# Patient Record
Sex: FEMALE | Race: WHITE | NOT HISPANIC OR LATINO | Employment: OTHER | URBAN - METROPOLITAN AREA
[De-identification: names, ages, dates, MRNs, and addresses within clinical notes are randomized per-mention and may not be internally consistent; named-entity substitution may affect disease eponyms.]

---

## 2017-11-15 ENCOUNTER — OFFICE VISIT (OUTPATIENT)
Dept: MEDICAL GROUP | Facility: MEDICAL CENTER | Age: 57
End: 2017-11-15
Payer: MEDICARE

## 2017-11-15 VITALS
SYSTOLIC BLOOD PRESSURE: 104 MMHG | TEMPERATURE: 98 F | WEIGHT: 141.09 LBS | BODY MASS INDEX: 25.96 KG/M2 | DIASTOLIC BLOOD PRESSURE: 48 MMHG | HEIGHT: 62 IN | HEART RATE: 100 BPM | OXYGEN SATURATION: 92 % | RESPIRATION RATE: 16 BRPM

## 2017-11-15 DIAGNOSIS — E78.5 DYSLIPIDEMIA: ICD-10-CM

## 2017-11-15 DIAGNOSIS — I10 ESSENTIAL HYPERTENSION: ICD-10-CM

## 2017-11-15 DIAGNOSIS — Z23 NEED FOR VACCINATION: ICD-10-CM

## 2017-11-15 DIAGNOSIS — E03.9 HYPOTHYROIDISM, UNSPECIFIED TYPE: ICD-10-CM

## 2017-11-15 DIAGNOSIS — Z12.11 SCREEN FOR COLON CANCER: ICD-10-CM

## 2017-11-15 DIAGNOSIS — Z12.39 SCREENING FOR MALIGNANT NEOPLASM OF BREAST: ICD-10-CM

## 2017-11-15 DIAGNOSIS — E10.3599 TYPE 1 DIABETES MELLITUS WITH PROLIFERATIVE RETINOPATHY, UNSPECIFIED LATERALITY, UNSPECIFIED PROLIFERATIVE RETINOPATHY TYPE: Primary | ICD-10-CM

## 2017-11-15 LAB
HBA1C MFR BLD: 9.4 % (ref ?–5.8)
INT CON NEG: NEGATIVE
INT CON POS: POSITIVE

## 2017-11-15 PROCEDURE — 92250 FUNDUS PHOTOGRAPHY W/I&R: CPT | Mod: 26 | Performed by: NURSE PRACTITIONER

## 2017-11-15 PROCEDURE — 83036 HEMOGLOBIN GLYCOSYLATED A1C: CPT | Mod: GZ | Performed by: NURSE PRACTITIONER

## 2017-11-15 PROCEDURE — 99204 OFFICE O/P NEW MOD 45 MIN: CPT | Performed by: NURSE PRACTITIONER

## 2017-11-15 ASSESSMENT — PATIENT HEALTH QUESTIONNAIRE - PHQ9
5. POOR APPETITE OR OVEREATING: 1 - SEVERAL DAYS
SUM OF ALL RESPONSES TO PHQ QUESTIONS 1-9: 4
CLINICAL INTERPRETATION OF PHQ2 SCORE: 1

## 2017-11-15 ASSESSMENT — PAIN SCALES - GENERAL: PAINLEVEL: NO PAIN

## 2017-11-15 NOTE — LETTER
[employer] [facility]  MANDEEP MoPDuaneRDuaneN.  [address] [facility] [address]  [place] [address]  Fax: [phone]   Authorization for Release/Disclosure of   Protected Health Information   Name: PAMELA CENTENO : 1960 SSN: [id number]   Address: [address] [place] [id number] Phone:    [phone] (home)    I authorize the entity listed below to release/disclose the PHI below to:   [facility]/RAVINDER Mo.P.R.MALORIE and MANDEEP MoPDuaneRMARICRUZ   Provider or Entity Name:  Dr. Mcfadden   Address   [facility], [facility]   Phone:      Fax:     Reason for request: continuity of care   Information to be released:    [  ] LAST COLONOSCOPY,  including any PATH REPORT and follow-up  [  ] LAST FIT/COLOGUARD RESULT [  ] LAST DEXA  [  ] LAST MAMMOGRAM  [  ] LAST PAP  [  ] LAST LABS [  ] RETINA EXAM REPORT  [  ] IMMUNIZATION RECORDS  [x] Release all info      [  ] Check here and initial the line next to each item to release ALL health information INCLUDING  _____ Care and treatment for drug and / or alcohol abuse  _____ HIV testing, infection status, or AIDS  _____ Genetic Testing    DATES OF SERVICE OR TIME PERIOD TO BE DISCLOSED: _____________  I understand and acknowledge that:  * This Authorization may be revoked at any time by you in writing, except if your health information has already been used or disclosed.  * Your health information that will be used or disclosed as a result of you signing this authorization could be re-disclosed by the recipient. If this occurs, your re-disclosed health information may no longer be protected by State or Federal laws.  * You may refuse to sign this Authorization. Your refusal will not affect your ability to obtain treatment.  * This Authorization becomes effective upon signing and will  on (date) __________.      If no date is indicated, this Authorization will  one (1) year from the signature date.    Name: Pamela Centeno    Signature:   Date:     11/15/2017       PLEASE FAX REQUESTED RECORDS BACK TO: [phone]

## 2017-11-15 NOTE — PROGRESS NOTES
CC: Diabetes      Poonam Torrie Mayo is a 57 y.o. female here to establish care and to discuss the evaluation and management of:    1. Type 1 diabetes mellitus with proliferative retinopathy, unspecified laterality, unspecified proliferative retinopathy type (CMS-[facility])   Patient states that she was diagnosed and she was 35 years old. Has had trouble in the past obtaining medications, and medical attention due to financial constraints. Patient states that she checks her blood sugar once per day, prefers to run high versus low does not eat regularly.  States that she takes her NovoLog 5-10 units when she eats however states only has one meal per day.Patient states that she been told that she had some diabetic retinopathy last time she had her eyes checked. Denies any visual changes at this time such as floaters, blurry vision, obstructed vision. Most recent blood sugar was 419.  Denies any real symptoms of hypoglycemia she states that it happens when she sleeps and then wakes up and is moaning and begins to walk around and then falls to the ground. States her son had watched this happen but did not check a blood sugar at that time. Has had emergency medical services called to help her with low blood sugars. States she takes Lantus 40 units daily. Denies any neuropathy denies any changes in her vision, denies any chest pain, shortness of breath.    2. Essential hypertension  She states she takes Diovan for this. She states that she thinks her blood pressure medicine is causing her to sleep more. Denies any increased alcohol, and/or drug use. Denies any chest pain, shortness of breath, leg swelling, heart palpitations.    3. Hypothyroidism, unspecified type  She says she takes her levothyroxine 100 µg for this. Denies any heat or cold intolerance, palpitations, states she does suffer from constipation which she takes FiberCon 4.    4. Dyslipidemia  States she takes her atorvastatin daily, has been on this for one year.  "    5. Need for vaccination  Declines at this time flu and pneumococcal vaccine.    6. Screen for colon cancer  States just had colonoscopy in July of this year. States no report of polyps or cancer    7. Screening for malignant neoplasm of breast  Declines mammogram      ROS:  Denies any Headache, Blurred Vision, Confusion Chest pain,  Shortness of breath,  Abdominal pain, Changes of bowel or bladder, states sometimes that she has some constipation which she takes 2 over-the-counter FiberCon tablets. Lower ext edema, Fevers, Nights sweats, Weight Changes, Focal weakness or numbness.  All other systems are negative.      Current Outpatient Prescriptions:   •  insulin glargine (LANTUS) 100 UNIT/ML Solution, Inject 40 Units as instructed every day., Disp: 10 mL, Rfl: 3  •  valsartan (DIOVAN) 80 MG TABS, Take 80 mg by mouth every day., Disp: , Rfl:   •  levothyroxine (SYNTHROID) 112 MCG TABS, Take 1 Tab by mouth every day. (Patient taking differently: Take 100 mcg by mouth every day.), Disp: 30 Tab, Rfl: 0  •  atorvastatin (LIPITOR) 10 MG TABS, Take 1 Tab by mouth every day., Disp: 30 Tab, Rfl: 0  •  INSULIN SYRINGE .5CC/29G 29G X 1/2\" 0.5 ML MISC, For use with lantus and novolog as instructed, Disp: 150 Each, Rfl: 3  •  Lancets MISC, 1 Each by Does not apply route every day., Disp: 30 Each, Rfl: 3  •  insulin aspart (NOVOLOG) 100 UNIT/ML SOLN, Inject 10 Units as instructed 3 times a day before meals., Disp: 1 Vial, Rfl: 3  •  insulin aspart (NOVOLOG) 100 UNIT/ML Solution, Inject 10 Units as instructed 3 times a day before meals., Disp: 10 mL, Rfl: 3  •  glucose blood (PRECISION XTRA TEST STRIPS) strip, 1 Strip by Other route 3 times a day before meals. (Patient not taking: Reported on 11/15/2017), Disp: 100 Strip, Rfl: 6  •  Blood Glucose Monitoring Suppl (PRECISION XTRA MONITOR) KARLA, ... (Patient not taking: Reported on 11/15/2017), Disp: 1 Each, Rfl: 0    No Known Allergies    Past Medical History:   Diagnosis Date " "  • 250.01 1996   • Dupuytren contracture     bilateral hands   • Hyperlipidemia    • Hypertension    • Hypothyroid      Past Surgical History:   Procedure Laterality Date   • TUBAL COAGULATION LAPAROSCOPIC BILATERAL  1984   • APPENDECTOMY     • TONSILLECTOMY       Family History   Problem Relation Age of Onset   • Lung Disease Father    • Diabetes Son      type 1     Social History     Social History   • Marital status: Single     Spouse name: N/A   • Number of children: N/A   • Years of education: N/A     Occupational History   • Not on file.     Social History Main Topics   • Smoking status: Former Smoker     Packs/day: 0.25     Years: 40.00     Types: Cigarettes     Quit date: 6/1/2016   • Smokeless tobacco: Never Used      Comment: occ   • Alcohol use 1.5 oz/week     3 Cans of beer per week      Comment: occasional   • Drug use: No   • Sexual activity: No     Other Topics Concern   • Not on file     Social History Narrative   • No narrative on file       Objective:     Vitals: /48   Pulse 100   Temp 36.7 °C (98 °F)   Resp 16   Ht 1.575 m (5' 2\")   Wt 64 kg (141 lb 1.5 oz)   SpO2 92%   Breastfeeding? No   BMI 25.81 kg/m²      General: Alert, pleasant, NAD  HEENT:  Normocephalic.  PERRL, EOMI, no icterus or pallor.  Conjunctivae and lids normal.  External ears normal. Tympanic membranes pearly, opaque.  Nares patent, mucosa pink.  Oropharynx non-erythematous, mucous membranes moist.  Absence of teeth. Neck supple.  No thyromegaly or masses palpated. No cervical or supraclavicular lymphadenopathy.  Heart:  Regular rate and rhythm.  S1 and S2 normal.  No murmurs appreciated.  Respiratory:  Normal respiratory effort.  Clear to auscultation bilaterally.    Abdomen:  Bowel sounds present.  Non-distended, soft, non-tender, no guarding/rebound. No hepatosplenomegaly.  No hernias.  Skin:  Warm, dry, no rashes  Musculoskeletal:  Gait is normal.  Moves all extremities well.  Extremities:  Pedal pulses 2+ " symmetric. No leg edema.  Neurological: No tremors, sensation grossly intact, patellar reflexes absent, tone/strength normal, gait is normal, no clonus, rapid movements normal, finger-to-nose intact, CN2-12 intact  Psych:  Affect/mood is normal, judgement is good, memory is intact, grooming is appropriate.      Assessment and Plan.   57 y.o. female to establish and discuss the following    1. Type 1 diabetes mellitus with proliferative retinopathy, unspecified laterality, unspecified proliferative retinopathy type (CMS-HCC)  Not well controlled. A1c in clinic today was 9.4. Discussed importance of having regular mealtimes so she can maintain better control of her blood sugars. Patient states he has enough medication at this time and will request refills when needed. Labs ordered and will follow up with patient 2 weeks to review labs.  - LIPID PROFILE; Future  - TSH; Future  - VITAMIN D,25 HYDROXY; Future  - COMP METABOLIC PANEL; Future  - POCT Retinal Eye Exam  - MICROALBUMIN CREAT RATIO URINE; Future    2. Essential hypertension  Stable in clinic today at 104/48. Patient states has been on Diovan for a long time. Will look into changing to lisinopril for prevention of nephropathy if urine creatinine ratio is elevated.  - COMP METABOLIC PANEL; Future    3. Hypothyroidism, unspecified type  Stable. Last TSH was in 2013 and it was 5.260. Continue levothyroxine 100mcg daily. May need to adjust dose after labs return.   - TSH; Future    4. Dyslipidemia  Last documented lipid panel was in 2013. Need new values. Continue Atorvastatin 10mg.   - LIPID PROFILE; Future    5. Need for vaccination  Declining Flu and pneumonia vaccines at this time. Will attempt on next visit.     6. Screen for colon cancer  States received in July 2017 and states no polyps/cancer. Requesting records.    7. Screening for malignant neoplasm of breast  Declining mammogram despite education of importance.      Return in about 2 weeks (around  11/29/2017) for Diabetes.    I have placed the above orders and discussed them with an approved delegating provider.  The MA is performing the below orders under the direction of Dr. Navneet FLORES

## 2017-11-27 ENCOUNTER — HOSPITAL ENCOUNTER (OUTPATIENT)
Dept: LAB | Facility: MEDICAL CENTER | Age: 57
End: 2017-11-27
Attending: NURSE PRACTITIONER
Payer: MEDICARE

## 2017-11-27 DIAGNOSIS — E78.5 DYSLIPIDEMIA: ICD-10-CM

## 2017-11-27 DIAGNOSIS — E10.3599 TYPE 1 DIABETES MELLITUS WITH PROLIFERATIVE RETINOPATHY, UNSPECIFIED LATERALITY, UNSPECIFIED PROLIFERATIVE RETINOPATHY TYPE: ICD-10-CM

## 2017-11-27 DIAGNOSIS — E03.9 HYPOTHYROIDISM, UNSPECIFIED TYPE: ICD-10-CM

## 2017-11-27 DIAGNOSIS — I10 ESSENTIAL HYPERTENSION: ICD-10-CM

## 2017-11-27 LAB
ALBUMIN SERPL BCP-MCNC: 3.7 G/DL (ref 3.2–4.9)
ALBUMIN/GLOB SERPL: 1 G/DL
ALP SERPL-CCNC: 81 U/L (ref 30–99)
ALT SERPL-CCNC: 12 U/L (ref 2–50)
ANION GAP SERPL CALC-SCNC: 9 MMOL/L (ref 0–11.9)
AST SERPL-CCNC: 13 U/L (ref 12–45)
BILIRUB SERPL-MCNC: 0.5 MG/DL (ref 0.1–1.5)
BUN SERPL-MCNC: 15 MG/DL (ref 8–22)
CALCIUM SERPL-MCNC: 9.4 MG/DL (ref 8.5–10.5)
CHLORIDE SERPL-SCNC: 105 MMOL/L (ref 96–112)
CHOLEST SERPL-MCNC: 139 MG/DL (ref 100–199)
CO2 SERPL-SCNC: 27 MMOL/L (ref 20–33)
CREAT SERPL-MCNC: 0.82 MG/DL (ref 0.5–1.4)
CREAT UR-MCNC: 118.3 MG/DL
GFR SERPL CREATININE-BSD FRML MDRD: >60 ML/MIN/1.73 M 2
GLOBULIN SER CALC-MCNC: 3.7 G/DL (ref 1.9–3.5)
GLUCOSE SERPL-MCNC: 86 MG/DL (ref 65–99)
HDLC SERPL-MCNC: 40 MG/DL
LDLC SERPL CALC-MCNC: 82 MG/DL
MICROALBUMIN UR-MCNC: 6.2 MG/DL
MICROALBUMIN/CREAT UR: 52 MG/G (ref 0–30)
POTASSIUM SERPL-SCNC: 4.3 MMOL/L (ref 3.6–5.5)
PROT SERPL-MCNC: 7.4 G/DL (ref 6–8.2)
SODIUM SERPL-SCNC: 141 MMOL/L (ref 135–145)
TRIGL SERPL-MCNC: 85 MG/DL (ref 0–149)
TSH SERPL DL<=0.005 MIU/L-ACNC: 2.95 UIU/ML (ref 0.3–3.7)

## 2017-11-27 PROCEDURE — 82043 UR ALBUMIN QUANTITATIVE: CPT

## 2017-11-27 PROCEDURE — 84443 ASSAY THYROID STIM HORMONE: CPT

## 2017-11-27 PROCEDURE — 80061 LIPID PANEL: CPT

## 2017-11-27 PROCEDURE — 80053 COMPREHEN METABOLIC PANEL: CPT

## 2017-11-27 PROCEDURE — 36415 COLL VENOUS BLD VENIPUNCTURE: CPT

## 2017-11-27 PROCEDURE — 82570 ASSAY OF URINE CREATININE: CPT

## 2017-11-28 PROBLEM — R80.9 MICROALBUMINURIA: Status: ACTIVE | Noted: 2017-11-28

## 2017-12-01 ENCOUNTER — OFFICE VISIT (OUTPATIENT)
Dept: MEDICAL GROUP | Facility: MEDICAL CENTER | Age: 57
End: 2017-12-01
Payer: MEDICARE

## 2017-12-01 VITALS
RESPIRATION RATE: 16 BRPM | OXYGEN SATURATION: 97 % | WEIGHT: 140 LBS | DIASTOLIC BLOOD PRESSURE: 80 MMHG | SYSTOLIC BLOOD PRESSURE: 138 MMHG | HEART RATE: 93 BPM | TEMPERATURE: 98 F | HEIGHT: 62 IN | BODY MASS INDEX: 25.76 KG/M2

## 2017-12-01 DIAGNOSIS — I10 ESSENTIAL HYPERTENSION: ICD-10-CM

## 2017-12-01 DIAGNOSIS — R80.9 MICROALBUMINURIA: ICD-10-CM

## 2017-12-01 DIAGNOSIS — E03.9 HYPOTHYROIDISM, UNSPECIFIED TYPE: ICD-10-CM

## 2017-12-01 DIAGNOSIS — Z71.2 ENCOUNTER TO DISCUSS TEST RESULTS: ICD-10-CM

## 2017-12-01 DIAGNOSIS — E10.9 TYPE 1 DIABETES MELLITUS WITHOUT COMPLICATION (HCC): ICD-10-CM

## 2017-12-01 DIAGNOSIS — K59.01 SLOW TRANSIT CONSTIPATION: ICD-10-CM

## 2017-12-01 PROCEDURE — 99213 OFFICE O/P EST LOW 20 MIN: CPT | Performed by: NURSE PRACTITIONER

## 2017-12-01 NOTE — PATIENT INSTRUCTIONS
Polyethylene Glycol powder  What is this medicine?  POLYETHYLENE GLYCOL 3350 (mahad ee ETH i amanda; GLYE col) powder is a laxative used to treat constipation. It increases the amount of water in the stool. Bowel movements become easier and more frequent.  This medicine may be used for other purposes; ask your health care provider or pharmacist if you have questions.  COMMON BRAND NAME(S): GaviLax, GlycoLax, MiraLax, Gwendolyn Health   What should I tell my health care provider before I take this medicine?  They need to know if you have any of these conditions:  -a history of blockage of the stomach or intestine  -current abdomen distension or pain  -difficulty swallowing  -diverticulitis, ulcerative colitis, or other chronic bowel disease  -phenylketonuria  -an unusual or allergic reaction to polyethylene glycol, other medicines, dyes, or preservatives  -pregnant or trying to get pregnant  -breast-feeding  How should I use this medicine?  Take this medicine by mouth. The bottle has a measuring cap that is marked with a line. Pour the powder into the cap up to the marked line (the dose is about 1 heaping tablespoon). Add the powder in the cap to a full glass (4 to 8 ounces or 120 to 240 ml) of water, juice, soda, coffee or tea. Mix the powder well. Drink the solution. Take exactly as directed. Do not take your medicine more often than directed.  Talk to your pediatrician regarding the use of this medicine in children. Special care may be needed.  Overdosage: If you think you have taken too much of this medicine contact a poison control center or emergency room at once.  NOTE: This medicine is only for you. Do not share this medicine with others.  What if I miss a dose?  If you miss a dose, take it as soon as you can. If it is almost time for your next dose, take only that dose. Do not take double or extra doses.  What may interact with this medicine?  Interactions are not expected.  This list may not describe all possible  interactions. Give your health care provider a list of all the medicines, herbs, non-prescription drugs, or dietary supplements you use. Also tell them if you smoke, drink alcohol, or use illegal drugs. Some items may interact with your medicine.  What should I watch for while using this medicine?  Do not use for more than 2 weeks without advice from your doctor or health care professional. It can take 2 to 4 days to have a bowel movement and to experience improvement in constipation. See your health care professional for any changes in bowel habits, including constipation, that are severe or last longer than three weeks.  Always take this medicine with plenty of water.  What side effects may I notice from receiving this medicine?  Side effects that you should report to your doctor or health care professional as soon as possible:  -diarrhea  -difficulty breathing  -itching of the skin, hives, or skin rash  -severe bloating, pain, or distension of the stomach  -vomiting  Side effects that usually do not require medical attention (report to your doctor or health care professional if they continue or are bothersome):  -bloating or gas  -lower abdominal discomfort or cramps  -nausea  This list may not describe all possible side effects. Call your doctor for medical advice about side effects. You may report side effects to FDA at 4-797-FDA-1738.  Where should I keep my medicine?  Keep out of the reach of children.  Store between 15 and 30 degrees C (59 and 86 degrees F). Throw away any unused medicine after the expiration date.  NOTE: This sheet is a summary. It may not cover all possible information. If you have questions about this medicine, talk to your doctor, pharmacist, or health care provider.  © 2014, [employer]. (7/20/2009 4:50:45 PM)  Constipation, Adult  Constipation is when a person has fewer than three bowel movements a week, has difficulty having a bowel movement, or has stools that are dry,  hard, or larger than normal. As people grow older, constipation is more common. A low-fiber diet, not taking in enough fluids, and taking certain medicines may make constipation worse.   CAUSES   · Certain medicines, such as antidepressants, pain medicine, iron supplements, antacids, and water pills.    · Certain diseases, such as diabetes, irritable bowel syndrome (IBS), thyroid disease, or depression.    · Not drinking enough water.    · Not eating enough fiber-rich foods.    · Stress or travel.    · Lack of physical activity or exercise.    · Ignoring the urge to have a bowel movement.    · Using laxatives too much.    SIGNS AND SYMPTOMS   · Having fewer than three bowel movements a week.    · Straining to have a bowel movement.    · Having stools that are hard, dry, or larger than normal.    · Feeling full or bloated.    · Pain in the lower abdomen.    · Not feeling relief after having a bowel movement.    DIAGNOSIS   Your health care provider will take a medical history and perform a physical exam. Further testing may be done for severe constipation. Some tests may include:  · A barium enema X-ray to examine your rectum, colon, and, sometimes, your small intestine.    · A sigmoidoscopy to examine your lower colon.    · A colonoscopy to examine your entire colon.  TREATMENT   Treatment will depend on the severity of your constipation and what is causing it. Some dietary treatments include drinking more fluids and eating more fiber-rich foods. Lifestyle treatments may include regular exercise. If these diet and lifestyle recommendations do not help, your health care provider may recommend taking over-the-counter laxative medicines to help you have bowel movements. Prescription medicines may be prescribed if over-the-counter medicines do not work.   HOME CARE INSTRUCTIONS   · Eat foods that have a lot of fiber, such as fruits, vegetables, whole grains, and beans.  · Limit foods high in fat and processed sugars,  such as french fries, hamburgers, cookies, candies, and soda.    · A fiber supplement may be added to your diet if you cannot get enough fiber from foods.    · Drink enough fluids to keep your urine clear or pale yellow.    · Exercise regularly or as directed by your health care provider.    · Go to the restroom when you have the urge to go. Do not hold it.    · Only take over-the-counter or prescription medicines as directed by your health care provider. Do not take other medicines for constipation without talking to your health care provider first.    SEEK IMMEDIATE MEDICAL CARE IF:   · You have bright red blood in your stool.    · Your constipation lasts for more than 4 days or gets worse.    · You have abdominal or rectal pain.    · You have thin, pencil-like stools.    · You have unexplained weight loss.  MAKE SURE YOU:   · Understand these instructions.  · Will watch your condition.  · Will get help right away if you are not doing well or get worse.     This information is not intended to replace advice given to you by your health care provider. Make sure you discuss any questions you have with your health care provider.     Document Released: 09/15/2005 Document Revised: 01/08/2016 Document Reviewed: 09/29/2014  [employer] Interactive Patient Education ©2016 [employer]

## 2017-12-01 NOTE — PROGRESS NOTES
"cc:  Diabetes check    Subjective:     HPI:     Poonam Mayo is a 57 y.o. female here to discuss the evaluation and management of:    1. Type 1 diabetes mellitus without complication (CMS-Formerly [facility])  Patient states that she did not check her blood sugar this morning however checked it last night prior to eating and it was over 300. States that she does give herself between 5-10 units of NovoLog however usually gives herself 5 because she is afraid of having hypoglycemic events. States that she does continue to take her Lantus daily. Denies any recent hypo-glycemic event states her last one was one month ago. She walks most every day around town. Denies any numbness or tingling in her feet, denies any cuts or sores on her feet. Denies any changes in her vision however she does not have her glasses as they broke and is in the process of getting new glasses so currently her vision is a little bit blurry, denies any floaters, tunnel vision, or black spots.    2. Essential hypertension  Patient states that she takes her Diovan every day. Denies any episodes of chest pain, shortness of breath and her dizziness, denies leg swelling.    3. Hypothyroidism, unspecified type  Patient states that she takes her thyroid medication every day, denies any heart palpitations, changes in heat or cold intolerance. Although states she does have some constipation.    4. Encounter to discuss test results  Wanting to review her recent lab results and retinal scan.    5. Slow transit constipation  Patient states that she has trouble with having bowel movements as she's always been some vomiting as a bowel movement at least once per week. At one point she states she has gone over 2 weeks without having a bowel movement. States that at least once a week she takes one of the tablets FiberCon. States that she needs to drink more water and eat more fruits and vegetables.      Patient here for Diabetes follow up:    Blood sugar this am: \"not taken " "yet this morning, last night 329 before eating\"  Diet: \"don't eat a lot of bread\" \"don't have full kitchen at current motel\"  Exercise: \"walking around town several times per week\"  Recent Hypoglycemic episodes: \"a month ago\"  Smoking hx: \"quit one year ago\"  Eye Changes: \"lost glasses over 2 months ago-have prescription and will go to [employer] to get new pair\"    Feet no numbness/tingling, no sores     DIABETES ABCDEF TARGETS    A1C:    9.4% on 11-15-17   Blood Pressure   138/80  Cholesterol   WNL-on atorvastatin 10mg  Dysalbuminuria -microalbuminuria at 52 on November labs  Enterecoated Aspirin   n/a   Retinal Eye exam   November 2017, no retinopathy  Foot Integrity   intact  Monofilament: not performed    Labs Ordered none today  BP MEDICATION: diovan  ASCVD risk: 6.7%     ROS:  Denies any Headache, Blurred Vision, Confusion Chest pain,  Shortness of breath,  Abdominal pain, Changes of bowel or bladder-reports consipation, Lower ext edema, Fevers, Nights sweats, Weight Changes, Focal weakness or numbness.  All other systems are negative.        Current Outpatient Prescriptions:   •  valsartan (DIOVAN) 80 MG TABS, Take 80 mg by mouth every day., Disp: , Rfl:   •  levothyroxine (SYNTHROID) 112 MCG TABS, Take 1 Tab by mouth every day. (Patient taking differently: Take 100 mcg by mouth every day.), Disp: 30 Tab, Rfl: 0  •  atorvastatin (LIPITOR) 10 MG TABS, Take 1 Tab by mouth every day., Disp: 30 Tab, Rfl: 0  •  INSULIN SYRINGE .5CC/29G 29G X 1/2\" 0.5 ML MISC, For use with lantus and novolog as instructed, Disp: 150 Each, Rfl: 3  •  Lancets MISC, 1 Each by Does not apply route every day., Disp: 30 Each, Rfl: 3  •  insulin aspart (NOVOLOG) 100 UNIT/ML SOLN, Inject 10 Units as instructed 3 times a day before meals., Disp: 1 Vial, Rfl: 3  •  insulin aspart (NOVOLOG) 100 UNIT/ML Solution, Inject 10 Units as instructed 3 times a day before meals., Disp: 10 mL, Rfl: 3  •  insulin glargine (LANTUS) 100 UNIT/ML Solution, " "Inject 40 Units as instructed every day., Disp: 10 mL, Rfl: 3  •  glucose blood (PRECISION XTRA TEST STRIPS) strip, 1 Strip by Other route 3 times a day before meals. (Patient not taking: Reported on 11/15/2017), Disp: 100 Strip, Rfl: 6  •  Blood Glucose Monitoring Suppl (PRECISION XTRA MONITOR) KARLA, ... (Patient not taking: Reported on 11/15/2017), Disp: 1 Each, Rfl: 0    No Known Allergies    Past Medical History:   Diagnosis Date   • 250.01 1996   • Dupuytren contracture     bilateral hands   • Hyperlipidemia    • Hypertension    • Hypothyroid      Past Surgical History:   Procedure Laterality Date   • TUBAL COAGULATION LAPAROSCOPIC BILATERAL  1984   • APPENDECTOMY     • TONSILLECTOMY       Family History   Problem Relation Age of Onset   • Lung Disease Father    • Diabetes Son      type 1     Social History     Social History   • Marital status: Single     Spouse name: N/A   • Number of children: N/A   • Years of education: N/A     Occupational History   • Not on file.     Social History Main Topics   • Smoking status: Former Smoker     Packs/day: 0.25     Years: 40.00     Types: Cigarettes     Quit date: 6/1/2016   • Smokeless tobacco: Never Used      Comment: occ   • Alcohol use 1.5 oz/week     3 Cans of beer per week      Comment: occasional   • Drug use: No   • Sexual activity: No     Other Topics Concern   • Not on file     Social History Narrative   • No narrative on file       Objective:     Vitals: /80   Pulse 93   Temp 36.7 °C (98 °F)   Resp 16   Ht 1.575 m (5' 2\")   Wt 63.5 kg (140 lb)   SpO2 97%   BMI 25.61 kg/m²    General: Alert, pleasant, NAD  HEENT: Normocephalic.  pablo supple.  No thyromegaly or masses palpated. No cervical or supraclavicular lymphadenopathy.  Heart: Regular rate and rhythm.  S1 and S2 normal.  No murmurs appreciated.  Respiratory: Normal respiratory effort.  Clear to auscultation bilaterally.  Skin: Warm, dry, no rashes.  Musculoskeletal: Gait is normal.  Moves all " extremities well.  Extremities: No leg edema.  Pedal pulses 2+ symmetric.   Neurological: No tremors, sensation grossly intact  Psych:  Affect/mood is normal, judgement is good, memory is intact, grooming is appropriate.    Assessment/Plan:     Poonam was seen today for results.    Diagnoses and all orders for this visit:    Encounter to discuss test results  Reviewed most recent lab results and retinal scan. Copy retinal scan given to patient    Type 1 diabetes mellitus without complication (CMS-HCC)  Chronic. Recent A1c was 9.4. Educated patient that she may need to give herself more insulin with her meals as her preprandial sugars are running over 300. Patient states she will try to work on this    Essential hypertension  Stable. No episodes of dizziness, leg swelling, syncopal episodes. Continue Diovan.     Hypothyroidism, unspecified type  Stable. Most recent TSH in November was 2.950. Continue levothyroxine 112mcg.    Microalbuminuria   Stable. Patient's last urine in November showed micro-albuminuria, ratio is 52. Serum creatinine 0.82, GFR over 60. Will follow-up with annual labs.    Slow transit constipation  Discussed opportunities to increasing her water intake to approximately 1-2 L per day. Increasing her fiber intake of fruits and veggies, and maybe supplementing with over-the-counter MiraLAX or Benefiber. Patient agrees to plan.       Health care Maintenance: Declines flu/pneumonia vaccine. Declines need for mammo and pap. Awaiting colonoscopy orders from previous provider. Pt states will bring in her records on next visit.     Return in about 3 months (around 3/1/2018).

## 2018-02-12 NOTE — TELEPHONE ENCOUNTER
1. Caller Name: Poonam                                        Call Back Number: [phone]      Patient approves a detailed voicemail message: yes

## 2018-03-21 DIAGNOSIS — E78.2 MIXED HYPERLIPIDEMIA: ICD-10-CM

## 2018-03-21 RX ORDER — LEVOTHYROXINE SODIUM 112 UG/1
112 TABLET ORAL DAILY
Qty: 30 TAB | Refills: 2 | Status: SHIPPED | OUTPATIENT
Start: 2018-03-21 | End: 2018-07-30 | Stop reason: SDUPTHER

## 2018-03-21 RX ORDER — ATORVASTATIN CALCIUM 10 MG/1
10 TABLET, FILM COATED ORAL DAILY
Qty: 30 TAB | Refills: 2 | Status: SHIPPED | OUTPATIENT
Start: 2018-03-21 | End: 2018-09-12 | Stop reason: SDUPTHER

## 2018-03-21 RX ORDER — VALSARTAN 80 MG/1
80 TABLET ORAL DAILY
Qty: 30 TAB | Refills: 3 | Status: SHIPPED | OUTPATIENT
Start: 2018-03-21 | End: 2018-06-05 | Stop reason: SDUPTHER

## 2018-05-30 ENCOUNTER — APPOINTMENT (OUTPATIENT)
Dept: RADIOLOGY | Facility: MEDICAL CENTER | Age: 58
End: 2018-05-30
Attending: EMERGENCY MEDICINE
Payer: MEDICARE

## 2018-05-30 ENCOUNTER — HOSPITAL ENCOUNTER (EMERGENCY)
Facility: MEDICAL CENTER | Age: 58
End: 2018-05-30
Attending: EMERGENCY MEDICINE
Payer: MEDICARE

## 2018-05-30 VITALS
TEMPERATURE: 97.9 F | BODY MASS INDEX: 24.97 KG/M2 | DIASTOLIC BLOOD PRESSURE: 81 MMHG | SYSTOLIC BLOOD PRESSURE: 155 MMHG | HEIGHT: 61 IN | RESPIRATION RATE: 16 BRPM | OXYGEN SATURATION: 97 % | WEIGHT: 132.28 LBS | HEART RATE: 84 BPM

## 2018-05-30 DIAGNOSIS — S62.326A CLOSED DISPLACED FRACTURE OF SHAFT OF FIFTH METACARPAL BONE OF RIGHT HAND, INITIAL ENCOUNTER: ICD-10-CM

## 2018-05-30 PROCEDURE — 99284 EMERGENCY DEPT VISIT MOD MDM: CPT

## 2018-05-30 PROCEDURE — 302874 HCHG BANDAGE ACE 2 OR 3""

## 2018-05-30 PROCEDURE — 73130 X-RAY EXAM OF HAND: CPT | Mod: RT

## 2018-05-30 PROCEDURE — 29125 APPL SHORT ARM SPLINT STATIC: CPT

## 2018-05-30 PROCEDURE — 73110 X-RAY EXAM OF WRIST: CPT | Mod: RT

## 2018-05-30 ASSESSMENT — LIFESTYLE VARIABLES
EVER FELT BAD OR GUILTY ABOUT YOUR DRINKING: NO
AVERAGE NUMBER OF DAYS PER WEEK YOU HAVE A DRINK CONTAINING ALCOHOL: 1
HAVE YOU EVER FELT YOU SHOULD CUT DOWN ON YOUR DRINKING: NO
EVER HAD A DRINK FIRST THING IN THE MORNING TO STEADY YOUR NERVES TO GET RID OF A HANGOVER: NO
DO YOU DRINK ALCOHOL: YES
TOTAL SCORE: 0
TOTAL SCORE: 0
ON A TYPICAL DAY WHEN YOU DRINK ALCOHOL HOW MANY DRINKS DO YOU HAVE: 2
HAVE PEOPLE ANNOYED YOU BY CRITICIZING YOUR DRINKING: NO
CONSUMPTION TOTAL: NEGATIVE
TOTAL SCORE: 0
HOW MANY TIMES IN THE PAST YEAR HAVE YOU HAD 5 OR MORE DRINKS IN A DAY: 0

## 2018-05-30 ASSESSMENT — PAIN SCALES - GENERAL: PAINLEVEL_OUTOF10: 7

## 2018-05-31 ENCOUNTER — PATIENT OUTREACH (OUTPATIENT)
Dept: HEALTH INFORMATION MANAGEMENT | Facility: OTHER | Age: 58
End: 2018-05-31

## 2018-05-31 NOTE — LETTER
Poonam Mayo  [address] [address]  [place] [address]    May 31, 2018      Dear Poonam Mayo,    [facility] wants to ensure your discharge home is safe and you or your loved ones have had all of your questions answered regarding your care after you leave the hospital.    Our discharge team was unsuccessful in our attempts to contact you telephonically and we wanted to be sure that you had a list of resources and contact information should you have any questions regarding your hospital stay, follow-up instructions, or active medical symptoms.    Questions or Concerns Regarding… Contact   Medical Questions Related to Your Discharge  (7 days a week, 8am-5pm) Contact a Nurse Care Coordinator   [phone]   Medical Questions Not Related to Your Discharge  (24 hours a day / 7 days a week)  Contact the Nurse Health Line   [phone]    Medications or Discharge Instructions Refer to your discharge packet   or contact your -[phone]   Follow-up Appointment(s) Schedule your appointment via [facility]   or contact Scheduling [phone]   Billing Review your statement via [facility]  or contact Billing [phone]   Medical Records Review your records via [facility]   or contact Medical Records [phone]     You can also easily access your medical information, test results and upcoming appointments via the [facility] free online health management tool. You can learn more and sign up at [employer]. For assistance setting up your [facility] account, please call [phone].    Once again, we want to ensure your discharge home is safe and that you have a clear understanding of any next steps in your care. If you have any questions or concerns, please do not hesitate to contact us, we are here for you. Thank you for choosing [facility] for your healthcare needs.    Sincerely,      Your [facility] Healthcare Team

## 2018-05-31 NOTE — ED TRIAGE NOTES
"Chief Complaint   Patient presents with   • Hand Pain   • Hand Swelling   Slammed hand on a piece of furniture. Swelling to R hand.     BP (!) 164/94   Pulse (!) 106   Temp 36.5 °C (97.7 °F)   Resp 18   Ht 1.549 m (5' 1\")   Wt 60 kg (132 lb 4.4 oz)   SpO2 98%   BMI 24.99 kg/m²     Pt Informed regarding triage process and verbalized understanding to inform triage tech or RN for any changes in condition.  Placed in lobby.    "

## 2018-05-31 NOTE — DISCHARGE INSTRUCTIONS
You were seen in the Emergency Department for hand pain.    Xrays were completed demonstrating fracture of the 5th hand bone.    Please use 1,000mg of tylenol or 600mg of ibuprofen every 6 hours as needed for pain.    Please follow up with hand surgeon as directed within the next week.    Return to the Emergency Department with uncontrolled pain, numbness or weakness of extremities, or other concerns.      Metacarpal Fracture  A metacarpal fracture is a break (fracture) of a bone in the hand. Metacarpals are the bones that extend from your knuckles to your wrist. In each hand, you have five metacarpal bones that connect your fingers and your thumb to your wrist.  Some hand fractures have bone pieces that are close together and stable (simple). These fractures may be treated with only a splint or cast. Hand fractures that have many pieces of broken bone (comminuted), unstable bone pieces (displaced), or a bone that breaks through the skin (compound) usually require surgery.  What are the causes?  This injury may be caused by:  · A fall.  · A hard, direct hit to your hand.  · An injury that squeezes your knuckle, stretches your finger out of place, or crushes your hand.  What increases the risk?  This injury is more likely to occur if:  · You play contact sports.  · You have certain bone diseases.  What are the signs or symptoms?  Symptoms of this type of fracture develop soon after the injury. Symptoms may include:  · Swelling.  · Pain.  · Stiffness.  · Increased pain with movement.  · Bruising.  · Inability to move a finger.  · A shortened finger.  · A finger knuckle that looks sunken in.  · Unusual appearance of the hand or finger (deformity).  How is this diagnosed?  This injury may be diagnosed based on your signs and symptoms, especially if you had a recent hand injury. Your health care provider will perform a physical exam. He or she may also order X-rays to confirm the diagnosis.  How is this  treated?  Treatment for this injury depends on the type of fracture you have and how severe it is. Possible treatments include:  · Non-reduction. This can be done if the bone does not need to be moved back into place. The fracture can be casted or splinted as it is.  · Closed reduction. If your bone is stable and can be moved back into place, you may only need to wear a cast or splint or have rebekah taping.  · Closed reduction with internal fixation (CRIF). This is the most common treatment. You may have this procedure if your bone can be moved back into place but needs more support. Wires, pins, or screws may be inserted through your skin to stabilize the fracture.  · Open reduction with internal fixation (ORIF). This may be needed if your fracture is severe and unstable. It involves surgery to move your bone back into the right position. Screws, wires, or plates are used to stabilize the fracture.  After all procedures, you may need to wear a cast or a splint for several weeks. You will also need to have follow-up X-rays to make sure that the bone is healing well and staying in position. After you no longer need your cast or splint, you may need physical therapy. This will help you to regain full movement and strength in your hand.  Follow these instructions at home:  If you have a cast:  · Do not stick anything inside the cast to scratch your skin. Doing that increases your risk of infection.  · Check the skin around the cast every day. Report any concerns to your health care provider. You may put lotion on dry skin around the edges of the cast. Do not apply lotion to the skin underneath the cast.  If you have a splint:  · Wear it as directed by your health care provider. Remove it only as directed by your health care provider.  · Loosen the splint if your fingers become numb and tingle, or if they turn cold and blue.  Bathing  · Cover the cast or splint with a watertight plastic bag to protect it from water while  you take a bath or a shower. Do not let the cast or splint get wet.  Managing pain, stiffness, and swelling  · If directed, apply ice to the injured area (if you have a splint, not a cast):  ¨ Put ice in a plastic bag.  ¨ Place a towel between your skin and the bag.  ¨ Leave the ice on for 20 minutes, 2-3 times a day.  · Move your fingers often to avoid stiffness and to lessen swelling.  · Raise the injured area above the level of your heart while you are sitting or lying down.  Driving  · Do not drive or operate heavy machinery while taking pain medicine.  · Do not drive while wearing a cast or splint on a hand that you use for driving.  Activity  · Return to your normal activities as directed by your health care provider. Ask your health care provider what activities are safe for you.  General instructions  · Do not put pressure on any part of the cast or splint until it is fully hardened. This may take several hours.  · Keep the cast or splint clean and dry.  · Do not use any tobacco products, including cigarettes, chewing tobacco, or electronic cigarettes. Tobacco can delay bone healing. If you need help quitting, ask your health care provider.  · Take medicines only as directed by your health care provider.  · Keep all follow-up visits as directed by your health care provider. This is important.  Contact a health care provider if:  · Your pain is getting worse.  · You have redness, swelling, or pain in the injured area.  · You have fluid, blood, or pus coming from under your cast or splint.  · You notice a bad smell coming from under your cast or splint.  · You have a fever.  Get help right away if:  · You develop a rash.  · You have trouble breathing.  · Your skin or nails on your injured hand turn blue or gray even after you loosen your splint.  · Your injured hand feels cold or becomes numb even after you loosen your splint.  · You develop severe pain under the cast or in your hand.  This information is not  intended to replace advice given to you by your health care provider. Make sure you discuss any questions you have with your health care provider.  Document Released: 12/18/2006 Document Revised: 05/25/2017 Document Reviewed: 10/07/2015  [employer] Interactive Patient Education © 2017 [employer]      Cast or Splint Care  Casts and splints support injured limbs and keep bones from moving while they heal.   HOME CARE  · Keep the cast or splint uncovered during the drying period.  ¨ A plaster cast can take 24 to 48 hours to dry.  ¨ A fiberglass cast will dry in less than 1 hour.  · Do not rest the cast on anything harder than a pillow for 24 hours.  · Do not put weight on your injured limb. Do not put pressure on the cast. Wait for your doctor's approval.  · Keep the cast or splint dry.  ¨ Cover the cast or splint with a plastic bag during baths or wet weather.  ¨ If you have a cast over your chest and belly (trunk), take sponge baths until the cast is taken off.  ¨ If your cast gets wet, dry it with a towel or blow dryer. Use the cool setting on the blow dryer.  · Keep your cast or splint clean. Wash a dirty cast with a damp cloth.  · Do not put any objects under your cast or splint.  · Do not scratch the skin under the cast with an object. If itching is a problem, use a blow dryer on a cool setting over the itchy area.  · Do not trim or cut your cast.  · Do not take out the padding from inside your cast.  · Exercise your joints near the cast as told by your doctor.  · Raise (elevate) your injured limb on 1 or 2 pillows for the first 1 to 3 days.  GET HELP IF:  · Your cast or splint cracks.  · Your cast or splint is too tight or too loose.  · You itch badly under the cast.  · Your cast gets wet or has a soft spot.  · You have a bad smell coming from the cast.  · You get an object stuck under the cast.  · Your skin around the cast becomes red or sore.  · You have new or more pain after the cast is put on.  GET HELP  RIGHT AWAY IF:  · You have fluid leaking through the cast.  · You cannot move your fingers or toes.  · Your fingers or toes turn blue or white or are cool, painful, or puffy (swollen).  · You have tingling or lose feeling (numbness) around the injured area.  · You have bad pain or pressure under the cast.  · You have trouble breathing or have shortness of breath.  · You have chest pain.     This information is not intended to replace advice given to you by your health care provider. Make sure you discuss any questions you have with your health care provider.     Document Released: 04/18/2012 Document Revised: 08/20/2014 Document Reviewed: 06/26/2014  [employer] Interactive Patient Education ©2016 [employer]

## 2018-05-31 NOTE — ED PROVIDER NOTES
ED Provider Note    Scribed for Corrine Rader M.D. by José Manuel Bernard. 5/30/2018  7:24 PM    Means of arrival: Walk in  History obtained from: Patient  History limited by: None      CHIEF COMPLAINT  Chief Complaint   Patient presents with   • Hand Pain   • Hand Swelling       HPI  Poonam Mayo is a 57 y.o. Female with diabetes and hypertension who presents to the Emergency Department for evaluation of right hand pain and wrist pain with associated swelling onset around 1:30 PM. The patient states the pain began 30 minutes after she was climbing through her daughter's window because she was locked out.  She landed with her palm out on her right hand. Movement exacerbates the pain. The patient took 4 Aspirin earlier today with mild relief of pain. She is not currently on any blood thinning medications. The patient is negative for any motor or sensory changes    REVIEW OF SYSTEMS  Pertinent positive include right hand pain, right wrist pain, and swelling. Pertinent negative include motor or sensory changes.   E.    PAST MEDICAL HISTORY   has a past medical history of 250.01 (1996); Dupuytren contracture; Hyperlipidemia; Hypertension; and Hypothyroid.    SOCIAL HISTORY  Social History     Social History Main Topics   • Smoking status: Former Smoker     Packs/day: 0.25     Years: 40.00     Types: Cigarettes     Quit date: 6/1/2016   • Smokeless tobacco: Never Used   • Alcohol use 1.5 oz/week     3 Cans of beer per week      Comment: occasional   • Drug use: No   • Sexual activity: No       SURGICAL HISTORY   has a past surgical history that includes tubal coagulation laparoscopic bilateral (1984); appendectomy; and tonsillectomy.    CURRENT MEDICATIONS  Home Medications     Reviewed by Nguyễn Rodriguez R.NDuane (Registered Nurse) on 05/30/18 at 1928  Med List Status: Partial   Medication Last Dose Status   atorvastatin (LIPITOR) 10 MG Tab  Active   Blood Glucose Monitoring Suppl (PRECISION XTRA MONITOR) KARLA Not Taking  "Active   glucose blood (PRECISION XTRA TEST STRIPS) strip Not Taking Active   insulin aspart (NOVOLOG) 100 UNIT/ML Solution  Active   insulin aspart (NOVOLOG) 100 UNIT/ML Solution  Active   insulin glargine (LANTUS) 100 UNIT/ML Solution  Active   INSULIN SYRINGE .5CC/29G 29G X 1/2\" 0.5 ML MISC 12/1/2017 Active   Lancets MISC 12/1/2017 Active   levothyroxine (SYNTHROID) 112 MCG Tab  Active   valsartan (DIOVAN) 80 MG Tab  Active                ALLERGIES  No Known Allergies    PHYSICAL EXAM   VITAL SIGNS: BP (!) 164/94   Pulse (!) 106   Temp 36.5 °C (97.7 °F)   Resp 18   Ht 1.549 m (5' 1\")   Wt 60 kg (132 lb 4.4 oz)   SpO2 98%   BMI 24.99 kg/m²    Constitutional: Well appearing female. Alert in no apparent distress.  HENT: Normocephalic, Atraumatic. Bilateral external ears normal. Nose normal. Moist mucous membranes.  Neck: Supple, full range of motion.  Eyes: Pupils are equal and reactive. Conjunctiva normal.   Heart: Regular rate and rhythm. No murmurs.    Lungs: No respiratory distress.  Normal work of breathing.  Clear to auscultation bilaterally.  Abdomen:  Soft, no distention. No tenderness to palpation  Skin: Warm, Dry. No rash.   Musculoskeletal: Swelling and echymosis to palmar aspect overlying 4th and 5th metacarpals. No rotational deformity.  Motor and sensory intact distally to injury. 2+ Radial pulses.  Neurologic: Alert and oriented. Moving all extremities spontaneously  Psychiatric: Affect normal, Mood normal. Appears appropriate and not intoxicated.     DIAGNOSTIC STUDIES    RADIOLOGY  Personally reviewed by me  DX-HAND 3+ RIGHT   Final Result         1.  Oblique fracture of the fifth metacarpal diaphysis, waist, and base extending into the carpometacarpal joint      DX-WRIST-COMPLETE 3+ RIGHT   Final Result         1.  Oblique fracture of the fifth metacarpal diaphysis, waist, and base extending into the carpometacarpal joint        ED COURSE  Vitals:    05/30/18 1813 05/30/18 1824 05/30/18 2008 " "05/30/18 2113   BP: (!) 164/94  158/85 155/81   Pulse: (!) 106  88 84   Resp: 18  16 16   Temp: 36.5 °C (97.7 °F)  36.4 °C (97.6 °F) 36.6 °C (97.9 °F)   SpO2: 98%  97% 97%   Weight:  60 kg (132 lb 4.4 oz)     Height: 1.549 m (5' 1\")            Medications administered:  Medications - No data to display      7:24 PM Patient seen and examined at bedside. The patient presents with right hand pain and wrist pain. Ordered for DX-Hand and DX-Wrist to evaluate.    8:14 PM - Reviewed patient's radiology results as shown above.    8:16 PM - Patient reevaluated at bedside. She was updated with radiology results that indicate fracture. Patient will be placed in ulnar gutter splint. She was informed I will be consulting with Hand Surgery.     MEDICAL DECISION MAKING  Patient presents with isolated right hand pain after hitting it while climbing in a window today.  She is mildly hypertensive and tachycardic on arrival.  No other traumatic injuries identified.  No concern for neurovascular compromise.  XR demonstrate 5th MCP fracture extending into carpometacarpal joint.  No rotational deformity.  Patient will be placed in ulnar guttar splint and discharged home with Hand follow up.    8:40 PM - Consult with Sundeep Espinosa, who is comfortable with patient being discharged in ulnar gutter splint and follow up in clinic.     8:55 PM - Patient informed after talking with Sundeep Espinosa, she will be discharged home in ulnar gutter and need to follow up in clinic as outpatient. Patient is agreeable to discharge.        The patient will return for new or worsening symptoms and is stable at the time of discharge.    DISPOSITION:  Patient will be discharged home in stable condition.    FOLLOW UP:  Henry Macias Jr., M.D.  [address]  [place] [address]  [phone]          [facility], Emergency Dept  [address] [facility] [address] [phone]    If symptoms worsen      OUTPATIENT " MEDICATIONS:  Discharge Medication List as of 5/30/2018  9:05 PM          IMPRESSION  (S62.326A) Closed displaced fracture of shaft of fifth metacarpal bone of right hand, initial encounter    Results, diagnoses, and treatment options were discussed with the patient and/or family. Patient verbalized understanding of plan of care.    Discharge Medication List as of 5/30/2018  9:05 PM               José Manuel MEADOWS (Cherryibcurtis), am scribing for, and in the presence of, Corrine Rader M.D..    Electronically signed by: José Manuel Bernard (Donovan), 5/30/2018    Corrine MEADOWS M.D. personally performed the services described in this documentation, as scribed by José Manuel Bernard in my presence, and it is both accurate and complete.    The note accurately reflects work and decisions made by me.  Corrine Rader  5/31/2018  1:09 PM

## 2018-05-31 NOTE — ED NOTES
Pt AOx4.  Pt given discharge instructions and pt verbalized understanding.  Pt ambulated to lobby with steady gait.

## 2018-06-05 RX ORDER — VALSARTAN 80 MG/1
80 TABLET ORAL DAILY
Qty: 90 TAB | Refills: 3 | Status: SHIPPED | OUTPATIENT
Start: 2018-06-05 | End: 2018-08-10

## 2018-06-05 NOTE — TELEPHONE ENCOUNTER
Per Insurance they would like a 90 day supply.    Was the patient seen in the last year in this department? Yes     Does patient have an active prescription for medications requested? No     Received Request Via: Pharmacy

## 2018-06-29 RX ORDER — INSULIN GLARGINE 100 [IU]/ML
40 INJECTION, SOLUTION SUBCUTANEOUS DAILY
Qty: 10 ML | Refills: 3 | Status: SHIPPED | OUTPATIENT
Start: 2018-06-29 | End: 2018-09-12 | Stop reason: SDUPTHER

## 2018-07-03 ENCOUNTER — OFFICE VISIT (OUTPATIENT)
Dept: MEDICAL GROUP | Facility: MEDICAL CENTER | Age: 58
End: 2018-07-03
Payer: MEDICARE

## 2018-07-03 VITALS
BODY MASS INDEX: 22.26 KG/M2 | SYSTOLIC BLOOD PRESSURE: 120 MMHG | HEIGHT: 62 IN | RESPIRATION RATE: 14 BRPM | WEIGHT: 121 LBS | DIASTOLIC BLOOD PRESSURE: 78 MMHG | TEMPERATURE: 97.9 F | HEART RATE: 99 BPM | OXYGEN SATURATION: 97 %

## 2018-07-03 DIAGNOSIS — E10.9 TYPE 1 DIABETES MELLITUS WITHOUT COMPLICATION (HCC): Chronic | ICD-10-CM

## 2018-07-03 LAB
HBA1C MFR BLD: 10.3 % (ref ?–5.8)
INT CON NEG: NEGATIVE
INT CON POS: POSITIVE

## 2018-07-03 PROCEDURE — 99213 OFFICE O/P EST LOW 20 MIN: CPT | Performed by: NURSE PRACTITIONER

## 2018-07-03 PROCEDURE — 83036 HEMOGLOBIN GLYCOSYLATED A1C: CPT | Performed by: NURSE PRACTITIONER

## 2018-07-03 NOTE — PROGRESS NOTES
cc:  Follow diabetes.      Subjective:     HPI:     Poonamluis m Mayo is a 57 y.o. female here to discuss the evaluation and management of:    1. Type 1 diabetes mellitus without complication (HCC)    Patient states that over the last week she has had several blood sugar lows.  States that she does not know what her blood sugar was that she was not testing while she was low.  States that she did not use any of her fast acting insulin at all last week. States that her daughter has called her between 9 and 10 in the morning and new that her blood sugars are low and came home and was giving her cereal and milk.  States that she typically goes to bed around 130 to 3:30 in the morning gets up at 11 AM.  States she has been taking her Lantus 40 units at 6 PM to 7 PM.  States that she wants to get it to where she is taking her Lantus to 12 in the afternoon.  States that she is testing about 2-3 times a day.  She states that she gets low and she will have a bowl of cereal, milk, juices and candy.  States he only eats about 2 meals a day.  States she cannot tell when her blood sugars are are getting low however she can tell when her blood sugars are getting high.  States that she thinks her legs start to get a little bit numb numbness and weakness when she starting to get low but denies any confusion, diaphoresis, fatigue or lethargy during any lows.  States that when her blood sugars are getting high she noticed that she starts urinating more frequently.    She walks most every day around town. Denies any numbness or tingling in her feet, denies any cuts or sores on her feet. Denies any changes in her vision however she does not have her glasses as they broke and is in the process of getting new glasses so currently her vision is a little bit blurry, denies any floaters, tunnel vision, or black spots.      ROS:  Denies any Headache, Blurred Vision, Confusion, Chest pain,  Shortness of breath,  Abdominal pain, Changes of  "bowel or bladder, Lower ext edema, Fevers, Nights sweats, Weight Changes, Focal weakness or numbness.  All other systems are negative.  Recent low blood sugars        Current Outpatient Prescriptions:   •  insulin aspart (NOVOLOG) 100 UNIT/ML Solution, Inject 10 Units as instructed 3 times a day before meals., Disp: 1 Vial, Rfl: 3  •  insulin glargine (LANTUS) 100 UNIT/ML Solution, Inject 40 Units as instructed every day., Disp: 10 mL, Rfl: 3  •  valsartan (DIOVAN) 80 MG Tab, Take 1 Tab by mouth every day., Disp: 90 Tab, Rfl: 3  •  levothyroxine (SYNTHROID) 112 MCG Tab, Take 1 Tab by mouth every day., Disp: 30 Tab, Rfl: 2  •  atorvastatin (LIPITOR) 10 MG Tab, Take 1 Tab by mouth every day., Disp: 30 Tab, Rfl: 2  •  insulin aspart (NOVOLOG) 100 UNIT/ML Solution, Inject 10 Units as instructed 3 times a day before meals., Disp: 10 mL, Rfl: 3  •  INSULIN SYRINGE .5CC/29G 29G X 1/2\" 0.5 ML MISC, For use with lantus and novolog as instructed, Disp: 150 Each, Rfl: 3  •  Lancets MISC, 1 Each by Does not apply route every day., Disp: 30 Each, Rfl: 3  •  glucose blood (PRECISION XTRA TEST STRIPS) strip, 1 Strip by Other route 3 times a day before meals. (Patient not taking: Reported on 11/15/2017), Disp: 100 Strip, Rfl: 6  •  Blood Glucose Monitoring Suppl (PRECISION XTRA MONITOR) KARLA, ... (Patient not taking: Reported on 11/15/2017), Disp: 1 Each, Rfl: 0    No Known Allergies    Past Medical History:   Diagnosis Date   • 250.01 1996   • Dupuytren contracture     bilateral hands   • Hyperlipidemia    • Hypertension    • Hypothyroid      Past Surgical History:   Procedure Laterality Date   • TUBAL COAGULATION LAPAROSCOPIC BILATERAL  1984   • APPENDECTOMY     • TONSILLECTOMY       Family History   Problem Relation Age of Onset   • Lung Disease Father    • Diabetes Son      type 1     Social History     Social History   • Marital status: Single     Spouse name: N/A   • Number of children: N/A   • Years of education: N/A " "    Occupational History   • Not on file.     Social History Main Topics   • Smoking status: Former Smoker     Packs/day: 0.25     Years: 40.00     Types: Cigarettes     Quit date: 6/1/2016   • Smokeless tobacco: Never Used   • Alcohol use 1.5 oz/week     3 Cans of beer per week      Comment: occasional   • Drug use: No   • Sexual activity: No     Other Topics Concern   • Not on file     Social History Narrative   • No narrative on file       Objective:     Vitals: /78   Pulse 99   Temp 36.6 °C (97.9 °F)   Resp 14   Ht 1.575 m (5' 2\")   Wt 54.9 kg (121 lb)   SpO2 97%   BMI 22.13 kg/m²    General: Alert, pleasant, NAD  HEENT: Normocephalic.  Heart: Regular rate and rhythm.  S1 and S2 normal.  No murmurs appreciated.  Respiratory: Normal respiratory effort.  Clear to auscultation bilaterally.  Skin: Warm, dry, no rashes.  Musculoskeletal: Gait is normal.  Moves all extremities well.  Extremities: No leg edema.    Neurological: No tremors, sensation grossly intact, gait is normal,   Psych:  Affect/mood is normal, judgement is good, memory is intact, grooming is appropriate.    Assessment/Plan:     Poonam was seen today for blood sugar problem.    Diagnoses and all orders for this visit:    Type 1 diabetes mellitus without complication (HCC)  Not well controlled.  A1c today in clinic was 10.3.  Patient has a very erratic sleep cycle and and does not check her blood sugar routinely.  States she is testing her blood sugars 2 to 3 times a day.  Discussed with patient that she should start taking her Lantus at 10 PM in the evening and make sure she checks her fasting sugar no later than 10 AM.  Patient states that she currently goes to bed around 1:30 occasionally 3:30 and wakes up at 11 AM.  Will refer her over to diabetic education.  Patient declines going over to endocrinology right now to have tighter control of her sugars.     -     REFERRAL TO DIABETIC EDUCATION Diabetes Self Management Education / " Training (DSME/T) and Medical Nutrition Therapy (MNT): Initial Group DSME/MNT as authorized by payor; DSME/T Content: Monitoring Diabetes, Nutritional Management  -     POCT  A1C         Health care Maintenance:     Return in about 3 months (around 10/3/2018) for Diabetes.    I have placed the above orders and discussed them with an approved delegating provider.  The MA is performing the below orders under the direction of Dr. Yaya FLORES

## 2018-07-09 ENCOUNTER — HOSPITAL ENCOUNTER (EMERGENCY)
Facility: MEDICAL CENTER | Age: 58
End: 2018-07-09
Payer: MEDICARE

## 2018-07-09 VITALS
SYSTOLIC BLOOD PRESSURE: 146 MMHG | HEIGHT: 61 IN | OXYGEN SATURATION: 98 % | WEIGHT: 125.44 LBS | TEMPERATURE: 96.8 F | HEART RATE: 103 BPM | BODY MASS INDEX: 23.68 KG/M2 | RESPIRATION RATE: 16 BRPM | DIASTOLIC BLOOD PRESSURE: 69 MMHG

## 2018-07-09 PROCEDURE — 302449 STATCHG TRIAGE ONLY (STATISTIC)

## 2018-07-09 ASSESSMENT — PAIN SCALES - GENERAL: PAINLEVEL_OUTOF10: 0

## 2018-07-09 ASSESSMENT — PAIN SCALES - WONG BAKER: WONGBAKER_NUMERICALRESPONSE: DOESN'T HURT AT ALL

## 2018-07-09 NOTE — ED NOTES
Tried to room pt but room was not ready, pt said she was going to smoke a cigarette and walked out.

## 2018-07-09 NOTE — ED NOTES
Lobby, restroom, senior lounge, and outside checked for pt and unable to find her.  Charge RN notified, pt discharged from computer.

## 2018-07-09 NOTE — ED TRIAGE NOTES
"Chief Complaint   Patient presents with   • Bug Bite     Multiple bites on body   • Wound Check     Lesion approximatly 2 cm diameter on left shin, pt states she pulled bugs from wound   /69   Pulse (!) 103   Temp 36 °C (96.8 °F)   Resp 16   Ht 1.549 m (5' 1\")   Wt 56.9 kg (125 lb 7.1 oz)   SpO2 98%   BMI 23.70 kg/m²    Pt ambulated to triage with above complaint.  Pt states she pulled multiple bugs from lesion on left shin and has multiple small lesions on all extemities.  Pt has small bugs in a bag that she states she pulled from her left shin.  No bugs currently noted on pt.  "

## 2018-07-31 RX ORDER — LEVOTHYROXINE SODIUM 112 UG/1
112 TABLET ORAL DAILY
Qty: 30 TAB | Refills: 2 | Status: SHIPPED | OUTPATIENT
Start: 2018-07-31 | End: 2018-09-12 | Stop reason: SDUPTHER

## 2018-08-08 ENCOUNTER — TELEPHONE (OUTPATIENT)
Dept: MEDICAL GROUP | Facility: MEDICAL CENTER | Age: 58
End: 2018-08-08

## 2018-08-08 NOTE — TELEPHONE ENCOUNTER
That is based on the certain , not all were recalled. Have her check with her pharmacy if she had the recalled medication. If so then I will change.

## 2018-08-08 NOTE — TELEPHONE ENCOUNTER
Pt wants to know if she could get her Valsartan changed to a different medication due to the recall.

## 2018-08-10 RX ORDER — LISINOPRIL 20 MG/1
20 TABLET ORAL DAILY
Qty: 30 TAB | Refills: 1 | Status: SHIPPED | OUTPATIENT
Start: 2018-08-10 | End: 2018-09-14 | Stop reason: SDUPTHER

## 2018-09-10 DIAGNOSIS — E78.2 MIXED HYPERLIPIDEMIA: ICD-10-CM

## 2018-09-13 RX ORDER — ATORVASTATIN CALCIUM 10 MG/1
10 TABLET, FILM COATED ORAL DAILY
Qty: 30 TAB | Refills: 2 | Status: SHIPPED | OUTPATIENT
Start: 2018-09-13 | End: 2018-09-14 | Stop reason: SDUPTHER

## 2018-09-13 RX ORDER — LEVOTHYROXINE SODIUM 112 UG/1
112 TABLET ORAL DAILY
Qty: 30 TAB | Refills: 2 | Status: SHIPPED | OUTPATIENT
Start: 2018-09-13 | End: 2018-09-14 | Stop reason: SDUPTHER

## 2018-09-13 RX ORDER — INSULIN GLARGINE 100 [IU]/ML
40 INJECTION, SOLUTION SUBCUTANEOUS DAILY
Qty: 10 ML | Refills: 3 | Status: SHIPPED | OUTPATIENT
Start: 2018-09-13 | End: 2018-09-14 | Stop reason: SDUPTHER

## 2018-09-14 ENCOUNTER — OFFICE VISIT (OUTPATIENT)
Dept: MEDICAL GROUP | Facility: MEDICAL CENTER | Age: 58
End: 2018-09-14
Payer: MEDICARE

## 2018-09-14 VITALS
RESPIRATION RATE: 12 BRPM | SYSTOLIC BLOOD PRESSURE: 86 MMHG | OXYGEN SATURATION: 97 % | HEART RATE: 90 BPM | WEIGHT: 120.2 LBS | TEMPERATURE: 97.3 F | DIASTOLIC BLOOD PRESSURE: 42 MMHG | HEIGHT: 62 IN | BODY MASS INDEX: 22.12 KG/M2

## 2018-09-14 DIAGNOSIS — I10 ESSENTIAL HYPERTENSION: ICD-10-CM

## 2018-09-14 DIAGNOSIS — E10.9 TYPE 1 DIABETES MELLITUS WITHOUT COMPLICATION (HCC): Chronic | ICD-10-CM

## 2018-09-14 DIAGNOSIS — E78.2 MIXED HYPERLIPIDEMIA: ICD-10-CM

## 2018-09-14 PROCEDURE — 99213 OFFICE O/P EST LOW 20 MIN: CPT | Performed by: NURSE PRACTITIONER

## 2018-09-14 RX ORDER — LISINOPRIL 20 MG/1
20 TABLET ORAL DAILY
Qty: 90 TAB | Refills: 3 | Status: SHIPPED | OUTPATIENT
Start: 2018-09-14 | End: 2019-04-18 | Stop reason: SDUPTHER

## 2018-09-14 RX ORDER — INSULIN GLARGINE 100 [IU]/ML
40 INJECTION, SOLUTION SUBCUTANEOUS DAILY
Qty: 40 ML | Refills: 3 | Status: SHIPPED | OUTPATIENT
Start: 2018-09-14 | End: 2019-09-28 | Stop reason: SDUPTHER

## 2018-09-14 RX ORDER — LEVOTHYROXINE SODIUM 112 UG/1
112 TABLET ORAL DAILY
Qty: 90 TAB | Refills: 3 | Status: SHIPPED | OUTPATIENT
Start: 2018-09-14 | End: 2019-10-28 | Stop reason: SDUPTHER

## 2018-09-14 RX ORDER — ATORVASTATIN CALCIUM 10 MG/1
10 TABLET, FILM COATED ORAL DAILY
Qty: 90 TAB | Refills: 3 | Status: SHIPPED | OUTPATIENT
Start: 2018-09-14 | End: 2019-10-30 | Stop reason: SDUPTHER

## 2018-09-14 NOTE — PROGRESS NOTES
"cc:  \"concerned about blood pressure reading\"      Subjective:     HPI:     Poonamluis m Mayo is a 57 y.o. female here to discuss the evaluation and management of:    Blood pressure  Took her blood pressure medication at 10am today, she usually takes at 4pm. sually takes at 4pm. Blood pressure running 135/85, 150/80's at home. She got concerned it was high because she saw that she had a broken blood vessel in her right eye a few weeks ago. It is resolving now.  She just does not remember having any head pain, trauma, or being hit on her right eye, no vomiting or coughing spells. Denies chest pain, dizziness, or vision changes.     Diabetes  States she is not being very good about her diabetes. States that she needs to \"be better about it.\"    Requesting refills now be sent to [facility] mail order.      ROS:  Denies any Headache, Blurred Vision, Confusion, Chest pain,  Shortness of breath,  Abdominal pain, Changes of bowel or bladder, Lower ext edema, Fevers, Nights sweats, Weight Changes, Focal weakness or numbness.  And all other systems are negative.burst blood vessel        Current Outpatient Prescriptions:   •  atorvastatin (LIPITOR) 10 MG Tab, Take 1 Tab by mouth every day., Disp: 90 Tab, Rfl: 3  •  insulin aspart (NOVOLOG) 100 UNIT/ML Solution, Inject 10 Units as instructed 3 times a day before meals., Disp: 3 Vial, Rfl: 3  •  levothyroxine (SYNTHROID) 112 MCG Tab, Take 1 Tab by mouth every day., Disp: 90 Tab, Rfl: 3  •  lisinopril (PRINIVIL) 20 MG Tab, Take 1 Tab by mouth every day., Disp: 90 Tab, Rfl: 3  •  insulin glargine (LANTUS) 100 UNIT/ML Solution, Inject 40 Units as instructed every day., Disp: 40 mL, Rfl: 3  •  INSULIN SYRINGE .5CC/29G 29G X 1/2\" 0.5 ML MISC, For use with lantus and novolog as instructed, Disp: 150 Each, Rfl: 3  •  glucose blood (PRECISION XTRA TEST STRIPS) strip, 1 Strip by Other route 3 times a day before meals., Disp: 100 Strip, Rfl: 6  •  Blood Glucose Monitoring Suppl (PRECISION " "XTRA MONITOR) KARLA ..., Disp: 1 Each, Rfl: 0  •  Lancets MISC, 1 Each by Does not apply route every day., Disp: 30 Each, Rfl: 3  •  insulin aspart (NOVOLOG) 100 UNIT/ML Solution, Inject 10 Units as instructed 3 times a day before meals., Disp: 10 mL, Rfl: 9    No Known Allergies    Past Medical History:   Diagnosis Date   • 250.01 1996   • Dupuytren contracture     bilateral hands   • Hyperlipidemia    • Hypertension    • Hypothyroid      Past Surgical History:   Procedure Laterality Date   • TUBAL COAGULATION LAPAROSCOPIC BILATERAL  1984   • APPENDECTOMY     • TONSILLECTOMY       Family History   Problem Relation Age of Onset   • Lung Disease Father    • Diabetes Son         type 1     Social History     Social History   • Marital status: Single     Spouse name: N/A   • Number of children: N/A   • Years of education: N/A     Occupational History   • Not on file.     Social History Main Topics   • Smoking status: Former Smoker     Packs/day: 0.25     Years: 40.00     Types: Cigarettes     Quit date: 6/1/2016   • Smokeless tobacco: Never Used   • Alcohol use 1.5 oz/week     3 Cans of beer per week      Comment: occasional   • Drug use: No   • Sexual activity: No     Other Topics Concern   • Not on file     Social History Narrative   • No narrative on file       Objective:     Vitals: BP (!) 86/42   Pulse 90   Temp 36.3 °C (97.3 °F)   Resp 12   Ht 1.575 m (5' 2\")   Wt 54.5 kg (120 lb 3.2 oz)   SpO2 97%   BMI 21.98 kg/m²    General: Alert, pleasant, NAD  HEENT: Normocephalic.  Right eye with burst blood vessels  Skin: Warm, dry, no rashes.  Musculoskeletal: Gait is normal.  Moves all extremities well.  Extremities: No leg edema. No discoloration  Neurological: No tremors, sensation grossly intact  Psych:  Affect/mood is normal, judgement is good, memory is intact, grooming is appropriate.    Assessment/Plan:     Poonam was seen today for medication problem.    Diagnoses and all orders for this " visit:    Essential hypertension  Hypotensive in clinic today. Repeat blood pressure twice. Asymptomatic. Denies dizziness. She took her medications at 10am instead of her usual 4pm today. Continue current regimen and recordings of her blood pressure. Follow up in two months. Will order Cmp next visit.     Mixed hyperlipidemia  Stable.tolerating without myalgias. Last lipid panel . Will order labs at next visit.   -     atorvastatin (LIPITOR) 10 MG Tab; Take 1 Tab by mouth every day.    Type 1 diabetes mellitus without complication (HCC)  Not in control. Does not take her insulin as directed. Follow up in two months. Last A1c was 10.3%. Declined Endocrinology referral .    Other orders  -     insulin aspart (NOVOLOG) 100 UNIT/ML Solution; Inject 10 Units as instructed 3 times a day before meals.  -     levothyroxine (SYNTHROID) 112 MCG Tab; Take 1 Tab by mouth every day.  -     lisinopril (PRINIVIL) 20 MG Tab; Take 1 Tab by mouth every day.  -     insulin glargine (LANTUS) 100 UNIT/ML Solution; Inject 40 Units as instructed every day.          Health care Maintenance:   Influenza vaccine: declined  Pneumonia vaccines: declined      Return in about 2 months (around 11/14/2018) for Diabetes.          Anamaria FLORES

## 2018-12-04 ENCOUNTER — TELEPHONE (OUTPATIENT)
Dept: MEDICAL GROUP | Facility: MEDICAL CENTER | Age: 58
End: 2018-12-04

## 2018-12-04 DIAGNOSIS — E10.9 TYPE 1 DIABETES MELLITUS WITHOUT COMPLICATION (HCC): Chronic | ICD-10-CM

## 2018-12-04 NOTE — TELEPHONE ENCOUNTER
1. Caller Name: Poonam Mayo                                         Call Back Number: [phone] (home)       Patient approves a detailed voicemail message: yes    Pt called and would like a referral for this eye doctor. She said they're accepting new patients and accepts her insurance.     Referral - Eye doctor Specialist    Name: Nguyễn Beasley   Phone: [phone]    Doesn't remember doctor name.

## 2019-01-08 ENCOUNTER — TELEPHONE (OUTPATIENT)
Dept: MEDICAL GROUP | Facility: MEDICAL CENTER | Age: 59
End: 2019-01-08

## 2019-01-08 NOTE — TELEPHONE ENCOUNTER
1. Caller Name: [facility]                                         Call Back Number: [phone]      Patient approves a detailed voicemail message: N\A    I got a call from [facility] letting us know that the pt left them a VM about canceling her apt. She did not leave a reason.

## 2019-04-05 ENCOUNTER — OFFICE VISIT (OUTPATIENT)
Dept: MEDICAL GROUP | Facility: MEDICAL CENTER | Age: 59
End: 2019-04-05
Payer: MEDICARE

## 2019-04-05 VITALS
OXYGEN SATURATION: 98 % | HEIGHT: 62 IN | RESPIRATION RATE: 16 BRPM | BODY MASS INDEX: 21.35 KG/M2 | HEART RATE: 96 BPM | SYSTOLIC BLOOD PRESSURE: 98 MMHG | DIASTOLIC BLOOD PRESSURE: 60 MMHG | TEMPERATURE: 95.5 F | WEIGHT: 116 LBS

## 2019-04-05 DIAGNOSIS — I10 ESSENTIAL HYPERTENSION: ICD-10-CM

## 2019-04-05 DIAGNOSIS — E10.9 TYPE 1 DIABETES MELLITUS WITHOUT COMPLICATION (HCC): Chronic | ICD-10-CM

## 2019-04-05 DIAGNOSIS — E03.9 HYPOTHYROIDISM, UNSPECIFIED TYPE: ICD-10-CM

## 2019-04-05 DIAGNOSIS — E55.9 VITAMIN D DEFICIENCY: ICD-10-CM

## 2019-04-05 DIAGNOSIS — B88.9 INFESTATION: ICD-10-CM

## 2019-04-05 PROCEDURE — 99214 OFFICE O/P EST MOD 30 MIN: CPT | Performed by: NURSE PRACTITIONER

## 2019-04-05 RX ORDER — HYDROXYZINE HYDROCHLORIDE 25 MG/1
25 TABLET, FILM COATED ORAL 3 TIMES DAILY PRN
Qty: 60 TAB | Refills: 0 | Status: SHIPPED | OUTPATIENT
Start: 2019-04-05 | End: 2019-04-05 | Stop reason: SDUPTHER

## 2019-04-05 RX ORDER — HYDROXYZINE HYDROCHLORIDE 25 MG/1
25 TABLET, FILM COATED ORAL 3 TIMES DAILY PRN
Qty: 60 TAB | Refills: 0 | Status: SHIPPED | OUTPATIENT
Start: 2019-04-05 | End: 2019-04-15

## 2019-04-05 NOTE — PROGRESS NOTES
"cc:  \" I feel like I have a parasite\"       Subjective:     HPI:     Poonam Mayo is a 58 y.o. female here to discuss the evaluation and management of:    Patient is here today because she feels like she has a \"parasite\" infection. She currently lives at the [facility] for the last year. She states that she has been waking up itching for three months now. She has bites all over her, mainly on her head and buttocks. She has open sores on her head that she has been scratching and making bleed. She has bites on her arms, legs and her back. She also makes note that she felt like she had a \"worm\" in her ear and a worm in her nose. She states that she has not seen anything like \"bed bugs.\" States that she has tried putting alcohol and hydrogen peroxide and topically ointment on her skin. Has also tried putting bleach on her skin. She states she has them \"in jars at her house.\"    Diabetes      ROS:  Denies any Headache, Blurred Vision, Confusion, Chest pain,  Shortness of breath,  Abdominal pain, Changes of bowel or bladder, Lower ext edema, Fevers, Nights sweats, Weight Changes, Focal weakness or numbness.  And all other systems are negative. Itching, infestation        Current Outpatient Prescriptions:   •  mupirocin (BACTROBAN) 2 % Ointment, Apply 1 g to affected area(s) 2 times a day., Disp: 60 g, Rfl: 1  •  hydrOXYzine HCl (ATARAX) 25 MG Tab, Take 1 Tab by mouth 3 times a day as needed for Itching for up to 10 days., Disp: 60 Tab, Rfl: 0  •  atorvastatin (LIPITOR) 10 MG Tab, Take 1 Tab by mouth every day., Disp: 90 Tab, Rfl: 3  •  insulin aspart (NOVOLOG) 100 UNIT/ML Solution, Inject 10 Units as instructed 3 times a day before meals., Disp: 3 Vial, Rfl: 3  •  levothyroxine (SYNTHROID) 112 MCG Tab, Take 1 Tab by mouth every day., Disp: 90 Tab, Rfl: 3  •  lisinopril (PRINIVIL) 20 MG Tab, Take 1 Tab by mouth every day., Disp: 90 Tab, Rfl: 3  •  insulin glargine (LANTUS) 100 UNIT/ML Solution, Inject 40 Units as " "instructed every day., Disp: 40 mL, Rfl: 3  •  insulin aspart (NOVOLOG) 100 UNIT/ML Solution, Inject 10 Units as instructed 3 times a day before meals., Disp: 10 mL, Rfl: 9  •  INSULIN SYRINGE .5CC/29G 29G X 1/2\" 0.5 ML MISC, For use with lantus and novolog as instructed, Disp: 150 Each, Rfl: 3  •  glucose blood (PRECISION XTRA TEST STRIPS) strip, 1 Strip by Other route 3 times a day before meals., Disp: 100 Strip, Rfl: 6  •  Blood Glucose Monitoring Suppl (PRECISION XTRA MONITOR) KARLA, ..., Disp: 1 Each, Rfl: 0  •  Lancets MISC, 1 Each by Does not apply route every day., Disp: 30 Each, Rfl: 3    No Known Allergies    Past Medical History:   Diagnosis Date   • 250.01 1996   • Dupuytren contracture     bilateral hands   • Hyperlipidemia    • Hypertension    • Hypothyroid      Past Surgical History:   Procedure Laterality Date   • TUBAL COAGULATION LAPAROSCOPIC BILATERAL  1984   • APPENDECTOMY     • TONSILLECTOMY       Family History   Problem Relation Age of Onset   • Lung Disease Father    • Diabetes Son         type 1     Social History     Social History   • Marital status: Single     Spouse name: N/A   • Number of children: N/A   • Years of education: N/A     Occupational History   • Not on file.     Social History Main Topics   • Smoking status: Former Smoker     Packs/day: 0.25     Years: 40.00     Types: Cigarettes     Quit date: 6/1/2016   • Smokeless tobacco: Never Used   • Alcohol use 1.5 oz/week     3 Cans of beer per week      Comment: occasional   • Drug use: No   • Sexual activity: No     Other Topics Concern   • Not on file     Social History Narrative   • No narrative on file       Objective:     Vitals: BP (!) 98/60   Pulse 96   Temp (!) 35.3 °C (95.5 °F)   Resp 16   Ht 1.575 m (5' 2\")   Wt 52.6 kg (116 lb)   SpO2 98%   BMI 21.22 kg/m²    General: Alert, pleasant, tearful  HEENT: Normocephali  Skin: Warm, dry, no rashes.multiple papular scabs on arms, legs, and buttocks. Scalp with multiple " large open sores.  Musculoskeletal: Gait is normal.  Moves all extremities well.  Extremities: No leg edema. No discoloration  Neurological: No tremors, sensation grossly intact, gait is normal,   Psych:  Affect/mood is tearful. Perseverating, judgement is good, memory is intact, grooming is appropriate.    Assessment/Plan:     Diagnoses and all orders for this visit:    Infestation  On inspection there are no visible infestations. Appears that she may have bed bugs versus scabies. Presentation on her buttocks are with multiple dried papular lesions. Her scalp has multiple large lesions that are crusted. She reports the lesions are pruritic. Will treat.    -     : hydrOXYzine HCl (ATARAX) 25 MG Tab; Take 1 Tab by mouth 3 times a day as needed for Itching for up to 10 days.  -     : mupirocin (BACTROBAN) 2 % Ointment; Apply 1 g to affected area(s) every day.  -     URINE DRUG SCREEN; Future  -     mupirocin (BACTROBAN) 2 % Ointment; Apply 1 g to affected area(s) 2 times a day.  -     hydrOXYzine HCl (ATARAX) 25 MG Tab; Take 1 Tab by mouth 3 times a day as needed for Itching for up to 10 days.  -permethrin 5% cream     Type 1 diabetes mellitus without complication (HCC)  Not well controlled. Needs updated labs. She is not very reliable regarding her diabetes.   -     Comp Metabolic Panel; Future  -     HEMOGLOBIN A1C; Future  -     Lipid Profile; Future  -     l: MICROALBUMIN CREAT RATIO URINE; Future  -     TSH WITH REFLEX TO FT4; Future  -     MICROALBUMIN CREAT RATIO URINE; Future    Essential hypertension  Due for labs. Stable in clinic. Denies chest pain or shortness of breath.     Hypothyroidism, unspecified type  Due for labs. Reports she has been taking her levothyroxine 112 daily for this.     Vitamin D deficiency  -     VITAMIN D,25 HYDROXY; Future          No Follow-up on file.        Anamaria FLORES

## 2019-04-07 RX ORDER — PERMETHRIN 50 MG/G
1 CREAM TOPICAL ONCE
Qty: 1 TUBE | Refills: 0 | Status: SHIPPED | OUTPATIENT
Start: 2019-04-07 | End: 2019-04-18 | Stop reason: SDUPTHER

## 2019-04-10 ENCOUNTER — HOSPITAL ENCOUNTER (OUTPATIENT)
Dept: LAB | Facility: MEDICAL CENTER | Age: 59
End: 2019-04-10
Attending: NURSE PRACTITIONER
Payer: MEDICARE

## 2019-04-10 DIAGNOSIS — E55.9 VITAMIN D DEFICIENCY: ICD-10-CM

## 2019-04-10 DIAGNOSIS — E10.9 TYPE 1 DIABETES MELLITUS WITHOUT COMPLICATION (HCC): Chronic | ICD-10-CM

## 2019-04-10 DIAGNOSIS — B88.9 INFESTATION: ICD-10-CM

## 2019-04-10 LAB
CREAT UR-MCNC: 140.3 MG/DL
EST. AVERAGE GLUCOSE BLD GHB EST-MCNC: 275 MG/DL
HBA1C MFR BLD: 11.2 % (ref 0–5.6)
MICROALBUMIN UR-MCNC: 1.9 MG/DL
MICROALBUMIN/CREAT UR: 14 MG/G (ref 0–30)

## 2019-04-10 PROCEDURE — 36415 COLL VENOUS BLD VENIPUNCTURE: CPT

## 2019-04-10 PROCEDURE — 80061 LIPID PANEL: CPT

## 2019-04-10 PROCEDURE — 82306 VITAMIN D 25 HYDROXY: CPT

## 2019-04-10 PROCEDURE — 80053 COMPREHEN METABOLIC PANEL: CPT

## 2019-04-10 PROCEDURE — 83036 HEMOGLOBIN GLYCOSYLATED A1C: CPT | Mod: GA

## 2019-04-10 PROCEDURE — 82043 UR ALBUMIN QUANTITATIVE: CPT

## 2019-04-10 PROCEDURE — 82570 ASSAY OF URINE CREATININE: CPT

## 2019-04-10 PROCEDURE — 84443 ASSAY THYROID STIM HORMONE: CPT

## 2019-04-10 PROCEDURE — 84439 ASSAY OF FREE THYROXINE: CPT

## 2019-04-11 LAB
25(OH)D3 SERPL-MCNC: 26 NG/ML (ref 30–100)
ALBUMIN SERPL BCP-MCNC: 4.4 G/DL (ref 3.2–4.9)
ALBUMIN/GLOB SERPL: 1.3 G/DL
ALP SERPL-CCNC: 95 U/L (ref 30–99)
ALT SERPL-CCNC: 24 U/L (ref 2–50)
ANION GAP SERPL CALC-SCNC: 8 MMOL/L (ref 0–11.9)
AST SERPL-CCNC: 20 U/L (ref 12–45)
BILIRUB SERPL-MCNC: 0.5 MG/DL (ref 0.1–1.5)
BUN SERPL-MCNC: 22 MG/DL (ref 8–22)
CALCIUM SERPL-MCNC: 9.2 MG/DL (ref 8.5–10.5)
CHLORIDE SERPL-SCNC: 106 MMOL/L (ref 96–112)
CHOLEST SERPL-MCNC: 158 MG/DL (ref 100–199)
CO2 SERPL-SCNC: 28 MMOL/L (ref 20–33)
CREAT SERPL-MCNC: 0.99 MG/DL (ref 0.5–1.4)
FASTING STATUS PATIENT QL REPORTED: NORMAL
GLOBULIN SER CALC-MCNC: 3.4 G/DL (ref 1.9–3.5)
GLUCOSE SERPL-MCNC: 81 MG/DL (ref 65–99)
HDLC SERPL-MCNC: 58 MG/DL
LDLC SERPL CALC-MCNC: 88 MG/DL
POTASSIUM SERPL-SCNC: 4.8 MMOL/L (ref 3.6–5.5)
PROT SERPL-MCNC: 7.8 G/DL (ref 6–8.2)
SODIUM SERPL-SCNC: 142 MMOL/L (ref 135–145)
T4 FREE SERPL-MCNC: 1.11 NG/DL (ref 0.53–1.43)
TRIGL SERPL-MCNC: 60 MG/DL (ref 0–149)
TSH SERPL DL<=0.005 MIU/L-ACNC: 8.71 UIU/ML (ref 0.38–5.33)

## 2019-04-18 ENCOUNTER — OFFICE VISIT (OUTPATIENT)
Dept: MEDICAL GROUP | Facility: MEDICAL CENTER | Age: 59
End: 2019-04-18
Payer: MEDICARE

## 2019-04-18 VITALS
OXYGEN SATURATION: 100 % | HEART RATE: 93 BPM | SYSTOLIC BLOOD PRESSURE: 92 MMHG | TEMPERATURE: 97.5 F | DIASTOLIC BLOOD PRESSURE: 60 MMHG | HEIGHT: 62 IN | WEIGHT: 112 LBS | RESPIRATION RATE: 16 BRPM | BODY MASS INDEX: 20.61 KG/M2

## 2019-04-18 DIAGNOSIS — B88.9 INFESTATION: ICD-10-CM

## 2019-04-18 DIAGNOSIS — E55.9 VITAMIN D DEFICIENCY: ICD-10-CM

## 2019-04-18 DIAGNOSIS — E10.8 TYPE 1 DIABETES MELLITUS WITH COMPLICATION (HCC): ICD-10-CM

## 2019-04-18 DIAGNOSIS — Z12.12 SCREENING FOR COLORECTAL CANCER: ICD-10-CM

## 2019-04-18 DIAGNOSIS — E03.9 HYPOTHYROIDISM, UNSPECIFIED TYPE: ICD-10-CM

## 2019-04-18 DIAGNOSIS — Z12.11 SCREENING FOR COLORECTAL CANCER: ICD-10-CM

## 2019-04-18 LAB — RETINAL SCREEN: NEGATIVE

## 2019-04-18 PROCEDURE — 92250 FUNDUS PHOTOGRAPHY W/I&R: CPT | Mod: TC | Performed by: NURSE PRACTITIONER

## 2019-04-18 PROCEDURE — 99214 OFFICE O/P EST MOD 30 MIN: CPT | Performed by: NURSE PRACTITIONER

## 2019-04-18 RX ORDER — PERMETHRIN 50 MG/G
1 CREAM TOPICAL ONCE
Qty: 2 TUBE | Refills: 0 | Status: SHIPPED | OUTPATIENT
Start: 2019-04-18 | End: 2019-04-18

## 2019-04-18 RX ORDER — LISINOPRIL 5 MG/1
20 TABLET ORAL DAILY
Qty: 30 TAB | Refills: 11 | Status: SHIPPED | OUTPATIENT
Start: 2019-04-18 | End: 2019-05-28 | Stop reason: SDUPTHER

## 2019-04-18 RX ORDER — PERMETHRIN 50 MG/G
1 CREAM TOPICAL ONCE
Qty: 2 TUBE | Refills: 0 | Status: SHIPPED | OUTPATIENT
Start: 2019-04-18 | End: 2019-04-18 | Stop reason: SDUPTHER

## 2019-05-28 DIAGNOSIS — E10.8 TYPE 1 DIABETES MELLITUS WITH COMPLICATION (HCC): ICD-10-CM

## 2019-05-28 DIAGNOSIS — B88.9 INFESTATION: ICD-10-CM

## 2019-05-28 RX ORDER — LISINOPRIL 5 MG/1
5 TABLET ORAL DAILY
Qty: 30 TAB | Refills: 11 | Status: SHIPPED | OUTPATIENT
Start: 2019-05-28 | End: 2020-10-07 | Stop reason: SDUPTHER

## 2019-05-28 RX ORDER — PERMETHRIN 50 MG/G
1 CREAM TOPICAL ONCE
Qty: 2 TUBE | Refills: 0 | Status: SHIPPED | OUTPATIENT
Start: 2019-05-28 | End: 2019-05-28

## 2019-07-16 ENCOUNTER — TELEPHONE (OUTPATIENT)
Dept: MEDICAL GROUP | Facility: MEDICAL CENTER | Age: 59
End: 2019-07-16

## 2019-07-16 NOTE — TELEPHONE ENCOUNTER
I could not find it under meds.    Was the patient seen in the last year in this department? Yes    Does patient have an active prescription for medications requested? No     Received Request Via: Pharmacy

## 2019-08-02 ENCOUNTER — OFFICE VISIT (OUTPATIENT)
Dept: MEDICAL GROUP | Facility: MEDICAL CENTER | Age: 59
End: 2019-08-02
Payer: MEDICARE

## 2019-08-02 VITALS
OXYGEN SATURATION: 95 % | BODY MASS INDEX: 19.88 KG/M2 | HEIGHT: 62 IN | SYSTOLIC BLOOD PRESSURE: 102 MMHG | TEMPERATURE: 97.7 F | WEIGHT: 108 LBS | RESPIRATION RATE: 16 BRPM | DIASTOLIC BLOOD PRESSURE: 68 MMHG | HEART RATE: 102 BPM

## 2019-08-02 DIAGNOSIS — L29.9 ITCHY SCALP: ICD-10-CM

## 2019-08-02 DIAGNOSIS — L85.3 DRY SKIN: ICD-10-CM

## 2019-08-02 DIAGNOSIS — F22 DELUSION OF INFESTATION (HCC): ICD-10-CM

## 2019-08-02 PROCEDURE — 99213 OFFICE O/P EST LOW 20 MIN: CPT | Performed by: NURSE PRACTITIONER

## 2019-08-02 RX ORDER — QUETIAPINE FUMARATE 25 MG/1
12.5 TABLET, FILM COATED ORAL
Qty: 30 TAB | Refills: 0 | Status: SHIPPED | OUTPATIENT
Start: 2019-08-02 | End: 2020-10-07

## 2019-08-02 ASSESSMENT — PATIENT HEALTH QUESTIONNAIRE - PHQ9
CLINICAL INTERPRETATION OF PHQ2 SCORE: 2
SUM OF ALL RESPONSES TO PHQ QUESTIONS 1-9: 9
5. POOR APPETITE OR OVEREATING: 0 - NOT AT ALL

## 2019-09-30 RX ORDER — INSULIN GLARGINE 100 [IU]/ML
INJECTION, SOLUTION SUBCUTANEOUS
Qty: 40 ML | Refills: 3 | Status: SHIPPED | OUTPATIENT
Start: 2019-09-30 | End: 2020-11-05 | Stop reason: SDUPTHER

## 2019-10-30 DIAGNOSIS — E78.2 MIXED HYPERLIPIDEMIA: ICD-10-CM

## 2019-10-30 RX ORDER — ATORVASTATIN CALCIUM 10 MG/1
10 TABLET, FILM COATED ORAL DAILY
Qty: 90 TAB | Refills: 3 | Status: SHIPPED | OUTPATIENT
Start: 2019-10-30 | End: 2020-10-07

## 2019-10-30 RX ORDER — LEVOTHYROXINE SODIUM 112 UG/1
TABLET ORAL
Qty: 90 TAB | Refills: 1 | Status: SHIPPED | OUTPATIENT
Start: 2019-10-30 | End: 2020-08-21 | Stop reason: SDUPTHER

## 2020-02-07 DIAGNOSIS — E10.9 TYPE 1 DIABETES MELLITUS (HCC): ICD-10-CM

## 2020-02-07 RX ORDER — LANCETS 30 GAUGE
1 EACH MISCELLANEOUS DAILY
Qty: 30 EACH | Refills: 3 | Status: SHIPPED | OUTPATIENT
Start: 2020-02-07 | End: 2022-01-01

## 2020-02-07 NOTE — TELEPHONE ENCOUNTER
Pt is out in [place] until June or July and does not have enough of her insulin. She would like an Rx until she gets back.     Received request via: Patient    Was the patient seen in the last year in this department? No     Does the patient have an active prescription (recently filled or refills available) for medication(s) requested? No

## 2020-08-21 RX ORDER — LEVOTHYROXINE SODIUM 112 UG/1
112 TABLET ORAL
Qty: 90 TAB | Refills: 0 | Status: SHIPPED | OUTPATIENT
Start: 2020-08-21 | End: 2020-12-21 | Stop reason: SDUPTHER

## 2020-10-07 ENCOUNTER — OFFICE VISIT (OUTPATIENT)
Dept: MEDICAL GROUP | Facility: MEDICAL CENTER | Age: 60
End: 2020-10-07
Payer: MEDICARE

## 2020-10-07 VITALS
TEMPERATURE: 97.2 F | WEIGHT: 132.5 LBS | HEIGHT: 62 IN | BODY MASS INDEX: 24.38 KG/M2 | SYSTOLIC BLOOD PRESSURE: 100 MMHG | DIASTOLIC BLOOD PRESSURE: 50 MMHG | OXYGEN SATURATION: 95 % | HEART RATE: 99 BPM

## 2020-10-07 DIAGNOSIS — F17.210 CIGARETTE NICOTINE DEPENDENCE WITHOUT COMPLICATION: ICD-10-CM

## 2020-10-07 DIAGNOSIS — Z23 NEED FOR VACCINATION: ICD-10-CM

## 2020-10-07 DIAGNOSIS — E55.9 VITAMIN D DEFICIENCY: Chronic | ICD-10-CM

## 2020-10-07 DIAGNOSIS — E78.5 DYSLIPIDEMIA: ICD-10-CM

## 2020-10-07 DIAGNOSIS — E03.9 HYPOTHYROIDISM, UNSPECIFIED TYPE: Chronic | ICD-10-CM

## 2020-10-07 DIAGNOSIS — E10.8 TYPE I DIABETES MELLITUS WITH COMPLICATION (HCC): ICD-10-CM

## 2020-10-07 DIAGNOSIS — Z11.59 ENCOUNTER FOR HEPATITIS C SCREENING TEST FOR LOW RISK PATIENT: ICD-10-CM

## 2020-10-07 LAB
HBA1C MFR BLD: 9.8 % (ref 0–5.6)
INT CON NEG: NEGATIVE
INT CON POS: POSITIVE

## 2020-10-07 PROCEDURE — 90732 PPSV23 VACC 2 YRS+ SUBQ/IM: CPT | Performed by: NURSE PRACTITIONER

## 2020-10-07 PROCEDURE — G0009 ADMIN PNEUMOCOCCAL VACCINE: HCPCS | Performed by: NURSE PRACTITIONER

## 2020-10-07 PROCEDURE — 99214 OFFICE O/P EST MOD 30 MIN: CPT | Mod: 25 | Performed by: NURSE PRACTITIONER

## 2020-10-07 PROCEDURE — 83036 HEMOGLOBIN GLYCOSYLATED A1C: CPT | Performed by: NURSE PRACTITIONER

## 2020-10-07 RX ORDER — LISINOPRIL 5 MG/1
5 TABLET ORAL DAILY
Qty: 90 TAB | Refills: 3 | Status: SHIPPED | OUTPATIENT
Start: 2020-10-07 | End: 2021-02-16 | Stop reason: SDUPTHER

## 2020-10-07 RX ORDER — ATORVASTATIN CALCIUM 10 MG/1
10 TABLET, FILM COATED ORAL DAILY
Qty: 90 TAB | Refills: 3 | Status: SHIPPED | OUTPATIENT
Start: 2020-10-07 | End: 2021-02-16 | Stop reason: SDUPTHER

## 2020-10-07 RX ORDER — SIMVASTATIN 10 MG
TABLET ORAL
COMMUNITY
End: 2020-10-07

## 2020-10-07 SDOH — HEALTH STABILITY: MENTAL HEALTH: HOW OFTEN DO YOU HAVE A DRINK CONTAINING ALCOHOL?: MONTHLY OR LESS

## 2020-10-07 SDOH — HEALTH STABILITY: MENTAL HEALTH: HOW MANY STANDARD DRINKS CONTAINING ALCOHOL DO YOU HAVE ON A TYPICAL DAY?: 1 OR 2

## 2020-10-07 SDOH — HEALTH STABILITY: MENTAL HEALTH: HOW OFTEN DO YOU HAVE 6 OR MORE DRINKS ON ONE OCCASION?: NEVER

## 2020-10-07 ASSESSMENT — PATIENT HEALTH QUESTIONNAIRE - PHQ9: CLINICAL INTERPRETATION OF PHQ2 SCORE: 0

## 2020-10-07 NOTE — PATIENT INSTRUCTIONS
Get blood work done-then follow back up.    Take Lantus at same time daily.    Check blood sugar at least before meals and at bedtime. Try to eat on regular schedule.     Due for eye exam.
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
## 2020-10-10 LAB
25(OH)D3+25(OH)D2 SERPL-MCNC: 29.2 NG/ML (ref 30–100)
ALBUMIN SERPL-MCNC: 4.7 G/DL (ref 3.8–4.9)
ALBUMIN/CREAT UR: 13 MG/G CREAT (ref 0–29)
ALBUMIN/GLOB SERPL: 1.9 {RATIO} (ref 1.2–2.2)
ALP SERPL-CCNC: 104 IU/L (ref 39–117)
ALT SERPL-CCNC: 26 IU/L (ref 0–32)
AST SERPL-CCNC: 37 IU/L (ref 0–40)
BILIRUB SERPL-MCNC: 0.4 MG/DL (ref 0–1.2)
BUN SERPL-MCNC: 25 MG/DL (ref 6–24)
BUN/CREAT SERPL: 29 (ref 9–23)
CALCIUM SERPL-MCNC: 9.4 MG/DL (ref 8.7–10.2)
CHLORIDE SERPL-SCNC: 101 MMOL/L (ref 96–106)
CHOLEST SERPL-MCNC: 138 MG/DL (ref 100–199)
CO2 SERPL-SCNC: 23 MMOL/L (ref 20–29)
CREAT SERPL-MCNC: 0.86 MG/DL (ref 0.57–1)
CREAT UR-MCNC: 77.8 MG/DL
GLOBULIN SER CALC-MCNC: 2.5 G/DL (ref 1.5–4.5)
GLUCOSE SERPL-MCNC: 127 MG/DL (ref 65–99)
HCV AB S/CO SERPL IA: <0.1 S/CO RATIO (ref 0–0.9)
HDLC SERPL-MCNC: 48 MG/DL
LABORATORY COMMENT REPORT: NORMAL
LDLC SERPL CALC-MCNC: 71 MG/DL (ref 0–99)
MICROALBUMIN UR-MCNC: 9.9 UG/ML
POTASSIUM SERPL-SCNC: 4.8 MMOL/L (ref 3.5–5.2)
PROT SERPL-MCNC: 7.2 G/DL (ref 6–8.5)
SODIUM SERPL-SCNC: 139 MMOL/L (ref 134–144)
T3 SERPL-MCNC: 101 NG/DL (ref 71–180)
T4 FREE SERPL-MCNC: 1.83 NG/DL (ref 0.82–1.77)
THYROPEROXIDASE AB SERPL-ACNC: 79 IU/ML (ref 0–34)
TRIGL SERPL-MCNC: 104 MG/DL (ref 0–149)
TSH SERPL DL<=0.005 MIU/L-ACNC: 1.86 UIU/ML (ref 0.45–4.5)
VLDLC SERPL CALC-MCNC: 19 MG/DL (ref 5–40)

## 2020-10-11 ENCOUNTER — HOSPITAL ENCOUNTER (EMERGENCY)
Facility: MEDICAL CENTER | Age: 60
End: 2020-10-11
Attending: EMERGENCY MEDICINE
Payer: MEDICARE

## 2020-10-11 VITALS
DIASTOLIC BLOOD PRESSURE: 63 MMHG | SYSTOLIC BLOOD PRESSURE: 96 MMHG | HEIGHT: 62 IN | WEIGHT: 132 LBS | BODY MASS INDEX: 24.29 KG/M2 | HEART RATE: 63 BPM | RESPIRATION RATE: 16 BRPM | OXYGEN SATURATION: 98 % | TEMPERATURE: 97.2 F

## 2020-10-11 DIAGNOSIS — F41.0 PANIC ATTACK: ICD-10-CM

## 2020-10-11 DIAGNOSIS — F41.9 ANXIETY: ICD-10-CM

## 2020-10-11 PROCEDURE — A9270 NON-COVERED ITEM OR SERVICE: HCPCS | Performed by: EMERGENCY MEDICINE

## 2020-10-11 PROCEDURE — 700102 HCHG RX REV CODE 250 W/ 637 OVERRIDE(OP): Performed by: EMERGENCY MEDICINE

## 2020-10-11 PROCEDURE — 99284 EMERGENCY DEPT VISIT MOD MDM: CPT

## 2020-10-11 RX ORDER — LORAZEPAM 2 MG/1
2 TABLET ORAL ONCE
Status: COMPLETED | OUTPATIENT
Start: 2020-10-11 | End: 2020-10-11

## 2020-10-11 RX ORDER — DIPHENHYDRAMINE HYDROCHLORIDE 50 MG/ML
25 INJECTION INTRAMUSCULAR; INTRAVENOUS ONCE
Status: DISCONTINUED | OUTPATIENT
Start: 2020-10-11 | End: 2020-10-11 | Stop reason: HOSPADM

## 2020-10-11 RX ORDER — HALOPERIDOL 5 MG/ML
2.5 INJECTION INTRAMUSCULAR ONCE
Status: DISCONTINUED | OUTPATIENT
Start: 2020-10-11 | End: 2020-10-11 | Stop reason: HOSPADM

## 2020-10-11 RX ADMIN — LORAZEPAM 2 MG: 2 TABLET ORAL at 02:02

## 2020-10-11 ASSESSMENT — ENCOUNTER SYMPTOMS
ABDOMINAL PAIN: 0
NAUSEA: 0
VOMITING: 0
NERVOUS/ANXIOUS: 1
SHORTNESS OF BREATH: 0

## 2020-10-11 NOTE — ED PROVIDER NOTES
ED Provider Note    Scribed for Gema Porter M.D. by Casey Ty. 10/11/2020, 1:44 AM.    Primary care provider: SANDRA Mo  Means of arrival: Walk-in  History obtained from: Patient  History limited by: None    CHIEF COMPLAINT  Chief Complaint   Patient presents with   • Anxiety       HPI  Poonam Mayo is a 59 y.o. female who presents to the Emergency Department for acute, moderate anxiety onset 5 hours ago. Patient reports that around 7:30 she began to feel increasingly anxious, denies any acute stressors. Prior to the episode she states that she took several vitamins and is concerned it might be related. She smokes around a pack of week and drinks alcohol weekly.  She reports that she drink 2 cocktails in order to try and calm down her anxiety but this did not work and therefore she came in for further management.  Patient has a past medical history including diabetes. She denies any drug usage, fever, or cough. 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  Patient reports that she does not take this as she does not feel it helps her.  Patient denies suicidal or homicidal ideation.      REVIEW OF SYSTEMS  Review of Systems   Respiratory: Negative for shortness of breath.    Cardiovascular: Negative for chest pain.   Gastrointestinal: Negative for abdominal pain, nausea and vomiting.   Psychiatric/Behavioral: The patient is nervous/anxious.    All other systems reviewed and are negative.    PAST MEDICAL HISTORY   has a past medical history of 250.01 (1996), Dupuytren contracture, HTN (hypertension) (3/18/2011), Hyperlipidemia, Hypertension, and Hypothyroid.    SURGICAL HISTORY   has a past surgical history that includes tubal coagulation laparoscopic bilateral (1984); appendectomy; and tonsillectomy.    SOCIAL HISTORY  Social History     Tobacco  "Use   • Smoking status: Current Every Day Smoker     Packs/day: 0.25     Years: 40.00     Pack years: 10.00     Types: Cigarettes     Last attempt to quit: 2016     Years since quittin.3   • Smokeless tobacco: Never Used   Substance Use Topics   • Alcohol use: Yes     Frequency: Monthly or less     Drinks per session: 1 or 2     Binge frequency: Never     Comment: occasional   • Drug use: No      Social History     Substance and Sexual Activity   Drug Use No       FAMILY HISTORY  Family History   Problem Relation Age of Onset   • Lung Disease Father    • Diabetes Son         type 1       CURRENT MEDICATIONS  Current Outpatient Medications   Medication Instructions   • ADULT ASPIRIN LOW STRENGTH PO ADULT ASPIRIN LOW STRENGTH 81 MG ORAL TABLET DISINTEGRATING   • atorvastatin (LIPITOR) 10 mg, Oral, DAILY   • Blood Glucose Monitoring Suppl (PRECISION XTRA MONITOR) KARLA ...   • glucose blood (PRECISION XTRA TEST STRIPS) strip 1 Strip, Other, 3 TIMES DAILY BEFORE MEALS   • insulin aspart (NOVOLOG) 10 Units, Subcutaneous, 3 TIMES DAILY BEFORE MEALS   • insulin aspart (NOVOLOG) 10 Units, Subcutaneous, 3 TIMES DAILY BEFORE MEALS   • INSULIN SYRINGE .5CC/29G 29G X 1/2\" 0.5 ML MISC For use with lantus and novolog as instructed   • Insulin Syringe-Needle U-100 (BD INSULIN SYRINGE ULTRAFINE) 31G X 15/64\" 1 ML Misc 1 Syringe, Subcutaneous, 4 TIMES DAILY - BEFORE MEALS , NIGHTLY, Dx: E10.8   • Insulin Syringes U-100 1 mL Use one syringe to inject insulin three times daily.   • Lancets 1 Each, Does not apply, DAILY   • LANTUS 100 UNIT/ML Solution INJECT 40 UNITS AS INSTRUCTED EVERY DAY.   • levothyroxine (SYNTHROID) 112 mcg, Oral, EVERY DAY   • lisinopril (PRINIVIL) 5 mg, Oral, DAILY        ALLERGIES  No Known Allergies    PHYSICAL EXAM  VITAL SIGNS: /72   Pulse 98   Temp 36.2 °C (97.2 °F) (Temporal)   Resp 14   Ht 1.575 m (5' 2\")   Wt 59.9 kg (132 lb)   SpO2 99%   BMI 24.14 kg/m²   Vitals reviewed by " myself.  Physical Exam  Nursing note and vitals reviewed.  Constitutional: Well-developed and well-nourished. She is very anxious and cannot sit still.  HENT: Head is normocephalic and atraumatic.  Eyes: extra-ocular movements intact  Cardiovascular: Regular rate and regular rhythm. No murmur heard.  Pulmonary/Chest: Breath sounds normal. No wheezes or rales.   Abdominal: Soft and non-tender. No distention.    Musculoskeletal: Extremities exhibit normal range of motion without edema or tenderness.   Neurological: Awake and alert  Skin: Skin is warm and dry. No rash.       DIAGNOSTIC STUDIES  LABS  None    REASSESSMENT    1:44 AM - Patient seen and examined at bedside. Discussed plan of care, including treating her symptoms. Patient agrees to the plan of care.      COURSE & MEDICAL DECISION MAKING  Nursing notes, VS, PMSFHx reviewed in chart.    Patient is a 59-year-old female who comes in for evaluation of anxiety.  Differential diagnosis includes acute stress, anxiety, psychosis NOS.  On physical exam patient is anxious, however her vitals are within normal limits.  She is not a danger to herself or others and therefore legal hold is not indicated.  She is treated with Ativan with some improvement in her symptoms, patient reports she is still feeling anxious and would like some more medication.  Therefore I was going to give her IM Haldol and Benadryl.  However I received a call from nursing staff that patient refused these medications and would like to be discharged as she was starting to feel improved with the Ativan alone.  Therefore patient is reassured and given strict return precautions.  She is then discharged in stable condition.        FINAL IMPRESSION  1. Anxiety    2. Panic attack          Casey MEADOWS), am scribing for, and in the presence of, Gema Porter M.D..    Electronically signed by: Casey Median), 10/11/2020    Gema MEADOWS M.D. personally performed the services  described in this documentation, as scribed by Casey Ty in my presence, and it is both accurate and complete. E.    The note accurately reflects work and decisions made by me.  Gema Porter M.D.  10/11/2020  4:17 AM

## 2020-10-11 NOTE — ED TRIAGE NOTES
Anxiety, restless, pacing around in the triage room.  Started about 1930. Symptoms have improved from when they first started.  Patient reports that she was having trouble catching her breath.  No changes in routine or stressors.  Patient reports to taking vitamins but has taken them before.

## 2020-10-14 ENCOUNTER — HOSPITAL ENCOUNTER (EMERGENCY)
Facility: MEDICAL CENTER | Age: 60
End: 2020-10-14
Attending: EMERGENCY MEDICINE
Payer: MEDICARE

## 2020-10-14 VITALS
HEIGHT: 62 IN | SYSTOLIC BLOOD PRESSURE: 152 MMHG | RESPIRATION RATE: 16 BRPM | HEART RATE: 89 BPM | OXYGEN SATURATION: 97 % | DIASTOLIC BLOOD PRESSURE: 110 MMHG | WEIGHT: 136.47 LBS | TEMPERATURE: 98 F | BODY MASS INDEX: 25.11 KG/M2

## 2020-10-14 DIAGNOSIS — M62.838 MUSCLE SPASM: ICD-10-CM

## 2020-10-14 DIAGNOSIS — N30.90 CYSTITIS: ICD-10-CM
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
## 2020-10-14 DIAGNOSIS — S30.861A INSECT BITE OF ABDOMINAL WALL, INITIAL ENCOUNTER: ICD-10-CM

## 2020-10-14 LAB
ALBUMIN SERPL BCP-MCNC: 4.6 G/DL (ref 3.2–4.9)
ALBUMIN/GLOB SERPL: 1.5 G/DL
ALP SERPL-CCNC: 99 U/L (ref 30–99)
ALT SERPL-CCNC: 20 U/L (ref 2–50)
ANION GAP SERPL CALC-SCNC: 11 MMOL/L (ref 7–16)
ANISOCYTOSIS BLD QL SMEAR: ABNORMAL
APPEARANCE UR: CLEAR
AST SERPL-CCNC: 29 U/L (ref 12–45)
BACTERIA #/AREA URNS HPF: ABNORMAL /HPF
BASOPHILS # BLD AUTO: 0 % (ref 0–1.8)
BASOPHILS # BLD: 0 K/UL (ref 0–0.12)
BILIRUB SERPL-MCNC: 0.3 MG/DL (ref 0.1–1.5)
BILIRUB UR QL STRIP.AUTO: NEGATIVE
BUN SERPL-MCNC: 16 MG/DL (ref 8–22)
CALCIUM SERPL-MCNC: 9.9 MG/DL (ref 8.5–10.5)
CHLORIDE SERPL-SCNC: 103 MMOL/L (ref 96–112)
CO2 SERPL-SCNC: 27 MMOL/L (ref 20–33)
COLOR UR: YELLOW
CREAT SERPL-MCNC: 0.8 MG/DL (ref 0.5–1.4)
EOSINOPHIL # BLD AUTO: 0.34 K/UL (ref 0–0.51)
EOSINOPHIL NFR BLD: 4.3 % (ref 0–6.9)
EPI CELLS #/AREA URNS HPF: NEGATIVE /HPF
ERYTHROCYTE [DISTWIDTH] IN BLOOD BY AUTOMATED COUNT: 40.6 FL (ref 35.9–50)
GLOBULIN SER CALC-MCNC: 3.1 G/DL (ref 1.9–3.5)
GLUCOSE SERPL-MCNC: 114 MG/DL (ref 65–99)
GLUCOSE UR STRIP.AUTO-MCNC: NEGATIVE MG/DL
HCT VFR BLD AUTO: 43.5 % (ref 37–47)
HGB BLD-MCNC: 14.2 G/DL (ref 12–16)
HYALINE CASTS #/AREA URNS LPF: ABNORMAL /LPF
KETONES UR STRIP.AUTO-MCNC: NEGATIVE MG/DL
LEUKOCYTE ESTERASE UR QL STRIP.AUTO: ABNORMAL
LYMPHOCYTES # BLD AUTO: 2.06 K/UL (ref 1–4.8)
LYMPHOCYTES NFR BLD: 26.1 % (ref 22–41)
MANUAL DIFF BLD: NORMAL
MCH RBC QN AUTO: 29.6 PG (ref 27–33)
MCHC RBC AUTO-ENTMCNC: 32.6 G/DL (ref 33.6–35)
MCV RBC AUTO: 90.8 FL (ref 81.4–97.8)
MICRO URNS: ABNORMAL
MICROCYTES BLD QL SMEAR: ABNORMAL
MONOCYTES # BLD AUTO: 0.41 K/UL (ref 0–0.85)
MONOCYTES NFR BLD AUTO: 5.2 % (ref 0–13.4)
MORPHOLOGY BLD-IMP: NORMAL
MYELOCYTES NFR BLD MANUAL: 0.9 %
NEUTROPHILS # BLD AUTO: 4.95 K/UL (ref 2–7.15)
NEUTROPHILS NFR BLD: 62.6 % (ref 44–72)
NITRITE UR QL STRIP.AUTO: NEGATIVE
NRBC # BLD AUTO: 0 K/UL
NRBC BLD-RTO: 0 /100 WBC
PH UR STRIP.AUTO: 5 [PH] (ref 5–8)
PLATELET # BLD AUTO: 387 K/UL (ref 164–446)
PLATELET BLD QL SMEAR: NORMAL
PMV BLD AUTO: 9.1 FL (ref 9–12.9)
POLYCHROMASIA BLD QL SMEAR: NORMAL
POTASSIUM SERPL-SCNC: 4 MMOL/L (ref 3.6–5.5)
PROT SERPL-MCNC: 7.7 G/DL (ref 6–8.2)
PROT UR QL STRIP: NEGATIVE MG/DL
RBC # BLD AUTO: 4.79 M/UL (ref 4.2–5.4)
RBC # URNS HPF: ABNORMAL /HPF
RBC BLD AUTO: PRESENT
RBC UR QL AUTO: NEGATIVE
SODIUM SERPL-SCNC: 141 MMOL/L (ref 135–145)
SP GR UR STRIP.AUTO: 1
UROBILINOGEN UR STRIP.AUTO-MCNC: 0.2 MG/DL
WBC # BLD AUTO: 7.9 K/UL (ref 4.8–10.8)
WBC #/AREA URNS HPF: ABNORMAL /HPF

## 2020-10-14 PROCEDURE — 96374 THER/PROPH/DIAG INJ IV PUSH: CPT

## 2020-10-14 PROCEDURE — 85027 COMPLETE CBC AUTOMATED: CPT

## 2020-10-14 PROCEDURE — 80053 COMPREHEN METABOLIC PANEL: CPT

## 2020-10-14 PROCEDURE — 99284 EMERGENCY DEPT VISIT MOD MDM: CPT

## 2020-10-14 PROCEDURE — 85007 BL SMEAR W/DIFF WBC COUNT: CPT

## 2020-10-14 PROCEDURE — 81001 URINALYSIS AUTO W/SCOPE: CPT

## 2020-10-14 PROCEDURE — 700111 HCHG RX REV CODE 636 W/ 250 OVERRIDE (IP): Performed by: EMERGENCY MEDICINE

## 2020-10-14 RX ORDER — CYCLOBENZAPRINE HCL 10 MG
10 TABLET ORAL 3 TIMES DAILY PRN
Qty: 20 TAB | Refills: 0 | Status: SHIPPED | OUTPATIENT
Start: 2020-10-14 | End: 2021-12-08

## 2020-10-14 RX ORDER — CEPHALEXIN 500 MG/1
500 CAPSULE ORAL 3 TIMES DAILY
Qty: 21 CAP | Refills: 0 | Status: SHIPPED | OUTPATIENT
Start: 2020-10-14 | End: 2020-10-21

## 2020-10-14 RX ORDER — DIAZEPAM 5 MG/ML
5 INJECTION, SOLUTION INTRAMUSCULAR; INTRAVENOUS ONCE
Status: COMPLETED | OUTPATIENT
Start: 2020-10-14 | End: 2020-10-14

## 2020-10-14 RX ADMIN — DIAZEPAM 5 MG: 5 INJECTION, SOLUTION INTRAMUSCULAR; INTRAVENOUS at 19:55

## 2020-10-14 ASSESSMENT — LIFESTYLE VARIABLES
DO YOU DRINK ALCOHOL: NO
DOES PATIENT WANT TO STOP DRINKING: NO

## 2020-10-15 NOTE — ED NOTES
Discharge instructions given to pt and discussed, paper prescriptions given to pt.  Questions and concerns addressed and answered.  Pt aware to follow-up with MD appointment.  Heart monitor and IV removed.  Pt has all belongings at discharge. Pt walked to nurses station with steady gait and stated she needed a wheelchair to dc. Wheelchair provided and wheeled to front entrance to be driven home by .

## 2020-10-15 NOTE — ED PROVIDER NOTES
"ED Provider Note    CHIEF COMPLAINT  Chief Complaint   Patient presents with   • Bug Bite     L groin x1 week. No drainage, pain, swelling.       HPI  Poonam Torrie Mayo is a 59 y.o. female who presents for evaluation of multiple complaints.  First she reports a \"spider bite\" to her left lower abdomen that has been present for 1 week.  She states there is no pain or drainage associated with it, no fever.  However starting today she is began to have muscle spasms that she reports seems to manifest with back arching, she reports these are very uncomfortable when they happen and 1 of these events occurred during my examination.  Additionally she has \"numbness\" of her stomach and kidneys.  The patient is concerned that all of the symptoms are related to the spider bite.  She is not short of breath although describes discomfort in her chest when these muscle spasms hit.  No focal weakness of the extremities.  History of hypertension, hyperlipidemia and anxiety.  Most recently evaluated emergency department 3 days ago with complaints of anxiety.  He states this is different.    REVIEW OF SYSTEMS  Negative for fever, rash, dyspnea, abdominal pain, nausea, vomiting, diarrhea, headache, focal weakness, focal tingling. All other systems are negative.     PAST MEDICAL HISTORY  Past Medical History:   Diagnosis Date   • 1996   • Dupuytren contracture     bilateral hands   • HTN (hypertension) 3/18/2011   • Hyperlipidemia    • Hypertension    • Hypothyroid        FAMILY HISTORY  Family History   Problem Relation Age of Onset   • Lung Disease Father    • Diabetes Son         type 1       SOCIAL HISTORY  Social History     Tobacco Use   • Smoking status: Current Every Day Smoker     Packs/day: 0.25     Years: 40.00     Pack years: 10.00     Types: Cigarettes     Last attempt to quit: 2016     Years since quittin.3   • Smokeless tobacco: Never Used   Substance Use Topics   • Alcohol use: Yes     Frequency: Monthly or " "less     Drinks per session: 1 or 2     Binge frequency: Never     Comment: occasional   • Drug use: No       SURGICAL HISTORY  Past Surgical History:   Procedure Laterality Date   • TUBAL COAGULATION LAPAROSCOPIC BILATERAL  1984   • APPENDECTOMY     • TONSILLECTOMY         CURRENT MEDICATIONS  I personally reviewed the medication list in the charting documentation.     ALLERGIES  No Known Allergies    MEDICAL RECORD  I have reviewed patient's medical record and pertinent results are listed above.      PHYSICAL EXAM  VITAL SIGNS: /80   Pulse 92   Temp 36.9 °C (98.5 °F) (Temporal)   Resp 16   Ht 1.575 m (5' 2\")   Wt 61.9 kg (136 lb 7.4 oz)   SpO2 95%   Breastfeeding No   BMI 24.96 kg/m²    Constitutional: Well appearing patient in no acute distress.  Not toxic, nor ill in appearance.  Once during examination the patient reported that an episode is coming on, she then experienced arching of her back and extension of her extremities.  This lasted for 3 to 5 seconds, resolved spontaneously and there was no alteration in her level of awareness or consciousness at that time.  HENT: Normocephalic, no evidence of acute trauma.  Eyes: No scleral icterus. Normal conjunctiva   Neck: Supple, comfortable, nonpainful range of motion.   Cardiovascular: Regular heart rate and rhythm.   Thorax & Lungs: Chest is nontender.  Lungs are clear to auscultation with good air movement bilaterally.  No wheeze, rhonchi, nor rales.   Abdomen: Soft, with no tenderness, rebound nor guarding.  There is a small erythematous lesion to the left lower quadrant without drainage.  No fluctuance.  Small superficial ulceration in the center.  Skin: Warm, dry. No rash appreciated  Extremities/Musculoskeletal: No sign of trauma. No asymmetric calf tenderness, erythema or edema. Normal range of motion   Neurologic: Alert & oriented. No focal deficits observed.   Psychiatric: Normal affect appropriate for the clinical situation.    DIAGNOSTIC " STUDIES / PROCEDURES    LABS/EKGs  Results for orders placed or performed during the hospital encounter of 10/14/20   CBC WITH DIFFERENTIAL   Result Value Ref Range    WBC 7.9 4.8 - 10.8 K/uL    RBC 4.79 4.20 - 5.40 M/uL    Hemoglobin 14.2 12.0 - 16.0 g/dL    Hematocrit 43.5 37.0 - 47.0 %    MCV 90.8 81.4 - 97.8 fL    MCH 29.6 27.0 - 33.0 pg    MCHC 32.6 (L) 33.6 - 35.0 g/dL    RDW 40.6 35.9 - 50.0 fL    Platelet Count 387 164 - 446 K/uL    MPV 9.1 9.0 - 12.9 fL    Neutrophils-Polys 62.60 44.00 - 72.00 %    Lymphocytes 26.10 22.00 - 41.00 %    Monocytes 5.20 0.00 - 13.40 %    Eosinophils 4.30 0.00 - 6.90 %    Basophils 0.00 0.00 - 1.80 %    Nucleated RBC 0.00 /100 WBC    Neutrophils (Absolute) 4.95 2.00 - 7.15 K/uL    Lymphs (Absolute) 2.06 1.00 - 4.80 K/uL    Monos (Absolute) 0.41 0.00 - 0.85 K/uL    Eos (Absolute) 0.34 0.00 - 0.51 K/uL    Baso (Absolute) 0.00 0.00 - 0.12 K/uL    NRBC (Absolute) 0.00 K/uL    Anisocytosis 1+     Microcytosis 1+    Comp Metabolic Panel   Result Value Ref Range    Sodium 141 135 - 145 mmol/L    Potassium 4.0 3.6 - 5.5 mmol/L    Chloride 103 96 - 112 mmol/L    Co2 27 20 - 33 mmol/L    Anion Gap 11.0 7.0 - 16.0    Glucose 114 (H) 65 - 99 mg/dL    Bun 16 8 - 22 mg/dL    Creatinine 0.80 0.50 - 1.40 mg/dL    Calcium 9.9 8.5 - 10.5 mg/dL    AST(SGOT) 29 12 - 45 U/L    ALT(SGPT) 20 2 - 50 U/L    Alkaline Phosphatase 99 30 - 99 U/L    Total Bilirubin 0.3 0.1 - 1.5 mg/dL    Albumin 4.6 3.2 - 4.9 g/dL    Total Protein 7.7 6.0 - 8.2 g/dL    Globulin 3.1 1.9 - 3.5 g/dL    A-G Ratio 1.5 g/dL   URINALYSIS,CULTURE IF INDICATED    Specimen: Urine   Result Value Ref Range    Color Yellow     Character Clear     Specific Gravity 1.005 <1.035    Ph 5.0 5.0 - 8.0    Glucose Negative Negative mg/dL    Ketones Negative Negative mg/dL    Protein Negative Negative mg/dL    Bilirubin Negative Negative    Urobilinogen, Urine 0.2 Negative    Nitrite Negative Negative    Leukocyte Esterase Moderate (A) Negative  "   Occult Blood Negative Negative    Micro Urine Req Microscopic    ESTIMATED GFR   Result Value Ref Range    GFR If African American >60 >60 mL/min/1.73 m 2    GFR If Non African American >60 >60 mL/min/1.73 m 2   DIFFERENTIAL MANUAL   Result Value Ref Range    Myelocytes 0.90 %    Manual Diff Status PERFORMED    PERIPHERAL SMEAR REVIEW   Result Value Ref Range    Peripheral Smear Review see below    PLATELET ESTIMATE   Result Value Ref Range    Plt Estimation Normal    MORPHOLOGY   Result Value Ref Range    RBC Morphology Present     Polychromia 2+    URINE MICROSCOPIC (W/UA)   Result Value Ref Range    WBC 5-10 (A) /hpf    RBC 0-2 /hpf    Bacteria Many (A) None /hpf    Epithelial Cells Negative /hpf    Hyaline Cast 0-2 /lpf       COURSE & MEDICAL DECISION MAKING  I have reviewed any medical record information, laboratory studies and radiographic results as noted above.  Differential diagnoses includes: Electrolyte abnormality, anemia, UTI, eversion disorder    Encounter Summary: This is a 59 y.o. female with multiple complaints that she is concerned are arising from a spider bite, there is a small nonspecific lesion on her left lower abdomen that does not seem to be overtly infected, certainly not an abscess.  But associated with this she has been experiencing some episodic muscle spasming that manifests as back arching and extremity extension, she also feels numb in her stomach and kidneys but is not having any dysuria, hematuria or malodorous urine.  Will check blood work and urinalysis, administer some Valium and she will be reevaluated ------- blood work is unremarkable.  The patient ultimately had a delay in obtaining her urinalysis and was signed out to my partner, urinalysis comes back concerning for cystitis and she was treated with Keflex.  The patient had many bizarre episodes of \"spasming and seizures\" but was able to walk around without difficulty never truly had a seizure.  Will prescribe her some " Flexeril.  She will follow-up with her primary care provider.  Strict return instructions discussed    DISPOSITION: Charge home in stable condition      FINAL IMPRESSION  1. Insect bite of abdominal wall, initial encounter    2. Muscle spasm    3. Cystitis           This dictation was created using voice recognition software. The accuracy of the dictation is limited to the abilities of the software. I expect there may be some errors of grammar and possibly content. The nursing notes were reviewed and certain aspects of this information were incorporated into this note.    Electronically signed by: Peter Han M.D., 10/14/2020 7:13 PM

## 2020-10-15 NOTE — ED TRIAGE NOTES
"Chief Complaint   Patient presents with   • Bug Bite     L groin x1 week. No drainage, pain, swelling.     /80   Pulse 92   Temp 36.9 °C (98.5 °F) (Temporal)   Resp 16   Ht 1.575 m (5' 2\")   Wt 61.9 kg (136 lb 7.4 oz)   SpO2 95%   Breastfeeding No   BMI 24.96 kg/m²     Pt ambulated into triage, mask in place. VS as above, NAD, encouraged to return to the triage nurse or tech with any new complaints or symptoms.  "

## 2020-10-15 NOTE — ED NOTES
"Pt's  states \"she's having multiple seizures and needs her seizure medication.\" One minute later, pt is seen walking to the restroom in no apparent distress with a steady gait.   "

## 2020-10-15 NOTE — ED NOTES
"Pt ambulated to yellow 62, now walking to restroom  Here for \"muscle spasm\" due to spider bite.   Small open wound left suprapubic area.   "

## 2020-10-16 DIAGNOSIS — G25.81 RLS (RESTLESS LEGS SYNDROME): ICD-10-CM

## 2020-10-16 RX ORDER — GABAPENTIN 100 MG/1
200 CAPSULE ORAL NIGHTLY PRN
Qty: 60 CAP | Refills: 0 | Status: SHIPPED | OUTPATIENT
Start: 2020-10-16 | End: 2021-02-16 | Stop reason: SDUPTHER

## 2020-10-28 ENCOUNTER — OFFICE VISIT (OUTPATIENT)
Dept: MEDICAL GROUP | Facility: MEDICAL CENTER | Age: 60
End: 2020-10-28
Payer: MEDICARE

## 2020-10-28 VITALS
HEIGHT: 62 IN | DIASTOLIC BLOOD PRESSURE: 58 MMHG | WEIGHT: 132 LBS | OXYGEN SATURATION: 97 % | BODY MASS INDEX: 24.29 KG/M2 | SYSTOLIC BLOOD PRESSURE: 96 MMHG | TEMPERATURE: 97.5 F | HEART RATE: 100 BPM

## 2020-10-28 DIAGNOSIS — E55.9 VITAMIN D DEFICIENCY: ICD-10-CM

## 2020-10-28 DIAGNOSIS — R76.8 ANTI-TPO ANTIBODIES PRESENT: ICD-10-CM

## 2020-10-28 DIAGNOSIS — E78.5 DYSLIPIDEMIA: ICD-10-CM

## 2020-10-28 DIAGNOSIS — E03.9 HYPOTHYROIDISM, UNSPECIFIED TYPE: ICD-10-CM

## 2020-10-28 DIAGNOSIS — E10.8 TYPE 1 DIABETES MELLITUS WITH COMPLICATION (HCC): ICD-10-CM

## 2020-10-28 PROCEDURE — 99214 OFFICE O/P EST MOD 30 MIN: CPT | Performed by: NURSE PRACTITIONER

## 2020-10-28 ASSESSMENT — FIBROSIS 4 INDEX: FIB4 SCORE: 0.99

## 2020-10-28 NOTE — PROGRESS NOTES
Chief Complaint   Patient presents with   • Lab Results     DOS: 10/14/2020       Subjective:     HPI:     Pamela Centeno is a 59 y.o. female here to discuss the evaluation and management of:    Patient presents today for recent follow-up from the emergency room.  Patient did present to the emergency room on October 11th for anxiety.  Patient then presented several days later on October 14th for a presumed spider bite.  She states that she started to have muscle spasms from her waist down.  Apparently her back was arcing and she was very uncomfortable.  She was very concerned that this was related to the spider bite and having some sort of reaction to this.  She was not having a seizure.  Based on exam from the emergency room it does not appear as if the small lesion on her left lower abdomen was infected.  No abscess was formed.  For her episodic muscle spasms she was given Valium.  She was treated for cystitis with Keflex.  She was also prescribed Flexeril which apparently was helpful.  Patient states that she has not had any symptoms since that ER visit.      She is also here to review her most recent labs.  She been lost to follow-up she was living in [place] for over a year.    1. Anti-TPO antibodies present  Found on most recent labs.  Results for PAMELA CENTENO (MRN [id number]) as of 11/2/2020 05:15   Ref. Range 10/9/2020 05:44   TSH Latest Ref Range: 0.450 - 4.500 uIU/mL 1.860   Free T-4 Latest Ref Range: 0.82 - 1.77 ng/dL 1.83 (H)   T3 Latest Ref Range: 71 - 180 ng/dL 101   Results for PAMELA CENTENO (MRN [id number]) as of 11/2/2020 05:15   Ref. Range 10/9/2020 05:44   Microsomal -Tpo- Abs Latest Ref Range: 0 - 34 IU/mL 79 (H)         2. Hypothyroidism, unspecified type  Reports compliance with her levothyroxine 112 mcg daily.    3. Type 1 diabetes mellitus with complication (HCC)  Not well controlled.  Last A1c 9.8%.  Patient is somewhat erratic with her insulin dosing.  She also does not have a  regular routine of nutrition.  She does not check her sugars more than 1-2 times a day.  Due for retinal screening.    4. Vitamin D deficiency  Suboptimal on most recent blood work.  Recommend taking OTC vitamin D3 for this.    5.  Dyslipidemia  Reports compliance with her atorvastatin.  Denies myalgias.    Results for PAMELA CENTENO (MRN [id number]) as of 11/2/2020 05:16   Ref. Range 10/9/2020 05:44   Cholesterol,Tot Latest Ref Range: 100 - 199 mg/dL 138   Triglycerides Latest Ref Range: 0 - 149 mg/dL 104   HDL Latest Ref Range: >39 mg/dL 48   LDL Chol Calc ([facility]) Latest Ref Range: 0 - 99 mg/dL 71   VLDL Cholesterol Calc Latest Ref Range: 5 - 40 mg/dL 19       ROS:  Denies any Headache, Blurred Vision, Confusion, Chest pain,  Shortness of breath,  Abdominal pain, Changes of bowel or bladder, Lower ext edema, Fevers, Nights sweats, Weight Changes, Focal weakness or numbness.  And all other systems reviewed and are all negative. POSITIVE FOR n/a        Current Outpatient Medications:   •  gabapentin (NEURONTIN) 100 MG Cap, Take 2 Caps by mouth at bedtime as needed., Disp: 60 Cap, Rfl: 0  •  cyclobenzaprine (FLEXERIL) 10 mg Tab, Take 1 Tab by mouth 3 times a day as needed., Disp: 20 Tab, Rfl: 0  •  ADULT ASPIRIN LOW STRENGTH PO, ADULT ASPIRIN LOW STRENGTH 81 MG ORAL TABLET DISINTEGRATING, Disp: , Rfl:   •  lisinopril (PRINIVIL) 5 MG Tab, Take 1 Tab by mouth every day., Disp: 90 Tab, Rfl: 3  •  atorvastatin (LIPITOR) 10 MG Tab, Take 1 Tab by mouth every day., Disp: 90 Tab, Rfl: 3  •  levothyroxine (SYNTHROID) 112 MCG Tab, Take 1 Tab by mouth every day., Disp: 90 Tab, Rfl: 0  •  Lancets, 1 Each by Does not apply route every day., Disp: 30 Each, Rfl: 3  •  insulin aspart (NOVOLOG) 100 UNIT/ML Solution, Inject 10 Units as instructed 3 times a day before meals., Disp: 3 Vial, Rfl: 3  •  LANTUS 100 UNIT/ML Solution, INJECT 40 UNITS AS INSTRUCTED EVERY DAY., Disp: 40 mL, Rfl: 3  •  Insulin Syringe-Needle U-100 (BD INSULIN  "SYRINGE ULTRAFINE) 31G X 15/64\" 1 ML Misc, Inject 1 Syringe as instructed 4 Times a Day,Before Meals and at Bedtime. Dx: E10.8, Disp: 360 Each, Rfl: 11  •  Insulin Syringes U-100 1 mL, Use one syringe to inject insulin three times daily., Disp: 100 Each, Rfl: 11  •  insulin aspart (NOVOLOG) 100 UNIT/ML Solution, Inject 10 Units as instructed 3 times a day before meals., Disp: 10 mL, Rfl: 9  •  INSULIN SYRINGE .5CC/29G 29G X 1/2\" 0.5 ML MISC, For use with lantus and novolog as instructed, Disp: 150 Each, Rfl: 3  •  glucose blood (PRECISION XTRA TEST STRIPS) strip, 1 Strip by Other route 3 times a day before meals., Disp: 100 Strip, Rfl: 6  •  Blood Glucose Monitoring Suppl (PRECISION XTRA MONITOR) KARLA, ..., Disp: 1 Each, Rfl: 0    No Known Allergies    Past Medical History:   Diagnosis Date   • 1996   • Dupuytren contracture     bilateral hands   • HTN (hypertension) 3/18/2011   • Hyperlipidemia    • Hypertension    • Hypothyroid      Past Surgical History:   Procedure Laterality Date   • TUBAL COAGULATION LAPAROSCOPIC BILATERAL     • APPENDECTOMY     • TONSILLECTOMY       Family History   Problem Relation Age of Onset   • Lung Disease Father    • Diabetes Son         type 1     Social History     Socioeconomic History   • Marital status: Single     Spouse name: Not on file   • Number of children: Not on file   • Years of education: Not on file   • Highest education level: Not on file   Occupational History   • Not on file   Social Needs   • Financial resource strain: Not on file   • Food insecurity     Worry: Not on file     Inability: Not on file   • Transportation needs     Medical: Not on file     Non-medical: Not on file   Tobacco Use   • Smoking status: Current Every Day Smoker     Packs/day: 0.25     Years: 40.00     Pack years: 10.00     Types: Cigarettes     Last attempt to quit: 2016     Years since quittin.4   • Smokeless tobacco: Never Used   Substance and Sexual Activity   • Alcohol " "use: Yes     Frequency: Monthly or less     Drinks per session: 1 or 2     Binge frequency: Never     Comment: occasional   • Drug use: No   • Sexual activity: Never   Lifestyle   • Physical activity     Days per week: Not on file     Minutes per session: Not on file   • Stress: Not on file   Relationships   • Social connections     Talks on phone: Not on file     Gets together: Not on file     Attends Gnosticism service: Not on file     Active member of club or organization: Not on file     Attends meetings of clubs or organizations: Not on file     Relationship status: Not on file   • Intimate partner violence     Fear of current or ex partner: Not on file     Emotionally abused: Not on file     Physically abused: Not on file     Forced sexual activity: Not on file   Other Topics Concern   • Not on file   Social History Narrative   • Not on file       Objective:     Vitals: BP (!) 96/58 (BP Location: Right arm, Patient Position: Sitting, BP Cuff Size: Adult)   Pulse 100   Temp 36.4 °C (97.5 °F) (Temporal)   Ht 1.575 m (5' 2\")   Wt 59.9 kg (132 lb)   SpO2 97%   BMI 24.14 kg/m²    General: Alert, pleasant, NAD, mildly anxious  HEENT: Normocephalic.    Skin: Warm, dry, no rashes.  Extremities: No leg edema. No discoloration  Neurological: No tremors  Psych:  Affect/mood is normal, judgement is good, memory is intact, grooming is appropriate.    Assessment/Plan:     Poonam was seen today for lab results.    Diagnoses and all orders for this visit:    Anti-TPO antibodies present  Present on most recent labs.  Recommend follow-up with endocrinology for further work-up.  -     REFERRAL TO ENDOCRINOLOGY    Hypothyroidism, unspecified type  Recommend follow-up with endocrinology for this.  -     REFERRAL TO ENDOCRINOLOGY    Type 1 diabetes mellitus with complication (HCC)  Now controlled A1c 9.8%.  Have discussed multiple times working on better compliance with her medications however has been unsuccessful.  Due for " retinal scan.    Vitamin D deficiency  Slightly suboptimal on most recent labs.  Recommend at least 2000 IUs daily.    Dyslipidemia  Stable.  Tolerating atorvastatin without myalgias.  Last lipid panel October, 2020.        No follow-ups on file.          Anamaria PAYTON.

## 2020-11-05 RX ORDER — INSULIN GLARGINE 100 [IU]/ML
40 INJECTION, SOLUTION SUBCUTANEOUS
Qty: 40 ML | Refills: 3 | Status: SHIPPED | OUTPATIENT
Start: 2020-11-05 | End: 2020-11-06 | Stop reason: SDUPTHER

## 2020-11-06 RX ORDER — INSULIN GLARGINE 100 [IU]/ML
40 INJECTION, SOLUTION SUBCUTANEOUS
Qty: 40 ML | Refills: 3 | Status: SHIPPED | OUTPATIENT
Start: 2020-11-06 | End: 2021-02-16 | Stop reason: SDUPTHER

## 2020-12-22 RX ORDER — LEVOTHYROXINE SODIUM 112 UG/1
112 TABLET ORAL
Qty: 90 TAB | Refills: 1 | Status: SHIPPED | OUTPATIENT
Start: 2020-12-22 | End: 2021-02-16 | Stop reason: SDUPTHER

## 2021-02-16 DIAGNOSIS — E78.5 DYSLIPIDEMIA: ICD-10-CM

## 2021-02-16 DIAGNOSIS — E10.8 TYPE I DIABETES MELLITUS WITH COMPLICATION (HCC): ICD-10-CM

## 2021-02-16 NOTE — TELEPHONE ENCOUNTER
Patient Seen: 10/28/2020 With TATA Mo.  Next Appointment: 2/22/2021 With TATA Mo.  Was the patient seen in the last year in this department? Yes     Does patient have an active prescription for medications requested? Yes     Received Request Via: Patient

## 2021-02-16 NOTE — TELEPHONE ENCOUNTER
Patient requests change in pharmacy. Chart updated and prescription called into     [facility]/pharmacy #[id number] - [place] - [address]  [address] [address]  Phone: [phone] Fax: [phone]

## 2021-02-17 RX ORDER — LEVOTHYROXINE SODIUM 112 UG/1
112 TABLET ORAL
Qty: 90 TABLET | Refills: 2 | Status: SHIPPED | OUTPATIENT
Start: 2021-02-17 | End: 2021-12-08 | Stop reason: SDUPTHER

## 2021-02-17 RX ORDER — ATORVASTATIN CALCIUM 10 MG/1
10 TABLET, FILM COATED ORAL DAILY
Qty: 90 TABLET | Refills: 2 | Status: SHIPPED | OUTPATIENT
Start: 2021-02-17 | End: 2021-12-08 | Stop reason: SDUPTHER

## 2021-02-17 RX ORDER — GABAPENTIN 100 MG/1
200 CAPSULE ORAL NIGHTLY PRN
Qty: 60 CAPSULE | Refills: 2 | Status: SHIPPED | OUTPATIENT
Start: 2021-02-17 | End: 2021-12-08

## 2021-02-17 RX ORDER — INSULIN GLARGINE 100 [IU]/ML
40 INJECTION, SOLUTION SUBCUTANEOUS
Qty: 40 ML | Refills: 2 | Status: SHIPPED | OUTPATIENT
Start: 2021-02-17 | End: 2021-12-08 | Stop reason: SDUPTHER

## 2021-02-17 RX ORDER — LISINOPRIL 5 MG/1
5 TABLET ORAL DAILY
Qty: 90 TABLET | Refills: 2 | Status: SHIPPED | OUTPATIENT
Start: 2021-02-17 | End: 2021-12-08 | Stop reason: SDUPTHER

## 2021-02-22 ENCOUNTER — OFFICE VISIT (OUTPATIENT)
Dept: MEDICAL GROUP | Facility: MEDICAL CENTER | Age: 61
End: 2021-02-22
Payer: MEDICARE

## 2021-02-22 VITALS
HEART RATE: 98 BPM | BODY MASS INDEX: 22.08 KG/M2 | TEMPERATURE: 96.8 F | OXYGEN SATURATION: 98 % | WEIGHT: 120 LBS | DIASTOLIC BLOOD PRESSURE: 62 MMHG | SYSTOLIC BLOOD PRESSURE: 112 MMHG | HEIGHT: 62 IN

## 2021-02-22 DIAGNOSIS — E03.8 HYPOTHYROIDISM DUE TO HASHIMOTO'S THYROIDITIS: ICD-10-CM

## 2021-02-22 DIAGNOSIS — E06.3 HYPOTHYROIDISM DUE TO HASHIMOTO'S THYROIDITIS: ICD-10-CM

## 2021-02-22 DIAGNOSIS — E10.8: ICD-10-CM

## 2021-02-22 DIAGNOSIS — F17.210 CIGARETTE NICOTINE DEPENDENCE WITHOUT COMPLICATION: ICD-10-CM

## 2021-02-22 DIAGNOSIS — E55.9 VITAMIN D DEFICIENCY: Chronic | ICD-10-CM

## 2021-02-22 LAB
HBA1C MFR BLD: 10.4 % (ref 0–5.6)
INT CON NEG: NEGATIVE
INT CON POS: POSITIVE

## 2021-02-22 PROCEDURE — 83036 HEMOGLOBIN GLYCOSYLATED A1C: CPT | Performed by: NURSE PRACTITIONER

## 2021-02-22 PROCEDURE — 99214 OFFICE O/P EST MOD 30 MIN: CPT | Performed by: NURSE PRACTITIONER

## 2021-02-22 RX ORDER — ASPIRIN 325 MG
325 TABLET ORAL EVERY 6 HOURS PRN
COMMUNITY

## 2021-02-22 ASSESSMENT — FIBROSIS 4 INDEX: FIB4 SCORE: 1.01

## 2021-02-22 NOTE — PROGRESS NOTES
"Chief Complaint   Patient presents with   • Diabetes     Fv       Subjective:     HPI:     Poonamluis m Mayo is a 60 y.o. female here to discuss the evaluation and management of:    Type I diabetes with complications (HCC)  Not well controlled. Today's A1c 10.4. having some blurry and difficulty with her vision. Has been reluctant to see Endo in the past. Have discussed with her that I would recommend follow up with Endocrinology at this time as her diabetes is poorly managed.  She denies any numbness/tingling in her feet. No wounds on her feet.  Reports that she does \"eats cookies and milk at night\" so she does not have \"any lows at night.\"    Patient continues to be somewhat erratic with her insulin dosing.  She also does not have a regular routine of nutrition.  She does not check her sugars more than 1-2 times a day.  Overdue for Retinal scan.     Low Vitamin D3  Sub optimal on last labs.   Recommend OTC supplement for this.  States she is taking vitamin D3.    Thyroid  Positive TPO on recent labs. Taking Levothyroxine in the morning.     TSH Latest Ref Range: 0.450 - 4.500 uIU/mL 1.860   Free T-4 Latest Ref Range: 0.82 - 1.77 ng/dL 1.83 (H)   T3 Latest Ref Range: 71 - 180 ng/dL 101   Microsomal -Tpo- Abs Latest Ref Range: 0 - 34 IU/mL 79 (H)     Cholesterol  She states that she has been taking her atorvastatin 10 mg.  No myalgias.    ROS:  Denies any Headache, Blurred Vision, Confusion, Chest pain,  Shortness of breath,  Abdominal pain, Changes of bowel or bladder, Lower ext edema, Fevers, Nights sweats, Weight Changes, Focal weakness or numbness.  And all other systems reviewed and are all negative. POSITIVE FOR blurry vision        Current Outpatient Medications:   •  aspirin (ASA) 325 MG Tab, Take 325 mg by mouth every 6 hours as needed., Disp: , Rfl:   •  lisinopril (PRINIVIL) 5 MG Tab, Take 1 tablet by mouth every day., Disp: 90 tablet, Rfl: 2  •  levothyroxine (SYNTHROID) 112 MCG Tab, Take 1 tablet by " "mouth every day., Disp: 90 tablet, Rfl: 2  •  insulin glargine (LANTUS) 100 UNIT/ML Solution, Inject 40 Units under the skin every day. AS INSTRUCTED, Disp: 40 mL, Rfl: 2  •  atorvastatin (LIPITOR) 10 MG Tab, Take 1 tablet by mouth every day., Disp: 90 tablet, Rfl: 2  •  Lancets, 1 Each by Does not apply route every day., Disp: 30 Each, Rfl: 3  •  insulin aspart (NOVOLOG) 100 UNIT/ML Solution, Inject 10 Units as instructed 3 times a day before meals., Disp: 3 Vial, Rfl: 3  •  Insulin Syringe-Needle U-100 (BD INSULIN SYRINGE ULTRAFINE) 31G X 15/64\" 1 ML Misc, Inject 1 Syringe as instructed 4 Times a Day,Before Meals and at Bedtime. Dx: E10.8, Disp: 360 Each, Rfl: 11  •  Insulin Syringes U-100 1 mL, Use one syringe to inject insulin three times daily., Disp: 100 Each, Rfl: 11  •  INSULIN SYRINGE .5CC/29G 29G X 1/2\" 0.5 ML MISC, For use with lantus and novolog as instructed, Disp: 150 Each, Rfl: 3  •  glucose blood (PRECISION XTRA TEST STRIPS) strip, 1 Strip by Other route 3 times a day before meals., Disp: 100 Strip, Rfl: 6  •  Blood Glucose Monitoring Suppl (PRECISION XTRA MONITOR) KARLA, ..., Disp: 1 Each, Rfl: 0  •  insulin aspart (NOVOLOG) 100 UNIT/ML Solution, Inject 10 Units under the skin 3 times a day before meals. (Patient not taking: Reported on 2/22/2021), Disp: 10 mL, Rfl: 2  •  gabapentin (NEURONTIN) 100 MG Cap, Take 2 Capsules by mouth at bedtime as needed. (Patient not taking: Reported on 2/22/2021), Disp: 60 capsule, Rfl: 2  •  cyclobenzaprine (FLEXERIL) 10 mg Tab, Take 1 Tab by mouth 3 times a day as needed. (Patient not taking: Reported on 2/22/2021), Disp: 20 Tab, Rfl: 0  •  ADULT ASPIRIN LOW STRENGTH PO, ADULT ASPIRIN LOW STRENGTH 81 MG ORAL TABLET DISINTEGRATING, Disp: , Rfl:     No Known Allergies    Past Medical History:   Diagnosis Date   • 250.01 1996   • Dupuytren contracture     bilateral hands   • HTN (hypertension) 3/18/2011   • Hyperlipidemia    • Hypertension    • Hypothyroid      Past " Surgical History:   Procedure Laterality Date   • TUBAL COAGULATION LAPAROSCOPIC BILATERAL     • APPENDECTOMY     • TONSILLECTOMY       Family History   Problem Relation Age of Onset   • Lung Disease Father    • Diabetes Son         type 1     Social History     Socioeconomic History   • Marital status: Single     Spouse name: Not on file   • Number of children: Not on file   • Years of education: Not on file   • Highest education level: Not on file   Occupational History   • Not on file   Tobacco Use   • Smoking status: Current Every Day Smoker     Packs/day: 0.25     Years: 40.00     Pack years: 10.00     Types: Cigarettes     Last attempt to quit: 2016     Years since quittin.7   • Smokeless tobacco: Never Used   Substance and Sexual Activity   • Alcohol use: Yes     Comment: occasional   • Drug use: No   • Sexual activity: Never   Other Topics Concern   • Not on file   Social History Narrative   • Not on file     Social Determinants of Health     Financial Resource Strain:    • Difficulty of Paying Living Expenses:    Food Insecurity:    • Worried About Running Out of Food in the Last Year:    • Ran Out of Food in the Last Year:    Transportation Needs:    • Lack of Transportation (Medical):    • Lack of Transportation (Non-Medical):    Physical Activity:    • Days of Exercise per Week:    • Minutes of Exercise per Session:    Stress:    • Feeling of Stress :    Social Connections:    • Frequency of Communication with Friends and Family:    • Frequency of Social Gatherings with Friends and Family:    • Attends [employer] Services:    • Active Member of Clubs or Organizations:    • Attends Club or Organization Meetings:    • Marital Status:    Intimate Partner Violence:    • Fear of Current or Ex-Partner:    • Emotionally Abused:    • Physically Abused:    • Sexually Abused:        Objective:     Vitals: /62 (BP Location: Right arm, Patient Position: Sitting, BP Cuff Size: Adult)   Pulse 98    "Temp 36 °C (96.8 °F) (Temporal)   Ht 1.575 m (5' 2\")   Wt 54.4 kg (120 lb)   SpO2 98%   BMI 21.95 kg/m²    General: Alert, pleasant, NAD  HEENT: Normocephalic.   Skin: Warm, dry, no rashes.  Extremities: No leg edema. No discoloration  Neurological: No tremors  Psych:  Affect/mood is pressured speech, distracted judgement is good, memory is intact, grooming is appropriate.    Assessment/Plan:     Poonam was seen today for diabetes.    Diagnoses and all orders for this visit:    Type I diabetes with complications (HCC)  Very poorly controlled.  Has been a longstanding problem for the patient.  Have discussed following up with endocrinology for this to manage her diabetes.  She is been very reluctant in the past and has had poor compliance with this.  Needs retinal scan.  Have advised her to schedule an appointment with a optometrist.  -     POCT Hemoglobin A1C  -     REFERRAL TO ENDOCRINOLOGY    Hypothyroidism due to Hashimoto's thyroiditis  Positive TPO antibodies on previous labs.  Recheck thyroid labs.  Continue current regimen at this time.  -     TSH; Future  -     FREE THYROXINE; Future  -     ANTITHYROGLOBULIN AB; Future  -     TRIIDOTHYRONINE; Future    Cigarette nicotine dependence without complication  Discussed with patient the importance of reducing tobacco consumption. Educated patient regarding the effects of tobacco use on cardiovascular system.    Vitamin D deficiency  Supplementing with OTC.        No follow-ups on file.    {I have placed the above orders and discussed them with an approved delegating provider.  The MA is performing the below orders under the direction of Dr. Yaya PAYTON.  "

## 2021-03-04 ENCOUNTER — TELEPHONE (OUTPATIENT)
Dept: MEDICAL GROUP | Facility: MEDICAL CENTER | Age: 61
End: 2021-03-04

## 2021-03-04 DIAGNOSIS — E10.8: ICD-10-CM

## 2021-03-04 NOTE — TELEPHONE ENCOUNTER
FINAL PRIOR AUTHORIZATION STATUS:    1.  Name of Medication & Dose: Novolog 100 Unit/mL     2. Prior Auth Status: Denied.  Reason: Not in formulary; will approve, humalog or Lyumjev    3. Action Taken: Pharmacy Notified: yes Patient Notified: no

## 2021-04-02 ENCOUNTER — OFFICE VISIT (OUTPATIENT)
Dept: MEDICAL GROUP | Facility: MEDICAL CENTER | Age: 61
End: 2021-04-02
Payer: MEDICARE

## 2021-04-02 VITALS
HEIGHT: 62 IN | DIASTOLIC BLOOD PRESSURE: 54 MMHG | TEMPERATURE: 96.2 F | BODY MASS INDEX: 19.95 KG/M2 | SYSTOLIC BLOOD PRESSURE: 94 MMHG | HEART RATE: 99 BPM | OXYGEN SATURATION: 100 % | WEIGHT: 108.4 LBS

## 2021-04-02 DIAGNOSIS — E10.8 TYPE 1 DIABETES MELLITUS WITH COMPLICATION (HCC): Chronic | ICD-10-CM

## 2021-04-02 DIAGNOSIS — R73.9 HYPERGLYCEMIA: ICD-10-CM

## 2021-04-02 DIAGNOSIS — B88.9 INFESTATION: ICD-10-CM

## 2021-04-02 DIAGNOSIS — E10.10 DIABETIC KETOACIDOSIS WITHOUT COMA ASSOCIATED WITH TYPE 1 DIABETES MELLITUS (HCC): ICD-10-CM

## 2021-04-02 LAB — GLUCOSE BLD-MCNC: 361 MG/DL (ref 70–100)

## 2021-04-02 PROCEDURE — 99214 OFFICE O/P EST MOD 30 MIN: CPT | Performed by: NURSE PRACTITIONER

## 2021-04-02 PROCEDURE — 82962 GLUCOSE BLOOD TEST: CPT | Performed by: NURSE PRACTITIONER

## 2021-04-02 ASSESSMENT — FIBROSIS 4 INDEX: FIB4 SCORE: 1.01

## 2021-04-02 NOTE — PROGRESS NOTES
Chief Complaint   Patient presents with   • Emesis     past 4 days, has trouble sleeping. Dizzy, causing her to fall.    • Blood Sugar Problem     pt. took reading this morning 281   • Shortness of Breath       Subjective:     HPI:     Poonam Mayo is a 60 y.o. female here to discuss the evaluation and management of:    Patient presents today with multiple complaints that started about 4 days ago.   Reports has been having emesis for three days in a row, did not throw up yesterday or today. No fevers. No diarrhea.  Feeling dizzy when she gets up and reports she has fallen and denies hitting her head.  She is not very clear if she is been taking her insulin as directed.  Has not been able to eat.  She does mention that she took 42 units of Lantus and 10 Novolog last night. POCT Glucose is 361 now. Somewhat groggy.     Have discussed with patient that I am concerned about her symptoms and have recommended for patient to follow-up in the emergency room at this time for further work-up.  I am concerned about diabetic ketoacidosis for her.  Patient states that she is unable to walk downstairs at this time to the emergency room.  She has called her daughter to come drive her to the emergency room.  States that she will [facility] emergency.  Able to speak with the daughter as I did ask her to come  her mother and walk with her.  Daughter and patient do understand the stroke recommendations.    Infestation  Has been spraying a lot of Raid in her house-precipitating her symptoms.  She is not sure if sprain all this radiated and being indoors without opening any windows could have contributed.  This is to help with ants and roaches.     She has multiple stages of scratches, scabs all over her back as well as her legs.    Has lost weight was 120 pounds on last visit-2/22/2021-now she is 108 pounds.    Has not picked up her Humalog (insurance recently changed formulary).     ROS:  Denies any  "Headache, Blurred Vision, Confusion, Chest pain,  Shortness of breath,  Abdominal pain, Changes of bowel or bladder, Lower ext edema, Fevers, Nights sweats, Weight Changes, Focal weakness or numbness.  And all other systems reviewed and are all negative. POSITIVE FOR see above        Current Outpatient Medications:   •  insulin lispro (HUMALOG) 100 UNIT/ML, Inject 10 Units under the skin 3 times a day before meals., Disp: 10 mL, Rfl: 3  •  aspirin (ASA) 325 MG Tab, Take 325 mg by mouth every 6 hours as needed., Disp: , Rfl:   •  lisinopril (PRINIVIL) 5 MG Tab, Take 1 tablet by mouth every day., Disp: 90 tablet, Rfl: 2  •  levothyroxine (SYNTHROID) 112 MCG Tab, Take 1 tablet by mouth every day., Disp: 90 tablet, Rfl: 2  •  insulin glargine (LANTUS) 100 UNIT/ML Solution, Inject 40 Units under the skin every day. AS INSTRUCTED, Disp: 40 mL, Rfl: 2  •  atorvastatin (LIPITOR) 10 MG Tab, Take 1 tablet by mouth every day., Disp: 90 tablet, Rfl: 2  •  ADULT ASPIRIN LOW STRENGTH PO, ADULT ASPIRIN LOW STRENGTH 81 MG ORAL TABLET DISINTEGRATING, Disp: , Rfl:   •  Lancets, 1 Each by Does not apply route every day., Disp: 30 Each, Rfl: 3  •  Insulin Syringe-Needle U-100 (BD INSULIN SYRINGE ULTRAFINE) 31G X 15/64\" 1 ML Misc, Inject 1 Syringe as instructed 4 Times a Day,Before Meals and at Bedtime. Dx: E10.8, Disp: 360 Each, Rfl: 11  •  Insulin Syringes U-100 1 mL, Use one syringe to inject insulin three times daily., Disp: 100 Each, Rfl: 11  •  INSULIN SYRINGE .5CC/29G 29G X 1/2\" 0.5 ML MISC, For use with lantus and novolog as instructed, Disp: 150 Each, Rfl: 3  •  glucose blood (PRECISION XTRA TEST STRIPS) strip, 1 Strip by Other route 3 times a day before meals., Disp: 100 Strip, Rfl: 6  •  Blood Glucose Monitoring Suppl (PRECISION XTRA MONITOR) KARLA, ..., Disp: 1 Each, Rfl: 0  •  gabapentin (NEURONTIN) 100 MG Cap, Take 2 Capsules by mouth at bedtime as needed. (Patient not taking: Reported on 2/22/2021), Disp: 60 capsule, Rfl: " 2  •  cyclobenzaprine (FLEXERIL) 10 mg Tab, Take 1 Tab by mouth 3 times a day as needed. (Patient not taking: Reported on 2021), Disp: 20 Tab, Rfl: 0    No Known Allergies    Past Medical History:   Diagnosis Date   • 1996   • Dupuytren contracture     bilateral hands   • HTN (hypertension) 3/18/2011   • Hyperlipidemia    • Hypertension    • Hypothyroid      Past Surgical History:   Procedure Laterality Date   • TUBAL COAGULATION LAPAROSCOPIC BILATERAL     • APPENDECTOMY     • TONSILLECTOMY       Family History   Problem Relation Age of Onset   • Lung Disease Father    • Diabetes Son         type 1     Social History     Socioeconomic History   • Marital status: Single     Spouse name: Not on file   • Number of children: Not on file   • Years of education: Not on file   • Highest education level: Not on file   Occupational History   • Not on file   Tobacco Use   • Smoking status: Current Every Day Smoker     Packs/day: 0.25     Years: 40.00     Pack years: 10.00     Types: Cigarettes     Last attempt to quit: 2016     Years since quittin.8   • Smokeless tobacco: Never Used   Substance and Sexual Activity   • Alcohol use: Yes     Comment: occasional   • Drug use: No   • Sexual activity: Never   Other Topics Concern   • Not on file   Social History Narrative   • Not on file     Social Determinants of Health     Financial Resource Strain:    • Difficulty of Paying Living Expenses:    Food Insecurity:    • Worried About Running Out of Food in the Last Year:    • Ran Out of Food in the Last Year:    Transportation Needs:    • Lack of Transportation (Medical):    • Lack of Transportation (Non-Medical):    Physical Activity:    • Days of Exercise per Week:    • Minutes of Exercise per Session:    Stress:    • Feeling of Stress :    Social Connections:    • Frequency of Communication with Friends and Family:    • Frequency of Social Gatherings with Friends and Family:    • Attends [employer] Services:  "   • Active Member of Clubs or Organizations:    • Attends Club or Organization Meetings:    • Marital Status:    Intimate Partner Violence:    • Fear of Current or Ex-Partner:    • Emotionally Abused:    • Physically Abused:    • Sexually Abused:        Objective:     Vitals: BP (!) 94/54 (BP Location: Right arm, Patient Position: Sitting, BP Cuff Size: Adult)   Pulse 99   Temp (!) 35.7 °C (96.2 °F) (Temporal)   Ht 1.575 m (5' 2\")   Wt 49.2 kg (108 lb 6.4 oz)   LMP 03/18/2011   SpO2 100%   BMI 19.83 kg/m²    General: Groggy  pleasant, appears ill  HEENT: Normocephalic.  Skin: Scabs, lesions all over her back and legs  Extremities: No leg edema. No discoloration  Neurological: No tremors  Psych:  Affect/mood is lethargic judgement is good, memory is intact, grooming is unkempt    Assessment/Plan:     Poonam was seen today for emesis, blood sugar problem and shortness of breath.    Diagnoses and all orders for this visit:    Diabetic ketoacidosis without coma associated with type 1 diabetes mellitus (HCC)  I have advised patient to present to the emergency room.  Patient's daughter \"Lanie\" present during this discussion and she has escorted the patient out of the office.    Type 1 diabetes mellitus with complication (HCC)  Not well controlled.  Glucose on in the clinic today 361.  -     POCT Glucose    Hyperglycemia  Chronic problem.  Has not been taking her insulin.    Infestation  This has been an ongoing problem as well.  Patient has multiple dried scabs, various sizes and open wounds on her legs.  Concern for possible secondary skin infection.      No follow-ups on file.    {I have placed the above orders and discussed them with an approved delegating provider.  The MA is performing the below orders under the direction of Dr. Yaya KAUFMANPDuaneRDuaneNDuane  "

## 2021-04-02 NOTE — LETTER
[employer] [facility]  MANDEEP MoP.RDuaneN.  [address] [facility] [address]  [place] [address]  Fax: [phone]   Authorization for Release/Disclosure of   Protected Health Information   Name: PAMELA CENTENO : 1960 SSN: [id number]   Address: [address] [place] [address] Phone:    [phone] (home)    I authorize the entity listed below to release/disclose the PHI below to:   [facility]/Anamaria Wang A.P.R.NDuane and ETHEL Mo.R.ERICKSON.   Provider or Entity Name:                                               [facility]   Address   City, State, Zip   Phone:      Fax:     Reason for request: continuity of care   Information to be released:    [  ] LAST COLONOSCOPY,  including any PATH REPORT and follow-up  [  ] LAST FIT/COLOGUARD RESULT [  ] LAST DEXA  [  ] LAST MAMMOGRAM  [  ] LAST PAP  [  ] LAST LABS [  ] RETINA EXAM REPORT  [  ] IMMUNIZATION RECORDS  [ x ] Release all info      [  ] Check here and initial the line next to each item to release ALL health information INCLUDING  _____ Care and treatment for drug and / or alcohol abuse  _____ HIV testing, infection status, or AIDS  _____ Genetic Testing    DATES OF SERVICE OR TIME PERIOD TO BE DISCLOSED: _____________  I understand and acknowledge that:  * This Authorization may be revoked at any time by you in writing, except if your health information has already been used or disclosed.  * Your health information that will be used or disclosed as a result of you signing this authorization could be re-disclosed by the recipient. If this occurs, your re-disclosed health information may no longer be protected by State or Federal laws.  * You may refuse to sign this Authorization. Your refusal will not affect your ability to obtain treatment.  * This Authorization becomes effective upon signing and will  on (date) __________.      If no date is indicated, this Authorization will  one (1) year from the signature date.       Name: Poonam Mayo    Signature:            Continuity of Care   Date:     4/2/2021       PLEASE FAX REQUESTED RECORDS BACK TO: [phone]

## 2021-11-29 ENCOUNTER — TELEPHONE (OUTPATIENT)
Dept: MEDICAL GROUP | Facility: MEDICAL CENTER | Age: 61
End: 2021-11-29

## 2021-11-30 NOTE — TELEPHONE ENCOUNTER
Pt came in the clinic stating that her ins will no longer cover her Rx Novolog and sh is down to her last bottle of it, pt would like to know if she will be able to receive the Rx she was on before which was Humalog as an alternative or another type of short acting insulin if appropriate. She would like Rx to be sent to [facility] on [facility].

## 2021-12-08 ENCOUNTER — OFFICE VISIT (OUTPATIENT)
Dept: MEDICAL GROUP | Facility: MEDICAL CENTER | Age: 61
End: 2021-12-08
Payer: MEDICARE

## 2021-12-08 VITALS
TEMPERATURE: 97 F | SYSTOLIC BLOOD PRESSURE: 100 MMHG | WEIGHT: 108.47 LBS | DIASTOLIC BLOOD PRESSURE: 62 MMHG | HEART RATE: 88 BPM | OXYGEN SATURATION: 100 % | BODY MASS INDEX: 19.96 KG/M2 | HEIGHT: 62 IN

## 2021-12-08 DIAGNOSIS — E78.5 DYSLIPIDEMIA: ICD-10-CM

## 2021-12-08 DIAGNOSIS — E03.8 HYPOTHYROIDISM DUE TO HASHIMOTO'S THYROIDITIS: ICD-10-CM

## 2021-12-08 DIAGNOSIS — F17.210 CIGARETTE NICOTINE DEPENDENCE WITHOUT COMPLICATION: ICD-10-CM

## 2021-12-08 DIAGNOSIS — E06.3 HYPOTHYROIDISM DUE TO HASHIMOTO'S THYROIDITIS: ICD-10-CM

## 2021-12-08 DIAGNOSIS — E10.65 TYPE 1 DIABETES MELLITUS WITH HYPERGLYCEMIA (HCC): ICD-10-CM

## 2021-12-08 DIAGNOSIS — E55.9 VITAMIN D DEFICIENCY: ICD-10-CM

## 2021-12-08 PROBLEM — R80.9 MICROALBUMINURIA: Status: RESOLVED | Noted: 2017-11-28 | Resolved: 2021-12-08

## 2021-12-08 PROCEDURE — 99214 OFFICE O/P EST MOD 30 MIN: CPT | Performed by: NURSE PRACTITIONER

## 2021-12-08 RX ORDER — LEVOTHYROXINE SODIUM 112 UG/1
112 TABLET ORAL
Qty: 90 TABLET | Refills: 2 | Status: SHIPPED | OUTPATIENT
Start: 2021-12-08

## 2021-12-08 RX ORDER — LISINOPRIL 5 MG/1
5 TABLET ORAL DAILY
Qty: 90 TABLET | Refills: 2 | Status: SHIPPED | OUTPATIENT
Start: 2021-12-08

## 2021-12-08 RX ORDER — ATORVASTATIN CALCIUM 10 MG/1
10 TABLET, FILM COATED ORAL DAILY
Qty: 90 TABLET | Refills: 2 | Status: SHIPPED | OUTPATIENT
Start: 2021-12-08

## 2021-12-08 RX ORDER — INSULIN GLARGINE 100 [IU]/ML
40 INJECTION, SOLUTION SUBCUTANEOUS
Qty: 40 ML | Refills: 2 | Status: SHIPPED | OUTPATIENT
Start: 2021-12-08

## 2021-12-08 ASSESSMENT — FIBROSIS 4 INDEX: FIB4 SCORE: 1.02

## 2021-12-08 ASSESSMENT — PATIENT HEALTH QUESTIONNAIRE - PHQ9: CLINICAL INTERPRETATION OF PHQ2 SCORE: 0

## 2021-12-08 NOTE — PROGRESS NOTES
Chief Complaint   Patient presents with   • Diabetes Follow-up       Subjective:     HPI:     Poonam Mayo is a 61 y.o. female   here to discuss the evaluation and management of:    Last OV 4/2021.     Diabetes type 1  Presents today to follow-up on diabetes.  She is requesting to have refills of her Humalog.  She reports she is taking 10 units in the morning and 5 to 10 units in the evenings.  She reports she is been skipping the afternoon dose as she does not eat that much throughout the day.  She reports that she is taking 41 units of Lantus around 12 in the afternoon.  She has a very odd schedule.   Does not check her sugars as directed.  Due for retinal exam. Last exam in 2019 without DM retinopathy.  She is on a low-dose ACE and statin.    Thyroid  Taking Levothyroxine 112 mcg for this. Due for labs. Weight is stable.     Dyslipidemia  Taking Atorvastatin 10mg for this. Denies myalgias. Due for lipid panel.     Vitamin D insufficiency  Taking occasionally vitamin D supplements.     ROS:  Denies any Headache, Blurred Vision, Confusion, Chest pain,  Shortness of breath,  Abdominal pain, Changes of bowel or bladder, Lower ext edema, Fevers, Nights sweats, Weight Changes, Focal weakness or numbness.  And all other systems reviewed and are all negative. POSITIVE FOR : see above        Current Outpatient Medications:   •  insulin lispro (HUMALOG) 100 UNIT/ML, Inject 10 Units under the skin 3 times a day before meals., Disp: 10 mL, Rfl: 3  •  lisinopril (PRINIVIL) 5 MG Tab, Take 1 Tablet by mouth every day., Disp: 90 Tablet, Rfl: 2  •  insulin glargine (LANTUS) 100 UNIT/ML Solution, Inject 40 Units under the skin every day. AS INSTRUCTED, Disp: 40 mL, Rfl: 2  •  levothyroxine (SYNTHROID) 112 MCG Tab, Take 1 Tablet by mouth every day., Disp: 90 Tablet, Rfl: 2  •  atorvastatin (LIPITOR) 10 MG Tab, Take 1 Tablet by mouth every day., Disp: 90 Tablet, Rfl: 2  •  aspirin (ASA) 325 MG Tab, Take 325 mg by mouth every 6  "hours as needed., Disp: , Rfl:   •  Lancets, 1 Each by Does not apply route every day., Disp: 30 Each, Rfl: 3  •  Insulin Syringe-Needle U-100 (BD INSULIN SYRINGE ULTRAFINE) 31G X 15/64\" 1 ML Misc, Inject 1 Syringe as instructed 4 Times a Day,Before Meals and at Bedtime. Dx: E10.8, Disp: 360 Each, Rfl: 11  •  Insulin Syringes U-100 1 mL, Use one syringe to inject insulin three times daily., Disp: 100 Each, Rfl: 11  •  INSULIN SYRINGE .5CC/29G 29G X 1/2\" 0.5 ML MISC, For use with lantus and novolog as instructed, Disp: 150 Each, Rfl: 3  •  glucose blood (PRECISION XTRA TEST STRIPS) strip, 1 Strip by Other route 3 times a day before meals., Disp: 100 Strip, Rfl: 6  •  Blood Glucose Monitoring Suppl (PRECISION XTRA MONITOR) KARLA, ..., Disp: 1 Each, Rfl: 0    No Known Allergies    Past Medical History:   Diagnosis Date   • 1996   • Dupuytren contracture     bilateral hands   • HTN (hypertension) 3/18/2011   • Hyperlipidemia    • Hypertension    • Hypothyroid      Past Surgical History:   Procedure Laterality Date   • TUBAL COAGULATION LAPAROSCOPIC BILATERAL     • APPENDECTOMY     • TONSILLECTOMY       Family History   Problem Relation Age of Onset   • Lung Disease Father    • Diabetes Son         type 1     Social History     Socioeconomic History   • Marital status: Single     Spouse name: Not on file   • Number of children: Not on file   • Years of education: Not on file   • Highest education level: Not on file   Occupational History   • Not on file   Tobacco Use   • Smoking status: Current Every Day Smoker     Packs/day: 0.25     Years: 40.00     Pack years: 10.00     Types: Cigarettes     Last attempt to quit: 2016     Years since quittin.5   • Smokeless tobacco: Never Used   Vaping Use   • Vaping Use: Never used   Substance and Sexual Activity   • Alcohol use: Yes     Comment: occasional   • Drug use: No   • Sexual activity: Never   Other Topics Concern   • Not on file   Social History Narrative " "  • Not on file     Social Determinants of Health     Financial Resource Strain:    • Difficulty of Paying Living Expenses: Not on file   Food Insecurity:    • Worried About Running Out of Food in the Last Year: Not on file   • Ran Out of Food in the Last Year: Not on file   Transportation Needs:    • Lack of Transportation (Medical): Not on file   • Lack of Transportation (Non-Medical): Not on file   Physical Activity:    • Days of Exercise per Week: Not on file   • Minutes of Exercise per Session: Not on file   Stress:    • Feeling of Stress : Not on file   Social Connections:    • Frequency of Communication with Friends and Family: Not on file   • Frequency of Social Gatherings with Friends and Family: Not on file   • Attends Orthodox Services: Not on file   • Active Member of Clubs or Organizations: Not on file   • Attends Club or Organization Meetings: Not on file   • Marital Status: Not on file   Intimate Partner Violence:    • Fear of Current or Ex-Partner: Not on file   • Emotionally Abused: Not on file   • Physically Abused: Not on file   • Sexually Abused: Not on file   Housing Stability:    • Unable to Pay for Housing in the Last Year: Not on file   • Number of Places Lived in the Last Year: Not on file   • Unstable Housing in the Last Year: Not on file       Objective:     Vitals: /62 (BP Location: Right arm, Patient Position: Sitting, BP Cuff Size: Adult)   Pulse 88   Temp 36.1 °C (97 °F) (Temporal)   Ht 1.575 m (5' 2\")   Wt 49.2 kg (108 lb 7.5 oz)   LMP 03/18/2011   SpO2 100%   BMI 19.84 kg/m²    General: Alert, pleasant, NAD  HEENT: Normocephalic.  Neck supple.   Respiratory: no distress, no audible wheezing, RR -WNL  Skin: Warm, dry, no rashes.  Extremities: No leg edema. No discoloration  Neurological: No tremors  Psych:  Affect/mood is normal, judgement is good, memory is intact, grooming is appropriate.    Assessment/Plan:     Poonam was seen today for diabetes follow-up.    Diagnoses " and all orders for this visit:    Type 1 diabetes mellitus with hyperglycemia (HCC)  Chronic, not well managed.  Patient is inconsistent with her medications and has been for many years.  She is due for labs.  Have placed refills on her medications for her.  Follow-up after labs.  Have tried to complete retinal exam in the clinic today-however patient became inpatient and left before exam done.   -     insulin lispro (HUMALOG) 100 UNIT/ML; Inject 10 Units under the skin 3 times a day before meals.  -     lisinopril (PRINIVIL) 5 MG Tab; Take 1 Tablet by mouth every day.  -     insulin glargine (LANTUS) 100 UNIT/ML Solution; Inject 40 Units under the skin every day. AS INSTRUCTED  -     MICROALBUMIN CREAT RATIO URINE; Future  -     Comp Metabolic Panel; Future  -     HEMOGLOBIN A1C; Future  -     POCT Retinal Eye Exam    Hypothyroidism due to Hashimoto's thyroiditis  Clinically stable on current replacement therapy.  Patient is due for labs.  -     levothyroxine (SYNTHROID) 112 MCG Tab; Take 1 Tablet by mouth every day.  -     TSH WITH REFLEX TO FT4; Future    Dyslipidemia  Chronic. On low-dose statin, denies myalgias.  Goal LDL below 70.  Continue current regimen.  Due for labs.  -     atorvastatin (LIPITOR) 10 MG Tab; Take 1 Tablet by mouth every day.  -     Lipid Profile; Future  -     Comp Metabolic Panel; Future    Vitamin D deficiency  Recommend OTC supplementation.    Cigarette nicotine dependence without complication  Discussed with patient the importance of reducing tobacco consumption. Educated patient regarding the effects of tobacco use on cardiovascular system.      Return in about 3 months (around 3/8/2022) for Lab results, Diabetes.    {I have placed the above orders and discussed them with an approved delegating provider.  The MA is performing the below orders under the direction of Dr. Krzysztof FLORES

## 2021-12-15 ENCOUNTER — HOSPITAL ENCOUNTER (OUTPATIENT)
Dept: LAB | Facility: MEDICAL CENTER | Age: 61
End: 2021-12-15
Attending: NURSE PRACTITIONER
Payer: MEDICARE

## 2021-12-15 DIAGNOSIS — E78.5 DYSLIPIDEMIA: ICD-10-CM

## 2021-12-15 DIAGNOSIS — E03.8 HYPOTHYROIDISM DUE TO HASHIMOTO'S THYROIDITIS: ICD-10-CM

## 2021-12-15 DIAGNOSIS — E06.3 HYPOTHYROIDISM DUE TO HASHIMOTO'S THYROIDITIS: ICD-10-CM

## 2021-12-15 DIAGNOSIS — E10.65 TYPE 1 DIABETES MELLITUS WITH HYPERGLYCEMIA (HCC): ICD-10-CM

## 2021-12-15 LAB
ALBUMIN SERPL BCP-MCNC: 4 G/DL (ref 3.2–4.9)
ALBUMIN/GLOB SERPL: 1.3 G/DL
ALP SERPL-CCNC: 109 U/L (ref 30–99)
ALT SERPL-CCNC: 19 U/L (ref 2–50)
ANION GAP SERPL CALC-SCNC: 12 MMOL/L (ref 7–16)
AST SERPL-CCNC: 23 U/L (ref 12–45)
BILIRUB SERPL-MCNC: 0.3 MG/DL (ref 0.1–1.5)
BUN SERPL-MCNC: 15 MG/DL (ref 8–22)
CALCIUM SERPL-MCNC: 9.1 MG/DL (ref 8.5–10.5)
CHLORIDE SERPL-SCNC: 104 MMOL/L (ref 96–112)
CHOLEST SERPL-MCNC: 159 MG/DL (ref 100–199)
CO2 SERPL-SCNC: 28 MMOL/L (ref 20–33)
CREAT SERPL-MCNC: 0.69 MG/DL (ref 0.5–1.4)
CREAT UR-MCNC: 26.13 MG/DL
EST. AVERAGE GLUCOSE BLD GHB EST-MCNC: 278 MG/DL
GLOBULIN SER CALC-MCNC: 3.1 G/DL (ref 1.9–3.5)
GLUCOSE SERPL-MCNC: 51 MG/DL (ref 65–99)
HBA1C MFR BLD: 11.3 % (ref 4–5.6)
HDLC SERPL-MCNC: 60 MG/DL
LDLC SERPL CALC-MCNC: 84 MG/DL
MICROALBUMIN UR-MCNC: <1.2 MG/DL
MICROALBUMIN/CREAT UR: NORMAL MG/G (ref 0–30)
POTASSIUM SERPL-SCNC: 4.4 MMOL/L (ref 3.6–5.5)
PROT SERPL-MCNC: 7.1 G/DL (ref 6–8.2)
SODIUM SERPL-SCNC: 144 MMOL/L (ref 135–145)
TRIGL SERPL-MCNC: 74 MG/DL (ref 0–149)
TSH SERPL DL<=0.005 MIU/L-ACNC: 3.83 UIU/ML (ref 0.38–5.33)

## 2021-12-15 PROCEDURE — 84443 ASSAY THYROID STIM HORMONE: CPT

## 2021-12-15 PROCEDURE — 36415 COLL VENOUS BLD VENIPUNCTURE: CPT

## 2021-12-15 PROCEDURE — 82570 ASSAY OF URINE CREATININE: CPT

## 2021-12-15 PROCEDURE — 82043 UR ALBUMIN QUANTITATIVE: CPT

## 2021-12-15 PROCEDURE — 80053 COMPREHEN METABOLIC PANEL: CPT

## 2021-12-15 PROCEDURE — 83036 HEMOGLOBIN GLYCOSYLATED A1C: CPT | Mod: GA

## 2021-12-15 PROCEDURE — 80061 LIPID PANEL: CPT

## 2022-01-01 ENCOUNTER — APPOINTMENT (OUTPATIENT)
Dept: RADIOLOGY | Facility: MEDICAL CENTER | Age: 62
DRG: 004 | End: 2022-01-01
Attending: INTERNAL MEDICINE
Payer: MEDICARE

## 2022-01-01 ENCOUNTER — APPOINTMENT (OUTPATIENT)
Dept: RADIOLOGY | Facility: MEDICAL CENTER | Age: 62
DRG: 004 | End: 2022-01-01
Attending: NURSE PRACTITIONER
Payer: MEDICARE

## 2022-01-01 ENCOUNTER — HOSPITAL ENCOUNTER (INPATIENT)
Facility: MEDICAL CENTER | Age: 62
LOS: 29 days | DRG: 004 | End: 2023-01-13
Attending: EMERGENCY MEDICINE | Admitting: INTERNAL MEDICINE
Payer: MEDICARE

## 2022-01-01 ENCOUNTER — APPOINTMENT (OUTPATIENT)
Dept: RADIOLOGY | Facility: MEDICAL CENTER | Age: 62
DRG: 004 | End: 2022-01-01
Attending: EMERGENCY MEDICINE
Payer: MEDICARE

## 2022-01-01 ENCOUNTER — HOSPITAL ENCOUNTER (INPATIENT)
Facility: MEDICAL CENTER | Age: 62
LOS: 2 days | DRG: 637 | End: 2022-04-12
Attending: EMERGENCY MEDICINE | Admitting: INTERNAL MEDICINE
Payer: MEDICARE

## 2022-01-01 ENCOUNTER — APPOINTMENT (OUTPATIENT)
Dept: RADIOLOGY | Facility: MEDICAL CENTER | Age: 62
DRG: 637 | End: 2022-01-01
Attending: NURSE PRACTITIONER
Payer: MEDICARE

## 2022-01-01 ENCOUNTER — OFFICE VISIT (OUTPATIENT)
Dept: MEDICAL GROUP | Facility: MEDICAL CENTER | Age: 62
End: 2022-01-01
Payer: MEDICARE

## 2022-01-01 ENCOUNTER — PATIENT OUTREACH (OUTPATIENT)
Dept: HEALTH INFORMATION MANAGEMENT | Facility: OTHER | Age: 62
End: 2022-01-01
Payer: MEDICARE

## 2022-01-01 ENCOUNTER — PATIENT OUTREACH (OUTPATIENT)
Dept: MEDICAL GROUP | Facility: MEDICAL CENTER | Age: 62
End: 2022-01-01
Payer: MEDICARE

## 2022-01-01 VITALS
DIASTOLIC BLOOD PRESSURE: 71 MMHG | BODY MASS INDEX: 18.9 KG/M2 | HEART RATE: 92 BPM | HEIGHT: 61 IN | WEIGHT: 100.09 LBS | TEMPERATURE: 97.1 F | SYSTOLIC BLOOD PRESSURE: 132 MMHG | RESPIRATION RATE: 18 BRPM | OXYGEN SATURATION: 98 %

## 2022-01-01 VITALS
OXYGEN SATURATION: 98 % | WEIGHT: 104.5 LBS | BODY MASS INDEX: 19.23 KG/M2 | DIASTOLIC BLOOD PRESSURE: 62 MMHG | TEMPERATURE: 97.2 F | HEIGHT: 62 IN | SYSTOLIC BLOOD PRESSURE: 120 MMHG | HEART RATE: 91 BPM

## 2022-01-01 VITALS
OXYGEN SATURATION: 99 % | HEART RATE: 91 BPM | SYSTOLIC BLOOD PRESSURE: 94 MMHG | HEIGHT: 62 IN | DIASTOLIC BLOOD PRESSURE: 56 MMHG | WEIGHT: 107.14 LBS | BODY MASS INDEX: 19.72 KG/M2 | TEMPERATURE: 97.8 F

## 2022-01-01 DIAGNOSIS — R41.3 MEMORY CHANGES: ICD-10-CM

## 2022-01-01 DIAGNOSIS — R73.9 HYPERGLYCEMIA: ICD-10-CM

## 2022-01-01 DIAGNOSIS — E10.10 DIABETIC KETOACIDOSIS WITHOUT COMA ASSOCIATED WITH TYPE 1 DIABETES MELLITUS (HCC): ICD-10-CM

## 2022-01-01 DIAGNOSIS — Z91.199 POOR COMPLIANCE: ICD-10-CM

## 2022-01-01 DIAGNOSIS — E87.20 LACTIC ACIDOSIS: ICD-10-CM

## 2022-01-01 DIAGNOSIS — J96.01 ACUTE RESPIRATORY FAILURE WITH HYPOXIA (HCC): ICD-10-CM

## 2022-01-01 DIAGNOSIS — E03.8 HYPOTHYROIDISM DUE TO HASHIMOTO'S THYROIDITIS: ICD-10-CM

## 2022-01-01 DIAGNOSIS — E87.20 METABOLIC ACIDOSIS: ICD-10-CM

## 2022-01-01 DIAGNOSIS — E16.2 HYPOGLYCEMIA: ICD-10-CM

## 2022-01-01 DIAGNOSIS — K29.00 ACUTE GASTRITIS, PRESENCE OF BLEEDING UNSPECIFIED, UNSPECIFIED GASTRITIS TYPE: ICD-10-CM

## 2022-01-01 DIAGNOSIS — N17.9 AKI (ACUTE KIDNEY INJURY) (HCC): ICD-10-CM

## 2022-01-01 DIAGNOSIS — Z79.4 ENCOUNTER FOR LONG-TERM (CURRENT) USE OF INSULIN (HCC): ICD-10-CM

## 2022-01-01 DIAGNOSIS — E10.65 TYPE 1 DIABETES MELLITUS WITH HYPERGLYCEMIA (HCC): ICD-10-CM

## 2022-01-01 DIAGNOSIS — G93.40 ACUTE ENCEPHALOPATHY: ICD-10-CM

## 2022-01-01 DIAGNOSIS — E11.11 DIABETIC KETOACIDOSIS WITH COMA ASSOCIATED WITH TYPE 2 DIABETES MELLITUS (HCC): Primary | ICD-10-CM

## 2022-01-01 DIAGNOSIS — E86.0 DEHYDRATION: ICD-10-CM

## 2022-01-01 DIAGNOSIS — E10.3592: ICD-10-CM

## 2022-01-01 DIAGNOSIS — J96.02 ACUTE RESPIRATORY FAILURE WITH HYPERCAPNIA (HCC): ICD-10-CM

## 2022-01-01 DIAGNOSIS — E10.3592 TYPE 1 DIABETES MELLITUS WITH PROLIFERATIVE RETINOPATHY OF LEFT EYE WITHOUT MACULAR EDEMA (HCC): ICD-10-CM

## 2022-01-01 DIAGNOSIS — E06.3 HYPOTHYROIDISM DUE TO HASHIMOTO'S THYROIDITIS: Chronic | ICD-10-CM

## 2022-01-01 DIAGNOSIS — E03.8 HYPOTHYROIDISM DUE TO HASHIMOTO'S THYROIDITIS: Chronic | ICD-10-CM

## 2022-01-01 DIAGNOSIS — Z09 HOSPITAL DISCHARGE FOLLOW-UP: ICD-10-CM

## 2022-01-01 DIAGNOSIS — E06.3 HYPOTHYROIDISM DUE TO HASHIMOTO'S THYROIDITIS: ICD-10-CM

## 2022-01-01 DIAGNOSIS — R53.1 GENERALIZED WEAKNESS: ICD-10-CM

## 2022-01-01 LAB
ALBUMIN SERPL BCP-MCNC: 2.9 G/DL (ref 3.2–4.9)
ALBUMIN SERPL BCP-MCNC: 3.1 G/DL (ref 3.2–4.9)
ALBUMIN SERPL BCP-MCNC: 3.5 G/DL (ref 3.2–4.9)
ALBUMIN SERPL BCP-MCNC: 4 G/DL (ref 3.2–4.9)
ALBUMIN SERPL BCP-MCNC: 4.8 G/DL (ref 3.2–4.9)
ALBUMIN/GLOB SERPL: 0.8 G/DL
ALBUMIN/GLOB SERPL: 0.9 G/DL
ALBUMIN/GLOB SERPL: 1.2 G/DL
ALBUMIN/GLOB SERPL: 1.2 G/DL
ALBUMIN/GLOB SERPL: 1.6 G/DL
ALP SERPL-CCNC: 112 U/L (ref 30–99)
ALP SERPL-CCNC: 116 U/L (ref 30–99)
ALP SERPL-CCNC: 176 U/L (ref 30–99)
ALP SERPL-CCNC: 85 U/L (ref 30–99)
ALP SERPL-CCNC: 95 U/L (ref 30–99)
ALP SERPL-CCNC: 95 U/L (ref 30–99)
ALP SERPL-CCNC: 99 U/L (ref 30–99)
ALT SERPL-CCNC: 15 U/L (ref 2–50)
ALT SERPL-CCNC: 17 U/L (ref 2–50)
ALT SERPL-CCNC: 18 U/L (ref 2–50)
ALT SERPL-CCNC: 21 U/L (ref 2–50)
ALT SERPL-CCNC: 29 U/L (ref 2–50)
ALT SERPL-CCNC: 47 U/L (ref 2–50)
ALT SERPL-CCNC: 49 U/L (ref 2–50)
AMMONIA PLAS-SCNC: 28 UMOL/L (ref 11–45)
AMPHET UR QL SCN: NEGATIVE
AMPHET UR QL SCN: POSITIVE
ANION GAP SERPL CALC-SCNC: 10 MMOL/L (ref 7–16)
ANION GAP SERPL CALC-SCNC: 11 MMOL/L (ref 7–16)
ANION GAP SERPL CALC-SCNC: 12 MMOL/L (ref 7–16)
ANION GAP SERPL CALC-SCNC: 13 MMOL/L (ref 7–16)
ANION GAP SERPL CALC-SCNC: 16 MMOL/L (ref 7–16)
ANION GAP SERPL CALC-SCNC: 17 MMOL/L (ref 7–16)
ANION GAP SERPL CALC-SCNC: 27 MMOL/L (ref 7–16)
ANION GAP SERPL CALC-SCNC: 7 MMOL/L (ref 7–16)
ANION GAP SERPL CALC-SCNC: 7 MMOL/L (ref 7–16)
ANION GAP SERPL CALC-SCNC: 8 MMOL/L (ref 7–16)
ANION GAP SERPL CALC-SCNC: 9 MMOL/L (ref 7–16)
APAP SERPL-MCNC: <5 UG/ML (ref 10–30)
APPEARANCE UR: ABNORMAL
APPEARANCE UR: ABNORMAL
AST SERPL-CCNC: 15 U/L (ref 12–45)
AST SERPL-CCNC: 19 U/L (ref 12–45)
AST SERPL-CCNC: 22 U/L (ref 12–45)
AST SERPL-CCNC: 25 U/L (ref 12–45)
AST SERPL-CCNC: 25 U/L (ref 12–45)
AST SERPL-CCNC: 44 U/L (ref 12–45)
AST SERPL-CCNC: 48 U/L (ref 12–45)
B-OH-BUTYR SERPL-MCNC: 5.6 MMOL/L (ref 0.02–0.27)
BACTERIA #/AREA URNS HPF: ABNORMAL /HPF
BACTERIA #/AREA URNS HPF: NEGATIVE /HPF
BACTERIA BLD CULT: NORMAL
BACTERIA BLD CULT: NORMAL
BACTERIA SPEC RESP CULT: ABNORMAL
BACTERIA SPEC RESP CULT: ABNORMAL
BACTERIA UR CULT: ABNORMAL
BARBITURATES UR QL SCN: NEGATIVE
BARBITURATES UR QL SCN: NEGATIVE
BASE EXCESS BLDA CALC-SCNC: 0 MMOL/L (ref -4–3)
BASE EXCESS BLDA CALC-SCNC: 3 MMOL/L (ref -4–3)
BASE EXCESS BLDA CALC-SCNC: 3 MMOL/L (ref -4–3)
BASE EXCESS BLDA CALC-SCNC: 4 MMOL/L (ref -4–3)
BASE EXCESS BLDA CALC-SCNC: 4 MMOL/L (ref -4–3)
BASE EXCESS BLDA CALC-SCNC: 5 MMOL/L (ref -4–3)
BASE EXCESS BLDA CALC-SCNC: 6 MMOL/L (ref -4–3)
BASE EXCESS BLDA CALC-SCNC: 8 MMOL/L (ref -4–3)
BASE EXCESS BLDV CALC-SCNC: -4 MMOL/L (ref -4–3)
BASE EXCESS BLDV CALC-SCNC: -9 MMOL/L
BASOPHILS # BLD AUTO: 0.2 % (ref 0–1.8)
BASOPHILS # BLD AUTO: 0.3 % (ref 0–1.8)
BASOPHILS # BLD AUTO: 0.4 % (ref 0–1.8)
BASOPHILS # BLD AUTO: 0.6 % (ref 0–1.8)
BASOPHILS # BLD: 0.02 K/UL (ref 0–0.12)
BASOPHILS # BLD: 0.03 K/UL (ref 0–0.12)
BASOPHILS # BLD: 0.04 K/UL (ref 0–0.12)
BASOPHILS # BLD: 0.05 K/UL (ref 0–0.12)
BASOPHILS # BLD: 0.07 K/UL (ref 0–0.12)
BENZODIAZ UR QL SCN: NEGATIVE
BENZODIAZ UR QL SCN: NEGATIVE
BILIRUB SERPL-MCNC: 0.2 MG/DL (ref 0.1–1.5)
BILIRUB SERPL-MCNC: 0.3 MG/DL (ref 0.1–1.5)
BILIRUB SERPL-MCNC: 0.5 MG/DL (ref 0.1–1.5)
BILIRUB SERPL-MCNC: 0.6 MG/DL (ref 0.1–1.5)
BILIRUB SERPL-MCNC: 0.6 MG/DL (ref 0.1–1.5)
BILIRUB SERPL-MCNC: 0.7 MG/DL (ref 0.1–1.5)
BILIRUB SERPL-MCNC: 0.7 MG/DL (ref 0.1–1.5)
BILIRUB UR QL STRIP.AUTO: NEGATIVE
BILIRUB UR QL STRIP.AUTO: NEGATIVE
BODY TEMPERATURE: ABNORMAL CENTIGRADE
BODY TEMPERATURE: ABNORMAL DEGREES
BREATHS SETTING VENT: 12
BREATHS SETTING VENT: 22
BUN SERPL-MCNC: 13 MG/DL (ref 8–22)
BUN SERPL-MCNC: 14 MG/DL (ref 8–22)
BUN SERPL-MCNC: 15 MG/DL (ref 8–22)
BUN SERPL-MCNC: 16 MG/DL (ref 8–22)
BUN SERPL-MCNC: 17 MG/DL (ref 8–22)
BUN SERPL-MCNC: 18 MG/DL (ref 8–22)
BUN SERPL-MCNC: 18 MG/DL (ref 8–22)
BUN SERPL-MCNC: 20 MG/DL (ref 8–22)
BUN SERPL-MCNC: 20 MG/DL (ref 8–22)
BUN SERPL-MCNC: 21 MG/DL (ref 8–22)
BUN SERPL-MCNC: 22 MG/DL (ref 8–22)
BUN SERPL-MCNC: 22 MG/DL (ref 8–22)
BUN SERPL-MCNC: 25 MG/DL (ref 8–22)
BUN SERPL-MCNC: 26 MG/DL (ref 8–22)
BUN SERPL-MCNC: 26 MG/DL (ref 8–22)
BUN SERPL-MCNC: 28 MG/DL (ref 8–22)
BUN SERPL-MCNC: 30 MG/DL (ref 8–22)
BUN SERPL-MCNC: 31 MG/DL (ref 8–22)
BUN SERPL-MCNC: 35 MG/DL (ref 8–22)
BUN SERPL-MCNC: 40 MG/DL (ref 8–22)
BUN SERPL-MCNC: 45 MG/DL (ref 8–22)
BZE UR QL SCN: NEGATIVE
BZE UR QL SCN: NEGATIVE
CALCIUM ALBUM COR SERPL-MCNC: 8.3 MG/DL (ref 8.5–10.5)
CALCIUM ALBUM COR SERPL-MCNC: 8.7 MG/DL (ref 8.5–10.5)
CALCIUM ALBUM COR SERPL-MCNC: 9.1 MG/DL (ref 8.5–10.5)
CALCIUM ALBUM COR SERPL-MCNC: 9.5 MG/DL (ref 8.5–10.5)
CALCIUM ALBUM COR SERPL-MCNC: 9.5 MG/DL (ref 8.5–10.5)
CALCIUM ALBUM COR SERPL-MCNC: 9.7 MG/DL (ref 8.5–10.5)
CALCIUM SERPL-MCNC: 7.5 MG/DL (ref 8.5–10.5)
CALCIUM SERPL-MCNC: 7.9 MG/DL (ref 8.5–10.5)
CALCIUM SERPL-MCNC: 8.1 MG/DL (ref 8.5–10.5)
CALCIUM SERPL-MCNC: 8.2 MG/DL (ref 8.5–10.5)
CALCIUM SERPL-MCNC: 8.3 MG/DL (ref 8.5–10.5)
CALCIUM SERPL-MCNC: 8.3 MG/DL (ref 8.5–10.5)
CALCIUM SERPL-MCNC: 8.4 MG/DL (ref 8.5–10.5)
CALCIUM SERPL-MCNC: 8.4 MG/DL (ref 8.5–10.5)
CALCIUM SERPL-MCNC: 8.5 MG/DL (ref 8.5–10.5)
CALCIUM SERPL-MCNC: 8.5 MG/DL (ref 8.5–10.5)
CALCIUM SERPL-MCNC: 8.6 MG/DL (ref 8.5–10.5)
CALCIUM SERPL-MCNC: 8.7 MG/DL (ref 8.5–10.5)
CALCIUM SERPL-MCNC: 8.7 MG/DL (ref 8.5–10.5)
CALCIUM SERPL-MCNC: 8.8 MG/DL (ref 8.5–10.5)
CALCIUM SERPL-MCNC: 9 MG/DL (ref 8.5–10.5)
CANNABINOIDS UR QL SCN: NEGATIVE
CANNABINOIDS UR QL SCN: NEGATIVE
CHLORIDE SERPL-SCNC: 100 MMOL/L (ref 96–112)
CHLORIDE SERPL-SCNC: 101 MMOL/L (ref 96–112)
CHLORIDE SERPL-SCNC: 102 MMOL/L (ref 96–112)
CHLORIDE SERPL-SCNC: 102 MMOL/L (ref 96–112)
CHLORIDE SERPL-SCNC: 103 MMOL/L (ref 96–112)
CHLORIDE SERPL-SCNC: 104 MMOL/L (ref 96–112)
CHLORIDE SERPL-SCNC: 105 MMOL/L (ref 96–112)
CHLORIDE SERPL-SCNC: 106 MMOL/L (ref 96–112)
CHLORIDE SERPL-SCNC: 107 MMOL/L (ref 96–112)
CHLORIDE SERPL-SCNC: 110 MMOL/L (ref 96–112)
CHLORIDE SERPL-SCNC: 84 MMOL/L (ref 96–112)
CHLORIDE SERPL-SCNC: 96 MMOL/L (ref 96–112)
CHLORIDE SERPL-SCNC: 97 MMOL/L (ref 96–112)
CHLORIDE SERPL-SCNC: 98 MMOL/L (ref 96–112)
CHLORIDE SERPL-SCNC: 99 MMOL/L (ref 96–112)
CK SERPL-CCNC: 197 U/L (ref 0–154)
CO2 BLDA-SCNC: 25 MMOL/L (ref 20–33)
CO2 BLDA-SCNC: 28 MMOL/L (ref 20–33)
CO2 BLDA-SCNC: 29 MMOL/L (ref 20–33)
CO2 BLDA-SCNC: 29 MMOL/L (ref 20–33)
CO2 BLDA-SCNC: 30 MMOL/L (ref 20–33)
CO2 BLDA-SCNC: 31 MMOL/L (ref 20–33)
CO2 BLDA-SCNC: 32 MMOL/L (ref 20–33)
CO2 BLDA-SCNC: 32 MMOL/L (ref 20–33)
CO2 BLDV-SCNC: 24 MMOL/L (ref 20–33)
CO2 SERPL-SCNC: 16 MMOL/L (ref 20–33)
CO2 SERPL-SCNC: 17 MMOL/L (ref 20–33)
CO2 SERPL-SCNC: 19 MMOL/L (ref 20–33)
CO2 SERPL-SCNC: 21 MMOL/L (ref 20–33)
CO2 SERPL-SCNC: 22 MMOL/L (ref 20–33)
CO2 SERPL-SCNC: 23 MMOL/L (ref 20–33)
CO2 SERPL-SCNC: 24 MMOL/L (ref 20–33)
CO2 SERPL-SCNC: 25 MMOL/L (ref 20–33)
CO2 SERPL-SCNC: 26 MMOL/L (ref 20–33)
CO2 SERPL-SCNC: 27 MMOL/L (ref 20–33)
CO2 SERPL-SCNC: 29 MMOL/L (ref 20–33)
CO2 SERPL-SCNC: 30 MMOL/L (ref 20–33)
COLOR UR: YELLOW
COLOR UR: YELLOW
CORTIS SERPL-MCNC: 23.3 UG/DL (ref 0–23)
CREAT SERPL-MCNC: 0.47 MG/DL (ref 0.5–1.4)
CREAT SERPL-MCNC: 0.54 MG/DL (ref 0.5–1.4)
CREAT SERPL-MCNC: 0.55 MG/DL (ref 0.5–1.4)
CREAT SERPL-MCNC: 0.56 MG/DL (ref 0.5–1.4)
CREAT SERPL-MCNC: 0.57 MG/DL (ref 0.5–1.4)
CREAT SERPL-MCNC: 0.58 MG/DL (ref 0.5–1.4)
CREAT SERPL-MCNC: 0.59 MG/DL (ref 0.5–1.4)
CREAT SERPL-MCNC: 0.59 MG/DL (ref 0.5–1.4)
CREAT SERPL-MCNC: 0.6 MG/DL (ref 0.5–1.4)
CREAT SERPL-MCNC: 0.61 MG/DL (ref 0.5–1.4)
CREAT SERPL-MCNC: 0.62 MG/DL (ref 0.5–1.4)
CREAT SERPL-MCNC: 0.63 MG/DL (ref 0.5–1.4)
CREAT SERPL-MCNC: 0.64 MG/DL (ref 0.5–1.4)
CREAT SERPL-MCNC: 0.65 MG/DL (ref 0.5–1.4)
CREAT SERPL-MCNC: 0.7 MG/DL (ref 0.5–1.4)
CREAT SERPL-MCNC: 0.72 MG/DL (ref 0.5–1.4)
CREAT SERPL-MCNC: 0.75 MG/DL (ref 0.5–1.4)
CREAT SERPL-MCNC: 0.76 MG/DL (ref 0.5–1.4)
CREAT SERPL-MCNC: 0.78 MG/DL (ref 0.5–1.4)
CREAT SERPL-MCNC: 0.81 MG/DL (ref 0.5–1.4)
CREAT SERPL-MCNC: 0.9 MG/DL (ref 0.5–1.4)
CREAT SERPL-MCNC: 1.03 MG/DL (ref 0.5–1.4)
CREAT SERPL-MCNC: 1.17 MG/DL (ref 0.5–1.4)
CREAT SERPL-MCNC: 1.5 MG/DL (ref 0.5–1.4)
CRP SERPL HS-MCNC: 12.59 MG/DL (ref 0–0.75)
CRP SERPL HS-MCNC: 13.3 MG/DL (ref 0–0.75)
CRP SERPL HS-MCNC: 15.77 MG/DL (ref 0–0.75)
DELSYS IDSYS: ABNORMAL
EKG IMPRESSION: NORMAL
EKG IMPRESSION: NORMAL
END TIDAL CARBON DIOXIDE IECO2: 33 MMHG
END TIDAL CARBON DIOXIDE IECO2: 34 MMHG
END TIDAL CARBON DIOXIDE IECO2: 38 MMHG
EOSINOPHIL # BLD AUTO: 0.01 K/UL (ref 0–0.51)
EOSINOPHIL # BLD AUTO: 0.01 K/UL (ref 0–0.51)
EOSINOPHIL # BLD AUTO: 0.04 K/UL (ref 0–0.51)
EOSINOPHIL # BLD AUTO: 0.1 K/UL (ref 0–0.51)
EOSINOPHIL # BLD AUTO: 0.13 K/UL (ref 0–0.51)
EOSINOPHIL # BLD AUTO: 0.17 K/UL (ref 0–0.51)
EOSINOPHIL # BLD AUTO: 0.18 K/UL (ref 0–0.51)
EOSINOPHIL # BLD AUTO: 0.18 K/UL (ref 0–0.51)
EOSINOPHIL # BLD AUTO: 0.2 K/UL (ref 0–0.51)
EOSINOPHIL NFR BLD: 0.1 % (ref 0–6.9)
EOSINOPHIL NFR BLD: 0.1 % (ref 0–6.9)
EOSINOPHIL NFR BLD: 0.5 % (ref 0–6.9)
EOSINOPHIL NFR BLD: 1.3 % (ref 0–6.9)
EOSINOPHIL NFR BLD: 1.5 % (ref 0–6.9)
EOSINOPHIL NFR BLD: 1.6 % (ref 0–6.9)
EOSINOPHIL NFR BLD: 2.2 % (ref 0–6.9)
EOSINOPHIL NFR BLD: 2.2 % (ref 0–6.9)
EOSINOPHIL NFR BLD: 2.7 % (ref 0–6.9)
EPI CELLS #/AREA URNS HPF: ABNORMAL /HPF
EPI CELLS #/AREA URNS HPF: NEGATIVE /HPF
ERYTHROCYTE [DISTWIDTH] IN BLOOD BY AUTOMATED COUNT: 36.2 FL (ref 35.9–50)
ERYTHROCYTE [DISTWIDTH] IN BLOOD BY AUTOMATED COUNT: 37.2 FL (ref 35.9–50)
ERYTHROCYTE [DISTWIDTH] IN BLOOD BY AUTOMATED COUNT: 42.3 FL (ref 35.9–50)
ERYTHROCYTE [DISTWIDTH] IN BLOOD BY AUTOMATED COUNT: 42.4 FL (ref 35.9–50)
ERYTHROCYTE [DISTWIDTH] IN BLOOD BY AUTOMATED COUNT: 42.5 FL (ref 35.9–50)
ERYTHROCYTE [DISTWIDTH] IN BLOOD BY AUTOMATED COUNT: 42.7 FL (ref 35.9–50)
ERYTHROCYTE [DISTWIDTH] IN BLOOD BY AUTOMATED COUNT: 42.9 FL (ref 35.9–50)
ERYTHROCYTE [DISTWIDTH] IN BLOOD BY AUTOMATED COUNT: 43 FL (ref 35.9–50)
ERYTHROCYTE [DISTWIDTH] IN BLOOD BY AUTOMATED COUNT: 43.2 FL (ref 35.9–50)
ERYTHROCYTE [DISTWIDTH] IN BLOOD BY AUTOMATED COUNT: 43.3 FL (ref 35.9–50)
ERYTHROCYTE [DISTWIDTH] IN BLOOD BY AUTOMATED COUNT: 43.6 FL (ref 35.9–50)
ERYTHROCYTE [DISTWIDTH] IN BLOOD BY AUTOMATED COUNT: 43.8 FL (ref 35.9–50)
ERYTHROCYTE [DISTWIDTH] IN BLOOD BY AUTOMATED COUNT: 44.2 FL (ref 35.9–50)
ERYTHROCYTE [DISTWIDTH] IN BLOOD BY AUTOMATED COUNT: 44.5 FL (ref 35.9–50)
ERYTHROCYTE [DISTWIDTH] IN BLOOD BY AUTOMATED COUNT: 44.6 FL (ref 35.9–50)
ERYTHROCYTE [DISTWIDTH] IN BLOOD BY AUTOMATED COUNT: 45.6 FL (ref 35.9–50)
EST. AVERAGE GLUCOSE BLD GHB EST-MCNC: 286 MG/DL
EST. AVERAGE GLUCOSE BLD GHB EST-MCNC: 332 MG/DL
ETHANOL BLD-MCNC: <10.1 MG/DL
GFR SERPLBLD CREATININE-BSD FMLA CKD-EPI: 100 ML/MIN/1.73 M 2
GFR SERPLBLD CREATININE-BSD FMLA CKD-EPI: 100 ML/MIN/1.73 M 2
GFR SERPLBLD CREATININE-BSD FMLA CKD-EPI: 101 ML/MIN/1.73 M 2
GFR SERPLBLD CREATININE-BSD FMLA CKD-EPI: 102 ML/MIN/1.73 M 2
GFR SERPLBLD CREATININE-BSD FMLA CKD-EPI: 103 ML/MIN/1.73 M 2
GFR SERPLBLD CREATININE-BSD FMLA CKD-EPI: 104 ML/MIN/1.73 M 2
GFR SERPLBLD CREATININE-BSD FMLA CKD-EPI: 107 ML/MIN/1.73 M 2
GFR SERPLBLD CREATININE-BSD FMLA CKD-EPI: 39 ML/MIN/1.73 M 2
GFR SERPLBLD CREATININE-BSD FMLA CKD-EPI: 53 ML/MIN/1.73 M 2
GFR SERPLBLD CREATININE-BSD FMLA CKD-EPI: 62 ML/MIN/1.73 M 2
GFR SERPLBLD CREATININE-BSD FMLA CKD-EPI: 73 ML/MIN/1.73 M 2
GFR SERPLBLD CREATININE-BSD FMLA CKD-EPI: 82 ML/MIN/1.73 M 2
GFR SERPLBLD CREATININE-BSD FMLA CKD-EPI: 86 ML/MIN/1.73 M 2
GFR SERPLBLD CREATININE-BSD FMLA CKD-EPI: 89 ML/MIN/1.73 M 2
GFR SERPLBLD CREATININE-BSD FMLA CKD-EPI: 90 ML/MIN/1.73 M 2
GFR SERPLBLD CREATININE-BSD FMLA CKD-EPI: 94 ML/MIN/1.73 M 2
GFR SERPLBLD CREATININE-BSD FMLA CKD-EPI: 98 ML/MIN/1.73 M 2
GFR SERPLBLD CREATININE-BSD FMLA CKD-EPI: 99 ML/MIN/1.73 M 2
GLOBULIN SER CALC-MCNC: 3 G/DL (ref 1.9–3.5)
GLOBULIN SER CALC-MCNC: 3 G/DL (ref 1.9–3.5)
GLOBULIN SER CALC-MCNC: 3.3 G/DL (ref 1.9–3.5)
GLOBULIN SER CALC-MCNC: 3.4 G/DL (ref 1.9–3.5)
GLOBULIN SER CALC-MCNC: 3.5 G/DL (ref 1.9–3.5)
GLUCOSE BLD STRIP.AUTO-MCNC: 103 MG/DL (ref 65–99)
GLUCOSE BLD STRIP.AUTO-MCNC: 105 MG/DL (ref 65–99)
GLUCOSE BLD STRIP.AUTO-MCNC: 109 MG/DL (ref 65–99)
GLUCOSE BLD STRIP.AUTO-MCNC: 113 MG/DL (ref 65–99)
GLUCOSE BLD STRIP.AUTO-MCNC: 113 MG/DL (ref 65–99)
GLUCOSE BLD STRIP.AUTO-MCNC: 116 MG/DL (ref 65–99)
GLUCOSE BLD STRIP.AUTO-MCNC: 117 MG/DL (ref 65–99)
GLUCOSE BLD STRIP.AUTO-MCNC: 119 MG/DL (ref 65–99)
GLUCOSE BLD STRIP.AUTO-MCNC: 125 MG/DL (ref 65–99)
GLUCOSE BLD STRIP.AUTO-MCNC: 126 MG/DL (ref 65–99)
GLUCOSE BLD STRIP.AUTO-MCNC: 128 MG/DL (ref 65–99)
GLUCOSE BLD STRIP.AUTO-MCNC: 128 MG/DL (ref 65–99)
GLUCOSE BLD STRIP.AUTO-MCNC: 130 MG/DL (ref 65–99)
GLUCOSE BLD STRIP.AUTO-MCNC: 130 MG/DL (ref 65–99)
GLUCOSE BLD STRIP.AUTO-MCNC: 133 MG/DL (ref 65–99)
GLUCOSE BLD STRIP.AUTO-MCNC: 133 MG/DL (ref 65–99)
GLUCOSE BLD STRIP.AUTO-MCNC: 135 MG/DL (ref 65–99)
GLUCOSE BLD STRIP.AUTO-MCNC: 136 MG/DL (ref 65–99)
GLUCOSE BLD STRIP.AUTO-MCNC: 137 MG/DL (ref 65–99)
GLUCOSE BLD STRIP.AUTO-MCNC: 137 MG/DL (ref 65–99)
GLUCOSE BLD STRIP.AUTO-MCNC: 139 MG/DL (ref 65–99)
GLUCOSE BLD STRIP.AUTO-MCNC: 141 MG/DL (ref 65–99)
GLUCOSE BLD STRIP.AUTO-MCNC: 142 MG/DL (ref 65–99)
GLUCOSE BLD STRIP.AUTO-MCNC: 142 MG/DL (ref 65–99)
GLUCOSE BLD STRIP.AUTO-MCNC: 144 MG/DL (ref 65–99)
GLUCOSE BLD STRIP.AUTO-MCNC: 146 MG/DL (ref 65–99)
GLUCOSE BLD STRIP.AUTO-MCNC: 147 MG/DL (ref 65–99)
GLUCOSE BLD STRIP.AUTO-MCNC: 149 MG/DL (ref 65–99)
GLUCOSE BLD STRIP.AUTO-MCNC: 153 MG/DL (ref 65–99)
GLUCOSE BLD STRIP.AUTO-MCNC: 155 MG/DL (ref 65–99)
GLUCOSE BLD STRIP.AUTO-MCNC: 155 MG/DL (ref 65–99)
GLUCOSE BLD STRIP.AUTO-MCNC: 156 MG/DL (ref 65–99)
GLUCOSE BLD STRIP.AUTO-MCNC: 158 MG/DL (ref 65–99)
GLUCOSE BLD STRIP.AUTO-MCNC: 160 MG/DL (ref 65–99)
GLUCOSE BLD STRIP.AUTO-MCNC: 161 MG/DL (ref 65–99)
GLUCOSE BLD STRIP.AUTO-MCNC: 165 MG/DL (ref 65–99)
GLUCOSE BLD STRIP.AUTO-MCNC: 166 MG/DL (ref 65–99)
GLUCOSE BLD STRIP.AUTO-MCNC: 169 MG/DL (ref 65–99)
GLUCOSE BLD STRIP.AUTO-MCNC: 169 MG/DL (ref 65–99)
GLUCOSE BLD STRIP.AUTO-MCNC: 170 MG/DL (ref 65–99)
GLUCOSE BLD STRIP.AUTO-MCNC: 171 MG/DL (ref 65–99)
GLUCOSE BLD STRIP.AUTO-MCNC: 173 MG/DL (ref 65–99)
GLUCOSE BLD STRIP.AUTO-MCNC: 174 MG/DL (ref 65–99)
GLUCOSE BLD STRIP.AUTO-MCNC: 176 MG/DL (ref 65–99)
GLUCOSE BLD STRIP.AUTO-MCNC: 177 MG/DL (ref 65–99)
GLUCOSE BLD STRIP.AUTO-MCNC: 177 MG/DL (ref 65–99)
GLUCOSE BLD STRIP.AUTO-MCNC: 179 MG/DL (ref 65–99)
GLUCOSE BLD STRIP.AUTO-MCNC: 182 MG/DL (ref 65–99)
GLUCOSE BLD STRIP.AUTO-MCNC: 19 MG/DL (ref 65–99)
GLUCOSE BLD STRIP.AUTO-MCNC: 192 MG/DL (ref 65–99)
GLUCOSE BLD STRIP.AUTO-MCNC: 195 MG/DL (ref 65–99)
GLUCOSE BLD STRIP.AUTO-MCNC: 195 MG/DL (ref 65–99)
GLUCOSE BLD STRIP.AUTO-MCNC: 196 MG/DL (ref 65–99)
GLUCOSE BLD STRIP.AUTO-MCNC: 197 MG/DL (ref 65–99)
GLUCOSE BLD STRIP.AUTO-MCNC: 197 MG/DL (ref 65–99)
GLUCOSE BLD STRIP.AUTO-MCNC: 198 MG/DL (ref 65–99)
GLUCOSE BLD STRIP.AUTO-MCNC: 199 MG/DL (ref 65–99)
GLUCOSE BLD STRIP.AUTO-MCNC: 200 MG/DL (ref 65–99)
GLUCOSE BLD STRIP.AUTO-MCNC: 202 MG/DL (ref 65–99)
GLUCOSE BLD STRIP.AUTO-MCNC: 202 MG/DL (ref 65–99)
GLUCOSE BLD STRIP.AUTO-MCNC: 207 MG/DL (ref 65–99)
GLUCOSE BLD STRIP.AUTO-MCNC: 209 MG/DL (ref 65–99)
GLUCOSE BLD STRIP.AUTO-MCNC: 211 MG/DL (ref 65–99)
GLUCOSE BLD STRIP.AUTO-MCNC: 211 MG/DL (ref 65–99)
GLUCOSE BLD STRIP.AUTO-MCNC: 212 MG/DL (ref 65–99)
GLUCOSE BLD STRIP.AUTO-MCNC: 216 MG/DL (ref 65–99)
GLUCOSE BLD STRIP.AUTO-MCNC: 221 MG/DL (ref 65–99)
GLUCOSE BLD STRIP.AUTO-MCNC: 23 MG/DL (ref 65–99)
GLUCOSE BLD STRIP.AUTO-MCNC: 230 MG/DL (ref 65–99)
GLUCOSE BLD STRIP.AUTO-MCNC: 235 MG/DL (ref 65–99)
GLUCOSE BLD STRIP.AUTO-MCNC: 247 MG/DL (ref 65–99)
GLUCOSE BLD STRIP.AUTO-MCNC: 248 MG/DL (ref 65–99)
GLUCOSE BLD STRIP.AUTO-MCNC: 251 MG/DL (ref 65–99)
GLUCOSE BLD STRIP.AUTO-MCNC: 254 MG/DL (ref 65–99)
GLUCOSE BLD STRIP.AUTO-MCNC: 254 MG/DL (ref 65–99)
GLUCOSE BLD STRIP.AUTO-MCNC: 255 MG/DL (ref 65–99)
GLUCOSE BLD STRIP.AUTO-MCNC: 259 MG/DL (ref 65–99)
GLUCOSE BLD STRIP.AUTO-MCNC: 263 MG/DL (ref 65–99)
GLUCOSE BLD STRIP.AUTO-MCNC: 267 MG/DL (ref 65–99)
GLUCOSE BLD STRIP.AUTO-MCNC: 267 MG/DL (ref 65–99)
GLUCOSE BLD STRIP.AUTO-MCNC: 269 MG/DL (ref 65–99)
GLUCOSE BLD STRIP.AUTO-MCNC: 269 MG/DL (ref 65–99)
GLUCOSE BLD STRIP.AUTO-MCNC: 287 MG/DL (ref 65–99)
GLUCOSE BLD STRIP.AUTO-MCNC: 288 MG/DL (ref 65–99)
GLUCOSE BLD STRIP.AUTO-MCNC: 295 MG/DL (ref 65–99)
GLUCOSE BLD STRIP.AUTO-MCNC: 295 MG/DL (ref 65–99)
GLUCOSE BLD STRIP.AUTO-MCNC: 299 MG/DL (ref 65–99)
GLUCOSE BLD STRIP.AUTO-MCNC: 308 MG/DL (ref 65–99)
GLUCOSE BLD STRIP.AUTO-MCNC: 323 MG/DL (ref 65–99)
GLUCOSE BLD STRIP.AUTO-MCNC: 330 MG/DL (ref 65–99)
GLUCOSE BLD STRIP.AUTO-MCNC: 336 MG/DL (ref 65–99)
GLUCOSE BLD STRIP.AUTO-MCNC: 349 MG/DL (ref 65–99)
GLUCOSE BLD STRIP.AUTO-MCNC: 38 MG/DL (ref 65–99)
GLUCOSE BLD STRIP.AUTO-MCNC: 419 MG/DL (ref 65–99)
GLUCOSE BLD STRIP.AUTO-MCNC: 46 MG/DL (ref 65–99)
GLUCOSE BLD STRIP.AUTO-MCNC: 534 MG/DL (ref 65–99)
GLUCOSE BLD STRIP.AUTO-MCNC: 55 MG/DL (ref 65–99)
GLUCOSE BLD STRIP.AUTO-MCNC: 56 MG/DL (ref 65–99)
GLUCOSE BLD STRIP.AUTO-MCNC: 69 MG/DL (ref 65–99)
GLUCOSE BLD STRIP.AUTO-MCNC: 74 MG/DL (ref 65–99)
GLUCOSE BLD STRIP.AUTO-MCNC: 75 MG/DL (ref 65–99)
GLUCOSE BLD STRIP.AUTO-MCNC: 76 MG/DL (ref 65–99)
GLUCOSE BLD STRIP.AUTO-MCNC: 77 MG/DL (ref 65–99)
GLUCOSE BLD STRIP.AUTO-MCNC: 78 MG/DL (ref 65–99)
GLUCOSE BLD STRIP.AUTO-MCNC: 79 MG/DL (ref 65–99)
GLUCOSE BLD STRIP.AUTO-MCNC: 85 MG/DL (ref 65–99)
GLUCOSE BLD STRIP.AUTO-MCNC: 87 MG/DL (ref 65–99)
GLUCOSE BLD STRIP.AUTO-MCNC: 87 MG/DL (ref 65–99)
GLUCOSE BLD STRIP.AUTO-MCNC: 88 MG/DL (ref 65–99)
GLUCOSE BLD STRIP.AUTO-MCNC: 89 MG/DL (ref 65–99)
GLUCOSE BLD STRIP.AUTO-MCNC: 90 MG/DL (ref 65–99)
GLUCOSE BLD STRIP.AUTO-MCNC: 90 MG/DL (ref 65–99)
GLUCOSE BLD STRIP.AUTO-MCNC: 95 MG/DL (ref 65–99)
GLUCOSE SERPL-MCNC: 112 MG/DL (ref 65–99)
GLUCOSE SERPL-MCNC: 113 MG/DL (ref 65–99)
GLUCOSE SERPL-MCNC: 116 MG/DL (ref 65–99)
GLUCOSE SERPL-MCNC: 122 MG/DL (ref 65–99)
GLUCOSE SERPL-MCNC: 126 MG/DL (ref 65–99)
GLUCOSE SERPL-MCNC: 128 MG/DL (ref 65–99)
GLUCOSE SERPL-MCNC: 131 MG/DL (ref 65–99)
GLUCOSE SERPL-MCNC: 143 MG/DL (ref 65–99)
GLUCOSE SERPL-MCNC: 148 MG/DL (ref 65–99)
GLUCOSE SERPL-MCNC: 157 MG/DL (ref 65–99)
GLUCOSE SERPL-MCNC: 166 MG/DL (ref 65–99)
GLUCOSE SERPL-MCNC: 172 MG/DL (ref 65–99)
GLUCOSE SERPL-MCNC: 175 MG/DL (ref 65–99)
GLUCOSE SERPL-MCNC: 182 MG/DL (ref 65–99)
GLUCOSE SERPL-MCNC: 188 MG/DL (ref 65–99)
GLUCOSE SERPL-MCNC: 231 MG/DL (ref 65–99)
GLUCOSE SERPL-MCNC: 258 MG/DL (ref 65–99)
GLUCOSE SERPL-MCNC: 296 MG/DL (ref 65–99)
GLUCOSE SERPL-MCNC: 341 MG/DL (ref 65–99)
GLUCOSE SERPL-MCNC: 345 MG/DL (ref 65–99)
GLUCOSE SERPL-MCNC: 39 MG/DL (ref 65–99)
GLUCOSE SERPL-MCNC: 460 MG/DL (ref 65–99)
GLUCOSE SERPL-MCNC: 576 MG/DL (ref 65–99)
GLUCOSE SERPL-MCNC: 74 MG/DL (ref 65–99)
GLUCOSE SERPL-MCNC: 80 MG/DL (ref 65–99)
GLUCOSE SERPL-MCNC: 83 MG/DL (ref 65–99)
GLUCOSE UR STRIP.AUTO-MCNC: 250 MG/DL
GLUCOSE UR STRIP.AUTO-MCNC: >=1000 MG/DL
GRAM STN SPEC: ABNORMAL
GRAM STN SPEC: NORMAL
HBA1C MFR BLD: 11.6 % (ref 4–5.6)
HBA1C MFR BLD: 13.2 % (ref 4–5.6)
HCO3 BLDA-SCNC: 23.6 MMOL/L (ref 17–25)
HCO3 BLDA-SCNC: 26.9 MMOL/L (ref 17–25)
HCO3 BLDA-SCNC: 27.8 MMOL/L (ref 17–25)
HCO3 BLDA-SCNC: 27.9 MMOL/L (ref 17–25)
HCO3 BLDA-SCNC: 29.2 MMOL/L (ref 17–25)
HCO3 BLDA-SCNC: 30 MMOL/L (ref 17–25)
HCO3 BLDA-SCNC: 30.2 MMOL/L (ref 17–25)
HCO3 BLDA-SCNC: 30.2 MMOL/L (ref 17–25)
HCO3 BLDV-SCNC: 18 MMOL/L (ref 24–28)
HCO3 BLDV-SCNC: 22.3 MMOL/L (ref 24–28)
HCT VFR BLD AUTO: 25.9 % (ref 37–47)
HCT VFR BLD AUTO: 26.7 % (ref 37–47)
HCT VFR BLD AUTO: 26.8 % (ref 37–47)
HCT VFR BLD AUTO: 27.6 % (ref 37–47)
HCT VFR BLD AUTO: 27.7 % (ref 37–47)
HCT VFR BLD AUTO: 28.8 % (ref 37–47)
HCT VFR BLD AUTO: 30.4 % (ref 37–47)
HCT VFR BLD AUTO: 30.7 % (ref 37–47)
HCT VFR BLD AUTO: 30.8 % (ref 37–47)
HCT VFR BLD AUTO: 31.4 % (ref 37–47)
HCT VFR BLD AUTO: 32.1 % (ref 37–47)
HCT VFR BLD AUTO: 32.3 % (ref 37–47)
HCT VFR BLD AUTO: 32.6 % (ref 37–47)
HCT VFR BLD AUTO: 32.8 % (ref 37–47)
HCT VFR BLD AUTO: 39.8 % (ref 37–47)
HCT VFR BLD AUTO: 39.9 % (ref 37–47)
HCT VFR BLD AUTO: 40.9 % (ref 37–47)
HCT VFR BLD AUTO: 43.9 % (ref 37–47)
HCT VFR BLD AUTO: 45.1 % (ref 37–47)
HGB BLD-MCNC: 10 G/DL (ref 12–16)
HGB BLD-MCNC: 10.2 G/DL (ref 12–16)
HGB BLD-MCNC: 10.2 G/DL (ref 12–16)
HGB BLD-MCNC: 10.5 G/DL (ref 12–16)
HGB BLD-MCNC: 10.9 G/DL (ref 12–16)
HGB BLD-MCNC: 13.3 G/DL (ref 12–16)
HGB BLD-MCNC: 13.7 G/DL (ref 12–16)
HGB BLD-MCNC: 14.3 G/DL (ref 12–16)
HGB BLD-MCNC: 14.7 G/DL (ref 12–16)
HGB BLD-MCNC: 15.3 G/DL (ref 12–16)
HGB BLD-MCNC: 8.3 G/DL (ref 12–16)
HGB BLD-MCNC: 8.4 G/DL (ref 12–16)
HGB BLD-MCNC: 8.6 G/DL (ref 12–16)
HGB BLD-MCNC: 8.8 G/DL (ref 12–16)
HGB BLD-MCNC: 8.9 G/DL (ref 12–16)
HGB BLD-MCNC: 9 G/DL (ref 12–16)
HGB BLD-MCNC: 9.6 G/DL (ref 12–16)
HGB BLD-MCNC: 9.8 G/DL (ref 12–16)
HGB BLD-MCNC: 9.9 G/DL (ref 12–16)
HOROWITZ INDEX BLDA+IHG-RTO: 115 MM[HG]
HOROWITZ INDEX BLDA+IHG-RTO: 195 MM[HG]
HOROWITZ INDEX BLDA+IHG-RTO: 250 MM[HG]
HOROWITZ INDEX BLDA+IHG-RTO: 260 MM[HG]
HOROWITZ INDEX BLDA+IHG-RTO: 264 MM[HG]
HOROWITZ INDEX BLDA+IHG-RTO: 267 MM[HG]
HOROWITZ INDEX BLDA+IHG-RTO: 287 MM[HG]
HOROWITZ INDEX BLDA+IHG-RTO: 870 MM[HG]
HYALINE CASTS #/AREA URNS LPF: ABNORMAL /LPF
HYALINE CASTS #/AREA URNS LPF: ABNORMAL /LPF
IMM GRANULOCYTES # BLD AUTO: 0.02 K/UL (ref 0–0.11)
IMM GRANULOCYTES # BLD AUTO: 0.03 K/UL (ref 0–0.11)
IMM GRANULOCYTES # BLD AUTO: 0.04 K/UL (ref 0–0.11)
IMM GRANULOCYTES # BLD AUTO: 0.06 K/UL (ref 0–0.11)
IMM GRANULOCYTES # BLD AUTO: 0.07 K/UL (ref 0–0.11)
IMM GRANULOCYTES # BLD AUTO: 0.09 K/UL (ref 0–0.11)
IMM GRANULOCYTES # BLD AUTO: 0.11 K/UL (ref 0–0.11)
IMM GRANULOCYTES NFR BLD AUTO: 0.3 % (ref 0–0.9)
IMM GRANULOCYTES NFR BLD AUTO: 0.4 % (ref 0–0.9)
IMM GRANULOCYTES NFR BLD AUTO: 0.5 % (ref 0–0.9)
IMM GRANULOCYTES NFR BLD AUTO: 0.6 % (ref 0–0.9)
IMM GRANULOCYTES NFR BLD AUTO: 0.6 % (ref 0–0.9)
IMM GRANULOCYTES NFR BLD AUTO: 0.7 % (ref 0–0.9)
IMM GRANULOCYTES NFR BLD AUTO: 0.8 % (ref 0–0.9)
KETONES UR STRIP.AUTO-MCNC: 80 MG/DL
KETONES UR STRIP.AUTO-MCNC: NEGATIVE MG/DL
LACTATE BLD-SCNC: 0.5 MMOL/L (ref 0.5–2)
LACTATE BLD-SCNC: 0.6 MMOL/L (ref 0.5–2)
LACTATE BLD-SCNC: 0.8 MMOL/L (ref 0.5–2)
LACTATE BLD-SCNC: 2.4 MMOL/L (ref 0.5–2)
LACTATE BLD-SCNC: 3.1 MMOL/L (ref 0.5–2)
LACTATE SERPL-SCNC: 2.7 MMOL/L (ref 0.5–2)
LEUKOCYTE ESTERASE UR QL STRIP.AUTO: ABNORMAL
LEUKOCYTE ESTERASE UR QL STRIP.AUTO: ABNORMAL
LIPASE SERPL-CCNC: 14 U/L (ref 11–82)
LYMPHOCYTES # BLD AUTO: 1.02 K/UL (ref 1–4.8)
LYMPHOCYTES # BLD AUTO: 1.25 K/UL (ref 1–4.8)
LYMPHOCYTES # BLD AUTO: 1.35 K/UL (ref 1–4.8)
LYMPHOCYTES # BLD AUTO: 1.41 K/UL (ref 1–4.8)
LYMPHOCYTES # BLD AUTO: 1.49 K/UL (ref 1–4.8)
LYMPHOCYTES # BLD AUTO: 1.63 K/UL (ref 1–4.8)
LYMPHOCYTES # BLD AUTO: 1.76 K/UL (ref 1–4.8)
LYMPHOCYTES # BLD AUTO: 2.35 K/UL (ref 1–4.8)
LYMPHOCYTES # BLD AUTO: 2.65 K/UL (ref 1–4.8)
LYMPHOCYTES NFR BLD: 12.4 % (ref 22–41)
LYMPHOCYTES NFR BLD: 13.4 % (ref 22–41)
LYMPHOCYTES NFR BLD: 14.5 % (ref 22–41)
LYMPHOCYTES NFR BLD: 14.8 % (ref 22–41)
LYMPHOCYTES NFR BLD: 17.8 % (ref 22–41)
LYMPHOCYTES NFR BLD: 17.8 % (ref 22–41)
LYMPHOCYTES NFR BLD: 19 % (ref 22–41)
LYMPHOCYTES NFR BLD: 20.4 % (ref 22–41)
LYMPHOCYTES NFR BLD: 22.9 % (ref 22–41)
MAGNESIUM SERPL-MCNC: 1.8 MG/DL (ref 1.5–2.5)
MAGNESIUM SERPL-MCNC: 1.8 MG/DL (ref 1.5–2.5)
MAGNESIUM SERPL-MCNC: 1.9 MG/DL (ref 1.5–2.5)
MAGNESIUM SERPL-MCNC: 2 MG/DL (ref 1.5–2.5)
MAGNESIUM SERPL-MCNC: 2.1 MG/DL (ref 1.5–2.5)
MAGNESIUM SERPL-MCNC: 2.1 MG/DL (ref 1.5–2.5)
MAGNESIUM SERPL-MCNC: 2.2 MG/DL (ref 1.5–2.5)
MAGNESIUM SERPL-MCNC: 2.3 MG/DL (ref 1.5–2.5)
MAGNESIUM SERPL-MCNC: 2.4 MG/DL (ref 1.5–2.5)
MCH RBC QN AUTO: 29.6 PG (ref 27–33)
MCH RBC QN AUTO: 29.9 PG (ref 27–33)
MCH RBC QN AUTO: 30 PG (ref 27–33)
MCH RBC QN AUTO: 30.1 PG (ref 27–33)
MCH RBC QN AUTO: 30.2 PG (ref 27–33)
MCH RBC QN AUTO: 30.3 PG (ref 27–33)
MCH RBC QN AUTO: 30.3 PG (ref 27–33)
MCH RBC QN AUTO: 30.4 PG (ref 27–33)
MCH RBC QN AUTO: 30.5 PG (ref 27–33)
MCH RBC QN AUTO: 30.6 PG (ref 27–33)
MCH RBC QN AUTO: 30.7 PG (ref 27–33)
MCH RBC QN AUTO: 30.8 PG (ref 27–33)
MCH RBC QN AUTO: 30.9 PG (ref 27–33)
MCH RBC QN AUTO: 31.1 PG (ref 27–33)
MCHC RBC AUTO-ENTMCNC: 31.2 G/DL (ref 33.6–35)
MCHC RBC AUTO-ENTMCNC: 31.3 G/DL (ref 33.6–35)
MCHC RBC AUTO-ENTMCNC: 31.5 G/DL (ref 33.6–35)
MCHC RBC AUTO-ENTMCNC: 31.5 G/DL (ref 33.6–35)
MCHC RBC AUTO-ENTMCNC: 31.6 G/DL (ref 33.6–35)
MCHC RBC AUTO-ENTMCNC: 31.8 G/DL (ref 33.6–35)
MCHC RBC AUTO-ENTMCNC: 31.9 G/DL (ref 33.6–35)
MCHC RBC AUTO-ENTMCNC: 32 G/DL (ref 33.6–35)
MCHC RBC AUTO-ENTMCNC: 32.1 G/DL (ref 33.6–35)
MCHC RBC AUTO-ENTMCNC: 32.1 G/DL (ref 33.6–35)
MCHC RBC AUTO-ENTMCNC: 32.2 G/DL (ref 33.6–35)
MCHC RBC AUTO-ENTMCNC: 32.2 G/DL (ref 33.6–35)
MCHC RBC AUTO-ENTMCNC: 32.6 G/DL (ref 33.6–35)
MCHC RBC AUTO-ENTMCNC: 32.6 G/DL (ref 33.6–35)
MCHC RBC AUTO-ENTMCNC: 33.2 G/DL (ref 33.6–35)
MCHC RBC AUTO-ENTMCNC: 33.3 G/DL (ref 33.6–35)
MCHC RBC AUTO-ENTMCNC: 33.5 G/DL (ref 33.6–35)
MCHC RBC AUTO-ENTMCNC: 34.9 G/DL (ref 33.6–35)
MCHC RBC AUTO-ENTMCNC: 35.9 G/DL (ref 33.6–35)
MCV RBC AUTO: 85 FL (ref 81.4–97.8)
MCV RBC AUTO: 86.8 FL (ref 81.4–97.8)
MCV RBC AUTO: 90.9 FL (ref 81.4–97.8)
MCV RBC AUTO: 92.1 FL (ref 81.4–97.8)
MCV RBC AUTO: 93.1 FL (ref 81.4–97.8)
MCV RBC AUTO: 93.2 FL (ref 81.4–97.8)
MCV RBC AUTO: 93.7 FL (ref 81.4–97.8)
MCV RBC AUTO: 93.8 FL (ref 81.4–97.8)
MCV RBC AUTO: 93.9 FL (ref 81.4–97.8)
MCV RBC AUTO: 94 FL (ref 81.4–97.8)
MCV RBC AUTO: 94.2 FL (ref 81.4–97.8)
MCV RBC AUTO: 94.5 FL (ref 81.4–97.8)
MCV RBC AUTO: 95 FL (ref 81.4–97.8)
MCV RBC AUTO: 95.2 FL (ref 81.4–97.8)
MCV RBC AUTO: 95.6 FL (ref 81.4–97.8)
MCV RBC AUTO: 96.6 FL (ref 81.4–97.8)
MCV RBC AUTO: 96.6 FL (ref 81.4–97.8)
MCV RBC AUTO: 96.7 FL (ref 81.4–97.8)
MCV RBC AUTO: 97.1 FL (ref 81.4–97.8)
METHADONE UR QL SCN: NEGATIVE
METHADONE UR QL SCN: NEGATIVE
MICRO URNS: ABNORMAL
MICRO URNS: ABNORMAL
MODE IMODE: ABNORMAL
MONOCYTES # BLD AUTO: 0.53 K/UL (ref 0–0.85)
MONOCYTES # BLD AUTO: 0.55 K/UL (ref 0–0.85)
MONOCYTES # BLD AUTO: 0.6 K/UL (ref 0–0.85)
MONOCYTES # BLD AUTO: 0.61 K/UL (ref 0–0.85)
MONOCYTES # BLD AUTO: 0.63 K/UL (ref 0–0.85)
MONOCYTES # BLD AUTO: 0.84 K/UL (ref 0–0.85)
MONOCYTES # BLD AUTO: 0.99 K/UL (ref 0–0.85)
MONOCYTES # BLD AUTO: 1.02 K/UL (ref 0–0.85)
MONOCYTES # BLD AUTO: 1.03 K/UL (ref 0–0.85)
MONOCYTES NFR BLD AUTO: 15 % (ref 0–13.4)
MONOCYTES NFR BLD AUTO: 5.3 % (ref 0–13.4)
MONOCYTES NFR BLD AUTO: 6.4 % (ref 0–13.4)
MONOCYTES NFR BLD AUTO: 6.8 % (ref 0–13.4)
MONOCYTES NFR BLD AUTO: 6.9 % (ref 0–13.4)
MONOCYTES NFR BLD AUTO: 7.1 % (ref 0–13.4)
MONOCYTES NFR BLD AUTO: 7.1 % (ref 0–13.4)
MONOCYTES NFR BLD AUTO: 8.3 % (ref 0–13.4)
MONOCYTES NFR BLD AUTO: 9.2 % (ref 0–13.4)
NEUTROPHILS # BLD AUTO: 11.05 K/UL (ref 2–7.15)
NEUTROPHILS # BLD AUTO: 12.35 K/UL (ref 2–7.15)
NEUTROPHILS # BLD AUTO: 4.03 K/UL (ref 2–7.15)
NEUTROPHILS # BLD AUTO: 5.27 K/UL (ref 2–7.15)
NEUTROPHILS # BLD AUTO: 5.58 K/UL (ref 2–7.15)
NEUTROPHILS # BLD AUTO: 5.79 K/UL (ref 2–7.15)
NEUTROPHILS # BLD AUTO: 6.4 K/UL (ref 2–7.15)
NEUTROPHILS # BLD AUTO: 6.54 K/UL (ref 2–7.15)
NEUTROPHILS # BLD AUTO: 9.08 K/UL (ref 2–7.15)
NEUTROPHILS NFR BLD: 61 % (ref 44–72)
NEUTROPHILS NFR BLD: 68.5 % (ref 44–72)
NEUTROPHILS NFR BLD: 69.9 % (ref 44–72)
NEUTROPHILS NFR BLD: 71.1 % (ref 44–72)
NEUTROPHILS NFR BLD: 74.4 % (ref 44–72)
NEUTROPHILS NFR BLD: 75.9 % (ref 44–72)
NEUTROPHILS NFR BLD: 76 % (ref 44–72)
NEUTROPHILS NFR BLD: 77.8 % (ref 44–72)
NEUTROPHILS NFR BLD: 79.5 % (ref 44–72)
NITRITE UR QL STRIP.AUTO: NEGATIVE
NITRITE UR QL STRIP.AUTO: NEGATIVE
NRBC # BLD AUTO: 0 K/UL
NRBC BLD-RTO: 0 /100 WBC
O2/TOTAL GAS SETTING VFR VENT: 100 %
O2/TOTAL GAS SETTING VFR VENT: 30 %
O2/TOTAL GAS SETTING VFR VENT: 40 %
O2/TOTAL GAS SETTING VFR VENT: 50 %
OPIATES UR QL SCN: NEGATIVE
OPIATES UR QL SCN: NEGATIVE
OSMOLALITY SERPL: 319 MOSM/KG H2O (ref 278–298)
OXYCODONE UR QL SCN: NEGATIVE
OXYCODONE UR QL SCN: NEGATIVE
PCO2 BLDA: 33.1 MMHG (ref 26–37)
PCO2 BLDA: 35 MMHG (ref 26–37)
PCO2 BLDA: 35.2 MMHG (ref 26–37)
PCO2 BLDA: 36.6 MMHG (ref 26–37)
PCO2 BLDA: 38.4 MMHG (ref 26–37)
PCO2 BLDA: 42.4 MMHG (ref 26–37)
PCO2 BLDA: 44.5 MMHG (ref 26–37)
PCO2 BLDA: 52.6 MMHG (ref 26–37)
PCO2 BLDV: 42 MMHG (ref 41–51)
PCO2 BLDV: 44.9 MMHG (ref 41–51)
PCO2 TEMP ADJ BLDA: 34 MMHG (ref 26–37)
PCO2 TEMP ADJ BLDA: 35.5 MMHG (ref 26–37)
PCO2 TEMP ADJ BLDA: 37.4 MMHG (ref 26–37)
PCO2 TEMP ADJ BLDA: 38.2 MMHG (ref 26–37)
PCO2 TEMP ADJ BLDA: 40.6 MMHG (ref 26–37)
PCO2 TEMP ADJ BLDA: 43.6 MMHG (ref 26–37)
PCO2 TEMP ADJ BLDA: 46 MMHG (ref 26–37)
PCO2 TEMP ADJ BLDV: 41 MMHG (ref 41–51)
PCP UR QL SCN: NEGATIVE
PCP UR QL SCN: NEGATIVE
PEEP END EXPIRATORY PRESSURE IPEEP: 8 CMH20
PH BLDA: 7.36 [PH] (ref 7.4–7.5)
PH BLDA: 7.43 [PH] (ref 7.4–7.5)
PH BLDA: 7.44 [PH] (ref 7.4–7.5)
PH BLDA: 7.44 [PH] (ref 7.4–7.5)
PH BLDA: 7.47 [PH] (ref 7.4–7.5)
PH BLDA: 7.51 [PH] (ref 7.4–7.5)
PH BLDA: 7.52 [PH] (ref 7.4–7.5)
PH BLDA: 7.54 [PH] (ref 7.4–7.5)
PH BLDV: 7.23 [PH] (ref 7.31–7.45)
PH BLDV: 7.33 [PH] (ref 7.31–7.45)
PH TEMP ADJ BLDA: 7.41 [PH] (ref 7.4–7.5)
PH TEMP ADJ BLDA: 7.42 [PH] (ref 7.4–7.5)
PH TEMP ADJ BLDA: 7.43 [PH] (ref 7.4–7.5)
PH TEMP ADJ BLDA: 7.45 [PH] (ref 7.4–7.5)
PH TEMP ADJ BLDA: 7.49 [PH] (ref 7.4–7.5)
PH TEMP ADJ BLDA: 7.51 [PH] (ref 7.4–7.5)
PH TEMP ADJ BLDA: 7.54 [PH] (ref 7.4–7.5)
PH TEMP ADJ BLDV: 7.34 [PH] (ref 7.31–7.45)
PH UR STRIP.AUTO: 5 [PH] (ref 5–8)
PH UR STRIP.AUTO: 7.5 [PH] (ref 5–8)
PHOSPHATE SERPL-MCNC: 1.9 MG/DL (ref 2.5–4.5)
PHOSPHATE SERPL-MCNC: 2.3 MG/DL (ref 2.5–4.5)
PHOSPHATE SERPL-MCNC: 2.4 MG/DL (ref 2.5–4.5)
PHOSPHATE SERPL-MCNC: 2.4 MG/DL (ref 2.5–4.5)
PHOSPHATE SERPL-MCNC: 2.9 MG/DL (ref 2.5–4.5)
PHOSPHATE SERPL-MCNC: 3.1 MG/DL (ref 2.5–4.5)
PHOSPHATE SERPL-MCNC: 3.1 MG/DL (ref 2.5–4.5)
PHOSPHATE SERPL-MCNC: 3.2 MG/DL (ref 2.5–4.5)
PHOSPHATE SERPL-MCNC: 3.5 MG/DL (ref 2.5–4.5)
PHOSPHATE SERPL-MCNC: 3.8 MG/DL (ref 2.5–4.5)
PHOSPHATE SERPL-MCNC: 3.9 MG/DL (ref 2.5–4.5)
PHOSPHATE SERPL-MCNC: 4 MG/DL (ref 2.5–4.5)
PHOSPHATE SERPL-MCNC: 4.2 MG/DL (ref 2.5–4.5)
PLATELET # BLD AUTO: 242 K/UL (ref 164–446)
PLATELET # BLD AUTO: 257 K/UL (ref 164–446)
PLATELET # BLD AUTO: 274 K/UL (ref 164–446)
PLATELET # BLD AUTO: 276 K/UL (ref 164–446)
PLATELET # BLD AUTO: 283 K/UL (ref 164–446)
PLATELET # BLD AUTO: 291 K/UL (ref 164–446)
PLATELET # BLD AUTO: 292 K/UL (ref 164–446)
PLATELET # BLD AUTO: 302 K/UL (ref 164–446)
PLATELET # BLD AUTO: 313 K/UL (ref 164–446)
PLATELET # BLD AUTO: 319 K/UL (ref 164–446)
PLATELET # BLD AUTO: 336 K/UL (ref 164–446)
PLATELET # BLD AUTO: 341 K/UL (ref 164–446)
PLATELET # BLD AUTO: 349 K/UL (ref 164–446)
PLATELET # BLD AUTO: 365 K/UL (ref 164–446)
PLATELET # BLD AUTO: 381 K/UL (ref 164–446)
PLATELET # BLD AUTO: 412 K/UL (ref 164–446)
PLATELET # BLD AUTO: 422 K/UL (ref 164–446)
PLATELET # BLD AUTO: 425 K/UL (ref 164–446)
PLATELET # BLD AUTO: 436 K/UL (ref 164–446)
PMV BLD AUTO: 8.5 FL (ref 9–12.9)
PMV BLD AUTO: 8.6 FL (ref 9–12.9)
PMV BLD AUTO: 8.7 FL (ref 9–12.9)
PMV BLD AUTO: 8.8 FL (ref 9–12.9)
PMV BLD AUTO: 8.9 FL (ref 9–12.9)
PMV BLD AUTO: 9 FL (ref 9–12.9)
PMV BLD AUTO: 9.1 FL (ref 9–12.9)
PMV BLD AUTO: 9.1 FL (ref 9–12.9)
PMV BLD AUTO: 9.2 FL (ref 9–12.9)
PMV BLD AUTO: 9.3 FL (ref 9–12.9)
PMV BLD AUTO: 9.4 FL (ref 9–12.9)
PMV BLD AUTO: 9.4 FL (ref 9–12.9)
PMV BLD AUTO: 9.5 FL (ref 9–12.9)
PMV BLD AUTO: 9.7 FL (ref 9–12.9)
PO2 BLDA: 115 MMHG (ref 64–87)
PO2 BLDA: 132 MMHG (ref 64–87)
PO2 BLDA: 261 MMHG (ref 64–87)
PO2 BLDA: 75 MMHG (ref 64–87)
PO2 BLDA: 78 MMHG (ref 64–87)
PO2 BLDA: 78 MMHG (ref 64–87)
PO2 BLDA: 80 MMHG (ref 64–87)
PO2 BLDA: 86 MMHG (ref 64–87)
PO2 BLDV: 30 MMHG (ref 25–40)
PO2 BLDV: 33.2 MMHG (ref 25–40)
PO2 TEMP ADJ BLDA: 136 MMHG (ref 64–87)
PO2 TEMP ADJ BLDA: 264 MMHG (ref 64–87)
PO2 TEMP ADJ BLDA: 81 MMHG (ref 64–87)
PO2 TEMP ADJ BLDA: 83 MMHG (ref 64–87)
PO2 TEMP ADJ BLDA: 85 MMHG (ref 64–87)
PO2 TEMP ADJ BLDA: 87 MMHG (ref 64–87)
PO2 TEMP ADJ BLDA: 87 MMHG (ref 64–87)
PO2 TEMP ADJ BLDV: 29 MMHG (ref 25–40)
POTASSIUM SERPL-SCNC: 3.3 MMOL/L (ref 3.6–5.5)
POTASSIUM SERPL-SCNC: 3.3 MMOL/L (ref 3.6–5.5)
POTASSIUM SERPL-SCNC: 3.6 MMOL/L (ref 3.6–5.5)
POTASSIUM SERPL-SCNC: 3.7 MMOL/L (ref 3.6–5.5)
POTASSIUM SERPL-SCNC: 3.8 MMOL/L (ref 3.6–5.5)
POTASSIUM SERPL-SCNC: 3.8 MMOL/L (ref 3.6–5.5)
POTASSIUM SERPL-SCNC: 3.9 MMOL/L (ref 3.6–5.5)
POTASSIUM SERPL-SCNC: 4 MMOL/L (ref 3.6–5.5)
POTASSIUM SERPL-SCNC: 4.2 MMOL/L (ref 3.6–5.5)
POTASSIUM SERPL-SCNC: 4.2 MMOL/L (ref 3.6–5.5)
POTASSIUM SERPL-SCNC: 4.4 MMOL/L (ref 3.6–5.5)
POTASSIUM SERPL-SCNC: 4.4 MMOL/L (ref 3.6–5.5)
POTASSIUM SERPL-SCNC: 4.5 MMOL/L (ref 3.6–5.5)
POTASSIUM SERPL-SCNC: 4.8 MMOL/L (ref 3.6–5.5)
POTASSIUM SERPL-SCNC: 5.3 MMOL/L (ref 3.6–5.5)
PREALB SERPL-MCNC: 10.8 MG/DL (ref 18–38)
PREALB SERPL-MCNC: 15.7 MG/DL (ref 18–38)
PREALB SERPL-MCNC: 9 MG/DL (ref 18–38)
PROPOXYPH UR QL SCN: NEGATIVE
PROPOXYPH UR QL SCN: NEGATIVE
PROT SERPL-MCNC: 6.2 G/DL (ref 6–8.2)
PROT SERPL-MCNC: 6.3 G/DL (ref 6–8.2)
PROT SERPL-MCNC: 6.4 G/DL (ref 6–8.2)
PROT SERPL-MCNC: 6.4 G/DL (ref 6–8.2)
PROT SERPL-MCNC: 6.5 G/DL (ref 6–8.2)
PROT SERPL-MCNC: 7.3 G/DL (ref 6–8.2)
PROT SERPL-MCNC: 7.8 G/DL (ref 6–8.2)
PROT UR QL STRIP: 300 MG/DL
PROT UR QL STRIP: NEGATIVE MG/DL
RBC # BLD AUTO: 2.72 M/UL (ref 4.2–5.4)
RBC # BLD AUTO: 2.75 M/UL (ref 4.2–5.4)
RBC # BLD AUTO: 2.82 M/UL (ref 4.2–5.4)
RBC # BLD AUTO: 2.94 M/UL (ref 4.2–5.4)
RBC # BLD AUTO: 2.94 M/UL (ref 4.2–5.4)
RBC # BLD AUTO: 2.98 M/UL (ref 4.2–5.4)
RBC # BLD AUTO: 3.19 M/UL (ref 4.2–5.4)
RBC # BLD AUTO: 3.24 M/UL (ref 4.2–5.4)
RBC # BLD AUTO: 3.25 M/UL (ref 4.2–5.4)
RBC # BLD AUTO: 3.32 M/UL (ref 4.2–5.4)
RBC # BLD AUTO: 3.34 M/UL (ref 4.2–5.4)
RBC # BLD AUTO: 3.38 M/UL (ref 4.2–5.4)
RBC # BLD AUTO: 3.5 M/UL (ref 4.2–5.4)
RBC # BLD AUTO: 3.5 M/UL (ref 4.2–5.4)
RBC # BLD AUTO: 4.39 M/UL (ref 4.2–5.4)
RBC # BLD AUTO: 4.44 M/UL (ref 4.2–5.4)
RBC # BLD AUTO: 4.68 M/UL (ref 4.2–5.4)
RBC # BLD AUTO: 4.84 M/UL (ref 4.2–5.4)
RBC # BLD AUTO: 5.06 M/UL (ref 4.2–5.4)
RBC # URNS HPF: ABNORMAL /HPF
RBC # URNS HPF: ABNORMAL /HPF
RBC UR QL AUTO: ABNORMAL
RBC UR QL AUTO: ABNORMAL
SALICYLATES SERPL-MCNC: <1 MG/DL (ref 15–25)
SAO2 % BLDA: 100 % (ref 93–99)
SAO2 % BLDA: 96 % (ref 93–99)
SAO2 % BLDA: 97 % (ref 93–99)
SAO2 % BLDA: 98 % (ref 93–99)
SAO2 % BLDA: 98 % (ref 93–99)
SAO2 % BLDA: 99 % (ref 93–99)
SAO2 % BLDV: 52 %
SAO2 % BLDV: 61.7 %
SIGNIFICANT IND 70042: ABNORMAL
SIGNIFICANT IND 70042: ABNORMAL
SIGNIFICANT IND 70042: NORMAL
SITE SITE: ABNORMAL
SITE SITE: ABNORMAL
SITE SITE: NORMAL
SODIUM SERPL-SCNC: 127 MMOL/L (ref 135–145)
SODIUM SERPL-SCNC: 129 MMOL/L (ref 135–145)
SODIUM SERPL-SCNC: 130 MMOL/L (ref 135–145)
SODIUM SERPL-SCNC: 132 MMOL/L (ref 135–145)
SODIUM SERPL-SCNC: 132 MMOL/L (ref 135–145)
SODIUM SERPL-SCNC: 133 MMOL/L (ref 135–145)
SODIUM SERPL-SCNC: 134 MMOL/L (ref 135–145)
SODIUM SERPL-SCNC: 135 MMOL/L (ref 135–145)
SODIUM SERPL-SCNC: 135 MMOL/L (ref 135–145)
SODIUM SERPL-SCNC: 137 MMOL/L (ref 135–145)
SODIUM SERPL-SCNC: 138 MMOL/L (ref 135–145)
SODIUM SERPL-SCNC: 139 MMOL/L (ref 135–145)
SODIUM SERPL-SCNC: 140 MMOL/L (ref 135–145)
SODIUM SERPL-SCNC: 141 MMOL/L (ref 135–145)
SODIUM SERPL-SCNC: 142 MMOL/L (ref 135–145)
SODIUM SERPL-SCNC: 142 MMOL/L (ref 135–145)
SOURCE SOURCE: ABNORMAL
SOURCE SOURCE: ABNORMAL
SOURCE SOURCE: NORMAL
SP GR UR STRIP.AUTO: 1.01
SP GR UR STRIP.AUTO: 1.02
SPECIMEN DRAWN FROM PATIENT: ABNORMAL
TIDAL VOLUME IVT: 350 ML
TRIGL SERPL-MCNC: 63 MG/DL (ref 0–149)
TRIGL SERPL-MCNC: 87 MG/DL (ref 0–149)
TROPONIN T SERPL-MCNC: 19 NG/L (ref 6–19)
TROPONIN T SERPL-MCNC: 46 NG/L (ref 6–19)
TROPONIN T SERPL-MCNC: 78 NG/L (ref 6–19)
TSH SERPL DL<=0.005 MIU/L-ACNC: 2.23 UIU/ML (ref 0.38–5.33)
UROBILINOGEN UR STRIP.AUTO-MCNC: 0.2 MG/DL
UROBILINOGEN UR STRIP.AUTO-MCNC: 0.2 MG/DL
WBC # BLD AUTO: 10.7 K/UL (ref 4.8–10.8)
WBC # BLD AUTO: 10.8 K/UL (ref 4.8–10.8)
WBC # BLD AUTO: 11.3 K/UL (ref 4.8–10.8)
WBC # BLD AUTO: 11.4 K/UL (ref 4.8–10.8)
WBC # BLD AUTO: 11.8 K/UL (ref 4.8–10.8)
WBC # BLD AUTO: 13.2 K/UL (ref 4.8–10.8)
WBC # BLD AUTO: 14.9 K/UL (ref 4.8–10.8)
WBC # BLD AUTO: 15.9 K/UL (ref 4.8–10.8)
WBC # BLD AUTO: 6.6 K/UL (ref 4.8–10.8)
WBC # BLD AUTO: 7.3 K/UL (ref 4.8–10.8)
WBC # BLD AUTO: 7.6 K/UL (ref 4.8–10.8)
WBC # BLD AUTO: 7.7 K/UL (ref 4.8–10.8)
WBC # BLD AUTO: 7.7 K/UL (ref 4.8–10.8)
WBC # BLD AUTO: 7.8 K/UL (ref 4.8–10.8)
WBC # BLD AUTO: 7.9 K/UL (ref 4.8–10.8)
WBC # BLD AUTO: 8.3 K/UL (ref 4.8–10.8)
WBC # BLD AUTO: 8.6 K/UL (ref 4.8–10.8)
WBC # BLD AUTO: 8.6 K/UL (ref 4.8–10.8)
WBC # BLD AUTO: 9.2 K/UL (ref 4.8–10.8)
WBC #/AREA URNS HPF: ABNORMAL /HPF
WBC #/AREA URNS HPF: ABNORMAL /HPF

## 2022-01-01 PROCEDURE — 94799 UNLISTED PULMONARY SVC/PX: CPT

## 2022-01-01 PROCEDURE — 99291 CRITICAL CARE FIRST HOUR: CPT

## 2022-01-01 PROCEDURE — 83605 ASSAY OF LACTIC ACID: CPT

## 2022-01-01 PROCEDURE — 700101 HCHG RX REV CODE 250: Performed by: EMERGENCY MEDICINE

## 2022-01-01 PROCEDURE — 700105 HCHG RX REV CODE 258: Performed by: INTERNAL MEDICINE

## 2022-01-01 PROCEDURE — 700102 HCHG RX REV CODE 250 W/ 637 OVERRIDE(OP): Performed by: NURSE PRACTITIONER

## 2022-01-01 PROCEDURE — 71045 X-RAY EXAM CHEST 1 VIEW: CPT

## 2022-01-01 PROCEDURE — 700111 HCHG RX REV CODE 636 W/ 250 OVERRIDE (IP): Performed by: INTERNAL MEDICINE

## 2022-01-01 PROCEDURE — 36600 WITHDRAWAL OF ARTERIAL BLOOD: CPT

## 2022-01-01 PROCEDURE — A9270 NON-COVERED ITEM OR SERVICE: HCPCS | Performed by: INTERNAL MEDICINE

## 2022-01-01 PROCEDURE — 82077 ASSAY SPEC XCP UR&BREATH IA: CPT

## 2022-01-01 PROCEDURE — 700111 HCHG RX REV CODE 636 W/ 250 OVERRIDE (IP): Performed by: NURSE PRACTITIONER

## 2022-01-01 PROCEDURE — 31500 INSERT EMERGENCY AIRWAY: CPT

## 2022-01-01 PROCEDURE — 84100 ASSAY OF PHOSPHORUS: CPT

## 2022-01-01 PROCEDURE — 700101 HCHG RX REV CODE 250: Performed by: INTERNAL MEDICINE

## 2022-01-01 PROCEDURE — 82962 GLUCOSE BLOOD TEST: CPT

## 2022-01-01 PROCEDURE — 302214 INTUBATION BOX: Performed by: EMERGENCY MEDICINE

## 2022-01-01 PROCEDURE — 770022 HCHG ROOM/CARE - ICU (200)

## 2022-01-01 PROCEDURE — 80048 BASIC METABOLIC PNL TOTAL CA: CPT

## 2022-01-01 PROCEDURE — 700105 HCHG RX REV CODE 258: Performed by: NURSE PRACTITIONER

## 2022-01-01 PROCEDURE — 700102 HCHG RX REV CODE 250 W/ 637 OVERRIDE(OP): Performed by: INTERNAL MEDICINE

## 2022-01-01 PROCEDURE — 99291 CRITICAL CARE FIRST HOUR: CPT | Performed by: NURSE PRACTITIONER

## 2022-01-01 PROCEDURE — 94003 VENT MGMT INPAT SUBQ DAY: CPT

## 2022-01-01 PROCEDURE — 700105 HCHG RX REV CODE 258: Performed by: EMERGENCY MEDICINE

## 2022-01-01 PROCEDURE — 83735 ASSAY OF MAGNESIUM: CPT

## 2022-01-01 PROCEDURE — 80053 COMPREHEN METABOLIC PANEL: CPT

## 2022-01-01 PROCEDURE — 82803 BLOOD GASES ANY COMBINATION: CPT

## 2022-01-01 PROCEDURE — 82962 GLUCOSE BLOOD TEST: CPT | Mod: 91

## 2022-01-01 PROCEDURE — 95822 EEG COMA OR SLEEP ONLY: CPT | Mod: 26 | Performed by: STUDENT IN AN ORGANIZED HEALTH CARE EDUCATION/TRAINING PROGRAM

## 2022-01-01 PROCEDURE — 86140 C-REACTIVE PROTEIN: CPT

## 2022-01-01 PROCEDURE — 95819 EEG AWAKE AND ASLEEP: CPT | Performed by: PSYCHIATRY & NEUROLOGY

## 2022-01-01 PROCEDURE — 700111 HCHG RX REV CODE 636 W/ 250 OVERRIDE (IP)

## 2022-01-01 PROCEDURE — 81001 URINALYSIS AUTO W/SCOPE: CPT

## 2022-01-01 PROCEDURE — A9270 NON-COVERED ITEM OR SERVICE: HCPCS | Performed by: NURSE PRACTITIONER

## 2022-01-01 PROCEDURE — 99214 OFFICE O/P EST MOD 30 MIN: CPT | Performed by: NURSE PRACTITIONER

## 2022-01-01 PROCEDURE — 700111 HCHG RX REV CODE 636 W/ 250 OVERRIDE (IP): Mod: JA | Performed by: NURSE PRACTITIONER

## 2022-01-01 PROCEDURE — 80143 DRUG ASSAY ACETAMINOPHEN: CPT

## 2022-01-01 PROCEDURE — 99291 CRITICAL CARE FIRST HOUR: CPT | Performed by: INTERNAL MEDICINE

## 2022-01-01 PROCEDURE — 700101 HCHG RX REV CODE 250: Performed by: NURSE PRACTITIONER

## 2022-01-01 PROCEDURE — 99291 CRITICAL CARE FIRST HOUR: CPT | Mod: 25 | Performed by: NURSE PRACTITIONER

## 2022-01-01 PROCEDURE — 85025 COMPLETE CBC W/AUTO DIFF WBC: CPT | Mod: 91

## 2022-01-01 PROCEDURE — 96375 TX/PRO/DX INJ NEW DRUG ADDON: CPT

## 2022-01-01 PROCEDURE — 87077 CULTURE AEROBIC IDENTIFY: CPT

## 2022-01-01 PROCEDURE — 97163 PT EVAL HIGH COMPLEX 45 MIN: CPT

## 2022-01-01 PROCEDURE — 85025 COMPLETE CBC W/AUTO DIFF WBC: CPT

## 2022-01-01 PROCEDURE — 95700 EEG CONT REC W/VID EEG TECH: CPT | Performed by: STUDENT IN AN ORGANIZED HEALTH CARE EDUCATION/TRAINING PROGRAM

## 2022-01-01 PROCEDURE — 99239 HOSP IP/OBS DSCHRG MGMT >30: CPT | Performed by: HOSPITALIST

## 2022-01-01 PROCEDURE — 87186 SC STD MICRODIL/AGAR DIL: CPT

## 2022-01-01 PROCEDURE — 3E0G76Z INTRODUCTION OF NUTRITIONAL SUBSTANCE INTO UPPER GI, VIA NATURAL OR ARTIFICIAL OPENING: ICD-10-PCS | Performed by: INTERNAL MEDICINE

## 2022-01-01 PROCEDURE — 700105 HCHG RX REV CODE 258: Performed by: STUDENT IN AN ORGANIZED HEALTH CARE EDUCATION/TRAINING PROGRAM

## 2022-01-01 PROCEDURE — 96365 THER/PROPH/DIAG IV INF INIT: CPT

## 2022-01-01 PROCEDURE — 84484 ASSAY OF TROPONIN QUANT: CPT

## 2022-01-01 PROCEDURE — 83690 ASSAY OF LIPASE: CPT

## 2022-01-01 PROCEDURE — 700102 HCHG RX REV CODE 250 W/ 637 OVERRIDE(OP): Performed by: EMERGENCY MEDICINE

## 2022-01-01 PROCEDURE — 80307 DRUG TEST PRSMV CHEM ANLYZR: CPT

## 2022-01-01 PROCEDURE — 83036 HEMOGLOBIN GLYCOSYLATED A1C: CPT

## 2022-01-01 PROCEDURE — 84100 ASSAY OF PHOSPHORUS: CPT | Mod: 91

## 2022-01-01 PROCEDURE — 36415 COLL VENOUS BLD VENIPUNCTURE: CPT

## 2022-01-01 PROCEDURE — 700102 HCHG RX REV CODE 250 W/ 637 OVERRIDE(OP)

## 2022-01-01 PROCEDURE — 0BH17EZ INSERTION OF ENDOTRACHEAL AIRWAY INTO TRACHEA, VIA NATURAL OR ARTIFICIAL OPENING: ICD-10-PCS | Performed by: EMERGENCY MEDICINE

## 2022-01-01 PROCEDURE — 99233 SBSQ HOSP IP/OBS HIGH 50: CPT | Mod: 25 | Performed by: PSYCHIATRY & NEUROLOGY

## 2022-01-01 PROCEDURE — 85027 COMPLETE CBC AUTOMATED: CPT

## 2022-01-01 PROCEDURE — 303105 HCHG CATHETER EXTRA

## 2022-01-01 PROCEDURE — 93005 ELECTROCARDIOGRAM TRACING: CPT | Performed by: EMERGENCY MEDICINE

## 2022-01-01 PROCEDURE — 95720 EEG PHY/QHP EA INCR W/VEEG: CPT | Performed by: STUDENT IN AN ORGANIZED HEALTH CARE EDUCATION/TRAINING PROGRAM

## 2022-01-01 PROCEDURE — 31500 INSERT EMERGENCY AIRWAY: CPT | Performed by: NURSE PRACTITIONER

## 2022-01-01 PROCEDURE — 80179 DRUG ASSAY SALICYLATE: CPT

## 2022-01-01 PROCEDURE — 87070 CULTURE OTHR SPECIMN AEROBIC: CPT

## 2022-01-01 PROCEDURE — 82010 KETONE BODYS QUAN: CPT

## 2022-01-01 PROCEDURE — 95819 EEG AWAKE AND ASLEEP: CPT | Mod: 26 | Performed by: PSYCHIATRY & NEUROLOGY

## 2022-01-01 PROCEDURE — 95714 VEEG EA 12-26 HR UNMNTR: CPT | Performed by: STUDENT IN AN ORGANIZED HEALTH CARE EDUCATION/TRAINING PROGRAM

## 2022-01-01 PROCEDURE — 82140 ASSAY OF AMMONIA: CPT

## 2022-01-01 PROCEDURE — 82533 TOTAL CORTISOL: CPT

## 2022-01-01 PROCEDURE — 70551 MRI BRAIN STEM W/O DYE: CPT

## 2022-01-01 PROCEDURE — 96367 TX/PROPH/DG ADDL SEQ IV INF: CPT

## 2022-01-01 PROCEDURE — 0BH17EZ INSERTION OF ENDOTRACHEAL AIRWAY INTO TRACHEA, VIA NATURAL OR ARTIFICIAL OPENING: ICD-10-PCS | Performed by: INTERNAL MEDICINE

## 2022-01-01 PROCEDURE — 770001 HCHG ROOM/CARE - MED/SURG/GYN PRIV*

## 2022-01-01 PROCEDURE — 87077 CULTURE AEROBIC IDENTIFY: CPT | Mod: 91

## 2022-01-01 PROCEDURE — 700111 HCHG RX REV CODE 636 W/ 250 OVERRIDE (IP): Performed by: EMERGENCY MEDICINE

## 2022-01-01 PROCEDURE — 87086 URINE CULTURE/COLONY COUNT: CPT

## 2022-01-01 PROCEDURE — 84134 ASSAY OF PREALBUMIN: CPT

## 2022-01-01 PROCEDURE — 5A1955Z RESPIRATORY VENTILATION, GREATER THAN 96 CONSECUTIVE HOURS: ICD-10-PCS | Performed by: EMERGENCY MEDICINE

## 2022-01-01 PROCEDURE — 302136 NUTRITION PUMP: Performed by: INTERNAL MEDICINE

## 2022-01-01 PROCEDURE — 95822 EEG COMA OR SLEEP ONLY: CPT | Performed by: STUDENT IN AN ORGANIZED HEALTH CARE EDUCATION/TRAINING PROGRAM

## 2022-01-01 PROCEDURE — 83930 ASSAY OF BLOOD OSMOLALITY: CPT

## 2022-01-01 PROCEDURE — 99292 CRITICAL CARE ADDL 30 MIN: CPT | Performed by: INTERNAL MEDICINE

## 2022-01-01 PROCEDURE — 99222 1ST HOSP IP/OBS MODERATE 55: CPT | Mod: 25 | Performed by: PSYCHIATRY & NEUROLOGY

## 2022-01-01 PROCEDURE — 73120 X-RAY EXAM OF HAND: CPT | Mod: RT

## 2022-01-01 PROCEDURE — 95718 EEG PHYS/QHP 2-12 HR W/VEEG: CPT | Performed by: STUDENT IN AN ORGANIZED HEALTH CARE EDUCATION/TRAINING PROGRAM

## 2022-01-01 PROCEDURE — 97167 OT EVAL HIGH COMPLEX 60 MIN: CPT

## 2022-01-01 PROCEDURE — 51702 INSERT TEMP BLADDER CATH: CPT

## 2022-01-01 PROCEDURE — 700111 HCHG RX REV CODE 636 W/ 250 OVERRIDE (IP): Performed by: STUDENT IN AN ORGANIZED HEALTH CARE EDUCATION/TRAINING PROGRAM

## 2022-01-01 PROCEDURE — 70450 CT HEAD/BRAIN W/O DYE: CPT

## 2022-01-01 PROCEDURE — 87147 CULTURE TYPE IMMUNOLOGIC: CPT

## 2022-01-01 PROCEDURE — 87205 SMEAR GRAM STAIN: CPT

## 2022-01-01 PROCEDURE — 99223 1ST HOSP IP/OBS HIGH 75: CPT | Performed by: HOSPITALIST

## 2022-01-01 PROCEDURE — 99496 TRANSJ CARE MGMT HIGH F2F 7D: CPT | Performed by: NURSE PRACTITIONER

## 2022-01-01 PROCEDURE — 306565 RIGID MIT RESTRAINT(PAIR): Performed by: INTERNAL MEDICINE

## 2022-01-01 PROCEDURE — 5A1955Z RESPIRATORY VENTILATION, GREATER THAN 96 CONSECUTIVE HOURS: ICD-10-PCS | Performed by: INTERNAL MEDICINE

## 2022-01-01 PROCEDURE — 302214 INTUBATION BOX: Performed by: INTERNAL MEDICINE

## 2022-01-01 PROCEDURE — 4A10X4Z MONITORING OF CENTRAL NERVOUS ELECTRICAL ACTIVITY, EXTERNAL APPROACH: ICD-10-PCS | Performed by: INTERNAL MEDICINE

## 2022-01-01 PROCEDURE — 99152 MOD SED SAME PHYS/QHP 5/>YRS: CPT

## 2022-01-01 PROCEDURE — 84478 ASSAY OF TRIGLYCERIDES: CPT

## 2022-01-01 PROCEDURE — 96374 THER/PROPH/DIAG INJ IV PUSH: CPT

## 2022-01-01 PROCEDURE — 83735 ASSAY OF MAGNESIUM: CPT | Mod: 91

## 2022-01-01 PROCEDURE — 87040 BLOOD CULTURE FOR BACTERIA: CPT

## 2022-01-01 PROCEDURE — 82550 ASSAY OF CK (CPK): CPT

## 2022-01-01 PROCEDURE — 94002 VENT MGMT INPAT INIT DAY: CPT

## 2022-01-01 PROCEDURE — 84443 ASSAY THYROID STIM HORMONE: CPT

## 2022-01-01 PROCEDURE — A9270 NON-COVERED ITEM OR SERVICE: HCPCS

## 2022-01-01 PROCEDURE — 95711 VEEG 2-12 HR UNMONITORED: CPT | Performed by: STUDENT IN AN ORGANIZED HEALTH CARE EDUCATION/TRAINING PROGRAM

## 2022-01-01 RX ORDER — PROMETHAZINE HYDROCHLORIDE 25 MG/1
12.5-25 SUPPOSITORY RECTAL EVERY 4 HOURS PRN
Status: DISCONTINUED | OUTPATIENT
Start: 2022-01-01 | End: 2022-01-01 | Stop reason: HOSPADM

## 2022-01-01 RX ORDER — POLYETHYLENE GLYCOL 3350 17 G/17G
1 POWDER, FOR SOLUTION ORAL
Status: DISCONTINUED | OUTPATIENT
Start: 2022-01-01 | End: 2022-01-01

## 2022-01-01 RX ORDER — LEVOTHYROXINE SODIUM 112 UG/1
112 TABLET ORAL DAILY
Status: DISCONTINUED | OUTPATIENT
Start: 2022-01-01 | End: 2022-01-01 | Stop reason: HOSPADM

## 2022-01-01 RX ORDER — DEXTROSE MONOHYDRATE 25 G/50ML
25 INJECTION, SOLUTION INTRAVENOUS
Status: DISCONTINUED | OUTPATIENT
Start: 2022-01-01 | End: 2022-01-01 | Stop reason: HOSPADM

## 2022-01-01 RX ORDER — MAGNESIUM SULFATE HEPTAHYDRATE 40 MG/ML
2 INJECTION, SOLUTION INTRAVENOUS
Status: COMPLETED | OUTPATIENT
Start: 2022-01-01 | End: 2022-01-01

## 2022-01-01 RX ORDER — INSULIN LISPRO 100 [IU]/ML
INJECTION, SOLUTION INTRAVENOUS; SUBCUTANEOUS
Qty: 10 ML | Refills: 3 | Status: SHIPPED | OUTPATIENT
Start: 2022-01-01

## 2022-01-01 RX ORDER — ROCURONIUM BROMIDE 10 MG/ML
50 INJECTION, SOLUTION INTRAVENOUS ONCE
Status: COMPLETED | OUTPATIENT
Start: 2022-01-01 | End: 2022-01-01

## 2022-01-01 RX ORDER — SODIUM CHLORIDE AND POTASSIUM CHLORIDE 150; 900 MG/100ML; MG/100ML
INJECTION, SOLUTION INTRAVENOUS CONTINUOUS
Status: DISCONTINUED | OUTPATIENT
Start: 2022-01-01 | End: 2022-01-01

## 2022-01-01 RX ORDER — CEFTRIAXONE 2 G/1
2 INJECTION, POWDER, FOR SOLUTION INTRAMUSCULAR; INTRAVENOUS ONCE
Status: COMPLETED | OUTPATIENT
Start: 2022-01-01 | End: 2022-01-01

## 2022-01-01 RX ORDER — ACETAMINOPHEN 325 MG/1
650 TABLET ORAL EVERY 6 HOURS PRN
Status: DISCONTINUED | OUTPATIENT
Start: 2022-01-01 | End: 2022-01-01

## 2022-01-01 RX ORDER — BISACODYL 10 MG
10 SUPPOSITORY, RECTAL RECTAL
Status: DISCONTINUED | OUTPATIENT
Start: 2022-01-01 | End: 2023-01-01

## 2022-01-01 RX ORDER — INSULIN LISPRO 100 [IU]/ML
INJECTION, SOLUTION INTRAVENOUS; SUBCUTANEOUS
COMMUNITY
Start: 2022-01-01 | End: 2022-01-01

## 2022-01-01 RX ORDER — HEPARIN SODIUM 5000 [USP'U]/ML
5000 INJECTION, SOLUTION INTRAVENOUS; SUBCUTANEOUS EVERY 8 HOURS
Status: DISCONTINUED | OUTPATIENT
Start: 2022-01-01 | End: 2022-01-01

## 2022-01-01 RX ORDER — ATORVASTATIN CALCIUM 10 MG/1
10 TABLET, FILM COATED ORAL DAILY
Status: DISCONTINUED | OUTPATIENT
Start: 2022-01-01 | End: 2022-01-01 | Stop reason: HOSPADM

## 2022-01-01 RX ORDER — MAGNESIUM SULFATE HEPTAHYDRATE 40 MG/ML
2 INJECTION, SOLUTION INTRAVENOUS ONCE
Status: COMPLETED | OUTPATIENT
Start: 2022-01-01 | End: 2022-01-01

## 2022-01-01 RX ORDER — ENOXAPARIN SODIUM 100 MG/ML
40 INJECTION SUBCUTANEOUS DAILY
Status: DISCONTINUED | OUTPATIENT
Start: 2022-01-01 | End: 2022-01-01

## 2022-01-01 RX ORDER — SODIUM CHLORIDE, SODIUM LACTATE, POTASSIUM CHLORIDE, CALCIUM CHLORIDE 600; 310; 30; 20 MG/100ML; MG/100ML; MG/100ML; MG/100ML
INJECTION, SOLUTION INTRAVENOUS CONTINUOUS
Status: ACTIVE | OUTPATIENT
Start: 2022-01-01 | End: 2022-01-01

## 2022-01-01 RX ORDER — DEXTROSE MONOHYDRATE 100 MG/ML
INJECTION, SOLUTION INTRAVENOUS CONTINUOUS
Status: DISCONTINUED | OUTPATIENT
Start: 2022-01-01 | End: 2022-01-01

## 2022-01-01 RX ORDER — LABETALOL HYDROCHLORIDE 5 MG/ML
10 INJECTION, SOLUTION INTRAVENOUS EVERY 4 HOURS PRN
Status: DISCONTINUED | OUTPATIENT
Start: 2022-01-01 | End: 2022-01-01 | Stop reason: HOSPADM

## 2022-01-01 RX ORDER — SODIUM CHLORIDE 9 MG/ML
2000 INJECTION, SOLUTION INTRAVENOUS ONCE
Status: DISCONTINUED | OUTPATIENT
Start: 2022-01-01 | End: 2022-01-01

## 2022-01-01 RX ORDER — SODIUM CHLORIDE, SODIUM LACTATE, POTASSIUM CHLORIDE, AND CALCIUM CHLORIDE .6; .31; .03; .02 G/100ML; G/100ML; G/100ML; G/100ML
1000 INJECTION, SOLUTION INTRAVENOUS ONCE
Status: COMPLETED | OUTPATIENT
Start: 2022-01-01 | End: 2022-01-01

## 2022-01-01 RX ORDER — SODIUM CHLORIDE 450 MG/100ML
INJECTION, SOLUTION INTRAVENOUS CONTINUOUS
Status: DISCONTINUED | OUTPATIENT
Start: 2022-01-01 | End: 2022-01-01

## 2022-01-01 RX ORDER — AMOXICILLIN 250 MG
2 CAPSULE ORAL 2 TIMES DAILY
Status: DISCONTINUED | OUTPATIENT
Start: 2022-01-01 | End: 2022-01-01 | Stop reason: HOSPADM

## 2022-01-01 RX ORDER — LEVETIRACETAM 500 MG/5ML
30 INJECTION, SOLUTION, CONCENTRATE INTRAVENOUS ONCE
Status: COMPLETED | OUTPATIENT
Start: 2022-01-01 | End: 2022-01-01

## 2022-01-01 RX ORDER — SODIUM CHLORIDE, SODIUM LACTATE, POTASSIUM CHLORIDE, CALCIUM CHLORIDE 600; 310; 30; 20 MG/100ML; MG/100ML; MG/100ML; MG/100ML
1000 INJECTION, SOLUTION INTRAVENOUS ONCE
Status: COMPLETED | OUTPATIENT
Start: 2022-01-01 | End: 2022-01-01

## 2022-01-01 RX ORDER — PROMETHAZINE HYDROCHLORIDE 25 MG/1
12.5-25 TABLET ORAL EVERY 4 HOURS PRN
Status: DISCONTINUED | OUTPATIENT
Start: 2022-01-01 | End: 2022-01-01 | Stop reason: HOSPADM

## 2022-01-01 RX ORDER — MAGNESIUM SULFATE HEPTAHYDRATE 40 MG/ML
4 INJECTION, SOLUTION INTRAVENOUS
Status: COMPLETED | OUTPATIENT
Start: 2022-01-01 | End: 2022-01-01

## 2022-01-01 RX ORDER — POLYETHYLENE GLYCOL 3350 17 G/17G
1 POWDER, FOR SOLUTION ORAL
Status: DISCONTINUED | OUTPATIENT
Start: 2022-01-01 | End: 2022-01-01 | Stop reason: HOSPADM

## 2022-01-01 RX ORDER — LISINOPRIL 5 MG/1
5 TABLET ORAL DAILY
Status: DISCONTINUED | OUTPATIENT
Start: 2022-01-01 | End: 2022-01-01 | Stop reason: HOSPADM

## 2022-01-01 RX ORDER — OMEPRAZOLE 20 MG/1
20 CAPSULE, DELAYED RELEASE ORAL DAILY
Qty: 14 CAPSULE | Refills: 0 | Status: SHIPPED | OUTPATIENT
Start: 2022-01-01 | End: 2022-01-01

## 2022-01-01 RX ORDER — OCTREOTIDE ACETATE 100 UG/ML
100 INJECTION, SOLUTION INTRAVENOUS; SUBCUTANEOUS ONCE
Status: COMPLETED | OUTPATIENT
Start: 2022-01-01 | End: 2022-01-01

## 2022-01-01 RX ORDER — PROPOFOL 10 MG/ML
60 INJECTION, EMULSION INTRAVENOUS ONCE
Status: COMPLETED | OUTPATIENT
Start: 2022-01-01 | End: 2022-01-01

## 2022-01-01 RX ORDER — DEXMEDETOMIDINE HYDROCHLORIDE 4 UG/ML
.1-1.5 INJECTION, SOLUTION INTRAVENOUS CONTINUOUS
Status: DISCONTINUED | OUTPATIENT
Start: 2022-01-01 | End: 2022-01-01

## 2022-01-01 RX ORDER — ETOMIDATE 2 MG/ML
20 INJECTION INTRAVENOUS ONCE
Status: COMPLETED | OUTPATIENT
Start: 2022-01-01 | End: 2022-01-01

## 2022-01-01 RX ORDER — BISACODYL 10 MG
10 SUPPOSITORY, RECTAL RECTAL
Status: DISCONTINUED | OUTPATIENT
Start: 2022-01-01 | End: 2022-01-01

## 2022-01-01 RX ORDER — LORAZEPAM 2 MG/ML
1-2 INJECTION INTRAMUSCULAR EVERY 4 HOURS PRN
Status: DISCONTINUED | OUTPATIENT
Start: 2022-01-01 | End: 2023-01-01

## 2022-01-01 RX ORDER — ASPIRIN 325 MG
325 TABLET ORAL DAILY
Status: DISCONTINUED | OUTPATIENT
Start: 2022-01-01 | End: 2023-01-01

## 2022-01-01 RX ORDER — LISINOPRIL 10 MG/1
10 TABLET ORAL DAILY
Status: DISCONTINUED | OUTPATIENT
Start: 2023-01-01 | End: 2023-01-01

## 2022-01-01 RX ORDER — ACETAMINOPHEN 325 MG/1
650 TABLET ORAL EVERY 6 HOURS PRN
Status: DISCONTINUED | OUTPATIENT
Start: 2022-01-01 | End: 2022-01-01 | Stop reason: HOSPADM

## 2022-01-01 RX ORDER — ACETAZOLAMIDE 250 MG/1
250 TABLET ORAL ONCE
Status: COMPLETED | OUTPATIENT
Start: 2022-01-01 | End: 2022-01-01

## 2022-01-01 RX ORDER — HYDROMORPHONE HYDROCHLORIDE 1 MG/ML
.5-1 INJECTION, SOLUTION INTRAMUSCULAR; INTRAVENOUS; SUBCUTANEOUS
Status: DISCONTINUED | OUTPATIENT
Start: 2022-01-01 | End: 2023-01-01

## 2022-01-01 RX ORDER — IPRATROPIUM BROMIDE AND ALBUTEROL SULFATE 2.5; .5 MG/3ML; MG/3ML
3 SOLUTION RESPIRATORY (INHALATION)
Status: DISCONTINUED | OUTPATIENT
Start: 2022-01-01 | End: 2023-01-01

## 2022-01-01 RX ORDER — FAMOTIDINE 20 MG/1
20 TABLET, FILM COATED ORAL EVERY 12 HOURS
Status: DISCONTINUED | OUTPATIENT
Start: 2022-01-01 | End: 2023-01-01

## 2022-01-01 RX ORDER — POTASSIUM CHLORIDE 7.45 MG/ML
10 INJECTION INTRAVENOUS ONCE
Status: COMPLETED | OUTPATIENT
Start: 2022-01-01 | End: 2022-01-01

## 2022-01-01 RX ORDER — SCOLOPAMINE TRANSDERMAL SYSTEM 1 MG/1
1 PATCH, EXTENDED RELEASE TRANSDERMAL
Status: DISCONTINUED | OUTPATIENT
Start: 2022-01-01 | End: 2022-01-01

## 2022-01-01 RX ORDER — LORAZEPAM 2 MG/ML
INJECTION INTRAMUSCULAR
Status: COMPLETED
Start: 2022-01-01 | End: 2022-01-01

## 2022-01-01 RX ORDER — ATORVASTATIN CALCIUM 10 MG/1
10 TABLET, FILM COATED ORAL DAILY
Status: DISCONTINUED | OUTPATIENT
Start: 2022-01-01 | End: 2023-01-01

## 2022-01-01 RX ORDER — PROCHLORPERAZINE EDISYLATE 5 MG/ML
5-10 INJECTION INTRAMUSCULAR; INTRAVENOUS EVERY 4 HOURS PRN
Status: DISCONTINUED | OUTPATIENT
Start: 2022-01-01 | End: 2022-01-01 | Stop reason: HOSPADM

## 2022-01-01 RX ORDER — OCTREOTIDE ACETATE 100 UG/ML
50 INJECTION, SOLUTION INTRAVENOUS; SUBCUTANEOUS EVERY 6 HOURS
Status: DISCONTINUED | OUTPATIENT
Start: 2022-01-01 | End: 2022-01-01

## 2022-01-01 RX ORDER — ROCURONIUM BROMIDE 10 MG/ML
60 INJECTION, SOLUTION INTRAVENOUS ONCE
Status: COMPLETED | OUTPATIENT
Start: 2022-01-01 | End: 2022-01-01

## 2022-01-01 RX ORDER — ENOXAPARIN SODIUM 100 MG/ML
30 INJECTION SUBCUTANEOUS DAILY
Status: DISCONTINUED | OUTPATIENT
Start: 2022-01-01 | End: 2023-01-01

## 2022-01-01 RX ORDER — BISACODYL 10 MG
10 SUPPOSITORY, RECTAL RECTAL
Status: DISCONTINUED | OUTPATIENT
Start: 2022-01-01 | End: 2022-01-01 | Stop reason: HOSPADM

## 2022-01-01 RX ORDER — LISINOPRIL 5 MG/1
5 TABLET ORAL DAILY
Status: DISCONTINUED | OUTPATIENT
Start: 2022-01-01 | End: 2022-01-01

## 2022-01-01 RX ORDER — POLYETHYLENE GLYCOL 3350 17 G/17G
1 POWDER, FOR SOLUTION ORAL
Status: DISCONTINUED | OUTPATIENT
Start: 2022-01-01 | End: 2023-01-01

## 2022-01-01 RX ORDER — ONDANSETRON 2 MG/ML
4 INJECTION INTRAMUSCULAR; INTRAVENOUS EVERY 4 HOURS PRN
Status: DISCONTINUED | OUTPATIENT
Start: 2022-01-01 | End: 2022-01-01 | Stop reason: HOSPADM

## 2022-01-01 RX ORDER — LORAZEPAM 2 MG/ML
2-4 INJECTION INTRAMUSCULAR EVERY 4 HOURS PRN
Status: DISCONTINUED | OUTPATIENT
Start: 2022-01-01 | End: 2022-01-01

## 2022-01-01 RX ORDER — SODIUM CHLORIDE 9 MG/ML
1000 INJECTION, SOLUTION INTRAVENOUS ONCE
Status: COMPLETED | OUTPATIENT
Start: 2022-01-01 | End: 2022-01-01

## 2022-01-01 RX ORDER — DEXTROSE AND SODIUM CHLORIDE 10; .45 G/100ML; G/100ML
INJECTION, SOLUTION INTRAVENOUS CONTINUOUS
Status: ACTIVE | OUTPATIENT
Start: 2022-01-01 | End: 2022-01-01

## 2022-01-01 RX ORDER — DEXTROSE AND SODIUM CHLORIDE 5; .9 G/100ML; G/100ML
INJECTION, SOLUTION INTRAVENOUS CONTINUOUS
Status: DISCONTINUED | OUTPATIENT
Start: 2022-01-01 | End: 2022-01-01

## 2022-01-01 RX ORDER — ONDANSETRON 4 MG/1
4 TABLET, ORALLY DISINTEGRATING ORAL EVERY 4 HOURS PRN
Status: DISCONTINUED | OUTPATIENT
Start: 2022-01-01 | End: 2022-01-01 | Stop reason: HOSPADM

## 2022-01-01 RX ORDER — MORPHINE SULFATE 4 MG/ML
2-8 INJECTION INTRAVENOUS ONCE
Status: DISCONTINUED | OUTPATIENT
Start: 2022-01-01 | End: 2022-01-01

## 2022-01-01 RX ORDER — QUETIAPINE FUMARATE 25 MG/1
25 TABLET, FILM COATED ORAL NIGHTLY
Status: DISCONTINUED | OUTPATIENT
Start: 2022-01-01 | End: 2022-01-01

## 2022-01-01 RX ORDER — DEXTROSE AND SODIUM CHLORIDE 5; .45 G/100ML; G/100ML
INJECTION, SOLUTION INTRAVENOUS CONTINUOUS
Status: DISCONTINUED | OUTPATIENT
Start: 2022-01-01 | End: 2022-01-01

## 2022-01-01 RX ORDER — AMOXICILLIN 250 MG
2 CAPSULE ORAL 2 TIMES DAILY
Status: DISCONTINUED | OUTPATIENT
Start: 2022-01-01 | End: 2023-01-01

## 2022-01-01 RX ORDER — ACETAMINOPHEN 325 MG/1
650 TABLET ORAL EVERY 6 HOURS PRN
Status: DISCONTINUED | OUTPATIENT
Start: 2022-01-01 | End: 2023-01-01

## 2022-01-01 RX ORDER — SODIUM CHLORIDE, SODIUM LACTATE, POTASSIUM CHLORIDE, AND CALCIUM CHLORIDE .6; .31; .03; .02 G/100ML; G/100ML; G/100ML; G/100ML
500 INJECTION, SOLUTION INTRAVENOUS ONCE
Status: COMPLETED | OUTPATIENT
Start: 2022-01-01 | End: 2022-01-01

## 2022-01-01 RX ORDER — LISINOPRIL 5 MG/1
5 TABLET ORAL ONCE
Status: COMPLETED | OUTPATIENT
Start: 2022-01-01 | End: 2022-01-01

## 2022-01-01 RX ORDER — AMOXICILLIN 250 MG
2 CAPSULE ORAL 2 TIMES DAILY
Status: DISCONTINUED | OUTPATIENT
Start: 2022-01-01 | End: 2022-01-01

## 2022-01-01 RX ORDER — LORAZEPAM 2 MG/1
TABLET ORAL
Status: DISCONTINUED
Start: 2022-01-01 | End: 2022-01-01

## 2022-01-01 RX ORDER — LEVOTHYROXINE SODIUM 112 UG/1
112 TABLET ORAL DAILY
Status: DISCONTINUED | OUTPATIENT
Start: 2022-01-01 | End: 2023-01-01

## 2022-01-01 RX ORDER — SODIUM CHLORIDE, SODIUM LACTATE, POTASSIUM CHLORIDE, AND CALCIUM CHLORIDE .6; .31; .03; .02 G/100ML; G/100ML; G/100ML; G/100ML
2000 INJECTION, SOLUTION INTRAVENOUS ONCE
Status: DISCONTINUED | OUTPATIENT
Start: 2022-01-01 | End: 2022-01-01

## 2022-01-01 RX ORDER — ACETAZOLAMIDE 500 MG/5ML
250 INJECTION, POWDER, LYOPHILIZED, FOR SOLUTION INTRAVENOUS ONCE
Status: DISCONTINUED | OUTPATIENT
Start: 2022-01-01 | End: 2022-01-01

## 2022-01-01 RX ADMIN — NYSTATIN 500000 UNITS: 100000 SUSPENSION ORAL at 12:39

## 2022-01-01 RX ADMIN — ENOXAPARIN SODIUM 40 MG: 40 INJECTION SUBCUTANEOUS at 18:01

## 2022-01-01 RX ADMIN — NYSTATIN 500000 UNITS: 100000 SUSPENSION ORAL at 16:14

## 2022-01-01 RX ADMIN — CEFAZOLIN 2 G: 2 INJECTION, POWDER, FOR SOLUTION INTRAMUSCULAR; INTRAVENOUS at 05:24

## 2022-01-01 RX ADMIN — FENTANYL CITRATE 50 MCG: 50 INJECTION INTRAMUSCULAR; INTRAVENOUS at 04:41

## 2022-01-01 RX ADMIN — HYDROMORPHONE HYDROCHLORIDE 1 MG: 1 INJECTION, SOLUTION INTRAMUSCULAR; INTRAVENOUS; SUBCUTANEOUS at 16:13

## 2022-01-01 RX ADMIN — LISINOPRIL 5 MG: 5 TABLET ORAL at 07:45

## 2022-01-01 RX ADMIN — DOCUSATE SODIUM 50 MG AND SENNOSIDES 8.6 MG 2 TABLET: 8.6; 5 TABLET, FILM COATED ORAL at 05:27

## 2022-01-01 RX ADMIN — CEFAZOLIN 2 G: 2 INJECTION, POWDER, FOR SOLUTION INTRAMUSCULAR; INTRAVENOUS at 23:48

## 2022-01-01 RX ADMIN — FAMOTIDINE 20 MG: 20 TABLET, FILM COATED ORAL at 05:20

## 2022-01-01 RX ADMIN — DEXMEDETOMIDINE 1 MCG/KG/HR: 200 INJECTION, SOLUTION INTRAVENOUS at 06:36

## 2022-01-01 RX ADMIN — CEFAZOLIN 2 G: 2 INJECTION, POWDER, FOR SOLUTION INTRAMUSCULAR; INTRAVENOUS at 11:15

## 2022-01-01 RX ADMIN — SODIUM CHLORIDE, POTASSIUM CHLORIDE, SODIUM LACTATE AND CALCIUM CHLORIDE 1000 ML: 600; 310; 30; 20 INJECTION, SOLUTION INTRAVENOUS at 23:58

## 2022-01-01 RX ADMIN — ACETAMINOPHEN 650 MG: 325 TABLET, FILM COATED ORAL at 06:47

## 2022-01-01 RX ADMIN — LEVETIRACETAM 1740 MG: 100 INJECTION, SOLUTION INTRAVENOUS at 21:07

## 2022-01-01 RX ADMIN — ROCURONIUM BROMIDE 50 MG: 10 INJECTION, SOLUTION INTRAVENOUS at 12:55

## 2022-01-01 RX ADMIN — LORAZEPAM 2 MG: 2 INJECTION INTRAMUSCULAR; INTRAVENOUS at 03:28

## 2022-01-01 RX ADMIN — CEFAZOLIN 2 G: 2 INJECTION, POWDER, FOR SOLUTION INTRAMUSCULAR; INTRAVENOUS at 15:54

## 2022-01-01 RX ADMIN — DEXTROSE MONOHYDRATE: 100 INJECTION, SOLUTION INTRAVENOUS at 04:56

## 2022-01-01 RX ADMIN — LEVOTHYROXINE SODIUM 112 MCG: 0.11 TABLET ORAL at 06:00

## 2022-01-01 RX ADMIN — CEFAZOLIN 2 G: 2 INJECTION, POWDER, FOR SOLUTION INTRAMUSCULAR; INTRAVENOUS at 16:22

## 2022-01-01 RX ADMIN — MAGNESIUM SULFATE HEPTAHYDRATE 2 G: 40 INJECTION, SOLUTION INTRAVENOUS at 21:24

## 2022-01-01 RX ADMIN — NYSTATIN 500000 UNITS: 100000 SUSPENSION ORAL at 08:52

## 2022-01-01 RX ADMIN — CEFAZOLIN 2 G: 2 INJECTION, POWDER, FOR SOLUTION INTRAMUSCULAR; INTRAVENOUS at 08:11

## 2022-01-01 RX ADMIN — LORAZEPAM 2 MG: 2 INJECTION INTRAMUSCULAR; INTRAVENOUS at 13:55

## 2022-01-01 RX ADMIN — HYDROMORPHONE HYDROCHLORIDE 1 MG: 1 INJECTION, SOLUTION INTRAMUSCULAR; INTRAVENOUS; SUBCUTANEOUS at 13:47

## 2022-01-01 RX ADMIN — CEFAZOLIN 2 G: 2 INJECTION, POWDER, FOR SOLUTION INTRAMUSCULAR; INTRAVENOUS at 01:04

## 2022-01-01 RX ADMIN — CEFAZOLIN 2 G: 2 INJECTION, POWDER, FOR SOLUTION INTRAMUSCULAR; INTRAVENOUS at 23:31

## 2022-01-01 RX ADMIN — LISINOPRIL 5 MG: 5 TABLET ORAL at 05:10

## 2022-01-01 RX ADMIN — POTASSIUM PHOSPHATE, MONOBASIC AND POTASSIUM PHOSPHATE, DIBASIC 30 MMOL: 224; 236 INJECTION, SOLUTION, CONCENTRATE INTRAVENOUS at 07:30

## 2022-01-01 RX ADMIN — DEXTROSE MONOHYDRATE 250 ML: 100 INJECTION, SOLUTION INTRAVENOUS at 22:21

## 2022-01-01 RX ADMIN — HYDROMORPHONE HYDROCHLORIDE 1 MG: 1 INJECTION, SOLUTION INTRAMUSCULAR; INTRAVENOUS; SUBCUTANEOUS at 08:45

## 2022-01-01 RX ADMIN — CEFAZOLIN 2 G: 2 INJECTION, POWDER, FOR SOLUTION INTRAMUSCULAR; INTRAVENOUS at 23:55

## 2022-01-01 RX ADMIN — HEPARIN SODIUM 5000 UNITS: 5000 INJECTION, SOLUTION INTRAVENOUS; SUBCUTANEOUS at 00:05

## 2022-01-01 RX ADMIN — SODIUM CHLORIDE, POTASSIUM CHLORIDE, SODIUM LACTATE AND CALCIUM CHLORIDE 1000 ML: 600; 310; 30; 20 INJECTION, SOLUTION INTRAVENOUS at 22:53

## 2022-01-01 RX ADMIN — DEXMEDETOMIDINE 1.5 MCG/KG/HR: 200 INJECTION, SOLUTION INTRAVENOUS at 13:40

## 2022-01-01 RX ADMIN — FENTANYL CITRATE 100 MCG: 50 INJECTION INTRAMUSCULAR; INTRAVENOUS at 06:19

## 2022-01-01 RX ADMIN — POTASSIUM CHLORIDE 10 MEQ: 10 INJECTION, SOLUTION INTRAVENOUS at 03:11

## 2022-01-01 RX ADMIN — ENOXAPARIN SODIUM 30 MG: 30 INJECTION SUBCUTANEOUS at 17:10

## 2022-01-01 RX ADMIN — POTASSIUM BICARBONATE 25 MEQ: 978 TABLET, EFFERVESCENT ORAL at 15:52

## 2022-01-01 RX ADMIN — POTASSIUM CHLORIDE AND SODIUM CHLORIDE: 900; 150 INJECTION, SOLUTION INTRAVENOUS at 21:35

## 2022-01-01 RX ADMIN — FENTANYL CITRATE 100 MCG: 50 INJECTION INTRAMUSCULAR; INTRAVENOUS at 14:43

## 2022-01-01 RX ADMIN — INSULIN GLARGINE-YFGN 20 UNITS: 100 INJECTION, SOLUTION SUBCUTANEOUS at 07:13

## 2022-01-01 RX ADMIN — ACETAMINOPHEN 650 MG: 325 TABLET, FILM COATED ORAL at 02:46

## 2022-01-01 RX ADMIN — LORAZEPAM 2 MG: 2 INJECTION INTRAMUSCULAR; INTRAVENOUS at 09:17

## 2022-01-01 RX ADMIN — CEFAZOLIN 2 G: 2 INJECTION, POWDER, FOR SOLUTION INTRAMUSCULAR; INTRAVENOUS at 23:30

## 2022-01-01 RX ADMIN — FENTANYL CITRATE 100 MCG: 50 INJECTION INTRAMUSCULAR; INTRAVENOUS at 10:34

## 2022-01-01 RX ADMIN — FAMOTIDINE 20 MG: 20 TABLET, FILM COATED ORAL at 05:03

## 2022-01-01 RX ADMIN — NYSTATIN 500000 UNITS: 100000 SUSPENSION ORAL at 21:00

## 2022-01-01 RX ADMIN — PROPOFOL 60 MG: 10 INJECTION, EMULSION INTRAVENOUS at 20:11

## 2022-01-01 RX ADMIN — DEXTROSE MONOHYDRATE: 100 INJECTION, SOLUTION INTRAVENOUS at 22:08

## 2022-01-01 RX ADMIN — FAMOTIDINE 20 MG: 20 TABLET, FILM COATED ORAL at 05:57

## 2022-01-01 RX ADMIN — FENTANYL CITRATE 100 MCG: 50 INJECTION INTRAMUSCULAR; INTRAVENOUS at 02:04

## 2022-01-01 RX ADMIN — MAGNESIUM HYDROXIDE 30 ML: 400 SUSPENSION ORAL at 17:20

## 2022-01-01 RX ADMIN — FAMOTIDINE 20 MG: 20 TABLET, FILM COATED ORAL at 17:20

## 2022-01-01 RX ADMIN — HEPARIN SODIUM 5000 UNITS: 5000 INJECTION, SOLUTION INTRAVENOUS; SUBCUTANEOUS at 05:11

## 2022-01-01 RX ADMIN — FAMOTIDINE 20 MG: 20 TABLET, FILM COATED ORAL at 17:12

## 2022-01-01 RX ADMIN — DEXMEDETOMIDINE 1 MCG/KG/HR: 200 INJECTION, SOLUTION INTRAVENOUS at 23:02

## 2022-01-01 RX ADMIN — DEXMEDETOMIDINE 0.8 MCG/KG/HR: 200 INJECTION, SOLUTION INTRAVENOUS at 06:10

## 2022-01-01 RX ADMIN — DOCUSATE SODIUM 50 MG AND SENNOSIDES 8.6 MG 2 TABLET: 8.6; 5 TABLET, FILM COATED ORAL at 18:29

## 2022-01-01 RX ADMIN — LEVOTHYROXINE SODIUM 112 MCG: 0.11 TABLET ORAL at 05:33

## 2022-01-01 RX ADMIN — ATORVASTATIN CALCIUM 10 MG: 10 TABLET, FILM COATED ORAL at 05:07

## 2022-01-01 RX ADMIN — FAMOTIDINE 20 MG: 20 TABLET, FILM COATED ORAL at 17:03

## 2022-01-01 RX ADMIN — ENOXAPARIN SODIUM 30 MG: 30 INJECTION SUBCUTANEOUS at 18:12

## 2022-01-01 RX ADMIN — CEFAZOLIN 2 G: 2 INJECTION, POWDER, FOR SOLUTION INTRAMUSCULAR; INTRAVENOUS at 08:05

## 2022-01-01 RX ADMIN — DEXMEDETOMIDINE 1.5 MCG/KG/HR: 200 INJECTION, SOLUTION INTRAVENOUS at 21:23

## 2022-01-01 RX ADMIN — HYDROMORPHONE HYDROCHLORIDE 1 MG: 1 INJECTION, SOLUTION INTRAMUSCULAR; INTRAVENOUS; SUBCUTANEOUS at 10:33

## 2022-01-01 RX ADMIN — FAMOTIDINE 20 MG: 20 TABLET, FILM COATED ORAL at 06:23

## 2022-01-01 RX ADMIN — DOCUSATE SODIUM 50 MG AND SENNOSIDES 8.6 MG 2 TABLET: 8.6; 5 TABLET, FILM COATED ORAL at 06:08

## 2022-01-01 RX ADMIN — ENOXAPARIN SODIUM 40 MG: 40 INJECTION SUBCUTANEOUS at 17:10

## 2022-01-01 RX ADMIN — POTASSIUM BICARBONATE 50 MEQ: 978 TABLET, EFFERVESCENT ORAL at 10:13

## 2022-01-01 RX ADMIN — HYDROMORPHONE HYDROCHLORIDE 1 MG: 1 INJECTION, SOLUTION INTRAMUSCULAR; INTRAVENOUS; SUBCUTANEOUS at 11:25

## 2022-01-01 RX ADMIN — INSULIN GLARGINE-YFGN 20 UNITS: 100 INJECTION, SOLUTION SUBCUTANEOUS at 07:56

## 2022-01-01 RX ADMIN — FAMOTIDINE 20 MG: 20 TABLET, FILM COATED ORAL at 06:00

## 2022-01-01 RX ADMIN — FAMOTIDINE 20 MG: 20 TABLET, FILM COATED ORAL at 05:27

## 2022-01-01 RX ADMIN — FAMOTIDINE 20 MG: 20 TABLET, FILM COATED ORAL at 17:16

## 2022-01-01 RX ADMIN — DOCUSATE SODIUM 50 MG AND SENNOSIDES 8.6 MG 2 TABLET: 8.6; 5 TABLET, FILM COATED ORAL at 17:34

## 2022-01-01 RX ADMIN — NYSTATIN 500000 UNITS: 100000 SUSPENSION ORAL at 12:00

## 2022-01-01 RX ADMIN — SENNOSIDES AND DOCUSATE SODIUM 2 TABLET: 50; 8.6 TABLET ORAL at 05:08

## 2022-01-01 RX ADMIN — CEFAZOLIN 2 G: 2 INJECTION, POWDER, FOR SOLUTION INTRAMUSCULAR; INTRAVENOUS at 23:33

## 2022-01-01 RX ADMIN — DEXMEDETOMIDINE 0.2 MCG/KG/HR: 200 INJECTION, SOLUTION INTRAVENOUS at 23:57

## 2022-01-01 RX ADMIN — NYSTATIN 500000 UNITS: 100000 SUSPENSION ORAL at 10:27

## 2022-01-01 RX ADMIN — NYSTATIN 500000 UNITS: 100000 SUSPENSION ORAL at 17:15

## 2022-01-01 RX ADMIN — DOCUSATE SODIUM 50 MG AND SENNOSIDES 8.6 MG 2 TABLET: 8.6; 5 TABLET, FILM COATED ORAL at 05:57

## 2022-01-01 RX ADMIN — DOCUSATE SODIUM 50 MG AND SENNOSIDES 8.6 MG 2 TABLET: 8.6; 5 TABLET, FILM COATED ORAL at 17:15

## 2022-01-01 RX ADMIN — OCTREOTIDE ACETATE 100 MCG: 100 INJECTION, SOLUTION INTRAVENOUS; SUBCUTANEOUS at 03:28

## 2022-01-01 RX ADMIN — LORAZEPAM 2 MG: 2 INJECTION INTRAMUSCULAR; INTRAVENOUS at 23:30

## 2022-01-01 RX ADMIN — ENOXAPARIN SODIUM 40 MG: 40 INJECTION SUBCUTANEOUS at 17:22

## 2022-01-01 RX ADMIN — DOCUSATE SODIUM 50 MG AND SENNOSIDES 8.6 MG 2 TABLET: 8.6; 5 TABLET, FILM COATED ORAL at 18:12

## 2022-01-01 RX ADMIN — SODIUM CHLORIDE 1000 ML: 9 INJECTION, SOLUTION INTRAVENOUS at 21:00

## 2022-01-01 RX ADMIN — MAGNESIUM SULFATE HEPTAHYDRATE 2 G: 40 INJECTION, SOLUTION INTRAVENOUS at 01:47

## 2022-01-01 RX ADMIN — CEFAZOLIN 2 G: 2 INJECTION, POWDER, FOR SOLUTION INTRAMUSCULAR; INTRAVENOUS at 22:53

## 2022-01-01 RX ADMIN — CEFAZOLIN 2 G: 2 INJECTION, POWDER, FOR SOLUTION INTRAMUSCULAR; INTRAVENOUS at 23:19

## 2022-01-01 RX ADMIN — ENOXAPARIN SODIUM 40 MG: 40 INJECTION SUBCUTANEOUS at 17:03

## 2022-01-01 RX ADMIN — CEFAZOLIN 2 G: 2 INJECTION, POWDER, FOR SOLUTION INTRAMUSCULAR; INTRAVENOUS at 23:18

## 2022-01-01 RX ADMIN — FENTANYL CITRATE 100 MCG: 50 INJECTION INTRAMUSCULAR; INTRAVENOUS at 00:13

## 2022-01-01 RX ADMIN — POTASSIUM BICARBONATE 25 MEQ: 978 TABLET, EFFERVESCENT ORAL at 09:08

## 2022-01-01 RX ADMIN — DOCUSATE SODIUM 50 MG AND SENNOSIDES 8.6 MG 2 TABLET: 8.6; 5 TABLET, FILM COATED ORAL at 06:23

## 2022-01-01 RX ADMIN — HYDROMORPHONE HYDROCHLORIDE 1 MG: 1 INJECTION, SOLUTION INTRAMUSCULAR; INTRAVENOUS; SUBCUTANEOUS at 18:15

## 2022-01-01 RX ADMIN — NYSTATIN 500000 UNITS: 100000 SUSPENSION ORAL at 13:05

## 2022-01-01 RX ADMIN — FAMOTIDINE 20 MG: 20 TABLET, FILM COATED ORAL at 17:11

## 2022-01-01 RX ADMIN — ENOXAPARIN SODIUM 40 MG: 40 INJECTION SUBCUTANEOUS at 17:20

## 2022-01-01 RX ADMIN — FENTANYL CITRATE 100 MCG: 50 INJECTION INTRAMUSCULAR; INTRAVENOUS at 13:39

## 2022-01-01 RX ADMIN — ACETAMINOPHEN 650 MG: 325 TABLET, FILM COATED ORAL at 04:35

## 2022-01-01 RX ADMIN — FENTANYL CITRATE 100 MCG: 50 INJECTION INTRAMUSCULAR; INTRAVENOUS at 19:03

## 2022-01-01 RX ADMIN — LEVOTHYROXINE SODIUM 112 MCG: 0.11 TABLET ORAL at 05:38

## 2022-01-01 RX ADMIN — FAMOTIDINE 20 MG: 20 TABLET, FILM COATED ORAL at 05:13

## 2022-01-01 RX ADMIN — NYSTATIN 500000 UNITS: 100000 SUSPENSION ORAL at 13:00

## 2022-01-01 RX ADMIN — INSULIN GLARGINE 40 UNITS: 100 INJECTION, SOLUTION SUBCUTANEOUS at 10:05

## 2022-01-01 RX ADMIN — ENOXAPARIN SODIUM 30 MG: 30 INJECTION SUBCUTANEOUS at 17:15

## 2022-01-01 RX ADMIN — HYDROMORPHONE HYDROCHLORIDE 1 MG: 1 INJECTION, SOLUTION INTRAMUSCULAR; INTRAVENOUS; SUBCUTANEOUS at 20:08

## 2022-01-01 RX ADMIN — FAMOTIDINE 20 MG: 20 TABLET, FILM COATED ORAL at 17:34

## 2022-01-01 RX ADMIN — HYDROMORPHONE HYDROCHLORIDE 1 MG: 1 INJECTION, SOLUTION INTRAMUSCULAR; INTRAVENOUS; SUBCUTANEOUS at 21:21

## 2022-01-01 RX ADMIN — DOCUSATE SODIUM 50 MG AND SENNOSIDES 8.6 MG 2 TABLET: 8.6; 5 TABLET, FILM COATED ORAL at 17:16

## 2022-01-01 RX ADMIN — INSULIN HUMAN 5 UNITS: 100 INJECTION, SOLUTION PARENTERAL at 21:42

## 2022-01-01 RX ADMIN — CEFAZOLIN 2 G: 2 INJECTION, POWDER, FOR SOLUTION INTRAMUSCULAR; INTRAVENOUS at 09:10

## 2022-01-01 RX ADMIN — LISINOPRIL 5 MG: 5 TABLET ORAL at 06:00

## 2022-01-01 RX ADMIN — FAMOTIDINE 20 MG: 20 TABLET, FILM COATED ORAL at 05:19

## 2022-01-01 RX ADMIN — SODIUM CHLORIDE, POTASSIUM CHLORIDE, SODIUM LACTATE AND CALCIUM CHLORIDE: 600; 310; 30; 20 INJECTION, SOLUTION INTRAVENOUS at 15:13

## 2022-01-01 RX ADMIN — INSULIN HUMAN 4 UNITS: 100 INJECTION, SOLUTION PARENTERAL at 06:10

## 2022-01-01 RX ADMIN — NYSTATIN 500000 UNITS: 100000 SUSPENSION ORAL at 17:34

## 2022-01-01 RX ADMIN — FAMOTIDINE 20 MG: 20 TABLET, FILM COATED ORAL at 05:15

## 2022-01-01 RX ADMIN — FAMOTIDINE 20 MG: 20 TABLET, FILM COATED ORAL at 18:12

## 2022-01-01 RX ADMIN — POTASSIUM BICARBONATE 50 MEQ: 978 TABLET, EFFERVESCENT ORAL at 11:13

## 2022-01-01 RX ADMIN — ATORVASTATIN CALCIUM 10 MG: 10 TABLET, FILM COATED ORAL at 06:23

## 2022-01-01 RX ADMIN — CEFAZOLIN 2 G: 2 INJECTION, POWDER, FOR SOLUTION INTRAMUSCULAR; INTRAVENOUS at 15:21

## 2022-01-01 RX ADMIN — ENOXAPARIN SODIUM 30 MG: 30 INJECTION SUBCUTANEOUS at 17:55

## 2022-01-01 RX ADMIN — CEFAZOLIN 2 G: 2 INJECTION, POWDER, FOR SOLUTION INTRAMUSCULAR; INTRAVENOUS at 16:02

## 2022-01-01 RX ADMIN — ACETAZOLAMIDE 250 MG: 250 TABLET ORAL at 07:57

## 2022-01-01 RX ADMIN — CEFAZOLIN 2 G: 2 INJECTION, POWDER, FOR SOLUTION INTRAMUSCULAR; INTRAVENOUS at 08:28

## 2022-01-01 RX ADMIN — DEXMEDETOMIDINE 1 MCG/KG/HR: 200 INJECTION, SOLUTION INTRAVENOUS at 01:40

## 2022-01-01 RX ADMIN — NYSTATIN 500000 UNITS: 100000 SUSPENSION ORAL at 17:11

## 2022-01-01 RX ADMIN — DEXTROSE AND SODIUM CHLORIDE: 10; .45 INJECTION, SOLUTION INTRAVENOUS at 03:11

## 2022-01-01 RX ADMIN — ASPIRIN 325 MG: 325 TABLET ORAL at 05:33

## 2022-01-01 RX ADMIN — NYSTATIN 500000 UNITS: 100000 SUSPENSION ORAL at 08:09

## 2022-01-01 RX ADMIN — DEXMEDETOMIDINE 1.4 MCG/KG/HR: 200 INJECTION, SOLUTION INTRAVENOUS at 00:25

## 2022-01-01 RX ADMIN — LIDOCAINE HYDROCHLORIDE 2 ML: 10 INJECTION, SOLUTION INFILTRATION; PERINEURAL at 21:15

## 2022-01-01 RX ADMIN — DEXMEDETOMIDINE 1.4 MCG/KG/HR: 200 INJECTION, SOLUTION INTRAVENOUS at 15:30

## 2022-01-01 RX ADMIN — CEFAZOLIN 2 G: 2 INJECTION, POWDER, FOR SOLUTION INTRAMUSCULAR; INTRAVENOUS at 08:00

## 2022-01-01 RX ADMIN — CEFAZOLIN 2 G: 2 INJECTION, POWDER, FOR SOLUTION INTRAMUSCULAR; INTRAVENOUS at 17:23

## 2022-01-01 RX ADMIN — ACETAMINOPHEN 650 MG: 325 TABLET, FILM COATED ORAL at 16:30

## 2022-01-01 RX ADMIN — FAMOTIDINE 20 MG: 20 TABLET, FILM COATED ORAL at 17:14

## 2022-01-01 RX ADMIN — FAMOTIDINE 20 MG: 20 TABLET, FILM COATED ORAL at 05:34

## 2022-01-01 RX ADMIN — ROCURONIUM BROMIDE 60 MG: 10 INJECTION, SOLUTION INTRAVENOUS at 20:13

## 2022-01-01 RX ADMIN — MAGNESIUM SULFATE HEPTAHYDRATE 2 G: 40 INJECTION, SOLUTION INTRAVENOUS at 07:49

## 2022-01-01 RX ADMIN — CEFAZOLIN 2 G: 2 INJECTION, POWDER, FOR SOLUTION INTRAMUSCULAR; INTRAVENOUS at 16:08

## 2022-01-01 RX ADMIN — SODIUM CHLORIDE, POTASSIUM CHLORIDE, SODIUM LACTATE AND CALCIUM CHLORIDE 500 ML: 600; 310; 30; 20 INJECTION, SOLUTION INTRAVENOUS at 05:43

## 2022-01-01 RX ADMIN — POTASSIUM BICARBONATE 25 MEQ: 978 TABLET, EFFERVESCENT ORAL at 07:45

## 2022-01-01 RX ADMIN — FENTANYL CITRATE 100 MCG: 50 INJECTION INTRAMUSCULAR; INTRAVENOUS at 00:43

## 2022-01-01 RX ADMIN — CEFAZOLIN 2 G: 2 INJECTION, POWDER, FOR SOLUTION INTRAMUSCULAR; INTRAVENOUS at 16:14

## 2022-01-01 RX ADMIN — NYSTATIN 500000 UNITS: 100000 SUSPENSION ORAL at 20:29

## 2022-01-01 RX ADMIN — NYSTATIN 500000 UNITS: 100000 SUSPENSION ORAL at 12:11

## 2022-01-01 RX ADMIN — CEFAZOLIN 2 G: 2 INJECTION, POWDER, FOR SOLUTION INTRAMUSCULAR; INTRAVENOUS at 07:40

## 2022-01-01 RX ADMIN — QUETIAPINE FUMARATE 25 MG: 25 TABLET ORAL at 23:30

## 2022-01-01 RX ADMIN — LEVOTHYROXINE SODIUM 112 MCG: 0.11 TABLET ORAL at 05:03

## 2022-01-01 RX ADMIN — NYSTATIN 500000 UNITS: 100000 SUSPENSION ORAL at 17:16

## 2022-01-01 RX ADMIN — INSULIN GLARGINE-YFGN 20 UNITS: 100 INJECTION, SOLUTION SUBCUTANEOUS at 17:25

## 2022-01-01 RX ADMIN — NYSTATIN 500000 UNITS: 100000 SUSPENSION ORAL at 18:01

## 2022-01-01 RX ADMIN — POTASSIUM CHLORIDE 10 MEQ: 10 INJECTION, SOLUTION INTRAVENOUS at 00:25

## 2022-01-01 RX ADMIN — NYSTATIN 500000 UNITS: 100000 SUSPENSION ORAL at 20:53

## 2022-01-01 RX ADMIN — INSULIN GLARGINE-YFGN 20 UNITS: 100 INJECTION, SOLUTION SUBCUTANEOUS at 17:17

## 2022-01-01 RX ADMIN — DOCUSATE SODIUM 50 MG AND SENNOSIDES 8.6 MG 2 TABLET: 8.6; 5 TABLET, FILM COATED ORAL at 17:20

## 2022-01-01 RX ADMIN — ENOXAPARIN SODIUM 30 MG: 30 INJECTION SUBCUTANEOUS at 17:14

## 2022-01-01 RX ADMIN — HYDROMORPHONE HYDROCHLORIDE 1 MG: 1 INJECTION, SOLUTION INTRAMUSCULAR; INTRAVENOUS; SUBCUTANEOUS at 05:08

## 2022-01-01 RX ADMIN — FAMOTIDINE 20 MG: 20 TABLET, FILM COATED ORAL at 06:08

## 2022-01-01 RX ADMIN — NYSTATIN 500000 UNITS: 100000 SUSPENSION ORAL at 09:08

## 2022-01-01 RX ADMIN — FAMOTIDINE 20 MG: 20 TABLET, FILM COATED ORAL at 17:22

## 2022-01-01 RX ADMIN — ATORVASTATIN CALCIUM 10 MG: 10 TABLET, FILM COATED ORAL at 05:33

## 2022-01-01 RX ADMIN — DEXMEDETOMIDINE 1 MCG/KG/HR: 200 INJECTION, SOLUTION INTRAVENOUS at 13:45

## 2022-01-01 RX ADMIN — ATORVASTATIN CALCIUM 10 MG: 10 TABLET, FILM COATED ORAL at 05:09

## 2022-01-01 RX ADMIN — LEVOTHYROXINE SODIUM 112 MCG: 0.11 TABLET ORAL at 05:10

## 2022-01-01 RX ADMIN — FENTANYL CITRATE 100 MCG: 50 INJECTION INTRAMUSCULAR; INTRAVENOUS at 12:11

## 2022-01-01 RX ADMIN — DEXMEDETOMIDINE 1.5 MCG/KG/HR: 200 INJECTION, SOLUTION INTRAVENOUS at 21:35

## 2022-01-01 RX ADMIN — DOCUSATE SODIUM 50 MG AND SENNOSIDES 8.6 MG 2 TABLET: 8.6; 5 TABLET, FILM COATED ORAL at 05:03

## 2022-01-01 RX ADMIN — FENTANYL CITRATE 50 MCG: 50 INJECTION INTRAMUSCULAR; INTRAVENOUS at 20:52

## 2022-01-01 RX ADMIN — INSULIN GLARGINE-YFGN 20 UNITS: 100 INJECTION, SOLUTION SUBCUTANEOUS at 17:06

## 2022-01-01 RX ADMIN — ENOXAPARIN SODIUM 40 MG: 40 INJECTION SUBCUTANEOUS at 17:08

## 2022-01-01 RX ADMIN — NYSTATIN 500000 UNITS: 100000 SUSPENSION ORAL at 12:23

## 2022-01-01 RX ADMIN — ATORVASTATIN CALCIUM 10 MG: 10 TABLET, FILM COATED ORAL at 06:00

## 2022-01-01 RX ADMIN — FAMOTIDINE 20 MG: 20 TABLET, FILM COATED ORAL at 05:08

## 2022-01-01 RX ADMIN — CEFAZOLIN 2 G: 2 INJECTION, POWDER, FOR SOLUTION INTRAMUSCULAR; INTRAVENOUS at 15:39

## 2022-01-01 RX ADMIN — CEFAZOLIN 2 G: 2 INJECTION, POWDER, FOR SOLUTION INTRAMUSCULAR; INTRAVENOUS at 08:54

## 2022-01-01 RX ADMIN — HYDROMORPHONE HYDROCHLORIDE 1 MG: 1 INJECTION, SOLUTION INTRAMUSCULAR; INTRAVENOUS; SUBCUTANEOUS at 15:10

## 2022-01-01 RX ADMIN — INSULIN GLARGINE-YFGN 20 UNITS: 100 INJECTION, SOLUTION SUBCUTANEOUS at 05:30

## 2022-01-01 RX ADMIN — Medication 10 MG: at 05:27

## 2022-01-01 RX ADMIN — DIBASIC SODIUM PHOSPHATE, MONOBASIC POTASSIUM PHOSPHATE AND MONOBASIC SODIUM PHOSPHATE 500 MG: 852; 155; 130 TABLET ORAL at 09:08

## 2022-01-01 RX ADMIN — POTASSIUM BICARBONATE 25 MEQ: 978 TABLET, EFFERVESCENT ORAL at 13:19

## 2022-01-01 RX ADMIN — HEPARIN SODIUM 5000 UNITS: 5000 INJECTION, SOLUTION INTRAVENOUS; SUBCUTANEOUS at 05:10

## 2022-01-01 RX ADMIN — FENTANYL CITRATE 50 MCG: 50 INJECTION INTRAMUSCULAR; INTRAVENOUS at 12:21

## 2022-01-01 RX ADMIN — ATORVASTATIN CALCIUM 10 MG: 10 TABLET, FILM COATED ORAL at 05:38

## 2022-01-01 RX ADMIN — DEXTROSE MONOHYDRATE: 100 INJECTION, SOLUTION INTRAVENOUS at 13:15

## 2022-01-01 RX ADMIN — ATORVASTATIN CALCIUM 10 MG: 10 TABLET, FILM COATED ORAL at 13:55

## 2022-01-01 RX ADMIN — LISINOPRIL 5 MG: 5 TABLET ORAL at 05:38

## 2022-01-01 RX ADMIN — CEFAZOLIN 2 G: 2 INJECTION, POWDER, FOR SOLUTION INTRAMUSCULAR; INTRAVENOUS at 09:04

## 2022-01-01 RX ADMIN — LORAZEPAM 2 MG: 2 INJECTION INTRAMUSCULAR; INTRAVENOUS at 00:59

## 2022-01-01 RX ADMIN — PROPOFOL 15 MCG/KG/MIN: 10 INJECTION, EMULSION INTRAVENOUS at 20:30

## 2022-01-01 RX ADMIN — NYSTATIN 500000 UNITS: 100000 SUSPENSION ORAL at 20:33

## 2022-01-01 RX ADMIN — DEXMEDETOMIDINE 0.4 MCG/KG/HR: 200 INJECTION, SOLUTION INTRAVENOUS at 20:10

## 2022-01-01 RX ADMIN — MAGNESIUM HYDROXIDE 30 ML: 400 SUSPENSION ORAL at 17:15

## 2022-01-01 RX ADMIN — FAMOTIDINE 20 MG: 20 TABLET, FILM COATED ORAL at 18:01

## 2022-01-01 RX ADMIN — DOCUSATE SODIUM 50 MG AND SENNOSIDES 8.6 MG 2 TABLET: 8.6; 5 TABLET, FILM COATED ORAL at 17:56

## 2022-01-01 RX ADMIN — ASPIRIN 325 MG: 325 TABLET ORAL at 06:00

## 2022-01-01 RX ADMIN — CEFAZOLIN 2 G: 2 INJECTION, POWDER, FOR SOLUTION INTRAMUSCULAR; INTRAVENOUS at 08:55

## 2022-01-01 RX ADMIN — SODIUM CHLORIDE 3 UNITS/HR: 9 INJECTION, SOLUTION INTRAVENOUS at 23:11

## 2022-01-01 RX ADMIN — ACETAMINOPHEN 650 MG: 325 TABLET, FILM COATED ORAL at 23:41

## 2022-01-01 RX ADMIN — NYSTATIN 500000 UNITS: 100000 SUSPENSION ORAL at 20:22

## 2022-01-01 RX ADMIN — SCOPOLAMINE 1 PATCH: 1.5 PATCH, EXTENDED RELEASE TRANSDERMAL at 12:23

## 2022-01-01 RX ADMIN — DEXTROSE AND SODIUM CHLORIDE: 5; 450 INJECTION, SOLUTION INTRAVENOUS at 01:30

## 2022-01-01 RX ADMIN — FENTANYL CITRATE 50 MCG: 50 INJECTION INTRAMUSCULAR; INTRAVENOUS at 09:14

## 2022-01-01 RX ADMIN — DEXTROSE AND SODIUM CHLORIDE: 10; .45 INJECTION, SOLUTION INTRAVENOUS at 10:48

## 2022-01-01 RX ADMIN — FENTANYL CITRATE 50 MCG: 50 INJECTION INTRAMUSCULAR; INTRAVENOUS at 11:14

## 2022-01-01 RX ADMIN — SCOPOLAMINE 1 PATCH: 1.5 PATCH, EXTENDED RELEASE TRANSDERMAL at 11:15

## 2022-01-01 RX ADMIN — ENOXAPARIN SODIUM 40 MG: 40 INJECTION SUBCUTANEOUS at 17:34

## 2022-01-01 RX ADMIN — CEFAZOLIN 2 G: 2 INJECTION, POWDER, FOR SOLUTION INTRAMUSCULAR; INTRAVENOUS at 00:00

## 2022-01-01 RX ADMIN — LISINOPRIL 5 MG: 5 TABLET ORAL at 06:23

## 2022-01-01 RX ADMIN — SODIUM CHLORIDE, POTASSIUM CHLORIDE, SODIUM LACTATE AND CALCIUM CHLORIDE 1000 ML: 600; 310; 30; 20 INJECTION, SOLUTION INTRAVENOUS at 01:25

## 2022-01-01 RX ADMIN — DIBASIC SODIUM PHOSPHATE, MONOBASIC POTASSIUM PHOSPHATE AND MONOBASIC SODIUM PHOSPHATE 500 MG: 852; 155; 130 TABLET ORAL at 12:15

## 2022-01-01 RX ADMIN — FENTANYL CITRATE 50 MCG: 50 INJECTION INTRAMUSCULAR; INTRAVENOUS at 16:35

## 2022-01-01 RX ADMIN — DEXTROSE AND SODIUM CHLORIDE: 5; 900 INJECTION, SOLUTION INTRAVENOUS at 22:15

## 2022-01-01 RX ADMIN — FAMOTIDINE 20 MG: 20 TABLET, FILM COATED ORAL at 17:05

## 2022-01-01 RX ADMIN — CEFAZOLIN 2 G: 2 INJECTION, POWDER, FOR SOLUTION INTRAMUSCULAR; INTRAVENOUS at 16:21

## 2022-01-01 RX ADMIN — FAMOTIDINE 20 MG: 20 TABLET, FILM COATED ORAL at 17:08

## 2022-01-01 RX ADMIN — CEFAZOLIN 2 G: 2 INJECTION, POWDER, FOR SOLUTION INTRAMUSCULAR; INTRAVENOUS at 00:26

## 2022-01-01 RX ADMIN — Medication 10 MG: at 05:04

## 2022-01-01 RX ADMIN — MAGNESIUM SULFATE HEPTAHYDRATE 2 G: 40 INJECTION, SOLUTION INTRAVENOUS at 08:55

## 2022-01-01 RX ADMIN — ENOXAPARIN SODIUM 30 MG: 30 INJECTION SUBCUTANEOUS at 17:20

## 2022-01-01 RX ADMIN — POLYETHYLENE GLYCOL 3350 1 PACKET: 17 POWDER, FOR SOLUTION ORAL at 18:12

## 2022-01-01 RX ADMIN — POTASSIUM BICARBONATE 50 MEQ: 978 TABLET, EFFERVESCENT ORAL at 12:27

## 2022-01-01 RX ADMIN — NYSTATIN 500000 UNITS: 100000 SUSPENSION ORAL at 17:20

## 2022-01-01 RX ADMIN — FAMOTIDINE 20 MG: 20 TABLET, FILM COATED ORAL at 05:07

## 2022-01-01 RX ADMIN — MAGNESIUM SULFATE HEPTAHYDRATE 2 G: 40 INJECTION, SOLUTION INTRAVENOUS at 13:22

## 2022-01-01 RX ADMIN — DOCUSATE SODIUM 50 MG AND SENNOSIDES 8.6 MG 2 TABLET: 8.6; 5 TABLET, FILM COATED ORAL at 17:10

## 2022-01-01 RX ADMIN — CEFAZOLIN 2 G: 2 INJECTION, POWDER, FOR SOLUTION INTRAMUSCULAR; INTRAVENOUS at 00:01

## 2022-01-01 RX ADMIN — FENTANYL CITRATE 100 MCG: 50 INJECTION INTRAMUSCULAR; INTRAVENOUS at 08:06

## 2022-01-01 RX ADMIN — CEFTRIAXONE SODIUM 1 G: 10 INJECTION, POWDER, FOR SOLUTION INTRAVENOUS at 05:17

## 2022-01-01 RX ADMIN — CEFAZOLIN 2 G: 2 INJECTION, POWDER, FOR SOLUTION INTRAMUSCULAR; INTRAVENOUS at 16:25

## 2022-01-01 RX ADMIN — FENTANYL CITRATE 50 MCG: 50 INJECTION INTRAMUSCULAR; INTRAVENOUS at 20:08

## 2022-01-01 RX ADMIN — HYDROMORPHONE HYDROCHLORIDE 1 MG: 1 INJECTION, SOLUTION INTRAMUSCULAR; INTRAVENOUS; SUBCUTANEOUS at 09:27

## 2022-01-01 RX ADMIN — ASPIRIN 325 MG: 325 TABLET ORAL at 05:07

## 2022-01-01 RX ADMIN — FAMOTIDINE 20 MG: 20 TABLET, FILM COATED ORAL at 17:15

## 2022-01-01 RX ADMIN — HYDROMORPHONE HYDROCHLORIDE 1 MG: 1 INJECTION, SOLUTION INTRAMUSCULAR; INTRAVENOUS; SUBCUTANEOUS at 12:15

## 2022-01-01 RX ADMIN — ASPIRIN 325 MG: 325 TABLET ORAL at 05:03

## 2022-01-01 RX ADMIN — CEFAZOLIN 2 G: 2 INJECTION, POWDER, FOR SOLUTION INTRAMUSCULAR; INTRAVENOUS at 16:16

## 2022-01-01 RX ADMIN — NYSTATIN 500000 UNITS: 100000 SUSPENSION ORAL at 20:56

## 2022-01-01 RX ADMIN — POTASSIUM BICARBONATE 25 MEQ: 978 TABLET, EFFERVESCENT ORAL at 08:52

## 2022-01-01 RX ADMIN — ASPIRIN 325 MG: 325 TABLET ORAL at 13:55

## 2022-01-01 RX ADMIN — LISINOPRIL 5 MG: 5 TABLET ORAL at 05:33

## 2022-01-01 RX ADMIN — POLYETHYLENE GLYCOL 3350 1 PACKET: 17 POWDER, FOR SOLUTION ORAL at 15:12

## 2022-01-01 RX ADMIN — SODIUM CHLORIDE: 4.5 INJECTION, SOLUTION INTRAVENOUS at 23:05

## 2022-01-01 RX ADMIN — HYDROMORPHONE HYDROCHLORIDE 1 MG: 1 INJECTION, SOLUTION INTRAMUSCULAR; INTRAVENOUS; SUBCUTANEOUS at 13:55

## 2022-01-01 RX ADMIN — NYSTATIN 500000 UNITS: 100000 SUSPENSION ORAL at 20:08

## 2022-01-01 RX ADMIN — DEXMEDETOMIDINE 0.6 MCG/KG/HR: 200 INJECTION, SOLUTION INTRAVENOUS at 03:59

## 2022-01-01 RX ADMIN — CEFAZOLIN 2 G: 2 INJECTION, POWDER, FOR SOLUTION INTRAMUSCULAR; INTRAVENOUS at 15:49

## 2022-01-01 RX ADMIN — POLYETHYLENE GLYCOL 3350 1 PACKET: 17 POWDER, FOR SOLUTION ORAL at 06:08

## 2022-01-01 RX ADMIN — ENOXAPARIN SODIUM 30 MG: 30 INJECTION SUBCUTANEOUS at 17:11

## 2022-01-01 RX ADMIN — DEXMEDETOMIDINE 1.5 MCG/KG/HR: 200 INJECTION, SOLUTION INTRAVENOUS at 21:36

## 2022-01-01 RX ADMIN — FAMOTIDINE 20 MG: 20 TABLET, FILM COATED ORAL at 17:56

## 2022-01-01 RX ADMIN — MAGNESIUM SULFATE HEPTAHYDRATE 2 G: 40 INJECTION, SOLUTION INTRAVENOUS at 11:13

## 2022-01-01 RX ADMIN — DOCUSATE SODIUM 50 MG AND SENNOSIDES 8.6 MG 2 TABLET: 8.6; 5 TABLET, FILM COATED ORAL at 05:19

## 2022-01-01 RX ADMIN — ASPIRIN 325 MG: 325 TABLET ORAL at 06:23

## 2022-01-01 RX ADMIN — FENTANYL CITRATE 100 MCG: 50 INJECTION INTRAMUSCULAR; INTRAVENOUS at 08:24

## 2022-01-01 RX ADMIN — MAGNESIUM SULFATE HEPTAHYDRATE 2 G: 40 INJECTION, SOLUTION INTRAVENOUS at 10:17

## 2022-01-01 RX ADMIN — DEXMEDETOMIDINE 1 MCG/KG/HR: 200 INJECTION, SOLUTION INTRAVENOUS at 15:40

## 2022-01-01 RX ADMIN — ENOXAPARIN SODIUM 30 MG: 30 INJECTION SUBCUTANEOUS at 17:16

## 2022-01-01 RX ADMIN — DOCUSATE SODIUM 50 MG AND SENNOSIDES 8.6 MG 2 TABLET: 8.6; 5 TABLET, FILM COATED ORAL at 05:54

## 2022-01-01 RX ADMIN — NYSTATIN 500000 UNITS: 100000 SUSPENSION ORAL at 08:54

## 2022-01-01 RX ADMIN — RACEPINEPHRINE HYDROCHLORIDE 0.5 ML: 11.25 SOLUTION RESPIRATORY (INHALATION) at 12:34

## 2022-01-01 RX ADMIN — ENOXAPARIN SODIUM 40 MG: 40 INJECTION SUBCUTANEOUS at 17:05

## 2022-01-01 RX ADMIN — LORAZEPAM 2 MG: 2 INJECTION INTRAMUSCULAR; INTRAVENOUS at 19:46

## 2022-01-01 RX ADMIN — DOCUSATE SODIUM 50 MG AND SENNOSIDES 8.6 MG 2 TABLET: 8.6; 5 TABLET, FILM COATED ORAL at 06:00

## 2022-01-01 RX ADMIN — FAMOTIDINE 20 MG: 20 TABLET, FILM COATED ORAL at 05:54

## 2022-01-01 RX ADMIN — FAMOTIDINE 20 MG: 10 INJECTION INTRAVENOUS at 05:11

## 2022-01-01 RX ADMIN — DOCUSATE SODIUM 50 MG AND SENNOSIDES 8.6 MG 2 TABLET: 8.6; 5 TABLET, FILM COATED ORAL at 05:08

## 2022-01-01 RX ADMIN — ETOMIDATE 20 MG: 2 INJECTION, SOLUTION INTRAVENOUS at 12:55

## 2022-01-01 RX ADMIN — DIBASIC SODIUM PHOSPHATE, MONOBASIC POTASSIUM PHOSPHATE AND MONOBASIC SODIUM PHOSPHATE 500 MG: 852; 155; 130 TABLET ORAL at 18:12

## 2022-01-01 RX ADMIN — HYDROMORPHONE HYDROCHLORIDE 1 MG: 1 INJECTION, SOLUTION INTRAMUSCULAR; INTRAVENOUS; SUBCUTANEOUS at 18:29

## 2022-01-01 RX ADMIN — LORAZEPAM 2 MG: 2 INJECTION INTRAMUSCULAR; INTRAVENOUS at 17:05

## 2022-01-01 RX ADMIN — FENTANYL CITRATE 50 MCG: 50 INJECTION INTRAMUSCULAR; INTRAVENOUS at 04:52

## 2022-01-01 RX ADMIN — FENTANYL CITRATE 50 MCG: 50 INJECTION INTRAMUSCULAR; INTRAVENOUS at 10:16

## 2022-01-01 RX ADMIN — DEXMEDETOMIDINE 1.5 MCG/KG/HR: 200 INJECTION, SOLUTION INTRAVENOUS at 03:56

## 2022-01-01 RX ADMIN — FENTANYL CITRATE 100 MCG: 50 INJECTION INTRAMUSCULAR; INTRAVENOUS at 04:23

## 2022-01-01 RX ADMIN — FENTANYL CITRATE 50 MCG: 50 INJECTION INTRAMUSCULAR; INTRAVENOUS at 21:02

## 2022-01-01 RX ADMIN — FENTANYL CITRATE 50 MCG: 50 INJECTION INTRAMUSCULAR; INTRAVENOUS at 17:39

## 2022-01-01 RX ADMIN — CEFTRIAXONE SODIUM 2 G: 2 INJECTION, POWDER, FOR SOLUTION INTRAMUSCULAR; INTRAVENOUS at 21:28

## 2022-01-01 RX ADMIN — HYDROMORPHONE HYDROCHLORIDE 1 MG: 1 INJECTION, SOLUTION INTRAMUSCULAR; INTRAVENOUS; SUBCUTANEOUS at 04:15

## 2022-01-01 RX ADMIN — SODIUM CHLORIDE 0.05 UNITS/KG/HR: 9 INJECTION, SOLUTION INTRAVENOUS at 21:53

## 2022-01-01 RX ADMIN — LEVOTHYROXINE SODIUM 112 MCG: 0.11 TABLET ORAL at 06:23

## 2022-01-01 RX ADMIN — LEVOTHYROXINE SODIUM 112 MCG: 0.11 TABLET ORAL at 13:55

## 2022-01-01 RX ADMIN — DOCUSATE SODIUM 50 MG AND SENNOSIDES 8.6 MG 2 TABLET: 8.6; 5 TABLET, FILM COATED ORAL at 17:03

## 2022-01-01 RX ADMIN — DEXMEDETOMIDINE 1 MCG/KG/HR: 200 INJECTION, SOLUTION INTRAVENOUS at 18:21

## 2022-01-01 RX ADMIN — ATORVASTATIN CALCIUM 10 MG: 10 TABLET, FILM COATED ORAL at 05:03

## 2022-01-01 RX ADMIN — FENTANYL CITRATE 50 MCG: 50 INJECTION INTRAMUSCULAR; INTRAVENOUS at 13:53

## 2022-01-01 RX ADMIN — DOCUSATE SODIUM 50 MG AND SENNOSIDES 8.6 MG 2 TABLET: 8.6; 5 TABLET, FILM COATED ORAL at 17:14

## 2022-01-01 RX ADMIN — ACETAMINOPHEN 650 MG: 325 TABLET, FILM COATED ORAL at 17:11

## 2022-01-01 RX ADMIN — NYSTATIN 500000 UNITS: 100000 SUSPENSION ORAL at 08:23

## 2022-01-01 RX ADMIN — DOCUSATE SODIUM 50 MG AND SENNOSIDES 8.6 MG 2 TABLET: 8.6; 5 TABLET, FILM COATED ORAL at 17:08

## 2022-01-01 RX ADMIN — DIBASIC SODIUM PHOSPHATE, MONOBASIC POTASSIUM PHOSPHATE AND MONOBASIC SODIUM PHOSPHATE 500 MG: 852; 155; 130 TABLET ORAL at 00:00

## 2022-01-01 RX ADMIN — LEVOTHYROXINE SODIUM 112 MCG: 0.11 TABLET ORAL at 05:07

## 2022-01-01 RX ADMIN — DEXTROSE MONOHYDRATE 25 G: 100 INJECTION, SOLUTION INTRAVENOUS at 02:47

## 2022-01-01 RX ADMIN — NYSTATIN 500000 UNITS: 100000 SUSPENSION ORAL at 12:12

## 2022-01-01 ASSESSMENT — COGNITIVE AND FUNCTIONAL STATUS - GENERAL
PERSONAL GROOMING: TOTAL
DRESSING REGULAR LOWER BODY CLOTHING: TOTAL
CLIMB 3 TO 5 STEPS WITH RAILING: TOTAL
MOBILITY SCORE: 22
TOILETING: A LITTLE
SUGGESTED CMS G CODE MODIFIER MOBILITY: CN
PERSONAL GROOMING: TOTAL
DRESSING REGULAR UPPER BODY CLOTHING: TOTAL
EATING MEALS: TOTAL
MOBILITY SCORE: 6
WALKING IN HOSPITAL ROOM: A LITTLE
HELP NEEDED FOR BATHING: TOTAL
MOVING FROM LYING ON BACK TO SITTING ON SIDE OF FLAT BED: UNABLE
SUGGESTED CMS G CODE MODIFIER DAILY ACTIVITY: CN
STANDING UP FROM CHAIR USING ARMS: A LITTLE
TURNING FROM BACK TO SIDE WHILE IN FLAT BAD: UNABLE
SUGGESTED CMS G CODE MODIFIER MOBILITY: CN
WALKING IN HOSPITAL ROOM: TOTAL
DRESSING REGULAR LOWER BODY CLOTHING: TOTAL
STANDING UP FROM CHAIR USING ARMS: TOTAL
MOBILITY SCORE: 6
DRESSING REGULAR UPPER BODY CLOTHING: TOTAL
DAILY ACTIVITIY SCORE: 6
TOILETING: TOTAL
CLIMB 3 TO 5 STEPS WITH RAILING: TOTAL
MOVING FROM LYING ON BACK TO SITTING ON SIDE OF FLAT BED: UNABLE
SUGGESTED CMS G CODE MODIFIER MOBILITY: CJ
TURNING FROM BACK TO SIDE WHILE IN FLAT BAD: UNABLE
SUGGESTED CMS G CODE MODIFIER DAILY ACTIVITY: CN
SUGGESTED CMS G CODE MODIFIER DAILY ACTIVITY: CI
MOVING TO AND FROM BED TO CHAIR: UNABLE
STANDING UP FROM CHAIR USING ARMS: TOTAL
DAILY ACTIVITIY SCORE: 23
HELP NEEDED FOR BATHING: TOTAL
TOILETING: TOTAL
DAILY ACTIVITIY SCORE: 6
WALKING IN HOSPITAL ROOM: TOTAL
MOVING TO AND FROM BED TO CHAIR: UNABLE
EATING MEALS: TOTAL

## 2022-01-01 ASSESSMENT — PAIN DESCRIPTION - PAIN TYPE
TYPE: ACUTE PAIN

## 2022-01-01 ASSESSMENT — FIBROSIS 4 INDEX
FIB4 SCORE: 0.57
FIB4 SCORE: 0.57
FIB4 SCORE: 0.58
FIB4 SCORE: 0.73
FIB4 SCORE: 0.53
FIB4 SCORE: 0.8
FIB4 SCORE: 0.52
FIB4 SCORE: 0.57
FIB4 SCORE: 0.63
FIB4 SCORE: 0.79
FIB4 SCORE: 0.75
FIB4 SCORE: 0.6
FIB4 SCORE: 0.76
FIB4 SCORE: 0.69
FIB4 SCORE: 1.41

## 2022-01-01 ASSESSMENT — LIFESTYLE VARIABLES
REASON UNABLE TO ASSESS: PT INTUBATED
DOES PATIENT WANT TO STOP DRINKING: CANNOT ASSESS

## 2022-01-01 ASSESSMENT — PATIENT HEALTH QUESTIONNAIRE - PHQ9
1. LITTLE INTEREST OR PLEASURE IN DOING THINGS: NOT AT ALL
SUM OF ALL RESPONSES TO PHQ9 QUESTIONS 1 AND 2: 0
SUM OF ALL RESPONSES TO PHQ9 QUESTIONS 1 AND 2: 0
2. FEELING DOWN, DEPRESSED, IRRITABLE, OR HOPELESS: NOT AT ALL
SUM OF ALL RESPONSES TO PHQ9 QUESTIONS 1 AND 2: 0
2. FEELING DOWN, DEPRESSED, IRRITABLE, OR HOPELESS: NOT AT ALL
2. FEELING DOWN, DEPRESSED, IRRITABLE, OR HOPELESS: NOT AT ALL

## 2022-01-01 ASSESSMENT — ENCOUNTER SYMPTOMS
DEPRESSION: 0
VOMITING: 0
DIZZINESS: 0
FEVER: 0
SHORTNESS OF BREATH: 0
ABDOMINAL PAIN: 0
NERVOUS/ANXIOUS: 0
NAUSEA: 0
ROS GI COMMENTS: ANXIOUS TO EAT AND DRINK
CHILLS: 0

## 2022-01-01 ASSESSMENT — GAIT ASSESSMENTS: GAIT LEVEL OF ASSIST: UNABLE TO PARTICIPATE

## 2022-04-10 PROBLEM — R79.89 PSEUDOHYPONATREMIA: Status: ACTIVE | Noted: 2022-01-01

## 2022-04-10 PROBLEM — N39.0 UTI (URINARY TRACT INFECTION): Status: ACTIVE | Noted: 2022-01-01

## 2022-04-10 PROBLEM — D72.829 LEUKOCYTOSIS: Status: ACTIVE | Noted: 2022-01-01

## 2022-04-10 PROBLEM — E11.10 DKA, TYPE 2, NOT AT GOAL (HCC): Status: ACTIVE | Noted: 2022-01-01

## 2022-04-10 PROBLEM — N17.9 AKI (ACUTE KIDNEY INJURY) (HCC): Status: ACTIVE | Noted: 2022-01-01

## 2022-04-11 PROBLEM — G93.40 ENCEPHALOPATHY ACUTE: Status: ACTIVE | Noted: 2022-01-01

## 2022-04-11 PROBLEM — R79.89 PSEUDOHYPONATREMIA: Status: RESOLVED | Noted: 2022-01-01 | Resolved: 2022-01-01

## 2022-04-11 NOTE — ED TRIAGE NOTES
Pt was brought here form home. For the past 3-4 days pt has been weak. [facility] BS read high. [facility]409 [facility]

## 2022-04-11 NOTE — ASSESSMENT & PLAN NOTE
Per chart review, pt with three days of weakness, nausea and vomiting. Presented to ED today for hyperglycemic today despite insulin  Possibly secondary to poor oral intake vs noncomplinace with insulin vs UTI vs pancreatitis   Glucose >1000 CO2 16 AG 27 beta hydroxybutyrate 5.6  Fluid resuscitated with 2L   Insulin per DKA protocol  Keep NPO   Serial BMPs  Monitor and replace electrolytes  A1c pending  Lipase pending  Diabetes education when clinically appropriate

## 2022-04-11 NOTE — ED PROVIDER NOTES
ED Provider Note    Scribed for Donald Jensen by Jessica Anton. 4/10/2022  8:17 PM    Primary care provider: SANDRA Mo  Means of arrival: Ambulance   History obtained from: Patient  History limited by: None    CHIEF COMPLAINT  Chief Complaint   Patient presents with   • Weakness   • Hyperglycemia     HPI  Poonamluis m Mayo is a 61 y.o. female with a history of diabetes who presents to the Emergency Department via ambulance for evaluation of hyperglycemia onset today.  Patient' daughter states that her sugar has been high all day. She has been feeling generally weak for the past three days. Patient received insulin today, but her sugar levels did not decrease. However, patient states she is feeling normal and is not in any pain. She reports that she lives alone and does not have any other major medical problems. Patient's daughter reports that she has been uncomfortable, unbalanced, and weaker all day today. Patient feels dizzy when she gets up too fast. Patient reports she had similar symptoms about a year ago when she was treated for BKA. She admits to associated symptoms of nausea and vomiting, but denies fever, abdominal pain, chest pain, shortness of breath, or chills. No alleviating factors were reported. Patient admits to drinking alcohol occasionally and smoking cigarettes. She denies any recreational drug use. Patient denies having her COV-19 vaccine.     Quality: Weakness  Duration: 1 day  Severity: Moderate  Associated sx: female     REVIEW OF SYSTEMS  As above, all other systems reviewed and are negative.   See HPI for further details.     PAST MEDICAL HISTORY   has a past medical history of 250.01 (1996), Dupuytren contracture, HTN (hypertension) (3/18/2011), Hyperlipidemia, Hypertension, and Hypothyroid.  SURGICAL HISTORY   has a past surgical history that includes tubal coagulation laparoscopic bilateral (1984); appendectomy; and tonsillectomy.  SOCIAL HISTORY  Social  "History     Tobacco Use   • Smoking status: Current Every Day Smoker     Packs/day: 0.25     Years: 40.00     Pack years: 10.00     Types: Cigarettes     Last attempt to quit: 2016     Years since quittin.8   • Smokeless tobacco: Never Used   Vaping Use   • Vaping Use: Never used   Substance Use Topics   • Alcohol use: Yes     Comment: occasional   • Drug use: No      Social History     Substance and Sexual Activity   Drug Use No     FAMILY HISTORY  Family History   Problem Relation Age of Onset   • Lung Disease Father    • Diabetes Son         type 1     CURRENT MEDICATIONS  Home Medications     Reviewed by Kayleen Davis (Pharmacy Tech) on 04/10/22 at 2114  Med List Status: Complete   Medication Last Dose Status   aspirin (ASA) 325 MG Tab  Active   atorvastatin (LIPITOR) 10 MG Tab unknown Active   insulin glargine (LANTUS) 100 UNIT/ML Solution uknown Active   insulin lispro (HUMALOG) 100 UNIT/ML unknown Active   levothyroxine (SYNTHROID) 112 MCG Tab unknown Active   lisinopril (PRINIVIL) 5 MG Tab unknown Active              ALLERGIES  No Known Allergies    PHYSICAL EXAM    VITAL SIGNS:   Vitals:    04/10/22 2113 04/10/22 2133 04/10/22 2201 04/10/22 2212   BP:  152/80 154/82    Pulse:  91 95 95   Resp:  (!) 21 19 20   Temp:    36.4 °C (97.6 °F)   TempSrc:    Temporal   SpO2:  97% 97% 97%   Weight: 49.9 kg (110 lb)      Height: 1.803 m (5' 11\")          Vitals: My interpretation: normotensive, not tachycardic, afebrile, not hypoxic    Reinterpretation of vitals: Unchanged    Cardiac Monitor Interpretation: The cardiac monitor revealed normal Sinus Rhythm as interpreted by me. The cardiac monitor was ordered secondary to the patient's history of altered mental status and to monitor for dysrhythmia and/or tachycardia.    PE:   Constitutional: Chronically ill-appearing, tired, lethargic  HENT: Normocephalic, Atraumatic, Bilateral external ears normal, Oropharynx is clear mucous membranes are moist. No oral " exudates or nasal discharge.   Eyes: Pupils are equal round and reactive, EOMI, Conjunctiva normal, No discharge.   Neck: Normal range of motion, No tenderness, Supple, No stridor. No meningismus.  Lymphatic: No lymphadenopathy noted.   Cardiovascular: Regular rate and rhythm without murmur rub or gallop.  Thorax & Lungs: Clear breath sounds bilaterally without wheezes, rhonchi or rales. There is no chest wall tenderness.   Abdomen: Soft non-tender non-distended. There is no rebound or guarding. No organomegaly is appreciated. Bowel sounds are normal.  Skin: Normal without rash.   Back: No CVA or spinal tenderness.   Extremities: Intact distal pulses, No edema, No tenderness, No cyanosis, No clubbing. Capillary refill is less than 2 seconds.  Musculoskeletal: Good range of motion in all major joints. No tenderness to palpation or major deformities noted.   Neurologic: Alert & oriented x 3, Normal motor function, Normal sensory function, No focal deficits noted. Reflexes are normal.  Psychiatric: Affect normal, Judgment normal, Mood normal. There is no suicidal ideation or patient reported hallucinations.     DIAGNOSTIC STUDIES / PROCEDURES    LABS  Results for orders placed or performed during the hospital encounter of 04/10/22   CBC WITH DIFFERENTIAL   Result Value Ref Range    WBC 15.9 (H) 4.8 - 10.8 K/uL    RBC 5.06 4.20 - 5.40 M/uL    Hemoglobin 15.3 12.0 - 16.0 g/dL    Hematocrit 43.9 37.0 - 47.0 %    MCV 86.8 81.4 - 97.8 fL    MCH 30.2 27.0 - 33.0 pg    MCHC 34.9 33.6 - 35.0 g/dL    RDW 37.2 35.9 - 50.0 fL    Platelet Count 412 164 - 446 K/uL    MPV 9.1 9.0 - 12.9 fL    Neutrophils-Polys 77.80 (H) 44.00 - 72.00 %    Lymphocytes 14.80 (L) 22.00 - 41.00 %    Monocytes 6.40 0.00 - 13.40 %    Eosinophils 0.10 0.00 - 6.90 %    Basophils 0.20 0.00 - 1.80 %    Immature Granulocytes 0.70 0.00 - 0.90 %    Nucleated RBC 0.00 /100 WBC    Neutrophils (Absolute) 12.35 (H) 2.00 - 7.15 K/uL    Lymphs (Absolute) 2.35 1.00 -  4.80 K/uL    Monos (Absolute) 1.02 (H) 0.00 - 0.85 K/uL    Eos (Absolute) 0.01 0.00 - 0.51 K/uL    Baso (Absolute) 0.03 0.00 - 0.12 K/uL    Immature Granulocytes (abs) 0.11 0.00 - 0.11 K/uL    NRBC (Absolute) 0.00 K/uL   URINALYSIS CULTURE, IF INDICATED    Specimen: Urine   Result Value Ref Range    Color Yellow     Character Cloudy (A)     Specific Gravity 1.023 <1.035    Ph 5.0 5.0 - 8.0    Glucose >=1000 (A) Negative mg/dL    Ketones 80 (A) Negative mg/dL    Protein Negative Negative mg/dL    Bilirubin Negative Negative    Urobilinogen, Urine 0.2 Negative    Nitrite Negative Negative    Leukocyte Esterase Moderate (A) Negative    Occult Blood Trace (A) Negative    Micro Urine Req Microscopic    COMP METABOLIC PANEL   Result Value Ref Range    Sodium 127 (L) 135 - 145 mmol/L    Potassium 4.5 3.6 - 5.5 mmol/L    Chloride 84 (L) 96 - 112 mmol/L    Co2 16 (L) 20 - 33 mmol/L    Anion Gap 27.0 (H) 7.0 - 16.0    Glucose 576 (HH) 65 - 99 mg/dL    Bun 45 (H) 8 - 22 mg/dL    Creatinine 1.50 (H) 0.50 - 1.40 mg/dL    Calcium 9.0 8.5 - 10.5 mg/dL    AST(SGOT) 25 12 - 45 U/L    ALT(SGPT) 49 2 - 50 U/L    Alkaline Phosphatase 176 (H) 30 - 99 U/L    Total Bilirubin 0.7 0.1 - 1.5 mg/dL    Albumin 4.8 3.2 - 4.9 g/dL    Total Protein 7.8 6.0 - 8.2 g/dL    Globulin 3.0 1.9 - 3.5 g/dL    A-G Ratio 1.6 g/dL   TROPONIN   Result Value Ref Range    Troponin T 19 6 - 19 ng/L   LACTIC ACID   Result Value Ref Range    Lactic Acid 3.1 (H) 0.5 - 2.0 mmol/L   VENOUS BLOOD GAS   Result Value Ref Range    Venous Bg Ph 7.23 (L) 7.31 - 7.45    Venous Bg Pco2 44.9 41.0 - 51.0 mmHg    Venous Bg Po2 33.2 25.0 - 40.0 mmHg    Venous Bg O2 Saturation 61.7 %    Venous Bg Hco3 18 (L) 24 - 28 mmol/L    Venous Bg Base Excess -9 mmol/L    Body Temp n/a Centigrade   BETA-HYDROXYBUTYRIC ACID   Result Value Ref Range    beta-Hydroxybutyric Acid 5.60 (H) 0.02 - 0.27 mmol/L   URINE MICROSCOPIC (W/UA)   Result Value Ref Range    -150 (A) /hpf    RBC 2-5 (A)  /hpf    Bacteria Negative None /hpf    Epithelial Cells Negative /hpf    Hyaline Cast 0-2 /lpf   ESTIMATED GFR   Result Value Ref Range    GFR (CKD-EPI) 39 (A) >60 mL/min/1.73 m 2   URINE CULTURE(NEW)    Specimen: Urine   Result Value Ref Range    Significant Indicator NEG     Source UR     Site -     Culture Result -    EKG (NOW)   Result Value Ref Range    Report       [facility]    Test Date:  2022-04-10  Pt Name:    PAMELA CENTENO                  Department: ER  MRN:        [id number]                      Room:       RD 10  Gender:     Female                       Technician: [id number]  :        1960                   Requested By:SUSAN STRICKLAND  Order #:    [id number]                    Reading MD: Susan Strickland    Measurements  Intervals                                Axis  Rate:       95                           P:          77  IA:         170                          QRS:        -68  QRSD:       84                           T:          80  QT:         388  QTc:        488    Interpretive Statements  Sinus rhythm  Inferior infarct, old  Probable anteroseptal infarct, old  No previous ECG available for comparison  Electronically Signed On 4- 22:22:29 PDT by Susan Strickland     POCT glucose device results   Result Value Ref Range    POC Glucose, Blood 534 (HH) 65 - 99 mg/dL   POCT glucose device results   Result Value Ref Range    POC Glucose, Blood 419 (HH) 65 - 99 mg/dL      All labs reviewed by me. Significant for leukocytosis of 15, no anemia urinalysis with positive ketones, suggestive of infection as well, mild hyponatremia, bicarbonate of 16, glucose of 576, JALEEL with creatinine 1.5, no transaminitis, normal bilirubin, troponin negative, lactic acid 3.1, mild metabolic acidosis on ABG, beta hydroxybutyrate acid 5.6    COURSE & MEDICAL DECISION MAKING  Nursing notes, VS, PMSFHx, labs, imaging, EKG reviewed in chart.    Heart Score: Low    MDM: 8:17 PM  Poonam Mayo is a 61 y.o. female who presented with 3 to 4 days of generalized weakness and high readings on her home glucometer.  Patient has a history of type 2 diabetes, takes insulin.  Daughter was over today the patient was extremely lethargic but responsive.  Her home glucometer again read high today even after she gave her her normal dose of insulin.  Upon arrival here the patient has normal vital signs and has a fairly benign physical exam and a nonfocal neurological exam other than moderate weakness and generalized fatigue.  Moving all extremities.  Vital signs are reassuring.  Labs concerning for severe diabetic ketoacidosis with 80+ urine ketones, possible infection on urinalysis as well, moderate metabolic acidosis on VBG with pH of 7.23, lactic acidosis of 3.1, severe dehydration with bicarbonate of 16, anion gap acidosis.  EKG and trop w/o ischemic changes. She was given several liters of IV fluids, 20 mEq of potassium, insulin bolus of 5 units as well as insulin drip, ceftriaxone to cover possible urinary tract infection.  She will require ICU placement considering her insulin drip and electrolyte and lab derangements.  Patient and daughter updated that she will be admitted and are amenable to care.    9:15 PM  -  (pulmonary) called.     HYDRATION: Based on the patient's presentation of Acute Vomiting, Dehydration and Inability to take oral fluids the patient was given IV fluids. IV Hydration was used because oral hydration was not adequate alone. Upon recheck following hydration, the patient was imroved.    CRITICAL CARE TIME 41 minutes: Lactic acidosis, metabolic acidosis, diabetic ketoacidosis, leukocytosis, sepsis, urinary tract infection, dehydration, management of insulin bolus, drip, management of electrolytes, fluid replacement with potassium replacement  There was a very real possibility of deterioration of the patient's condition.  This patient required the highest level of care.   I provided critical care services which included: review of the medical record, treatment orders, ordering and reviewing test results, frequent reevaluation of the patient's condition and response to treatment, as well as discussing the case with appropriate personnel and various consultants. The critical care time associated with the care of this patient is exclusive of any procedures or specific interventions.    FINAL IMPRESSION  1. Diabetic ketoacidosis with coma associated with type 2 diabetes mellitus (HCC) Acute   2. Hyperglycemia Acute   3. Generalized weakness Acute   4. Lactic acidosis Acute   5. Dehydration Acute   6. JALEEL (acute kidney injury) (HCC) Acute   7. Metabolic acidosis Acute      Jessica MEADOWS (Scribe), am scribing for, and in the presence of, Donald Jensen.    Electronically signed by: Jessica Anton (Cherryibcurtis), 4/10/2022    IDonald personally performed the services described in this documentation, as scribed by Jessica Anton in my presence, and it is both accurate and complete. C.     The note accurately reflects work and decisions made by me.  Donald Jensen  4/10/2022  8:47 PM

## 2022-04-11 NOTE — PROGRESS NOTES
Critical Care Progress Note    Date of admission  4/10/2022    Chief Complaint  61-year-old female with past medical history of insulin-dependent diabetes, hypothyroidism, hyperlipidemia, hypertension who presented on 4/10/2022 with hyperglycemia and confusion.  Patient is unaccompanied by any further caregivers therefore history is limited however she presented today with her daughter for 3 days of nausea and vomiting with weakness and confusion starting today.  She was found to be in diabetic ketoacidosis on labs with leukocytosis, sterile pyuria on UA and JALEEL.  She is to be admitted to the ICU for ongoing care and further resuscitation. (Dr. Wellington consult)    Hospital Course  No notes on file    Interval Problem Update  Reviewed last 24 hour events:  Neuro: normal mental status  HR: SR 80-90  SBP: mild HTN  Tmax: AF  GI: BM PTA, NPO on DKA protocol  I/O: purewick, good UOP  Lines: PIV  Mobility: up to chair  Resp: room air   Vte: lovenox  PPI/H2:n/a  Antibx: CTX    -Gap closed, transition to home glargine 41U daily and medium sliding scale  -Patient reports she has unstable housing.  Is currently in a hotel.  Got in touch with an old boyfriend and forgot to take her insulin the last few days.  Most days she remembers to take her long acting and takes short acting once.  Only checking sugar once daily.    -No urinary symptoms-dc UTI tx  -Smokes 1pack q3d  -one drink/month  -denies recreational drug use    Review of Systems  Review of Systems   Respiratory: Negative for shortness of breath.    Gastrointestinal: Negative for abdominal pain, nausea and vomiting.   Genitourinary: Negative for dysuria, hematuria and urgency.   Neurological: Negative for dizziness.   Psychiatric/Behavioral: Negative for depression. The patient is not nervous/anxious.         Vital Signs for last 24 hours   Temp:  [36.1 °C (97 °F)-36.4 °C (97.6 °F)] 36.2 °C (97.2 °F)  Pulse:  [86-97] 90  Resp:  [14-24] 22  BP: (142-163)/(73-87)  163/84  SpO2:  [95 %-98 %] 97 %    Hemodynamic parameters for last 24 hours       Respiratory Information for the last 24 hours       Physical Exam   Physical Exam  Constitutional:       Appearance: Normal appearance.   HENT:      Head: Normocephalic and atraumatic.      Mouth/Throat:      Mouth: Mucous membranes are moist.   Eyes:      Pupils: Pupils are equal, round, and reactive to light.   Cardiovascular:      Rate and Rhythm: Normal rate and regular rhythm.      Pulses: Normal pulses.      Heart sounds: Normal heart sounds.   Pulmonary:      Effort: Pulmonary effort is normal.      Breath sounds: Normal breath sounds.   Abdominal:      General: Abdomen is flat. Bowel sounds are normal.      Palpations: Abdomen is soft.      Tenderness: There is no abdominal tenderness.   Musculoskeletal:      Cervical back: Normal range of motion.      Right lower leg: No edema.      Left lower leg: No edema.   Skin:     General: Skin is warm and dry.   Neurological:      General: No focal deficit present.      Mental Status: She is alert.   Psychiatric:         Mood and Affect: Mood normal.         Medications  Current Facility-Administered Medications   Medication Dose Route Frequency Provider Last Rate Last Admin   • D10%-0.45% NaCl infusion   Intravenous Continuous RAVINDER Das.P.R.N. 125 mL/hr at 04/11/22 0311 New Bag at 04/11/22 0311   • MD ALERT-PHARMACY TO CONSULT FOR DKA MONITORING 1 Each  1 Each Other PRN RAVINDER Das.P.R.N.       • potassium phosphate 30 mmol in  mL ivpb  30 mmol Intravenous Once PRN RAVINDER Das.P.R.N.        Or   • sodium phosphate 30 mmol in 1/2  mL ivpb  30 mmol Intravenous Once PRN RAVINDER Das.P.R.N.       • Adult DKA potassium(K+) replacement scale  1 Each Intravenous Q4HRS RAVINDER Das.P.R.N.   1 Each at 04/11/22 0200   • senna-docusate (PERICOLACE or SENOKOT S) 8.6-50 MG per tablet 2 Tablet  2 Tablet Oral BID RAVINDER Das.P.R.N.   2  Tablet at 04/11/22 0508    And   • polyethylene glycol/lytes (MIRALAX) PACKET 1 Packet  1 Packet Oral QDAY PRN Lorenza Huitron A.P.R.N.        And   • magnesium hydroxide (MILK OF MAGNESIA) suspension 30 mL  30 mL Oral QDAY PRN Lorenza Huitron A.P.R.N.        And   • bisacodyl (DULCOLAX) suppository 10 mg  10 mg Rectal QDAY PRN Lorenza Huitron A.P.R.N.       • 1/2 NS infusion   Intravenous Continuous RAVINDER Das.P.R.N.   Stopped at 04/11/22 0129   • D5 1/2 NS infusion   Intravenous Continuous RAVINDER Das.P.R.N.   Stopped at 04/11/22 0311   • heparin injection 5,000 Units  5,000 Units Subcutaneous Q8HRS RAVINDER Das.P.R.N.   5,000 Units at 04/11/22 0510   • acetaminophen (Tylenol) tablet 650 mg  650 mg Oral Q6HRS PRN Lorenza Huitron, A.P.R.N.       • ondansetron (ZOFRAN) syringe/vial injection 4 mg  4 mg Intravenous Q4HRS PRN Lorenza Huitron A.P.R.N.       • ondansetron (ZOFRAN ODT) dispertab 4 mg  4 mg Oral Q4HRS PRN Lorenza Huitron, A.P.R.N.       • promethazine (PHENERGAN) tablet 12.5-25 mg  12.5-25 mg Oral Q4HRS PRN Lorenza Huitron, A.P.R.N.       • promethazine (PHENERGAN) suppository 12.5-25 mg  12.5-25 mg Rectal Q4HRS PRN Lorenza Huitron A.P.R.N.       • prochlorperazine (COMPAZINE) injection 5-10 mg  5-10 mg Intravenous Q4HRS PRN Lorenza Huitron, A.P.R.N.       • labetalol (NORMODYNE/TRANDATE) injection 10 mg  10 mg Intravenous Q4HRS PRN Lorenza Huitron, A.P.R.N.       • atorvastatin (LIPITOR) tablet 10 mg  10 mg Oral DAILY Lorenza Huitron, A.P.R.N.   10 mg at 04/11/22 0509   • levothyroxine (SYNTHROID) tablet 112 mcg  112 mcg Oral DAILY Lorenza Huitron, A.P.R.N.   112 mcg at 04/11/22 0510   • lisinopril (PRINIVIL) tablet 5 mg  5 mg Oral DAILY Lorenza Huitron A.P.R.N.   5 mg at 04/11/22 0510   • insulin regular (HumuLIN R) 100 Units in  mL Infusion for DKA  3 Units/hr Intravenous Continuous Ramos Wellington Jr., D.O. 5 mL/hr at 04/11/22 0100 5 Units/hr at  04/11/22 0100   • cefTRIAXone (Rocephin) syringe 1 g  1 g Intravenous Q24HRS Lorenza Huitron A.P.R.N.   1 g at 04/11/22 0517       Fluids    Intake/Output Summary (Last 24 hours) at 4/11/2022 0618  Last data filed at 4/11/2022 0347  Gross per 24 hour   Intake 1748.75 ml   Output 150 ml   Net 1598.75 ml       Laboratory  Recent Labs     04/10/22  2311   ISTATTEMP 97.6 F   ISTATSPEC Venous         Recent Labs     04/10/22  2008 04/10/22  2310 04/11/22  0223 04/11/22  0526   SODIUM 127* 129* 134* 134*   POTASSIUM 4.5 5.3 4.5  4.5 4.4   CHLORIDE 84* 96 98 101   CO2 16* 17* 19* 23   BUN 45* 40* 35* 30*   CREATININE 1.50* 1.17 1.03 0.90   MAGNESIUM 2.1 1.9 2.0 2.3   PHOSPHORUS 4.2 2.9  --  1.9*   CALCIUM 9.0 8.2* 8.5 8.1*     Recent Labs     04/10/22  2008 04/10/22  2310 04/11/22  0223 04/11/22  0526   ALTSGPT 49  --   --   --    ASTSGOT 25  --   --   --    ALKPHOSPHAT 176*  --   --   --    TBILIRUBIN 0.7  --   --   --    LIPASE 14  --   --   --    GLUCOSE 576* 345* 182* 148*     Recent Labs     04/10/22  2008 04/10/22  2310   WBC 15.9* 14.9*   NEUTSPOLYS 77.80* 74.40*   LYMPHOCYTES 14.80* 17.80*   MONOCYTES 6.40 6.90   EOSINOPHILS 0.10 0.10   BASOPHILS 0.20 0.20   ASTSGOT 25  --    ALTSGPT 49  --    ALKPHOSPHAT 176*  --    TBILIRUBIN 0.7  --      Recent Labs     04/10/22  2008 04/10/22  2310   RBC 5.06 4.68   HEMOGLOBIN 15.3 14.3   HEMATOCRIT 43.9 39.8   PLATELETCT 412 381       Imaging  none new    Assessment/Plan  * DKA, type 2, not at goal (HCC)- (present on admission)  Assessment & Plan  -Reports poor medication compliance-likely trigger  -Gap closed, transition insulin drip back to home regimen  -I discussed insulin compliance with her at some length.  We reviewed the risks of poorly controlled DM, which she was familiar with.  Her main barrier is remembering to take her insulin.  Housing instability is contributing.  She was uninterested in the idea of home health.    -DM education    Encephalopathy acute-  (present on admission)  Assessment & Plan  Was likely d/t DKA  Resolved    Leukocytosis- (present on admission)  Assessment & Plan  Likely stress response  Improving    UTI (urinary tract infection)- (present on admission)  Assessment & Plan  Pyuria on admit  Denies symptoms  D/c antibiotics    JALEEL (acute kidney injury) (HCC)- (present on admission)  Assessment & Plan  Was likely d/t hypovolemia  Resolved    Pseudohyponatremia- (present on admission)  Assessment & Plan  Resolved      Dyslipidemia- (present on admission)  Assessment & Plan  Continue atorvastatin 10 mg     Hypothyroidism due to Hashimoto's thyroiditis- (present on admission)  Assessment & Plan  TSH 3.83 in December 2021  Continue levothyroxine 112 mcg    Hypertension  Assessment & Plan  Remains hypertensive       VTE:  Lovenox  Ulcer: Not Indicated  Lines: None    I have performed a physical exam and reviewed and updated ROS and Plan today (4/11/2022). In review of yesterday's note (4/10/2022), there are no changes except as documented above.     Discussed patient condition and risk of morbidity and/or mortality with RN, RT, Pharmacy, Charge nurse / hot rounds and Patient  The patient was critically ill on an insulin infusion, which I actively titrated.  Critical care time = 40 minutes in directly providing and coordinating critical care and extensive data review.  No time overlap and excludes procedures.

## 2022-04-11 NOTE — DIETARY
"Nutrition services: Day 1 of admit.  Poonam Mayo is a 61 y.o. female with admitting DX of T2DM.    Consult received for DM education.  Pt also noted with a low BMI (18.91).  Pt appears thin with moderate muscle/fat loss.  RD was able to visit pt at bedside to discuss appetite/intake and wt hx.  Pt reports a poor appetite with poor intake x at least 5 days.  She notes PO intake is sometimes inconsistent as currently she lives in a hotel and sometimes experiences n/v or abdominal pain (currently some nausea), sometimes worsened w/ eating.  Pt endorses pain in her R arm currently (noted IV sites) which is affecting how much she is able to fork in her mouth - hopeful daughter will be here soon to assist.  Pt denies any issues chewing/swallowing.  Pt denies questions about DM @ this time.  In regards to her wt, she is sure she has lost, but unable to provide UBW or amount she has possibly lost.  Discussed food/oral supplement options.  No further needs or questions @ this time.     Assessment:  Height: 154.9 cm (5' 1\")  Weight: 45.4 kg (100 lb 1.4 oz) - via bed scale.   Body mass index is 18.91 kg/m²., BMI classification: Underweight.   Diet/Intake: Consistent CHO.     Evaluation:   1. Pt noted with pseudohyponatremia, JALEEL, UTI, dyslipidemia, HTN, and hypothyroid.  Pt presented w/ hyperglycemia, confusion, n/v, and abdominal pain.  2. Current clinical picture and MD progress notes reviewed.  3. Per chart review, pt weighed 120 lbs February 2021 and 108 lbs December 2021.  10% wt loss in at least 10 months, which is not severe for that time frame.  Additional 7.4% wt loss in 4 months, which is considered significant.  4. Pt noted with moderate muscle loss to deltoids, temples, clavicles, and moderate fat loss to orbital regions.  5. Labs: Glucose: 126.   6. Meds: Lantus, SSI, Synthroid, Prinivil, transitioning off DKA protocol.  7. LBM: PTA.     Malnutrition Risk: Pt meets criteria for severe malnutrition in the " context of acute illness related malnutrition (though suspect more chronic/environmental circumstances) related to GI distress, uncontrolled T2DM, questionable food security as evidenced by poor PO intake of <50% for >5 days per pt, moderate fat loss, and moderate muscle loss as noted above.     Recommendations/Plan:  1. Boost Glucose Control BID.    2. Encourage intake of meals/supplements.  Continue to document % consumed under ADLs.   3. Monitor weight.  4. Nutrition rep will continue to see patient for ongoing meal and snack preferences.     RD follows.

## 2022-04-11 NOTE — CARE PLAN
Problem: Nutritional:  Goal: Achieve adequate nutritional intake  Description: Patient will consume >50% of meals/supps  Outcome: Not Met

## 2022-04-11 NOTE — H&P
Critical Care History & Physical Note    Date of Service  4/10/2022    Primary Care Physician  TATA Mo.    Consultants  None    Specialist Names: N/A    Code Status  Full Code    Chief Complaint  Chief Complaint   Patient presents with   • Weakness   • Hyperglycemia       History of Presenting Illness  Poonam Mayo is a 61 y.o. female with past medical history of hypothyroidism, insulin dependent diabetes, HTN, and hyperlipidemia who presented 4/10/2022 with c/o a 3 day history of confusion, weakness, hyperglycemia nausea and vomiting.The patient presented to the ED today for hyperglycemia despite taking her insulin. The patient is lethargic and non verbal at time of exam, per ERP discussion with pts daughter, she denies fevers, chills, chest pain, or shortness of breath. In the ED patient found to be in DKA with leukocytosis and lactic acidosis. Patient will be admitted to ICU for insulin gtt and close electrolyte monitoring. Due to patients clinical condition the majority of this HPI was obtained by chart review therefore, liver ultrasound to evaluate for causes of N/V and antibiotics for possible UTI.     I discussed the plan of care with patient, bedside RN and Dr. Wellington.    Review of Systems  Review of Systems   Unable to perform ROS: Acuity of condition       Past Medical History   has a past medical history of 250.01 (1996), Dupuytren contracture, HTN (hypertension) (3/18/2011), Hyperlipidemia, Hypertension, and Hypothyroid.    Surgical History   has a past surgical history that includes tubal coagulation laparoscopic bilateral (1984); appendectomy; and tonsillectomy.     Family History  family history includes Diabetes in her son; Lung Disease in her father.   Family history reviewed with patient. There is no family history that is pertinent to the chief complaint.     Social History   reports that she has been smoking cigarettes. She has a 10.00 pack-year smoking history. She  has never used smokeless tobacco. She reports current alcohol use. She reports that she does not use drugs.    Allergies  No Known Allergies    Medications  Prior to Admission Medications   Prescriptions Last Dose Informant Patient Reported? Taking?   aspirin (ASA) 325 MG Tab  Patient Yes No   Sig: Take 325 mg by mouth every 6 hours as needed.   atorvastatin (LIPITOR) 10 MG Tab unknown at unknown Patient No No   Sig: Take 1 Tablet by mouth every day.   insulin glargine (LANTUS) 100 UNIT/ML Solution uknown at unknown Patient No No   Sig: Inject 40 Units under the skin every day. AS INSTRUCTED   insulin lispro (HUMALOG) 100 UNIT/ML unknown at unknown Patient No No   Sig: Inject 10 Units under the skin 3 times a day before meals.   levothyroxine (SYNTHROID) 112 MCG Tab unknown at unknown Patient No No   Sig: Take 1 Tablet by mouth every day.   lisinopril (PRINIVIL) 5 MG Tab unknown at unknown Patient No No   Sig: Take 1 Tablet by mouth every day.      Facility-Administered Medications: None       Physical Exam  Temp:  [36.1 °C (97 °F)-36.4 °C (97.6 °F)] 36.4 °C (97.6 °F)  Pulse:  [91-95] 95  Resp:  [19-22] 20  BP: (142-154)/(73-82) 154/82  SpO2:  [97 %-98 %] 97 %  Blood Pressure: 154/82   Temperature: 36.4 °C (97.6 °F)   Pulse: 95   Respiration: 20   Pulse Oximetry: 97 %       Physical Exam  Vitals and nursing note reviewed.   Constitutional:       General: She is sleeping.      Appearance: She is underweight. She is ill-appearing.      Comments: Patient arouses to voice briefly, not answering questions, falls back asleep quickly    HENT:      Head: Normocephalic and atraumatic.      Right Ear: External ear normal.      Left Ear: External ear normal.      Nose: Nose normal.      Mouth/Throat:      Mouth: Mucous membranes are moist.      Pharynx: Oropharynx is clear.   Eyes:      General:         Right eye: No discharge.         Left eye: No discharge.      Pupils: Pupils are equal, round, and reactive to light.    Cardiovascular:      Rate and Rhythm: Normal rate and regular rhythm.      Heart sounds: Normal heart sounds.   Pulmonary:      Effort: Pulmonary effort is normal.      Breath sounds: Normal breath sounds.   Abdominal:      General: Abdomen is flat. Bowel sounds are normal. There is no distension.      Palpations: Abdomen is soft.      Tenderness: There is no abdominal tenderness.   Musculoskeletal:      Cervical back: Neck supple. No tenderness.      Right lower leg: No edema.      Left lower leg: No edema.   Skin:     General: Skin is warm and dry.      Capillary Refill: Capillary refill takes 2 to 3 seconds.   Neurological:      Mental Status: She is lethargic.      Comments: Pt not participating in neuro exam   Psychiatric:         Attention and Perception: She is inattentive.         Laboratory:  Recent Labs     04/10/22  2008   WBC 15.9*   RBC 5.06   HEMOGLOBIN 15.3   HEMATOCRIT 43.9   MCV 86.8   MCH 30.2   MCHC 34.9   RDW 37.2   PLATELETCT 412   MPV 9.1     Recent Labs     04/10/22  2008   SODIUM 127*   POTASSIUM 4.5   CHLORIDE 84*   CO2 16*   GLUCOSE 576*   BUN 45*   CREATININE 1.50*   CALCIUM 9.0     Recent Labs     04/10/22  2008   ALTSGPT 49   ASTSGOT 25   ALKPHOSPHAT 176*   TBILIRUBIN 0.7   LIPASE 14   GLUCOSE 576*         No results for input(s): NTPROBNP in the last 72 hours.      Recent Labs     04/10/22  2008   TROPONINT 19       Imaging:  US-LIVER AND VESSELS COMPLETE (COMBO)    (Results Pending)       EKG:  My impression is: sinus tachycardia    Assessment/Plan:  I anticipate this patient will require at least two midnights for appropriate medical management, necessitating inpatient admission.    * DKA, type 2, not at goal (HCC)- (present on admission)  Assessment & Plan  History of insulin dependent diabetes. Pt c/o weakness, nausea and vomiting x 3 days. Presented to ED today for hyperglycemia despite insulin.   Possibily secondary to poor oral intake/insulin non adherence vs UTI vs  pancreatitis   CO2 16 AG 27 beta hydroxybutate 5.6  Fluid resuscitated with 2 L in ED  Serial labs per protocol   Monitor and replace electrolytes  Lipase and amylase pending  A1c 11.6  Diabetes education when clinically appropriate  Liver US to rule out cholelithiasis  Continue rocephin until patient able to provide ROS  Hypoglycemia protocol  Transition to lantus and SSI when appropriate    JALEEL (acute kidney injury) (HCC)- (present on admission)  Assessment & Plan  Serum creatinine 1.5, baseline 0.69 in 12/2021  Likely secondary to DKA  Fluid resuscitation   Strict I/Os  Serial labs  Avoid nephrotoxins     UTI (urinary tract infection)- (present on admission)  Assessment & Plan  Suspected,patient lethargic and nonverbal, unable to determine if she has urinary symptoms   UA with leuk esterase and WBCs  Treat for UTI in the setting of DKA  Rocephin 1g daily  Trend vital signs, CBC, and lactic acid  Consider discontinuing if patient denies symptoms when mental status is improved    Pseudohyponatremia- (present on admission)  Assessment & Plan  Secondary to hyperglycemia  Corrected Na 135  Fluids per DKA protocol  Serial BMPs      Leukocytosis- (present on admission)  Assessment & Plan  Due to DKA vs infection  Continue rocephin for presumed UTI  DKA per protocol   Trend vital signs, CBC, and lactic acid    Dyslipidemia- (present on admission)  Assessment & Plan  Continue atorvastatin 10 mg   Triglycerides pending    Hypothyroidism due to Hashimoto's thyroiditis- (present on admission)  Assessment & Plan  TSH 3.83 in December 2021  Continue levothyroxine 112 mcg      VTE prophylaxis: heparin ppx     Patient was critically ill during the time I treated the patient.  She had DKA and was at risk for deocmpensation         35 minutes of critical care time were spent, including examination and interview of the patient, explanation of prognosis/diagnosis/plan of care, direction of nursing staff and discussion of the patient  with other physicians involved.  This time was independent of procedures performed.    Greater than 50% of which was spent at the bedside.

## 2022-04-11 NOTE — ASSESSMENT & PLAN NOTE
Serum creatinine 1.5, up from 0.69 in 12/2021  Likely secondary to DKA  Fluid resuscitation   Strict I/Os  Serial labs  Avoid nephrotoxins

## 2022-04-11 NOTE — PROGRESS NOTES
4 Eyes Skin Assessment Completed by REESE Mchugh and REESE Adrian.    Head WDL  Ears WDL  Nose WDL  Mouth WDL  Neck WDL  Breast/Chest WDL  Shoulder Blades WDL  Spine WDL  (R) Arm/Elbow/Hand Redness and Blanching  (L) Arm/Elbow/Hand Redness and Blanching  Abdomen WDL  Groin WDL  Scrotum/Coccyx/Buttocks Redness and Blanching  (R) Leg Slight mottling on knees  (L) Leg mottling on knees  (R) Heel/Foot/Toe WDL   (L) Heel/Foot/Toe WDL          Devices In Places ECG, Blood Pressure Cuff and Pulse Ox      Interventions In Place Heel Mepilex, Sacral Mepilex, Pillows, Elbow Mepilex, Q2 Turns and Low Air Loss Mattress    Possible Skin Injury No    Pictures Uploaded Into Epic N/A  Wound Consult Placed N/A  RN Wound Prevention Protocol Ordered No

## 2022-04-11 NOTE — ASSESSMENT & PLAN NOTE
Secondary to non-compliance of insulin for 4 days  She was admitted to the ICU on an IV insulin drip with aggressive IV fluids and electrolyte replacement  Glucose on admission 576  HbA1c 11.6 consistent with poor control  Transition to a diabetic diet and restart long-acting Lantus insulin and sliding scale

## 2022-04-11 NOTE — PROGRESS NOTES
Bedside report received from [facility]. Pt drowsy, however able to follow commands, A&O x4. VSS, drips verified, on room air. Pt on portable monitor transported to room 623 with all belongings. DKA protocol initiated. Pt daughter Sindy called and given updates, no questions at this time.

## 2022-04-11 NOTE — ED NOTES
Med rec updated and complete chart review/[facility] ( [facility])/  Allergies reviewed . Confirmed name and date of birth with family at bedside. Pt unable to participate in an interview.    Family unsure of last doses taken.      Home pharmacy  [phone]

## 2022-04-11 NOTE — ASSESSMENT & PLAN NOTE
Secondary to hyperglycemia   Corrected Na 127  1/2NS, D5 1/2 NS, and D10 1/2 NS for DKA protocol fluids  Monitor serial labs

## 2022-04-11 NOTE — ASSESSMENT & PLAN NOTE
-Reports poor medication compliance-likely trigger  -Gap closed, transition insulin drip back to home regimen  -I discussed insulin compliance with her at some length.  We reviewed the risks of poorly controlled DM, which she was familiar with.  Her main barrier is remembering to take her insulin.  Housing instability is contributing.  She was uninterested in the idea of home health.    -DM education

## 2022-04-11 NOTE — CARE PLAN
The patient is Watcher - Medium risk of patient condition declining or worsening    Shift Goals  Clinical Goals: Blood glucose control  Patient Goals: GREGORIO  Family Goals: GREGORIO    Progress made toward(s) clinical / shift goals:      Problem: Knowledge Deficit - Standard  Goal: Patient and family/care givers will demonstrate understanding of plan of care, disease process/condition, diagnostic tests and medications  Outcome: Progressing     Problem: Fall Risk  Goal: Patient will remain free from falls  Outcome: Progressing     Problem: Hemodynamics  Goal: Patient's hemodynamics, fluid balance and neurologic status will be stable or improve  Outcome: Progressing     Problem: Respiratory  Goal: Patient will achieve/maintain optimum respiratory ventilation and gas exchange  Outcome: Progressing       Patient is not progressing towards the following goals:

## 2022-04-12 PROBLEM — N17.9 AKI (ACUTE KIDNEY INJURY) (HCC): Status: RESOLVED | Noted: 2022-01-01 | Resolved: 2022-01-01

## 2022-04-12 PROBLEM — G93.40 ENCEPHALOPATHY ACUTE: Status: RESOLVED | Noted: 2022-01-01 | Resolved: 2022-01-01

## 2022-04-12 PROBLEM — D72.829 LEUKOCYTOSIS: Status: RESOLVED | Noted: 2022-01-01 | Resolved: 2022-01-01

## 2022-04-12 PROBLEM — N39.0 UTI (URINARY TRACT INFECTION): Status: RESOLVED | Noted: 2022-01-01 | Resolved: 2022-01-01

## 2022-04-12 NOTE — PROGRESS NOTES
Hypoglycemia Intervention    Hypoglycemia protocol intervention:  Blood glucose 23 at 0312.  Intervention: 8 oz of fruit juice x2  Repeat blood glucose 0332 at 38.  Intervention: Pt request a carton of whole milk and helga crackers.  Additional interventions needed: Pt refusing FSBG until 0415. FSBG 116.

## 2022-04-12 NOTE — CARE PLAN
"The patient is Stable - Low risk of patient condition declining or worsening    Shift Goals  Clinical Goals: transition  Patient Goals: \"Go Home\"  Family Goals: GREGORIO    Progress made toward(s) clinical / shift goals:  yes    Patient is not progressing towards the following goals:      Problem: Knowledge Deficit - Standard  Goal: Patient and family/care givers will demonstrate understanding of plan of care, disease process/condition, diagnostic tests and medications  Outcome: Progressing     Problem: Fall Risk  Goal: Patient will remain free from falls  Outcome: Progressing     Problem: Psychosocial  Goal: Patient's level of anxiety will decrease  Outcome: Progressing  Goal: Patient's ability to verbalize feelings about condition will improve  Outcome: Progressing  Goal: Patient's ability to re-evaluate and adapt role responsibilities will improve  Outcome: Progressing  Goal: Patient and family will demonstrate ability to cope with life altering diagnosis and/or procedure  Outcome: Progressing  Goal: Spiritual and cultural needs incorporated into hospitalization  Outcome: Progressing     Problem: Communication  Goal: The ability to communicate needs accurately and effectively will improve  Outcome: Progressing     Problem: Discharge Barriers/Planning  Goal: Patient's continuum of care needs are met  Outcome: Progressing     Problem: Hemodynamics  Goal: Patient's hemodynamics, fluid balance and neurologic status will be stable or improve  Outcome: Progressing     Problem: Respiratory  Goal: Patient will achieve/maintain optimum respiratory ventilation and gas exchange  Outcome: Progressing     Problem: Chest Tube Management  Goal: Complications related to chest tube will be avoided or minimized  Outcome: Progressing     Problem: Fluid Volume  Goal: Fluid volume balance will be maintained  Outcome: Progressing     Problem: Mechanical Ventilation  Goal: Safe management of artificial airway and ventilation  Outcome: " Progressing  Goal: Successful weaning off mechanical ventilator, spontaneously maintains adequate gas exchange  Outcome: Progressing  Goal: Patient will be able to express needs and understand communication  Outcome: Progressing     Problem: Dysphagia  Goal: Dysphagia will improve  Outcome: Progressing     Problem: Risk for Aspiration  Goal: Patient's risk for aspiration will be absent or decrease  Outcome: Progressing     Problem: Nutrition  Goal: Patient's nutritional and fluid intake will be adequate or improve  Outcome: Progressing  Goal: Enteral nutrition will be maintained or improve  Outcome: Progressing  Goal: Enteral nutrition will be maintained or improve  Outcome: Progressing     Problem: Urinary Elimination  Goal: Establish and maintain regular urinary output  Outcome: Progressing     Problem: Bowel Elimination  Goal: Establish and maintain regular bowel function  Outcome: Progressing     Problem: Gastrointestinal Irritability  Goal: Nausea and vomiting will be absent or improve  Outcome: Progressing  Goal: Diarrhea will be absent or improved  Outcome: Progressing     Problem: Rectal Tube  Goal: Fecal output will be contained and skin will remain free from irritation  Outcome: Progressing     Problem: Mobility  Goal: Patient's capacity to carry out activities will improve  Outcome: Progressing     Problem: Self Care  Goal: Patient will have the ability to perform ADLs independently or with assistance (bathe, groom, dress, toilet and feed)  Outcome: Progressing     Problem: Infection - Standard  Goal: Patient will remain free from infection  Outcome: Progressing     Problem: Wound/ / Incision Healing  Goal: Patient's wound/surgical incision will decrease in size and heals properly  Outcome: Progressing     Problem: Fluid Volume  Goal: Fluid volume balance will be maintained  Outcome: Progressing     Problem: Mechanical Ventilation  Goal: Successful weaning off mechanical ventilator, spontaneously  maintains adequate gas exchange  Outcome: Progressing     Problem: Hemodynamics  Goal: Patient's hemodynamics, fluid balance and neurologic status will be stable or improve  Outcome: Progressing     Problem: Respiratory  Goal: Patient will achieve/maintain optimum respiratory ventilation and gas exchange  Outcome: Progressing     Problem: Dysphagia  Goal: Dysphagia will improve  Outcome: Progressing     Problem: Hemodynamics  Goal: Patient's hemodynamics, fluid balance and neurologic status will be stable or improve  Outcome: Progressing     Problem: Respiratory  Goal: Patient will achieve/maintain optimum respiratory ventilation and gas exchange  Outcome: Progressing     Problem: Chest Tube Management  Goal: Complications related to chest tube will be avoided or minimized  Outcome: Progressing     Problem: Fluid Volume  Goal: Fluid volume balance will be maintained  Outcome: Progressing     Problem: Mechanical Ventilation  Goal: Safe management of artificial airway and ventilation  Outcome: Progressing

## 2022-04-12 NOTE — FLOWSHEET NOTE
Pt. Is AAOx4 and wants to go home. Pt. States she will go AMA if necessary. MD rounded and said she can be discharged. She refused vital signs but was convinced to allow me to take her blood sugar after breakfast and administer insulin based on orders. Education related to condition provided. All questions were answered. Transport in wheelchair refused. RN walked down with patient to ED lobby for POV  by daughter. Pt. Refused discharge lounge and refused to wait in room 623.

## 2022-04-12 NOTE — DISCHARGE INSTRUCTIONS
Discharge Instructions    Discharged to home by car with relative. Discharged via wheelchair, hospital escort: Yes.  Special equipment needed: Not Applicable    Be sure to schedule a follow-up appointment with your primary care doctor or any specialists as instructed.     Discharge Plan:        I understand that a diet low in cholesterol, fat, and sodium is recommended for good health. Unless I have been given specific instructions below for another diet, I accept this instruction as my diet prescription.   Other diet: ADA diet    Special Instructions: None    · Is patient discharged on Warfarin / Coumadin?   No     Depression / Suicide Risk    As you are discharged from this [facility] facility, it is important to learn how to keep safe from harming yourself.    Recognize the warning signs:  · Abrupt changes in personality, positive or negative- including increase in energy   · Giving away possessions  · Change in eating patterns- significant weight changes-  positive or negative  · Change in sleeping patterns- unable to sleep or sleeping all the time   · Unwillingness or inability to communicate  · Depression  · Unusual sadness, discouragement and loneliness  · Talk of wanting to die  · Neglect of personal appearance   · Rebelliousness- reckless behavior  · Withdrawal from people/activities they love  · Confusion- inability to concentrate     If you or a loved one observes any of these behaviors or has concerns about self-harm, here's what you can do:  · Talk about it- your feelings and reasons for harming yourself  · Remove any means that you might use to hurt yourself (examples: pills, rope, extension cords, firearm)  · Get professional help from the community (Mental Health, Substance Abuse, psychological counseling)  · Do not be alone:Call your Safe Contact- someone whom you trust who will be there for you.  · Call your local CRISIS HOTLINE 249-0966 or 103-729-8711  · Call your local Children's Mobile  Crisis Response Team [place] (099) 307-7678 or www.[employer].st.[employer]  · Call the toll free National Suicide Prevention Hotlines   · National Suicide Prevention Lifeline 341-087-EAOL (1120)  · National Hope Line Network 800-SUICIDE (131-6026)

## 2022-04-12 NOTE — PROGRESS NOTES
Hypoglycemia Intervention    Hypoglycemia protocol intervention:  Blood glucose 55 at 2121.  Intervention: 8 oz of fruit juice   Repeat blood glucose 2142 at 76.  Intervention: No intervention required.  Additional interventions needed: None

## 2022-04-12 NOTE — PROGRESS NOTES
Pt educated on the safety measures of using a safety device: bed alarm. Pt understands with verbal acknowledgment. Pt refuses the use of a safety device: bed alarm. Pt AAOx4. Bed locked in lowest position, upper bed rails up, treaded slippers on. Call light within reach. Pt educated to use call light before getting up. Pt does not call appropriately.

## 2022-04-12 NOTE — PROGRESS NOTES
Pt being verbally aggressive this morning. Pt demanding RN to remove IV's otherwise pt will remove them herself. Pt agreed to only have one removed at this time. Pt refusing all future lab draws.

## 2022-04-12 NOTE — CONSULTS
"Hospital Medicine Consultation    Date of Service  4/11/2022    Referring Physician  Yaneli Fishman M.D.    Consulting Physician  Amauri Carrillo M.D.    Reason for Consultation  Poorly controlled diabetes     History of Presenting Illness  61 y.o. female who presented 4/10/2022 with weakness and elevated blood glucose levels.  Ms. Mayo has a past medical history of insulin-dependent diabetes mellitus since 1996 that brought to the emergency room on 4/10/2022 with evaded blood sugars and weakness with confusion.  Upon questioning, she had been \"hanging out\" with a friend and forgot to take her insulin for the prior 4 days.  She did have a glucose of 576 with a anion gap of 27 and a bicarb of 16.  She was admitted to the intensive care unit for an IV insulin drip, IV fluids and electrolyte replacements.  On 4/11/2022 she has transitioned off of the insulin drip to a diabetic diet and will have Lantus 40 units daily with sliding scale before every meal and nightly.  Upon questioning, she has plenty of her insulin and supplies she simply did not take her insulin.  She states this is the first time since 1996 she has done this not have a particularly phonation as to why she forgot.    Review of Systems  Review of Systems   Constitutional: Negative for chills and fever.   Respiratory: Negative for shortness of breath.    Cardiovascular: Negative for chest pain.   Gastrointestinal:        Anxious to eat and drink   All other systems reviewed and are negative.      Past Medical History   has a past medical history of 250.01 (1996), Dupuytren contracture, HTN (hypertension) (3/18/2011), Hyperlipidemia, Hypertension, and Hypothyroid.    Surgical History   has a past surgical history that includes tubal coagulation laparoscopic bilateral (1984); appendectomy; and tonsillectomy.    Family History  family history includes Diabetes in her son; Lung Disease in her father.    Social History   reports that she has been smoking " cigarettes. She has a 10.00 pack-year smoking history. She has never used smokeless tobacco. She reports current alcohol use. She reports that she does not use drugs.lives alone    Medications  Prior to Admission Medications   Prescriptions Last Dose Informant Patient Reported? Taking?   aspirin (ASA) 325 MG Tab  Patient Yes No   Sig: Take 325 mg by mouth every 6 hours as needed.   atorvastatin (LIPITOR) 10 MG Tab unknown at unknown Patient No No   Sig: Take 1 Tablet by mouth every day.   insulin glargine (LANTUS) 100 UNIT/ML Solution uknown at unknown Patient No No   Sig: Inject 40 Units under the skin every day. AS INSTRUCTED   insulin lispro (HUMALOG) 100 UNIT/ML unknown at unknown Patient No No   Sig: Inject 10 Units under the skin 3 times a day before meals.   levothyroxine (SYNTHROID) 112 MCG Tab unknown at unknown Patient No No   Sig: Take 1 Tablet by mouth every day.   lisinopril (PRINIVIL) 5 MG Tab unknown at unknown Patient No No   Sig: Take 1 Tablet by mouth every day.      Facility-Administered Medications: None       Allergies  No Known Allergies    Physical Exam  Temp:  [36.1 °C (97 °F)-36.4 °C (97.6 °F)] 36.2 °C (97.2 °F)  Pulse:  [84-97] 86  Resp:  [14-24] 20  BP: (142-163)/(73-90) 160/86  SpO2:  [95 %-98 %] 97 %    Physical Exam  Vitals and nursing note reviewed.   Constitutional:       General: She is not in acute distress.     Appearance: She is not toxic-appearing.   HENT:      Head: Normocephalic and atraumatic.      Mouth/Throat:      Mouth: Mucous membranes are dry.      Pharynx: Oropharynx is clear.      Comments: edentulous   Eyes:      General: No scleral icterus.     Conjunctiva/sclera: Conjunctivae normal.   Cardiovascular:      Rate and Rhythm: Normal rate and regular rhythm.      Heart sounds: No murmur heard.  Pulmonary:      Effort: Pulmonary effort is normal. No respiratory distress.      Breath sounds: Normal breath sounds.   Abdominal:      General: There is no distension.       Palpations: Abdomen is soft.      Tenderness: There is no abdominal tenderness.   Musculoskeletal:      Cervical back: Normal range of motion and neck supple.      Right lower leg: No edema.      Left lower leg: No edema.   Skin:     General: Skin is warm and dry.      Coloration: Skin is pale.   Neurological:      General: No focal deficit present.      Mental Status: She is alert and oriented to person, place, and time.   Psychiatric:         Mood and Affect: Mood normal.         Behavior: Behavior normal.         Fluids      Laboratory  Recent Labs     04/10/22  2008 04/10/22  2310   WBC 15.9* 14.9*   RBC 5.06 4.68   HEMOGLOBIN 15.3 14.3   HEMATOCRIT 43.9 39.8   MCV 86.8 85.0   MCH 30.2 30.6   MCHC 34.9 35.9*   RDW 37.2 36.2   PLATELETCT 412 381   MPV 9.1 9.2     Recent Labs     04/11/22  0223 04/11/22  0526 04/11/22  1150   SODIUM 134* 134* 135   POTASSIUM 4.5  4.5 4.4 4.2   CHLORIDE 98 101 101   CO2 19* 23 21   GLUCOSE 182* 148* 126*   BUN 35* 30* 21   CREATININE 1.03 0.90 0.76   CALCIUM 8.5 8.1* 8.1*              Recent Labs     04/10/22  2310   TRIGLYCERIDE 87        Imaging  No orders to display       Assessment/Plan  * DKA, type 2, not at goal (HCC)- (present on admission)  Assessment & Plan  Secondary to non-compliance of insulin for 4 days  She was admitted to the ICU on an IV insulin drip with aggressive IV fluids and electrolyte replacement  Glucose on admission 576  HbA1c 11.6 consistent with poor control  Transition to a diabetic diet and restart long-acting Lantus insulin and sliding scale      Encephalopathy acute- (present on admission)  Assessment & Plan  Acute metabolic encephalopathy that has now resolved    UTI (urinary tract infection)- (present on admission)  Assessment & Plan  +UA  She had been treated with Rocephin    JALEEL (acute kidney injury) (HCC)- (present on admission)  Assessment & Plan  Serum creatinine 1.5, up from 0.69 in 12/2021  Likely secondary to DKA  Fluid resuscitation    Strict I/Os  Serial labs  Avoid nephrotoxins        Dyslipidemia- (present on admission)  Assessment & Plan  Continue atorvastatin 10 mg      Hypothyroidism due to Hashimoto's thyroiditis- (present on admission)  Assessment & Plan  TSH 3.83 in December 2021  Continue levothyroxine  112 mcg daily      Hypertension  Assessment & Plan  Continue Lisinopril 5 mg daily    Cigarette nicotine dependence without complication- (present on admission)  Assessment & Plan  Cessation discussed  Nicotine patch for withdrawal

## 2022-04-12 NOTE — CARE PLAN
The patient is Unstable - High likelihood or risk of patient condition declining or worsening    Shift Goals  Clinical Goals: Blood glucose control.  Patient Goals: Discharge tomorrow.    Progress made toward(s) clinical / shift goals:  Pt became hypoglycemic twice last night. Pt is being non-compliant with POC and staff this AM.    Patient is not progressing towards the following goals:      Problem: Psychosocial  Goal: Patient's level of anxiety will decrease  Outcome: Not Progressing  Note: Pt woke up verbally aggressive. Pt refusing IV's, refusing future blood draws. Pt ambulated up to bathroom without calling for assistance, sounding bed alarm, refusing assistance while in the bathroom.      Problem: Hemodynamics  Goal: Patient's hemodynamics, fluid balance and neurologic status will be stable or improve  Outcome: Not Progressing  Note: Pt became hypoglycemic twice last night.       Update: 0542: Pt removed x2 IV's from right arm in the bathroom while alone.

## 2022-04-12 NOTE — DISCHARGE SUMMARY
Discharge Summary    CHIEF COMPLAINT ON ADMISSION  Chief Complaint   Patient presents with   • Weakness   • Hyperglycemia       Reason for Admission  EMS     Admission Date  4/10/2022    CODE STATUS  Full Code    HPI & HOSPITAL COURSE  This is a 61 y.o. female here with generalized weakness and hyperglycemia noted to have DKA.  The patient had not use her insulin for 4 days as she has been visiting with a friend.  She was admitted to the intensive care unit and started on insulin drip IV fluids for hydration and electrolyte repletion.  On 4/11/2022 she was transitioned to subcutaneous insulin with Lantus and sliding scale.  She was hypoglycemic this morning and was treated with oral intake.  I discussed with the patient continued monitoring today given her hypoglycemia and further adjustment of subcutaneous insulin however the patient is adamant about being discharged home and reports that she would leave AMA if she is not discharged.  She is currently asymptomatic and is having good p.o. intake.  I reviewed with her the importance to comply with her insulin and monitoring her CBGs 3 times daily.  She reports that she has all her insulin as well as testing supplies available to her at home.  She is established with a PCP at [facility] and I recommended she schedules a follow-up later this week or early next week.  I reviewed with her that her hemoglobin A1c is 11.6 consistent with uncontrolled diabetes and discussed her long-term complications of uncontrolled diabetes.    No notes on file    Therefore, she is discharged in good and stable condition to home with close outpatient follow-up.    The patient met 2-midnight criteria for an inpatient stay at the time of discharge.    Discharge Date  4/12/2022    FOLLOW UP ITEMS POST DISCHARGE  Follow-up with PCP with CBG log    DISCHARGE DIAGNOSES  Principal Problem:    DKA, type 2, not at goal (HCC) POA: Yes  Active Problems:    Hypertension POA: Unknown    Hypothyroidism due  to Hashimoto's thyroiditis (Chronic) POA: Yes    Cigarette nicotine dependence without complication (Chronic) POA: Yes    Dyslipidemia (Chronic) POA: Yes  Resolved Problems:    Pseudohyponatremia POA: Yes    JALEEL (acute kidney injury) (HCC) POA: Yes    UTI (urinary tract infection) POA: Yes    Leukocytosis POA: Yes    Encephalopathy acute POA: Yes      FOLLOW UP  No future appointments.  MANDEEP MoPDuaneRDuaneNDuane  [address] [facility] [address]  [facility] [id number] [phone]    In 1 week        MEDICATIONS ON DISCHARGE     Medication List      CONTINUE taking these medications      Instructions   aspirin 325 MG Tabs  Commonly known as: ASA   Take 325 mg by mouth every 6 hours as needed.  Dose: 325 mg     atorvastatin 10 MG Tabs  Commonly known as: LIPITOR   Take 1 Tablet by mouth every day.  Dose: 10 mg     insulin glargine 100 UNIT/ML Soln  Commonly known as: Lantus   Inject 40 Units under the skin every day. AS INSTRUCTED  Dose: 40 Units     insulin lispro 100 UNIT/ML  Commonly known as: HumaLOG   Doctor's comments: Insurance no longer covers NovoLog.  Inject 10 Units under the skin 3 times a day before meals.  Dose: 10 Units     levothyroxine 112 MCG Tabs  Commonly known as: SYNTHROID   Take 1 Tablet by mouth every day.  Dose: 112 mcg     lisinopril 5 MG Tabs  Commonly known as: PRINIVIL   Take 1 Tablet by mouth every day.  Dose: 5 mg            Allergies  No Known Allergies    DIET  Orders Placed This Encounter   Procedures   • Diet Order Diet: Consistent CHO (Diabetic)     Standing Status:   Standing     Number of Occurrences:   1     Order Specific Question:   Diet:     Answer:   Consistent CHO (Diabetic) [4]       ACTIVITY  As tolerated.  Weight bearing as tolerated    CONSULTATIONS  Critical care    PROCEDURES      LABORATORY  Lab Results   Component Value Date    SODIUM 138 04/12/2022    POTASSIUM 3.3 (L) 04/12/2022    CHLORIDE 104 04/12/2022    CO2 23 04/12/2022    GLUCOSE 39 (LL) 04/12/2022    BUN 13  04/12/2022    CREATININE 0.57 04/12/2022    CREATININE 0.7 05/08/2009        Lab Results   Component Value Date    WBC 14.9 (H) 04/10/2022    HEMOGLOBIN 14.3 04/10/2022    HEMATOCRIT 39.8 04/10/2022    PLATELETCT 381 04/10/2022        Total time of the discharge process exceeds 35 minutes.

## 2022-04-13 NOTE — PROGRESS NOTES
Patient outreach for Transitional Care Management.  Reviewed discharge instructions and medications.  No new prescriptions given.  Scheduled follow up appointment with PCP for 4/19 at 1000a.

## 2022-04-19 PROBLEM — E11.10 DKA, TYPE 2, NOT AT GOAL (HCC): Status: RESOLVED | Noted: 2022-01-01 | Resolved: 2022-01-01

## 2022-04-19 PROBLEM — E10.3592: Status: ACTIVE | Noted: 2022-01-01

## 2022-04-19 NOTE — LETTER
[place]  MANDEEP MoP.RDuaneN.  [address] [facility] [address]  [place] [address]  Fax: [phone]   Authorization for Release/Disclosure of   Protected Health Information   Name: PAMELA MAYO : 1960 SSN: [id number]   Address: [address]  [place] [address] Phone:    [phone] (home)    I authorize the entity listed below to release/disclose the PHI below to:   [facility]/Anamaria Wang A.P.R.MALORIE and MANDEEP MoP.RMARICRUZ   Provider or Entity Name:  Dr. Black   [facility], [facility], [facility]   Phone:      Fax:     Reason for request: continuity of care   Information to be released:    [  ] LAST COLONOSCOPY,  including any PATH REPORT and follow-up  [  ] LAST FIT/COLOGUARD RESULT [  ] LAST DEXA  [  ] LAST MAMMOGRAM  [  ] LAST PAP  [  ] LAST LABS [  ] RETINA EXAM REPORT  [  ] IMMUNIZATION RECORDS  [  ] Release all info      [  ] Check here and initial the line next to each item to release ALL health information INCLUDING  _____ Care and treatment for drug and / or alcohol abuse  _____ HIV testing, infection status, or AIDS  _____ Genetic Testing    DATES OF SERVICE OR TIME PERIOD TO BE DISCLOSED: _____________  I understand and acknowledge that:  * This Authorization may be revoked at any time by you in writing, except if your health information has already been used or disclosed.  * Your health information that will be used or disclosed as a result of you signing this authorization could be re-disclosed by the recipient. If this occurs, your re-disclosed health information may no longer be protected by State or Federal laws.  * You may refuse to sign this Authorization. Your refusal will not affect your ability to obtain treatment.  * This Authorization becomes effective upon signing and will  on (date) __________.      If no date is indicated, this Authorization will  one (1) year from the signature date.    Name: Pamela Mayo    Signature:   Date:      4/19/2022       PLEASE FAX REQUESTED RECORDS BACK TO: [phone]

## 2022-04-19 NOTE — PROGRESS NOTES
"Subjective:     Poonam Mayo is a 61 y.o. female who presents for Hospital Follow-up.    POST DISCHARGE CALL:  Discharge Date:4/12/2022   Date of Outreach Call: 4/13/2022 10:39 AM  Now that you're home, how are you doing? Good  Do you have questions about your medications? No  Did you fill your medications? Yes  Do you have a follow-up appointment scheduled?Yes  Discharging Department: Cardiac ICU  Number of Attempts: 1  Current or previous attempts completed within two business days of discharge? Yes  Provided education regarding treatment plan, medication, self-management, ADLs? Yes  Has patient completed Advance Directive? If yes, advise them to bring to appointment. No  Care Manager phone number provided? Yes  Is there anything else I can help you with? No    HPI:   Recently hospitalized for hyperglycemia, DKA.  Patient had not been using insulin for about 4 days as she was visiting a friend.  She is admitted to the ICU and started on insulin drip and fluids.  Transition back to subcutaneous Lantus and sliding scale.  Most recent A1c 11.6% on 4/10/22. Hx of noncompliance and poor management of her diabetes.   Since discharging from the hospital:  Tells me her fasting BS this am was 137.   Taking 40 units of Lantus after dinner 6-8pm  Taking Humalog sliding scale \"usually 10 units per day\"  Sometimes will add an extra 5 units if she has additional soda/ice cream.     Diabetic retinopathy without macular edema  Have reviewed records from optometry.  It does appear that patient has evidence of diabetic proliferative retinopathy in her left eye without any evidence of macular edema.  She was referred over to Dr. Black with retinal specialty.  She was also referred over to [facility] as she is a glaucoma suspect per notes.  We will request records.    Hypothyroid secondary to Hashimoto  Reports compliance with her levothyroxine.  Last TSH in December at 3.830.    She does not that she is been having upset " "stomach since being discharged from the hospital.  States is improving although she feels little sour in her stomach.  Currently not taking any PPI.    Current medicines (including reconciliation performed today)  Current Outpatient Medications   Medication Sig Dispense Refill   • omeprazole (PRILOSEC) 20 MG delayed-release capsule Take 1 Capsule by mouth every day for 14 days. For stomach 14 Capsule 0   • insulin lispro (HUMALOG) 100 UNIT/ML Inject 10 Units under the skin 3 times a day before meals. 10 mL 3   • lisinopril (PRINIVIL) 5 MG Tab Take 1 Tablet by mouth every day. 90 Tablet 2   • insulin glargine (LANTUS) 100 UNIT/ML Solution Inject 40 Units under the skin every day. AS INSTRUCTED 40 mL 2   • levothyroxine (SYNTHROID) 112 MCG Tab Take 1 Tablet by mouth every day. 90 Tablet 2   • atorvastatin (LIPITOR) 10 MG Tab Take 1 Tablet by mouth every day. 90 Tablet 2   • aspirin (ASA) 325 MG Tab Take 325 mg by mouth every 6 hours as needed.       No current facility-administered medications for this visit.       Allergies:   Patient has no known allergies.    Social History     Tobacco Use   • Smoking status: Current Every Day Smoker     Packs/day: 0.10     Years: 40.00     Pack years: 4.00     Types: Cigarettes     Last attempt to quit: 2016     Years since quittin.8   • Smokeless tobacco: Never Used   • Tobacco comment: 4 cigs a day   Vaping Use   • Vaping Use: Never used   Substance Use Topics   • Alcohol use: Yes     Comment: occasional   • Drug use: No       ROS:  See above    Objective:     Vitals:    22 1003   BP: 120/62   BP Location: Right arm   Patient Position: Sitting   BP Cuff Size: Adult   Pulse: 91   Temp: 36.2 °C (97.2 °F)   TempSrc: Temporal   SpO2: 98%   Weight: 47.4 kg (104 lb 8 oz)   Height: 1.575 m (5' 2\")     Body mass index is 19.11 kg/m².    Physical Exam:  General: Alert, pleasant, NAD  HEENT: Normocephalic.  Neck supple.   Respiratory: no distress, no audible wheezing, RR " -WNL  Skin: Warm, dry, no rashes.  Extremities: No leg edema. No discoloration.   Neurological: No tremors  Psych:  Affect/mood is normal, judgement is good, memory is intact, grooming is appropriate    Assessment and Plan:   1. Hospital discharge follow-up  Have reviewed hospital admission, discharge, labs and recommendations.    2. Diabetic ketoacidosis without coma associated with type 1 diabetes mellitus (HCC)  Recently admitted for DKA.  Clinically she is stable at this time however A1c is not controlled. Blood sugar this morning was 137.    3. 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  A1c at 11.6%.  Patient does have a history of poor compliance and declining referral to endocrinology for appropriate management.  At this time have discussed with her that I will be placing referral to endocrinology for further management.  Monofilament completed with intact sensation, no nonhealing wounds.  Positive pulses.  Update labs.  Follow-up 3 months if she is unable to get into endocrinology by then we will recheck A1c.  - Referral to Endocrinology  - Diabetic Monofilament Lower Extremity Exam  - SMITH-65; Future  - C-PEPTIDE; Future  - Comp Metabolic Panel; Future    4.Proliferative diabetic retinopathy of left eye without macular edema associated with type 1 diabetes mellitus (HCC)  New problem.Secondary to uncontrolled diabetes type 1.  A1c 11.6%.  Have reviewed recent optometry exam and recommendations to follow-up with retina specialist for additional work-up.  Recommend patient to improve diabetes management and follow-up with retina specialists as directed.    5. Poor compliance  Longstanding history.    6. Hypothyroidism due to Hashimoto's thyroiditis  Chronic, stable.  Most recent TSH stable.  Continue current regimen and recheck.  - Referral to Endocrinology  - TSH; Future  - FREE THYROXINE; Future  - T3 FREE; Future    7.  Acute gastritis, presence of bleeding unspecified, unspecified gastritis type  Acute problem.  Recommend omeprazole x14 days.  Follow-up if symptoms persist or worsen.  - omeprazole (PRILOSEC) 20 MG delayed-release capsule; Take 1 Capsule by mouth every day for 14 days. For stomach  Dispense: 14 Capsule; Refill: 0      - Chart and discharge summary were reviewed.   - Hospitalization and results reviewed with patient.   - Medications reviewed including instructions regarding high risk medications, dosing and side effects.  - Recommended Services: No services needed at this time  - Advance directive/POLST on file?  No     Follow-up:Return in about 3 months (around 7/19/2022).    Face-to-face transitional care management services with HIGH (today's visit is within 7 days post discharge & LACE+ score of 28-58) medical decision complexity were provided.     LACE+ Historical Score Over Time (0-28: Low, 29-58: Medium, 59+: High): 59      Anamaria CROWELL

## 2022-04-19 NOTE — LETTER
[facility]  MANDEEP MoPDuaneRDuaneN.  [address] [facility] [address]  [place] [address]  Fax: [phone]   Authorization for Release/Disclosure of   Protected Health Information   Name: PAMELA CENTENO : 1960 SSN: [id number]   Address: [address]  [place] [address] Phone:    [phone] (home)    I authorize the entity listed below to release/disclose the PHI below to:   [facility]/RAVINDER Mo.P.R.MALORIE and MANDEEP MoP.RMARICRUZ   Provider or Entity Name:  Dr. Miles   [facility], [facility], [facility]   Phone:      Fax:     Reason for request: continuity of care   Information to be released:    [  ] LAST COLONOSCOPY,  including any PATH REPORT and follow-up  [  ] LAST FIT/COLOGUARD RESULT [  ] LAST DEXA  [  ] LAST MAMMOGRAM  [  ] LAST PAP  [  ] LAST LABS [  ] RETINA EXAM REPORT  [  ] IMMUNIZATION RECORDS  [  ] Release all info      [  ] Check here and initial the line next to each item to release ALL health information INCLUDING  _____ Care and treatment for drug and / or alcohol abuse  _____ HIV testing, infection status, or AIDS  _____ Genetic Testing    DATES OF SERVICE OR TIME PERIOD TO BE DISCLOSED: _____________  I understand and acknowledge that:  * This Authorization may be revoked at any time by you in writing, except if your health information has already been used or disclosed.  * Your health information that will be used or disclosed as a result of you signing this authorization could be re-disclosed by the recipient. If this occurs, your re-disclosed health information may no longer be protected by State or Federal laws.  * You may refuse to sign this Authorization. Your refusal will not affect your ability to obtain treatment.  * This Authorization becomes effective upon signing and will  on (date) __________.      If no date is indicated, this Authorization will  one (1) year from the signature date.    Name: Pamela Centeno    Signature:   Date:      4/19/2022       PLEASE FAX REQUESTED RECORDS BACK TO: [phone]

## 2022-09-26 PROBLEM — Z79.4 ENCOUNTER FOR LONG-TERM (CURRENT) USE OF INSULIN (HCC): Status: ACTIVE | Noted: 2022-01-01

## 2022-09-26 PROBLEM — Z79.4 ENCOUNTER FOR LONG-TERM (CURRENT) USE OF INSULIN (HCC): Chronic | Status: ACTIVE | Noted: 2022-01-01

## 2022-09-26 NOTE — PROGRESS NOTES
Chief Complaint   Patient presents with    Memory Loss       Subjective:     HPI:     Poonam Mayo is a 61 y.o. female   here to discuss the evaluation and management of:     Last OV 4/2022.    Memory loss  Presents with concerns for memory issues.   Ongoing for sometime-worse over last 2 years  Forgetting where she places things.   Long standing hx of uncontrolled Diabetes.   Denies Head trauma/injury.   Denies hx of concussion.  +tobacco use.   +ETOH use-weekends.  Denies illicit substances >10years. Hx of cocaine use.   Taking Ginko Biloba.     Type 1 diabetes mellitus with proliferative retinopathy of left eye without macular edema ([facility])  A1c today is 11.6%, previously 11.6%.  Inconsistent use of insulin.  Patient does have a history of poor compliance and had declined referral to Endocrinology numerous times. She is wondering about getting a Dexcom.    Proliferative diabetic retinopathy of left eye without macular edema associated with type 1 diabetes mellitus (HCC  Tells me she followed up with the retina specialist.   She was given options but has not followed up since. - she is not interested in injections of her eye.  Previously she was also referred over to [facility] as she is a glaucoma suspect per notes.      Poor compliance  Longstanding history.    Hypothyroidism due to Hashimoto's thyroiditis  Reports compliance with Levothyroxine.   Has not completed labs that were previously ordered.     Declines Flu vaccine.     ROS: : see above      Current Outpatient Medications:     insulin lispro (HUMALOG) 100 UNIT/ML, Inject 10 Units under the skin 3 times a day before meals., Disp: 10 mL, Rfl: 3    lisinopril (PRINIVIL) 5 MG Tab, Take 1 Tablet by mouth every day., Disp: 90 Tablet, Rfl: 2    insulin glargine (LANTUS) 100 UNIT/ML Solution, Inject 40 Units under the skin every day. AS INSTRUCTED, Disp: 40 mL, Rfl: 2    levothyroxine (SYNTHROID) 112 MCG Tab, Take 1 Tablet by mouth every day., Disp: 90 Tablet,  Rfl: 2    atorvastatin (LIPITOR) 10 MG Tab, Take 1 Tablet by mouth every day., Disp: 90 Tablet, Rfl: 2    aspirin (ASA) 325 MG Tab, Take 325 mg by mouth every 6 hours as needed., Disp: , Rfl:     HUMALOG 100 UNIT/ML, INJECT 10 UNITS UNDER THE SKIN 3 TIMES A DAY BEFORE MEALS. (Patient not taking: Reported on 2022), Disp: , Rfl:     No Known Allergies    Past Medical History:   Diagnosis Date    1996    Dupuytren contracture     bilateral hands    HTN (hypertension) 3/18/2011    Hyperlipidemia     Hypertension     Hypothyroid      Past Surgical History:   Procedure Laterality Date    TUBAL COAGULATION LAPAROSCOPIC BILATERAL  1984    APPENDECTOMY      TONSILLECTOMY       Family History   Problem Relation Age of Onset    Lung Disease Father     Diabetes Son         type 1     Social History     Socioeconomic History    Marital status: Single     Spouse name: Not on file    Number of children: Not on file    Years of education: Not on file    Highest education level: Not on file   Occupational History    Not on file   Tobacco Use    Smoking status: Every Day     Packs/day: 0.10     Years: 40.00     Pack years: 4.00     Types: Cigarettes     Last attempt to quit: 2016     Years since quittin.3    Smokeless tobacco: Never    Tobacco comments:     4 cigs a day   Vaping Use    Vaping Use: Never used   Substance and Sexual Activity    Alcohol use: Yes     Comment: occasional    Drug use: No    Sexual activity: Never   Other Topics Concern    Not on file   Social History Narrative    Not on file     Social Determinants of Health     Financial Resource Strain: Not on file   Food Insecurity: Not on file   Transportation Needs: Not on file   Physical Activity: Not on file   Stress: Not on file   Social Connections: Not on file   Intimate Partner Violence: Not on file   Housing Stability: Not on file       Objective:     Vitals: BP (!) 94/56 (BP Location: Right arm, Patient Position: Sitting, BP Cuff Size:  "Adult)   Pulse 91   Temp 36.6 °C (97.8 °F) (Temporal)   Ht 1.575 m (5' 2\")   Wt 48.6 kg (107 lb 2.3 oz)   LMP 03/18/2011   SpO2 99%   BMI 19.60 kg/m²    General: Alert, pleasant, NAD  HEENT: Normocephalic.  Neck supple.   Respiratory: no distress, no audible wheezing, RR -WNL  Skin: Warm, dry, no rashes.  Extremities: No leg edema. No discoloration  Neurological: No tremors  Psych:  Affect/mood is normal, judgement is good, memory is intact, grooming is appropriate.    Assessment/Plan:     Poonam was seen today for memory loss.    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  Longstanding problem with compliance and follow-up.  Update labs and have referred again over to endocrinology.  Have discussed with patient the importance of optimizing her A1c and avoiding long-term complications.  -     Referral to Endocrinology  -     C-PEPTIDE; Future  -     SMITH-65; Future  -     Lipid Profile; Future    Proliferative diabetic retinopathy of left eye without macular edema associated with type 1 diabetes mellitus (HCC)  Chronic, not well managed. Have discussed recommendations to continue to follow-up with retina specialist and review treatment options.  Patient is not interested in following up or having any injections at this time.  -     Referral to Endocrinology    Encounter for long-term (current) use of insulin (HCC)  Taking Lantus and Humalog.    Poor compliance  Longstanding problem.    Hypothyroidism due to Hashimoto's thyroiditis  Has not completed previously ordered labs.  Have reordered.  Have updated referral to endocrinology again.  -     Referral to Endocrinology    Memory changes  Ongoing problem for the patient.  Recommend baseline labs including vitamin B12, folic acid and RPR.  Consider MRI however patient is adamant she is not interested in that at this time.  " Recommend optimal glucose control, reducing A1c, stop smoking, reduce EtOH, ensure physical activity on a regular basis, ensure adequate sleep, heart healthy diet.  -     VITAMIN B1; Future  -     VITAMIN B12; Future  -     FOLATE; Future  -     Comp Metabolic Panel; Future  -     TSH WITH REFLEX TO FT4; Future  -     RPR (SYPHILIS)      Return in about 6 months (around 3/26/2023).    {I have placed the above orders and discussed them with an approved delegating provider.  The MA is performing the below orders under the direction of Dr. Yaya FLORES

## 2022-11-01 NOTE — PROGRESS NOTES
CHW Jose Guadalupe called the pt to introduce the CCM program. Pt did not answer and CHW left a message..

## 2022-12-15 PROBLEM — E16.2 HYPOGLYCEMIA: Status: ACTIVE | Noted: 2022-01-01

## 2022-12-16 PROBLEM — F15.10 METHAMPHETAMINE ABUSE (HCC): Chronic | Status: ACTIVE | Noted: 2022-01-01

## 2022-12-16 PROBLEM — R41.82 ALTERED MENTAL STATUS: Status: ACTIVE | Noted: 2022-01-01

## 2022-12-16 PROBLEM — E46 PROTEIN CALORIE MALNUTRITION (HCC): Status: ACTIVE | Noted: 2022-01-01

## 2022-12-16 NOTE — DISCHARGE PLANNING
Medical Social Work     SW provided emotional support to the pt daughter while the pt was getting intubated. SW escorted the pts daughter Sindy back to the pt room once the pt was settled.     Sindy (daughter) [phone]    SW will remain available  for pt support.

## 2022-12-16 NOTE — ED NOTES
Unable to complete med rec at this time  Patient unable to participate in interview  Per patient's daughter she fills her medications at [facility] on [facility], the only medications they show as recently dispensed are her insulin

## 2022-12-16 NOTE — ASSESSMENT & PLAN NOTE
History of, unknown compliance with medications  As needed IV antihypertensives with parameters  home oral med lisinopril 10mg daily  Well controlled   SBP < 160

## 2022-12-16 NOTE — ASSESSMENT & PLAN NOTE
-A1c 13.2  -Suspected type 2 diabetes  -No hypoglycemia events recently  -Continue Lantus - caution with increasing dose  -Continue to follow closely and adjust as indicated (patient on 40 units Lantus as outpatient.  Unknown compliance)  -Continue tube feedings at goal  -Continue Accu-Cheks every 6 hours and as needed with SSI  -Hypoglycemia protocols  -Hypoglycemia has resolved.  -morning glucose remain elevated, adjusted Lantus a.m.  -Added Lantus p.m. and adjusted the dose

## 2022-12-16 NOTE — CONSULTS
I have seen and examined the patient today.  I have reviewed the medical record, laboratory data, imaging and all relevant studies.  I have discussed the plan of care with the Internal Medicine Resident and agree with the note and plan as documented with the following additions and clarifications:    HPI: 62-year-old female, PMH IDDM, HTN, hypothyroidism, ongoing tobacco and methamphetamine use MI: Last known well at noon, found down by family on the floor of her home with the vacuum  running.  She was unresponsive and had been incontinent of bladder by report.  On EMS arrival blood glucose was 32.  Patient was given 250 cc of D10 with repeat blood sugar of 38.  She was unresponsive on arrival to ED and intubated for airway protection.  Repeat blood sugars in the 200s but dropped again to 19 by report she is now on D10 infusion after further boluses of D10.  Her family tells me that she had been feeling well but does use methamphetamine and is very inconsistent with her insulin.  On exam she is dehydrated, unresponsive on low-dose propofol.  She did receive a Keppra load of 30 mg/kg.  She is hypothermic here at 95 °F, normotensive, sinus rhythm 90s, arterial blood gas 7.36/52/115 post intubation.  WBC 7.7, creatinine 0.81, serum bicarbonate 26, AG 11, lactate 2.7.  Acetaminophen and salicylates undetectable, EtOH less than 10, UDS positive for amphetamines.  CT scan of the head without contrast was unremarkable.  EKG sinus rhythm 90, QTc 513, no ischemic change    On exam: Pupils are equal sluggishly reactive, unresponsive on low-dose propofol currently, full ventilator support, cool extremities      A/P:  Acute encephalopathy, found down unresponsive  -Suspect hypoglycemic seizure  -Profound hypoglycemia in the 30s on arrival  -Stat EEG rule out nonconvulsive status, received Keppra load, ongoing propofol  -CT head reassuring  -Hold off further Keppra dosing untill EEG reviewed    Hypoglycemia  -Suspect  medication error (ongoing methamphetamine use and inconsistent insulin dosing)  -Continue D10 infusion, serial FSBS every hour until stable    Acute respiratory failure  -Intubated in ED for airway protection  -LTV/LPS, A-F bundle, liberate when clinically appropriate    IDDM  -Noncompliant with insulin regimen, most recent glycohemoglobin from April 2022 was 11.6  -Follow FSBS as above, DM education, review insulin regimen    Methamphetamine use  -Counseled on appropriate    Ongoing tobacco use  - when appropriate    Hypokalemia  -Replete    Goals of care/counseling  -I met with her daughter and granddaughter in the ED, granddaughter works for [employer]  -They states she would not want prolonged life support but agree with short time-limited trial of resuscitation, full CODE STATUS    The patient remains critically ill.  Critical care time = 80 minutes in directly providing and coordinating critical care and extensive data review.  No time overlap and excludes procedures.

## 2022-12-16 NOTE — DIETARY
"Nutrition Support Assessment   Day 1 of admit.  Poonam Mayo is a 62 y.o. female with admitting DX of Hypoglycemia.     Current problem list:  Protein calorie malnutrition  Methamphetamine abuse  Hypoglycemia  AMS  Cigarette nicotine dependence without complication  Dyslipidemia  HTN  Hypothyroidism d/t Hashimoto's thyroiditis  T1DM with ophthalmic complication     Assessment:  Estimated Nutritional Needs: based on:   Height: 165.1 cm (5' 5\")  Weight: 46.5 kg (102 lb 8.2 oz) - bed scale  Weight to Use in Calculations: 46.5 kg (102 lb 8.2 oz)  Body mass index is 17.06 kg/m²., BMI classification: underweight    Calculation/Equation: REE per MSJ x 1.2 = 1232 kcals  RMR per PSU (VE: 7.8 L/min and Tmax: 37.6 C) = 1293  Total Calories / day: 1200 - 1500 (Calories / k - 32)  Total Grams Protein / day: 56 - 70 (Grams Protein / k.2 - 1.5)     Evaluation:   Consult received for TF.   NG tube placed and confirmed on KUB for enteral access.   Pt is intubated on ventilator.  Pt observed, appears thin and while pt is mainly under blankets, observed arms and face with muscle/fat wasting which both appear to be severe.  Per weight hx, pt has ranged 100-110 lbs at highest over last 1 1/2 years, was at 108-110 lbs > 1 year ago.   Skin: No wounds or edema.   Labs: Na 132, Ca 8.3, A1C 13.2  Meds: pepcid, colace, bowel protocol, D10 infusion  Last BM: PTA  CHO-controlled formula appropriate for blood sugar control. Increased protein d/t muscle wasting.      Malnutrition Risk: Severe malnutrition in the context of starvation-related as evidenced by severe muscle and severe fat wasting.      Recommendations/Plan:  Start Diabetisource AC at 25 ml/hr and advance per protocol to goal rate of 50 ml/hr to provide 1440 kcals, 72 gm protein, and 984 ml free water per day.   Fluids per MD.   Monitor electrolytes and replete as needed.    RD following.              "

## 2022-12-16 NOTE — PROCEDURES
INPATIENT ROUTINE VIDEO ELECTROENCEPHALOGRAM REPORT      REFERRING PROVIDER: Dr. Pool    DOS: 12/16/2022     TOTAL RECORDING TIME: 0 hours and 24 minutes of total recording time    INDICATION:  Poonam Mayo 62 y.o. female presenting with altered mental status    CURRENT ANTI-SEIZURE MEDICATIONS:    Propofol gtt    TECHNIQUE: Routine VEEG was set up by a Neurodiagnostic technologist who performed education to the patient and staff. A minimum of 23 electrodes and 23 channel recording was setup and performed by Neurodiagnostic technologist, in accordance with the international 10-20 system. The study was reviewed in bipolar and referential montages. The recording examined the patient in the  drowsy/sleep and/or comatose state(s).     DESCRIPTION OF THE RECORD:  The background was continuous, symmetrical, and was without a definite posterior dominant rhythm . The background was composed of medium voltage delta and theta activity, at times rhythmic 1 Hz delta or frontal predominant 3-4 Hz theta was seen. . Reactivity was absent. Spontaneous variability was absent. State changes were absent.   EEG Sleep: N2 sleep architecture was not seen.      ACTIVATION PROCEDURES:   NA    ICTAL AND INTERICTAL FINDINGS:   No focal or generalized epileptiform activity noted.     No regional slowing was seen during this routine study.      No definite electrographic or electroclinical seizures.     EKG: Sampling of the EKG recording did not demonstrate any abnormalities      EVENTS:  None    INTERPRETATION:   Abnormal video EEG recording in the drowsy/sleep and/or comatose state(s):  - Moderate to severe background slowing and attenuation suggestive of diffuse/multifocal cerebral dysfunction. Clinical and radiographic correlation recommended.   - No persistent focal asymmetries seen.  - No definitive epileptiform discharges or other epileptiform phenomena seen.   - No definitive seizures. Clinical correlation is  recommended.      Note: This EEG does not rule the possibility of seizures  If the clinical suspicion remains high for seizures, a prolonged recording to capture clinical or subclinical events may be helpful.        Gurwinder Ingram MD  Department of Neurology at [facility]  General Neurologist and Epileptologist  Director of [facility] Level III Comprehensive Epilepsy Program  Professor of Clinical Neurology, [facility].   Phone: [phone]  Fax: [phone]  E-mail: [email or web address]

## 2022-12-16 NOTE — PROGRESS NOTES
Critical Care Progress Note    Date of admission  12/15/2022    Chief Complaint  Altered mental status    Hospital Course  Poonam Mayo is a 62-year-old female with PMH significant for uncontrolled DM 1 (most recent A1c 11.6), HTN, HLD, tobacco dependence, hypothyroidism, methamphetamine abuse, and questionable medication compliance who presented to the emergency department via EMS on 12/15 after being found down with the vacuum running.  Blood glucose was 32.  She received 250 cc of D10 with repeat blood sugar 38.  Upon arrival to the emergency department her blood sugar was in the 200s.  She was intubated for airway protection, started on D10 infusion, and admitted to the ICU for close hemodynamic monitoring and ventilator management.    Interval Problem Update  No acute events overnight  Prop @ 5 ---> off. RASS -4  Does not follow commands, DELGADO 2-3/5, withdraws. Pupils sluggish, left 4mm/right 3mm  Tmax 99.7 - Maria Victoria Hugger off  -110's  D10 150 ---> 100. CS every hour 140s to 180s  Place OG and start tube feedings  Catalan 1700 out overnight  PIV's  Vent day #2: APVC 22/350/8/30%  SAT/SBT: yes/yes. RSBI 90. Did not follow for parameters  Lovenox, Pepcid, no ABX  cEEG captured no seizures  Mobility 1    Spoke with patient's daughter, Lanie, at bedside who reports she spoke with her mother around 1130 yesterday morning.  At that time mom was cleaning out the vacuum and getting ready to clean. Lanie stopped by the house around 1730 when she got off work when she found the vacuum running and the patient unresponsive.  Patient remains unresponsive off all sedation.  Possible anoxic brain injury.  Brain MRI ordered.    Review of Systems  Review of Systems   Unable to perform ROS: Intubated      Vital Signs for last 24 hours   Pulse:  [] 105  Resp:  [17-42] 29  BP: ()/() 102/61  SpO2:  [89 %-100 %] 95 %    Hemodynamic parameters for last 24 hours       Respiratory Information for the last 24  hours  Vent Mode: APVCMV  Rate (breaths/min): 22  Vt Target (mL): 350  PEEP/CPAP: 8  P Support: 5  MAP: 13  Length of Weaning Trial (Hours): 1  Control VTE (exp VT): 329    Physical Exam   Physical Exam  Vitals and nursing note reviewed.   Constitutional:       Appearance: She is cachectic. She is ill-appearing. She is not toxic-appearing.      Interventions: She is intubated.   HENT:      Head: Normocephalic.      Nose: Nose normal.      Mouth/Throat:      Lips: Pink.      Mouth: Mucous membranes are moist.   Eyes:      Pupils: Pupils are equal, round, and reactive to light.      Comments: Sluggish   Cardiovascular:      Rate and Rhythm: Tachycardia present.      Pulses: Normal pulses.      Heart sounds: Normal heart sounds.   Pulmonary:      Effort: Pulmonary effort is normal. She is intubated.      Breath sounds: Normal breath sounds. No wheezing.   Abdominal:      General: Bowel sounds are decreased.      Palpations: Abdomen is soft.   Musculoskeletal:      Comments: Does not follow commands  Not moving any extremities spontaneously   Skin:     General: Skin is warm and dry.      Capillary Refill: Capillary refill takes less than 2 seconds.   Neurological:      GCS: GCS eye subscore is 1. GCS verbal subscore is 1. GCS motor subscore is 4.      Comments: Intubated   Psychiatric:      Comments: Intubated       Medications  Current Facility-Administered Medications   Medication Dose Route Frequency Provider Last Rate Last Admin    Respiratory Therapy Consult   Nebulization Continuous RT Ace Pool M.D.        famotidine (PEPCID) tablet 20 mg  20 mg Enteral Tube Q12HRS Ace Pool M.D.        Or    famotidine (PEPCID) injection 20 mg  20 mg Intravenous Q12HRS Ace Pool M.D.   20 mg at 12/16/22 0511    MD Alert...ICU Electrolyte Replacement per Pharmacy   Other PHARMACY TO DOSE Ace Pool M.D.        lidocaine (XYLOCAINE) 1 % injection 2 mL  2 mL Tracheal Tube Q30 MIN PRN Ace Pool M.D.         ipratropium-albuterol (DUONEB) nebulizer solution  3 mL Nebulization Q2HRS PRN (RT) Ace Pool M.D.        enoxaparin (Lovenox) inj 40 mg  40 mg Subcutaneous DAILY AT 1800 Jazmine Pike, A.P.R.N.        Pharmacy Consult: Enteral tube insertion - review meds/change route/product selection  1 Each Other PHARMACY TO DOSE Jazmine Pike, A.P.R.N.        senna-docusate (PERICOLACE or SENOKOT S) 8.6-50 MG per tablet 2 Tablet  2 Tablet Enteral Tube BID Jazmine Pike, A.P.R.N.        And    polyethylene glycol/lytes (MIRALAX) PACKET 1 Packet  1 Packet Enteral Tube QDAY PRN Jazmine Pike, A.P.R.N.        And    magnesium hydroxide (MILK OF MAGNESIA) suspension 30 mL  30 mL Enteral Tube QDAY PRN Jazmine Pike, A.P.R.N.        And    bisacodyl (DULCOLAX) suppository 10 mg  10 mg Rectal QDAY PRN Jazmine Pike, A.P.R.N.        dextrose 10% infusion   Intravenous Continuous Jazmine Pike, A.P.R.N. 100 mL/hr at 12/16/22 1315 New Bag at 12/16/22 1315       Fluids    Intake/Output Summary (Last 24 hours) at 12/16/2022 1437  Last data filed at 12/16/2022 1000  Gross per 24 hour   Intake 2968.53 ml   Output 1970 ml   Net 998.53 ml       Laboratory  Recent Labs     12/15/22  2117 12/16/22  0444   ISTATAPH 7.364* 7.517*   ISTATAPCO2 52.6* 33.1   ISTATAPO2 115* 132*   ISTATATCO2 32 28   LZLEPGY6PGH 98 99   ISTATARTHCO3 30.0* 26.9*   ISTATARTBE 3 4*   ISTATTEMP see below 99.7 F   ISTATFIO2 100 50   ISTATSPEC Arterial Arterial   ISTATAPHTC  --  7.508*   IUILFLXC7DO  --  136*     Recent Labs     12/16/22  0000   CPKTOTAL 197*     Recent Labs     12/15/22  1924 12/16/22  0000 12/16/22 0410   SODIUM 133* 130* 132*   POTASSIUM 3.3* 3.7 3.9   CHLORIDE 96 97 96   CO2 26 22 27   BUN 16 17 15   CREATININE 0.81 0.62 0.57   MAGNESIUM 2.0  --  2.2   PHOSPHORUS  --   --  3.1   CALCIUM 8.3* 8.2* 8.3*     Recent Labs     12/15/22  1924 12/16/22  0000 12/16/22  0410   ALTSGPT 17  --  15   ASTSGOT 22  --  25   ALKPHOSPHAT 95  --  85    TBILIRUBIN 0.2  --  0.6   GLUCOSE 460* 122* 80     Recent Labs     12/15/22  1924 12/16/22  0410   WBC 7.7 11.8*   NEUTSPOLYS 68.50  --    LYMPHOCYTES 22.90  --    MONOCYTES 7.10  --    EOSINOPHILS 0.50  --    BASOPHILS 0.60  --    ASTSGOT 22 25   ALTSGPT 17 15   ALKPHOSPHAT 95 85   TBILIRUBIN 0.2 0.6     Recent Labs     12/15/22  1924 12/16/22  0410   RBC 4.84 4.39   HEMOGLOBIN 14.7 13.3   HEMATOCRIT 45.1 39.9   PLATELETCT 436 341       Imaging  X-Ray:  I have personally reviewed the images and compared with prior images.  CT:    Reviewed    Assessment/Plan  * Hypoglycemia- (present on admission)  Assessment & Plan  -Uncertain etiology at this time  -Accu-Cheks every hour until stable  -D10    Methamphetamine abuse (HCC)- (present on admission)  Assessment & Plan  Cessation counseling when appropriate    Protein calorie malnutrition (HCC)- (present on admission)  Assessment & Plan  Body mass index is 17.06 kg/m².  -dietary consult    Altered mental status- (present on admission)  Assessment & Plan  -Initial event likely hypoglycemia.  Unknown downtime.  Possibly as long as 4 hours.  Anoxic brain injury in the differential  -History of poorly controlled DM and ongoing methamphetamine use  -Continuous EEG captured no seizures - stopped Keppra  -Judicious use of narcotics, sedation/benzos  -Fall precautions, aspiration precautions    Dyslipidemia- (present on admission)  Assessment & Plan  -Continue statin    Cigarette nicotine dependence without complication- (present on admission)  Assessment & Plan  -Cessation education and counseling when clinically appropriate    Hypothyroidism due to Hashimoto's thyroiditis- (present on admission)  Assessment & Plan  Awaiting results of TSH w/ reflex to FT4. 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 A1c in 4/2022 was 11.6%  -Follow up on repeat A1c  -Diabetic education  -May need to redose insulin regimen  -Management of hypoglycemia - also see problem of hypoglycemia       VTE:  Lovenox  Ulcer: H2 Antagonist  Lines: Catalan Catheter  Ongoing indication addressed    I have performed a physical exam and reviewed and updated ROS and Plan today (12/16/2022). In review of yesterday's note (12/15/2022), there are no changes except as documented above.     Discussed patient condition and risk of morbidity and/or mortality with Family, RN, RT, Pharmacy, , Charge nurse / hot rounds, Patient, and my attending Dr. Franco  The patient remains critically ill.  Critical care time = 55 minutes in directly providing and coordinating critical care and extensive data review.  No time overlap and excludes procedures.    Please note that this dictation was created using voice recognition software. I have made every reasonable attempt to correct obvious errors, but there may be errors of grammar and possibly content that I did not discover before finalizing the note.    TATA Neumann.

## 2022-12-16 NOTE — ASSESSMENT & PLAN NOTE
-Initial event likely hypoglycemia.  Unknown downtime.  Possibly as long as 4 hours.  Anoxic brain injury in the differential  -History of poorly controlled DM and ongoing methamphetamine use  -Continuous EEG captured no seizures -Keppra DC'd  -Judicious use of narcotics, sedation/benzos  -Fall precautions, aspiration precautions  -MRI -questionable tiny acute infarct of the right frontal lobe periventricular white matter; parenchymal atrophy; white matter disease most likely microvascular ischemic changes- no other significant findings

## 2022-12-16 NOTE — CARE PLAN
Problem: Ventilation  Goal: Ability to achieve and maintain unassisted ventilation or tolerate decreased levels of ventilator support  Description: Target End Date:  4 days     Document on Vent flowsheet    1.  Support and monitor invasive and noninvasive mechanical ventilation  2.  Monitor ventilator weaning response  3.  Perform ventilator associated pneumonia prevention interventions  4.  Manage ventilation therapy by monitoring diagnostic test results  Note:    Ventilator Daily Summary    Vent Day # 2  22/350/8+/30%    Ventilator settings changed this shift: none     Weaning trials: SBT 5/8 for a total of two hours not following for parameters    Respiratory Procedures: none     Plan: Continue current ventilator settings and wean mechanical ventilation as tolerated per physician orders.

## 2022-12-16 NOTE — H&P
"[facility] Internal Medicine History & Physical Note    Date of Service  12/16/2022    [facility] Team: [facility] ICU Gold Team   Attending: Ace Pool M.d.  Senior Resident: Dr. Villa  Contact Number: [phone]    Primary Care Physician  SANDRA Mo    Consultants  critical care      Code Status  Full Code    Chief Complaint  Chief Complaint   Patient presents with    Loss of Consciousness     Pt found face down on carpet with vacuum running by family. Pt BG found to be 32. After 250cc D10, BG 38. Another 250cc D10 running       History of Presenting Illness (HPI):     Note: I am unable to get history from the patient. History is obtained from discussion with daughter (Sindy), granddaughter (Leatha Balderas), and chart review.    Poonam Mayo is a 62 y.o. female with a PMH significant for insulin-dependent T1DM (A1c 11.6% in 4/2022, questionable compliance with insulin), HTN/HLD, tobacco use disorder, methamphetamine use disorder, and hypothyroidism who presented 12/15/2022 with severe hypoglycemia. Per daughter, patient has a history of questionable compliance with medications at baseline, has low PO intake of food/water usually (daughter reminds patient to eat/drink), lives by herself, and manages her own medications. Patient previously had a viral upper respiratory infection about 2 weeks ago which had since resolved. Daughter called the patient around 12 PM during which the patient sounded awake and \"normal\". Around 5 PM, daughter went to check up on the patient in person; there was no response to knocking on the door so daughter broke down her door and found the patient laying on the ground, face down, with the vacuum machine still running. Daughter propped the patient up in a chair and tried to give her some orange juice (which has helped with hypoglycemic episodes the patient has had in the past) but the patient choked on it. Daughter then called EMS were the patient's BG was found to be " 32 for which she was given 250cc D10, with repeat BG 38 after which another D10 250cc was given. She was sent to the ED; upon arrival the patient's BG was in the 200s. The patient was intubated emergently for airway protection. She was started on D10 at 75cc/hr; repeat BG was 19 and the patient was given another D10 250cc bolus and her D10 was increased to 200cc/hr. Repeat BG was 170 and thus D10 was decreased to 150cc/hr.    I discussed the plan of care with patient, family, and bedside RN.    Review of Systems  Review of Systems   Unable to perform ROS: Mental acuity     Past Medical History   has a past medical history of 250.01 (1996), Dupuytren contracture, HTN (hypertension) (3/18/2011), Hyperlipidemia, Hypertension, and Hypothyroid.    Surgical History   has a past surgical history that includes tubal coagulation laparoscopic bilateral (1984); appendectomy; and tonsillectomy.     Family History  family history includes Diabetes in her son; Lung Disease in her father.   Family history reviewed with patient's family.     Social History  Tobacco: Smokes few cigarettes/day x 50+ years  Alcohol: Occasional  Recreational drugs (illegal or prescription): Methamphetamine  Employment: On Disability  Living Situation: Lives by self at home, no pets  Recent Travel: None  Primary Care Provider: Not Reviewed - patient unable to answer  Other (stressors, spirituality, exposures): Had viral upper respiratory infection 2 weeks ago (recovered)    Allergies  No Known Allergies    Medications  Prior to Admission Medications   Prescriptions Last Dose Informant Patient Reported? Taking?   HUMALOG 100 UNIT/ML   No No   Sig: INJECT 10 UNITS UNDER THE SKIN 3 TIMES A DAY BEFORE MEALS.   aspirin (ASA) 325 MG Tab  Patient Yes No   Sig: Take 325 mg by mouth every 6 hours as needed.   atorvastatin (LIPITOR) 10 MG Tab  Patient No No   Sig: Take 1 Tablet by mouth every day.   insulin glargine (LANTUS) 100 UNIT/ML Solution  Patient No No    Sig: Inject 40 Units under the skin every day. AS INSTRUCTED   levothyroxine (SYNTHROID) 112 MCG Tab  Patient No No   Sig: Take 1 Tablet by mouth every day.   lisinopril (PRINIVIL) 5 MG Tab  Patient No No   Sig: Take 1 Tablet by mouth every day.      Facility-Administered Medications: None       Physical Exam  Pulse:  [] 83  Resp:  [17-28] 22  BP: ()/() 121/77  SpO2:  [89 %-100 %] 96 %  Blood Pressure: (!) 91/55       Pulse: 74   Respiration: (!) 27   Pulse Oximetry: 92 %          Physical Exam  Vitals and nursing note reviewed. Exam conducted with a chaperone present (RN, patient's granddaughter and daughter present at bedside).   Constitutional:       Appearance: She is ill-appearing and toxic-appearing. She is not diaphoretic.      Comments: Laying in bed. Cachectic. Looks older than stated age   HENT:      Head: Normocephalic and atraumatic.      Right Ear: External ear normal.      Left Ear: External ear normal.      Nose: Nose normal. No congestion.      Mouth/Throat:      Mouth: Mucous membranes are dry.      Pharynx: No oropharyngeal exudate.      Comments: intubated  Eyes:      General:         Right eye: No discharge.         Left eye: No discharge.      Pupils: Pupils are equal, round, and reactive to light.      Comments: Pupils equal and reactive to light, unable to test accommodation   Cardiovascular:      Rate and Rhythm: Tachycardia present. Rhythm irregular.      Heart sounds:     No gallop.   Pulmonary:      Comments: On ventilator. With ventilator sounds  Abdominal:      General: Abdomen is flat. Bowel sounds are normal. There is no distension.      Palpations: Abdomen is soft.   Genitourinary:     Comments: Catalan in place  Musculoskeletal:      Right lower leg: No edema.      Left lower leg: No edema.      Comments: Extremities very cool.   Skin:     General: Skin is dry.      Coloration: Skin is pale.      Comments: Skin tenting present   Neurological:      Mental Status: She  is disoriented.      Comments: Difficult to assess. Was able to spontaneously wiggle toes on right foot.   Psychiatric:      Comments: Unable to assess       Laboratory:  Recent Labs     12/15/22  1924   WBC 7.7   RBC 4.84   HEMOGLOBIN 14.7   HEMATOCRIT 45.1   MCV 93.2   MCH 30.4   MCHC 32.6*   RDW 44.2   PLATELETCT 436   MPV 8.6*     Recent Labs     12/15/22  1924 12/16/22  0000   SODIUM 133* 130*   POTASSIUM 3.3* 3.7   CHLORIDE 96 97   CO2 26 22   GLUCOSE 460* 122*   BUN 16 17   CREATININE 0.81 0.62   CALCIUM 8.3* 8.2*     Recent Labs     12/15/22  1924 12/16/22  0000   ALTSGPT 17  --    ASTSGOT 22  --    ALKPHOSPHAT 95  --    TBILIRUBIN 0.2  --    GLUCOSE 460* 122*         No results for input(s): NTPROBNP in the last 72 hours.  Recent Labs     12/15/22  1924   TRIGLYCERIDE 63     Recent Labs     12/15/22  1924 12/16/22  0000   TROPONINT 46* 78*       Imaging:  DX-CHEST-PORTABLE (1 VIEW)   Final Result         1.  No acute cardiopulmonary disease.   2.  Bilateral lower lobe nodular densities, location and appearance favors nipple shadows. Could be definitively characterized with repeat chest x-ray with nipple markers to exclude pulmonary nodule.   3.  Atherosclerosis      CT-HEAD W/O   Final Result      No acute intracranial abnormality.             EKG:  My impression is: sinus rhythm, prolonged QTC  CXR: No acute abnormalities  CTH: no acute abnormalities    Assessment/Plan:    Problem Representation: 62 y.o. female with a PMH significant for insulin-dependent T1DM (A1c 11.6% in 4/2022, questionable compliance with insulin), HTN/HLD, tobacco use disorder, methamphetamine use disorder, and hypothyroidism who presented 12/15/2022 with severe hypoglycemia.     The primary team spoke with the patient's daughter and granddaughter regarding CODE STATUS. Daughter stated that the patient would be okay with trial of resuscitation/intubation. Thus will be FULL CODE for now. If patient does not improve clinically, will  revisit code status/have further GOC conversation.    I anticipate this patient will require at least two midnights for appropriate medical management, necessitating inpatient admission because of severe hypoglycemia with acute encephalopathy    Patient will need a ICU (Adult and Pediatrics) bed on MEDICAL service .  The need is secondary to intubated status, constant lab checks, critical monitoring of neurological status, managing airway/ventilator settings.    Acute encephalopathy  Assessment & Plan  Likely due to seizure from hypoglycemia. Patient had BG 32 in field and at one point in ED BG was 19. Was loaded with Keppra in ED. [facility] with no acute abnormalities. Intubated for airway protection.    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 A1c in 4/2022 was 11.6%  -Follow up on repeat A1c  -Diabetic education  -May need to redose insulin regimen  -Management of hypoglycemia - also see problem of hypoglycemia    * Hypoglycemia  Assessment & Plan  Patient had BG 32 in the field, was given dextrose supplementation with repeat BG 38. Upon my initial assessment in the ED, BG 19. I gave another D10 250cc bolus and increased D10 infusion to 200cc/hr. Repeat BG was 170 and then I decreased D10 to 150cc/hr.  -Repeat BG check after patient was moved to RICU was 165.    -Continue D10 at 150cc/hr. Check BG q1hr till stable.  -Neuro checks q4hr  -Diabetic education ordered  -Nutrition consult ordered    Methamphetamine abuse (HCC)  Assessment & Plan  Cessation counseling when appropriate    Protein calorie malnutrition (HCC)  Assessment & Plan  #Cachexia    Patient has a history of low PO intake of food/drink per daughter. Physical exam reveals  cachexia.  -Nutrition consult in  -Diabetic education ordered  -Consider starting tube feeds if mentation does not improve; monitor for refeeding syndrome    Dyslipidemia  Assessment & Plan  Patient on statin in outpatient setting. Consider switching to high intensity statin in outpatient setting due to ASCVD risk factor. Will restart statin when patient is able to protect airway.    Cigarette nicotine dependence without complication  Assessment & Plan  When patient is more awake, will ask if patient would like nicotine replacement therapy  Nicotine cessation counseling when appropriate    Hypothyroidism due to Hashimoto's thyroiditis  Assessment & Plan  Awaiting results of TSH w/ reflex to FT4. Will continue on levothyroxine at home dosage when patient is able to protect airway and tolerate swallowing    Hypertension  Assessment & Plan  Patient had labile BP in ED. Will restart home lisinopril when appropriate with holding parameters.  -Medicine compliance education when appropriate        VTE prophylaxis: SCDs/TEDs and heparin ppx

## 2022-12-16 NOTE — PROGRESS NOTES
2 RN skin check complete with REESE Jose  Devices in place BP cuff, Tele leads, spo2 probe, ETT, PIVs.  Skin assessed under devices Yes.  Confirmed pressure ulcers found on N/A.  New potential pressure ulcers noted on N/A. Blanching redness to sacrum and bilateral heels Wound consult placed N/A.  The following interventions in place q2hr turns, frequent rotation of medical device placement

## 2022-12-16 NOTE — HOSPITAL COURSE
Poonam Mayo is a 62-year-old female with PMH significant for uncontrolled DM 1 (most recent A1c 11.6), HTN, HLD, tobacco dependence, hypothyroidism, methamphetamine abuse, and questionable medication compliance who presented to the emergency department via EMS on 12/15 after being found down with the vacuum running.  Blood glucose was 32.  She received 250 cc of D10 with repeat blood sugar 38.  Upon arrival to the emergency department her blood sugar was in the 200s.  She was intubated for airway protection, started on D10 infusion, and admitted to the ICU for close hemodynamic monitoring and ventilator management.  12/16 - hypoglycemia improving, weaning D10. Starting TF's. MRI brain pending  12/1806-lwffpi-it MRI with possible small acute infarct to right frontal lobe;no other acute intracranial abnormality noted-patient slightly more responsive  12/19 - Vent day #5. Tolerating SBT's. Not following for parameters  12/20 - spontaneous movements, nothing purposeful.  Repeat EEG captured no seizures.  Presence of triphasic waveforms consistent with toxic metabolic etiology.  12/21- Extubated however pt failed and required reintubation due to tachypnea and difficulty supporting her airway

## 2022-12-16 NOTE — ASSESSMENT & PLAN NOTE
-given mental status, she might not smoke any more but cessation education and counseling might not happen  -unclear if any hx of COPD  -RT protocols

## 2022-12-16 NOTE — ED TRIAGE NOTES
Pt BIBA via [facility]    Chief Complaint   Patient presents with    Loss of Consciousness     Pt found face down on carpet with vacuum running by family. Pt BG found to be 32. After 250cc D10, BG 38. Another 250cc D10 running     ABCs intact. Pt unarousable to painful stimuli. Skin pale cool and dry. Vitals and tele on the monitor

## 2022-12-16 NOTE — ASSESSMENT & PLAN NOTE
Body mass index is 17.06 kg/m².  Dietary consult-TF/supplements  Peg tube placed by GI dr Goodwin on 1/6/23

## 2022-12-16 NOTE — ED NOTES
Per MD, pt requires intubation    2010 - timeout  2011 - 60 propofol given  2013 - 60 rocuronium given  2014 - pt intubated with 7.5 tube, 21 @ the lip, positive color change, positive bilateral breath sounds  2030 - brown placed & UA collected and sent  2033 - propofol drip initiated

## 2022-12-17 NOTE — PROGRESS NOTES
0045: Advised APRN of increasing BGL and tachycardia.  Discontinued dextrose drip, administered LR bolus per order.    0600:  BGL continues trending up, APRN advised sliding scale insulin ordered and administered.

## 2022-12-17 NOTE — CARE PLAN
Problem: Ventilation  Goal: Ability to achieve and maintain unassisted ventilation or tolerate decreased levels of ventilator support  Description: Target End Date:  4 days     Document on Vent flowsheet    1.  Support and monitor invasive and noninvasive mechanical ventilation  2.  Monitor ventilator weaning response  3.  Perform ventilator associated pneumonia prevention interventions  4.  Manage ventilation therapy by monitoring diagnostic test results  Note:    Ventilator Daily Summary    Vent Day #3  CMV 18/320/8+/30%    Ventilator settings changed this shift: none    Weaning trials:SBT x2 for a total of two hours    Respiratory Procedures: none    Plan: Continue current ventilator settings and wean mechanical ventilation as tolerated per physician orders.

## 2022-12-17 NOTE — CARE PLAN
The patient is Stable - Low risk of patient condition declining or worsening    Shift Goals  Clinical Goals: complete MRI, monitor BS, increase wakefulness  Patient Goals: erlinda  Family Goals: erlinda    Progress made toward(s) clinical / shift goals:   - MRI pending, MRI tech to call when pt to come down.  - Monitoring blood sugars Q6, lantus given this morning.  - Mobilized pt to edge of bed in attempt to wake her up.  Pt was a max assist, continues to not open eyes or follow commands.      Patient is not progressing towards the following goals:      Problem: Safety - Medical Restraint  Goal: Free from restraint(s) (Restraint for Interference with Medical Device)  Outcome: Not Progressing  - Pt remains restrained due to risk of removal of life saving device.     Problem: Psychosocial  Goal: Patient's ability to verbalize feelings about condition will improve  Outcome: Not Progressing  - pt remains obtunded  Goal: Patient's ability to re-evaluate and adapt role responsibilities will improve  Outcome: Not Progressing  - pt remains obtunded     Problem: Mechanical Ventilation  Goal: Successful weaning off mechanical ventilator, spontaneously maintains adequate gas exchange  Outcome: Not Progressing  - Pt SBTing but unable to follow commands to pull parameters.  Goal: Patient will be able to express needs and understand communication  Outcome: Not Progressing  - Pt remains obtunded     Problem: Bowel Elimination  Goal: Establish and maintain regular bowel function  Outcome: Not Progressing  - Pt has not had bowel movement since PTA, will escalate bowel protocol

## 2022-12-17 NOTE — CARE PLAN
The patient is Watcher - Medium risk of patient condition declining or worsening    Shift Goals  Clinical Goals: Glucose/Neuro stability  Patient Goals: GREGORIO  Family Goals: GREGORIO    Progress made toward(s) clinical / shift goals:    Problem: Safety - Medical Restraint  Goal: Remains free of injury from restraints (Restraint for Interference with Medical Device)  Outcome: Progressing  Q2 CMS/ROM assessments     Problem: Pain - Standard  Goal: Alleviation of pain or a reduction in pain to the patient’s comfort goal  Outcome: Progressing  Q2 pain/rass assessments     Problem: Hemodynamics  Goal: Patient's hemodynamics, fluid balance and neurologic status will be stable or improve  Outcome: Progressing  Q4 neuro checks       Patient is not progressing towards the following goals:

## 2022-12-17 NOTE — PROGRESS NOTES
Critical Care Progress Note    Date of admission  12/15/2022    Chief Complaint  Altered mental status    Hospital Course  Poonam Mayo is a 62-year-old female with PMH significant for uncontrolled DM 1 (most recent A1c 11.6), HTN, HLD, tobacco dependence, hypothyroidism, methamphetamine abuse, and questionable medication compliance who presented to the emergency department via EMS on 12/15 after being found down with the vacuum running.  Blood glucose was 32.  She received 250 cc of D10 with repeat blood sugar 38.  Upon arrival to the emergency department her blood sugar was in the 200s.  She was intubated for airway protection, started on D10 infusion, and admitted to the ICU for close hemodynamic monitoring and ventilator management.    12/16 - hypoglycemia improving, weaning D10. Starting TF's. MRI brain pending    Interval Problem Update  No acute events overnight  Blood sugars now 200-300's  No sedation. Does not open eyes, does not follow commands. (+) cough, (+) gag. PERRLA  110's Sinus tach  SBP's 90's  Tmax 99  NG with TF's at goal - BM PTA.  CS's 200-300's - adding Lantus, adjusted SSI  Catalan, PIV's  Vent day #3 APVC 22/350/8/30%  SAT/SBT: yes/yes  I/O: 3100/2000  Pepcid, Lovenox  Mobility 1    Review of Systems  Review of Systems   Unable to perform ROS: Intubated      Vital Signs for last 24 hours   Temp:  [37.2 °C (99 °F)-37.4 °C (99.3 °F)] 37.3 °C (99.2 °F)  Pulse:  [106-132] 113  Resp:  [18-35] 30  BP: ()/(62-76) 101/70  SpO2:  [88 %-100 %] 97 %    Hemodynamic parameters for last 24 hours       Respiratory Information for the last 24 hours  Vent Mode: APVCMV  Rate (breaths/min): 18  Vt Target (mL): 350  PEEP/CPAP: 8  P Support: 5  MAP: 11  Length of Weaning Trial (Hours): 1  Control VTE (exp VT): 358    Physical Exam   Physical Exam  Vitals and nursing note reviewed.   Constitutional:       Appearance: She is cachectic. She is ill-appearing. She is not toxic-appearing.      Interventions: She is  intubated.   HENT:      Head: Normocephalic.      Nose: Nose normal.      Mouth/Throat:      Lips: Pink.      Mouth: Mucous membranes are moist.   Eyes:      Pupils: Pupils are equal, round, and reactive to light.      Comments: Sluggish   Cardiovascular:      Rate and Rhythm: Tachycardia present.      Pulses: Normal pulses.      Heart sounds: Normal heart sounds.   Pulmonary:      Effort: Pulmonary effort is normal. She is intubated.      Breath sounds: Normal breath sounds. No wheezing.   Abdominal:      General: Bowel sounds are decreased.      Palpations: Abdomen is soft.   Musculoskeletal:      Comments: Does not follow commands  Not moving any extremities spontaneously   Skin:     General: Skin is warm and dry.      Capillary Refill: Capillary refill takes less than 2 seconds.   Neurological:      GCS: GCS eye subscore is 1. GCS verbal subscore is 1. GCS motor subscore is 4.      Comments: Intubated   Psychiatric:      Comments: Intubated       Medications  Current Facility-Administered Medications   Medication Dose Route Frequency Provider Last Rate Last Admin    insulin regular (HumuLIN R,NovoLIN R) injection  3-13 Units Subcutaneous Q6HRS Jazmine Pike, A.P.R.N.   9 Units at 12/17/22 1104    And    dextrose 10 % BOLUS 25 g  25 g Intravenous Q15 MIN PRN Jazmine Pike, A.P.R.N.        insulin GLARGINE (Lantus,Semglee) injection  20 Units Subcutaneous BID Jazmine Pike, A.P.R.N.   20 Units at 12/17/22 0756    acetaminophen (Tylenol) tablet 650 mg  650 mg Enteral Tube Q6HRS PRN Jazmine Pike, A.P.R.N.        Respiratory Therapy Consult   Nebulization Continuous RT Ace Pool M.D.        famotidine (PEPCID) tablet 20 mg  20 mg Enteral Tube Q12HRS Ace Pool M.D.   20 mg at 12/17/22 0519    Or    famotidine (PEPCID) injection 20 mg  20 mg Intravenous Q12HRS Ace Polo M.D.   20 mg at 12/16/22 0511    MD Alert...ICU Electrolyte Replacement per Pharmacy   Other PHARMACY TO DOSE Ace Pool  M.D.        lidocaine (XYLOCAINE) 1 % injection 2 mL  2 mL Tracheal Tube Q30 MIN PRN Ace Pool M.D.        ipratropium-albuterol (DUONEB) nebulizer solution  3 mL Nebulization Q2HRS PRN (RT) Ace Pool M.D.        enoxaparin (Lovenox) inj 40 mg  40 mg Subcutaneous DAILY AT 1800 Jazmine Pike, A.P.R.N.   40 mg at 12/16/22 1703    Pharmacy Consult: Enteral tube insertion - review meds/change route/product selection  1 Each Other PHARMACY TO DOSE Jazmine Pike, A.P.R.N.        senna-docusate (PERICOLACE or SENOKOT S) 8.6-50 MG per tablet 2 Tablet  2 Tablet Enteral Tube BID Jazmine Pike, A.P.R.N.   2 Tablet at 12/17/22 0519    And    polyethylene glycol/lytes (MIRALAX) PACKET 1 Packet  1 Packet Enteral Tube QDAY PRN aJzmine Pike, A.P.R.N.        And    magnesium hydroxide (MILK OF MAGNESIA) suspension 30 mL  30 mL Enteral Tube QDAY PRN Jazmine Pike, A.P.R.N.        And    bisacodyl (DULCOLAX) suppository 10 mg  10 mg Rectal QDAY PRN Jazmine Pike, A.P.R.N.           Fluids    Intake/Output Summary (Last 24 hours) at 12/17/2022 1206  Last data filed at 12/17/2022 0600  Gross per 24 hour   Intake 2401.41 ml   Output 1665 ml   Net 736.41 ml       Laboratory  Recent Labs     12/15/22  2117 12/16/22  0444 12/17/22  0354   ISTATAPH 7.364* 7.517* 7.436   ISTATAPCO2 52.6* 33.1 35.0   ISTATAPO2 115* 132* 78   ISTATATCO2 32 28 25   RCLJMSC1RLZ 98 99 96   ISTATARTHCO3 30.0* 26.9* 23.6   ISTATARTBE 3 4* 0   ISTATTEMP see below 99.7 F 101.3 F   ISTATFIO2 100 50 40   ISTATSPEC Arterial Arterial Arterial   ISTATAPHTC  --  7.508* 7.414   [id number]  --  136* 87     Recent Labs     12/16/22  0000   CPKTOTAL 197*     Recent Labs     12/15/22  1924 12/16/22  0000 12/16/22 0410 12/17/22 0415   SODIUM 133* 130* 132* 132*   POTASSIUM 3.3* 3.7 3.9 4.2   CHLORIDE 96 97 96 98   CO2 26 22 27 23   BUN 16 17 15 15   CREATININE 0.81 0.62 0.57 0.78   MAGNESIUM 2.0  --  2.2 2.0   PHOSPHORUS  --   --  3.1 2.9   CALCIUM 8.3* 8.2*  8.3* 8.4*     Recent Labs     12/15/22  1924 12/16/22  0000 12/16/22 0410 12/17/22 0415   ALTSGPT 17  --  15 21   ASTSGOT 22  --  25 48*   ALKPHOSPHAT 95  --  85 95   TBILIRUBIN 0.2  --  0.6 0.7   PREALBUMIN  --   --   --  15.7*   GLUCOSE 460* 122* 80 341*     Recent Labs     12/15/22  1924 12/16/22  0410 12/17/22 0415   WBC 7.7 11.8* 13.2*   NEUTSPOLYS 68.50  --   --    LYMPHOCYTES 22.90  --   --    MONOCYTES 7.10  --   --    EOSINOPHILS 0.50  --   --    BASOPHILS 0.60  --   --    ASTSGOT 22 25 48*   ALTSGPT 17 15 21   ALKPHOSPHAT 95 85 95   TBILIRUBIN 0.2 0.6 0.7     Recent Labs     12/15/22  1924 12/16/22  0410 12/17/22  0415   RBC 4.84 4.39 4.44   HEMOGLOBIN 14.7 13.3 13.7   HEMATOCRIT 45.1 39.9 40.9   PLATELETCT 436 341 313       Imaging  No new imaging today    Assessment/Plan  * Altered mental status- (present on admission)  Assessment & Plan  -Initial event likely hypoglycemia.  Unknown downtime.  Possibly as long as 4 hours.  Anoxic brain injury in the differential  -History of poorly controlled DM and ongoing methamphetamine use  -Continuous EEG captured no seizures - stopped Keppra  -Judicious use of narcotics, sedation/benzos  -Fall precautions, aspiration precautions  -MRI pending    Methamphetamine abuse (HCC)- (present on admission)  Assessment & Plan  Cessation counseling when appropriate    Protein calorie malnutrition (HCC)- (present on admission)  Assessment & Plan  Body mass index is 17.06 kg/m².  -dietary consult    Hypoglycemia- (present on admission)  Assessment & Plan  -resolved    Dyslipidemia- (present on admission)  Assessment & Plan  -Continue statin    Cigarette nicotine dependence without complication- (present on admission)  Assessment & Plan  -Cessation education and counseling when clinically appropriate    Hypothyroidism due to Hashimoto's thyroiditis- (present on admission)  Assessment & Plan  -TSH WNL  -home levothyroxine    Hypertension- (present on admission)  Assessment &  Plan  -History of, unknown compliance with medications  -As needed IV antihypertensives with parameters    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 A1c in 4/2022 was 11.6%  -presented with severe hypoglycemia, now resolved  -accuchecks Q6h with SSI  -Lantus  -Hypoglycemia protocol       VTE:  Lovenox  Ulcer: H2 Antagonist  Lines: Catalan Catheter  Ongoing indication addressed    I have performed a physical exam and reviewed and updated ROS and Plan today (12/17/2022). In review of yesterday's note (12/16/2022), there are no changes except as documented above.     Discussed patient condition and risk of morbidity and/or mortality with Family, RN, RT, Pharmacy, and my attending Dr. Franco  The patient remains critically ill.  Critical care time = 39 minutes in directly providing and coordinating critical care and extensive data review.  No time overlap and excludes procedures.    Please note that this dictation was created using voice recognition software. I have made every reasonable attempt to correct obvious errors, but there may be errors of grammar and possibly content that I did not discover before finalizing the note.    TATA Neumann.

## 2022-12-18 PROBLEM — R40.20 COMA (HCC): Status: ACTIVE | Noted: 2022-01-01

## 2022-12-18 PROBLEM — E87.8 ELECTROLYTE ABNORMALITY: Status: ACTIVE | Noted: 2022-01-01

## 2022-12-18 PROBLEM — R41.82 ALTERED MENTAL STATUS: Status: RESOLVED | Noted: 2022-01-01 | Resolved: 2022-01-01

## 2022-12-18 PROBLEM — J96.01 ACUTE RESPIRATORY FAILURE WITH HYPOXIA AND HYPERCAPNIA (HCC): Status: ACTIVE | Noted: 2022-01-01

## 2022-12-18 PROBLEM — J96.02 ACUTE RESPIRATORY FAILURE WITH HYPOXIA AND HYPERCAPNIA (HCC): Status: ACTIVE | Noted: 2022-01-01

## 2022-12-18 NOTE — WOUND TEAM
In to see pt for R hand. Both hands are red with some edema. Per RN it is decreased from admit. Pt had been found down and bilateral hands are red with same discoloration to anterior knees. All areas are hyperemic and blanching at this time. Unsure if areas will deteriorate, so nursing to continue to monitor. All areas are ERIN. No advanced wound care needs at this time, please consult if condition changes.

## 2022-12-18 NOTE — PROGRESS NOTES
Critical Care Progress Note    Date of admission  12/15/2022    Chief Complaint  62 y.o. female admitted 12/15/2022 with altered mental status    Hospital Course  Poonam Mayo is a 62-year-old female with PMH significant for uncontrolled DM 1 (most recent A1c 11.6), HTN, HLD, tobacco dependence, hypothyroidism, methamphetamine abuse, and questionable medication compliance who presented to the emergency department via EMS on 12/15 after being found down with the vacuum running.  Blood glucose was 32.  She received 250 cc of D10 with repeat blood sugar 38.  Upon arrival to the emergency department her blood sugar was in the 200s.  She was intubated for airway protection, started on D10 infusion, and admitted to the ICU for close hemodynamic monitoring and ventilator management.    12/16 - hypoglycemia improving, weaning D10. Starting TF's. MRI brain pending    Interval Problem Update  Reviewed last 24 hour events:    -T max 100.6  --Vent day 4-CMV 18/320/8 plus/30%  Spontaneous;y breathing however unable to FC to assess for extubation parameters   -Does not follow   -Electrolyte replace ment-magnesium    Review of Systems  Review of Systems   Unable to perform ROS: Acuity of condition      Vital Signs for last 24 hours   Temp:  [37.6 °C (99.6 °F)-38.1 °C (100.6 °F)] 37.7 °C (99.8 °F)  Pulse:  [] 98  Resp:  [11-37] 18  BP: ()/(55-82) 112/60  SpO2:  [91 %-100 %] 98 %    Hemodynamic parameters for last 24 hours       Respiratory Information for the last 24 hours  Vent Mode: APVCMV  Rate (breaths/min): 18  Vt Target (mL): 350  PEEP/CPAP: 8  P Support: 5  MAP: 12  Length of Weaning Trial (Hours): 0  Control VTE (exp VT): 342    Physical Exam   Physical Exam  Vitals and nursing note reviewed.   Constitutional:       Interventions: She is intubated.   HENT:      Head: Normocephalic and atraumatic.   Neck:      Trachea: Trachea normal.   Cardiovascular:      Pulses:           Radial pulses are 2+ on the right side  and 2+ on the left side.        Dorsalis pedis pulses are 1+ on the right side and 1+ on the left side.      Heart sounds: Heart sounds are distant.   Pulmonary:      Effort: She is intubated.      Comments: Intubated with decreased lung sounds bilateral bases  Abdominal:      General: Abdomen is flat. Bowel sounds are normal.      Palpations: Abdomen is soft.   Genitourinary:     Comments: Catalan catheter to down drain-clear yellow urine  Skin:     General: Skin is warm.      Capillary Refill: Capillary refill takes 2 to 3 seconds.   Neurological:      Comments: OSCAR 3-4 mm sluggish  Slight movement and withdraw to painful stimuli in all extremities  Spontaneously opens eyes  Positive gag, positive cough  Intubated   Psychiatric:      Comments: Unresponsive       Medications  Current Facility-Administered Medications   Medication Dose Route Frequency Provider Last Rate Last Admin    insulin regular (HumuLIN R,NovoLIN R) injection  3-13 Units Subcutaneous Q6HRS Jazmine Aparicioer, A.P.R.N.   5 Units at 12/17/22 1718    And    dextrose 10 % BOLUS 25 g  25 g Intravenous Q15 MIN PRN Jazmine Pike, A.P.R.N.        insulin GLARGINE (Lantus,Semglee) injection  20 Units Subcutaneous BID Jazmine Aparicioer, A.P.R.N.   20 Units at 12/18/22 0713    acetaminophen (Tylenol) tablet 650 mg  650 mg Enteral Tube Q6HRS PRN Jazmine Aparicioer, A.P.R.N.   650 mg at 12/17/22 2341    Respiratory Therapy Consult   Nebulization Continuous RT Ace Pool M.D.        famotidine (PEPCID) tablet 20 mg  20 mg Enteral Tube Q12HRS Ace Pool M.D.   20 mg at 12/18/22 0557    MD Alert...ICU Electrolyte Replacement per Pharmacy   Other PHARMACY TO DOSE Ace Pool M.D.        lidocaine (XYLOCAINE) 1 % injection 2 mL  2 mL Tracheal Tube Q30 MIN PRN Ace Pool M.D.        ipratropium-albuterol (DUONEB) nebulizer solution  3 mL Nebulization Q2HRS PRN (RT) Ace Pool M.D.        enoxaparin (Lovenox) inj 40 mg  40 mg Subcutaneous DAILY AT  1800 Jazmine Pike, A.P.R.N.   40 mg at 12/17/22 1708    Pharmacy Consult: Enteral tube insertion - review meds/change route/product selection  1 Each Other PHARMACY TO DOSE Jazmine Pike A.P.R.N.        senna-docusate (PERICOLACE or SENOKOT S) 8.6-50 MG per tablet 2 Tablet  2 Tablet Enteral Tube BID Jazmine Pike A.P.R.N.   2 Tablet at 12/18/22 0557    And    polyethylene glycol/lytes (MIRALAX) PACKET 1 Packet  1 Packet Enteral Tube QDAY PRN Jazmine Pike A.P.R.N.   1 Packet at 12/17/22 1512    And    magnesium hydroxide (MILK OF MAGNESIA) suspension 30 mL  30 mL Enteral Tube QDAY PRN Jazmnie Pike, A.P.R.N.        And    bisacodyl (DULCOLAX) suppository 10 mg  10 mg Rectal QDAY PRN Jazmine Pike, A.P.R.N.           Fluids    Intake/Output Summary (Last 24 hours) at 12/18/2022 1137  Last data filed at 12/18/2022 1000  Gross per 24 hour   Intake 2726.12 ml   Output 915 ml   Net 1811.12 ml       Laboratory  Recent Labs     12/15/22  2117 12/16/22  0444 12/17/22  0354   ISTATAPH 7.364* 7.517* 7.436   ISTATAPCO2 52.6* 33.1 35.0   ISTATAPO2 115* 132* 78   ISTATATCO2 32 28 25   REFMFQI0OTU 98 99 96   ISTATARTHCO3 30.0* 26.9* 23.6   ISTATARTBE 3 4* 0   ISTATTEMP see below 99.7 F 101.3 F   ISTATFIO2 100 50 40   ISTATSPEC Arterial Arterial Arterial   ISTATAPHTC  --  7.508* 7.414   RVBYKGTC5XL  --  136* 87     Recent Labs     12/16/22  0000   CPKTOTAL 197*     Recent Labs     12/16/22  0410 12/17/22  0415 12/18/22  0545   SODIUM 132* 132* 135   POTASSIUM 3.9 4.2 3.7   CHLORIDE 96 98 99   CO2 27 23 27   BUN 15 15 22   CREATININE 0.57 0.78 0.63   MAGNESIUM 2.2 2.0 1.9   PHOSPHORUS 3.1 2.9 2.9   CALCIUM 8.3* 8.4* 8.6     Recent Labs     12/16/22  0410 12/17/22  0415 12/18/22  0545   ALTSGPT 15 21 47   ASTSGOT 25 48* 44   ALKPHOSPHAT 85 95 116*   TBILIRUBIN 0.6 0.7 0.6   PREALBUMIN  --  15.7*  --    GLUCOSE 80 341* 131*     Recent Labs     12/15/22  1924 12/16/22  0410 12/17/22  0415 12/18/22  0545   WBC 7.7 11.8* 13.2*  10.7   NEUTSPOLYS 68.50  --   --   --    LYMPHOCYTES 22.90  --   --   --    MONOCYTES 7.10  --   --   --    EOSINOPHILS 0.50  --   --   --    BASOPHILS 0.60  --   --   --    ASTSGOT 22 25 48* 44   ALTSGPT 17 15 21 47   ALKPHOSPHAT 95 85 95 116*   TBILIRUBIN 0.2 0.6 0.7 0.6     Recent Labs     12/16/22  0410 12/17/22  0415 12/18/22  0545   RBC 4.39 4.44 3.50*   HEMOGLOBIN 13.3 13.7 10.9*   HEMATOCRIT 39.9 40.9 32.8*   PLATELETCT 341 313 283       Imaging  X-Ray:  I have personally reviewed the images and compared with prior images.  EKG:  I have personally reviewed the images and compared with prior images.  CT:    Reviewed    Assessment/Plan  * Coma (HCC)  Assessment & Plan   Initial event likely due to hypoglycemia; unknown downtime but possibly as long as 4 hours with a blood sugar of 35  -History of methamphetamine use poorly controlled diabetes melitis  -R/O anoxic brain injury  -Continuous EEG did not capture any seizure activity therefore Keppra was DC'd  -Judicious use of narcotics, sedation  -Fall precautions, aspiration precautions keep  HOB greater than 30 degrees  -MRI of head showed possible tiny acute infarcts to the right frontal lobe periventricular white matter; parenchymal atrophy; no other specific findings noted      Acute respiratory failure with hypercapnia (HCC)  Assessment & Plan  Intubated 10/4 for airway protection for GCS of 3  -Titrate FiO2 to keep sats greater than 90%   -Titrate ventilator prescription to optimize acid-base balance, oxygenation and ventilation  -HOB > 30  -daily SBT  -Early mobility, ABCDEF bundle  -DVT prophylaxis; chemical VTE  -Pepcid, chlorhexidine    Electrolyte abnormality  Assessment & Plan  -hypomagnesemia  -replaced magnesium  -Daily CMP, mag, phos- replace as indicated     Methamphetamine abuse (HCC)- (present on admission)  Assessment & Plan  Cessation counseling when appropriate    Protein calorie malnutrition (HCC)- (present on admission)  Assessment &  Plan  Body mass index is 17.06 kg/m².  -dietary consult    Hypoglycemia- (present on admission)  Assessment & Plan  -resolved    Dyslipidemia- (present on admission)  Assessment & Plan  -Continue statin    Cigarette nicotine dependence without complication- (present on admission)  Assessment & Plan  -Cessation education and counseling when clinically appropriate    Hypothyroidism due to Hashimoto's thyroiditis- (present on admission)  Assessment & Plan  -TSH WNL  -home levothyroxine    Hypertension- (present on admission)  Assessment & Plan  -History of, unknown compliance with medications  -As needed IV antihypertensives with parameters    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 A1c in 4/2022 was 11.6%  -presented with severe hypoglycemia, now resolved  -accuchecks Q6h with SSI  -Lantus  -Hypoglycemia protocol       VTE:  Lovenox  Ulcer: H2 Antagonist  Lines: Catalan Catheter  Ongoing indication addressed    I have performed a physical exam and reviewed and updated ROS and Plan today (12/18/2022). In review of yesterday's note (12/17/2022), there are no changes except as documented above.     Discussed patient condition and risk of morbidity and/or mortality with RN, RT, Therapies, Pharmacy, Charge nurse / hot rounds, and Patient  The patient remains critically ill.  Critical care time =45 minutes in directly providing and coordinating critical care and extensive data review.  No time overlap and excludes procedures.

## 2022-12-18 NOTE — ASSESSMENT & PLAN NOTE
Initial event likely due to hypoglycemia; unknown downtime but possibly as long as 4 hours with a blood sugar of 35  History of methamphetamine use poorly controlled diabetes melitus  cEEG captured no seizures, pattern of encephalopathy.  Keppra discontinued  Fall precautions, aspiration precautions keep  HOB greater than 30 degrees  MRI of head showed possible tiny acute infarcts to the right frontal lobe periventricular white matter; parenchymal atrophy; no other specific findings noted  TSH was normal.  Ammonia levels were within normal limits.  Serial neurologic exams-has already plateau, patient does not track, does not follow commands and intermittently withdraw with pain. Per daughter the interactions are consistent now, the encephalopathy is more clear now that it is chronic and not acute and multifactorial as above.  LTAC transfer pending bed

## 2022-12-18 NOTE — CARE PLAN
Problem: Ventilation  Goal: Ability to achieve and maintain unassisted ventilation or tolerate decreased levels of ventilator support  Description: Target End Date:  4 days     Document on Vent flowsheet    1.  Support and monitor invasive and noninvasive mechanical ventilation  2.  Monitor ventilator weaning response  3.  Perform ventilator associated pneumonia prevention interventions  4.  Manage ventilation therapy by monitoring diagnostic test results  Note:                                   Ventilator Daily Summary     Vent Day #4  CMV 18/320/8+/30%     Ventilator settings changed this shift: none     Weaning trials:Yes     Respiratory Procedures: none     Plan: Continue current ventilator settings and wean mechanical ventilation as tolerated per physician orders.

## 2022-12-18 NOTE — CARE PLAN
The patient is Watcher - Medium risk of patient condition declining or worsening    Shift Goals  Clinical Goals: Increase wakefullness, stable vitals and blood sugar  Patient Goals: erlinda  Family Goals: erlinda    Progress made toward(s) clinical / shift goals:    Problem: Skin Integrity  Goal: Skin integrity is maintained or improved  Outcome: Progressing  Note: Patient turned every 2 hours and pillows used for reposition foam elbow and heel protection in place to protected.      Problem: Bowel Elimination  Goal: Establish and maintain regular bowel function  Outcome: Progressing  Note: Continued order bowel protocol. Helped encourage BM and assisted with digital dislodgement of school.

## 2022-12-18 NOTE — ASSESSMENT & PLAN NOTE
Intubated 10/4 for airway protection for GCS of 3  Extubated 12/- failed extubation after approx 2 hrs due to tachypnea, difficulty maintaining patent airway  HOB > 30  Early mobility, ABCDEF bundle  DVT prophylaxis; chemical VTE  Pepcid, chlorhexidine  s/p trach 1/1/23, routine care of tracheostomy  GI placed a PEG tube on 1/6/23  Continue T piece trials 1/7/23

## 2022-12-18 NOTE — CONSULTS
Referral # [id number]    Please call [phone] select 1, then select 2, and use the last 5 digits of the referral number to contact the on-call coordinator with any updates.     Date: 12/18/22    Thank you for the referral of this patient. A chart review has been completed to determine suitability for organ and tissue donation.    *Donation is an option:  -Upon meeting the criteria for brain death this patient will be a potential candidate for organ donation, upon further evaluation.  -This patient may meet the criteria for DCD (donation after circulatory death). Further evaluation will be needed should the family decide to transition to comfort care.    *[facility] will continue to follow this case.  -Guidelines for the management of a potential organ donor have been provided for use at the physician's discretion.  -Please contact the clinical coordinator with any changes in the patient's neurological or hemodynamic status, family questions/concerns/decisions, or changes in plan of care.  -Organ donation should not be mentioned to the family until the physician has discussed the patient's diagnosis and grave prognosis. Discussion of organ donation should then be a planned, one-time coordinated event in collaboration with [facility].     Thank you for your continued support of organ and tissue donation.

## 2022-12-18 NOTE — CARE PLAN
Problem: Ventilation  Goal: Ability to achieve and maintain unassisted ventilation or tolerate decreased levels of ventilator support  Description: Target End Date:  4 days     Document on Vent flowsheet    1.  Support and monitor invasive and noninvasive mechanical ventilation  2.  Monitor ventilator weaning response  3.  Perform ventilator associated pneumonia prevention interventions  4.  Manage ventilation therapy by monitoring diagnostic test results  Note:    Ventilator Daily Summary    Vent Day #4  7.5 @ 21   18/320/8+/30%    Ventilator settings changed this shift:none    Weaning trials: SBT x2 for a total of two hours does not follow for parameters    Respiratory Procedures: none    Plan: Continue current ventilator settings and wean mechanical ventilation as tolerated per physician orders.

## 2022-12-19 PROBLEM — A49.01 MSSA INFECTION, NON-INVASIVE: Status: ACTIVE | Noted: 2022-01-01

## 2022-12-19 NOTE — CARE PLAN
The patient is Watcher - Medium risk of patient condition declining or worsening    Shift Goals  Clinical Goals: Increase wakefullness, stable vitals and blood sugar  Patient Goals: erlinda  Family Goals: erlinda    Progress made toward(s) clinical / shift goals:   - Neuro assessment not improving - does not open eyes to pain, does not follow commands, withdraws to pain, uppers seem to extend during painful stimuli.    - This AM pt had an episode after mobilization where left arm became very stiff and bent at the elbow in an over 90 degree angle, very difficult to get arm to relax - Dr Franco and India CROWELL aware.  - Vitals remain stable HR    Patient is not progressing towards the following goals:      Problem: Skin Integrity  Goal: Skin integrity is maintained or improved  Outcome: Not Progressing  - Right hand is not as red/edematous as it was yesterday. Continuing to elevate with pillows.    - Despite mepilex, Q2 hour turns, and low airloss mattress pt has a red and barely blanching spot on coccyx.  Will continue to turn Q2 hours, mepilex replaced with allevyn dressing.     Problem: Psychosocial  Goal: Patient's ability to verbalize feelings about condition will improve  Outcome: Not Progressing  - Pt remains obtunded  Goal: Patient's ability to re-evaluate and adapt role responsibilities will improve  Outcome: Not Progressing  - Pt remains obtunded  Goal: Patient and family will demonstrate ability to cope with life altering diagnosis and/or procedure  Outcome: Not Progressing  - Pt remains obtunded.     Problem: Hemodynamics  Goal: Patient's hemodynamics, fluid balance and neurologic status will be stable or improve  Outcome: Not Progressing  - Neurologic status is not improving.  Hemodynamics are stable.     Problem: Mechanical Ventilation  Goal: Successful weaning off mechanical ventilator, spontaneously maintains adequate gas exchange  Outcome: Not Progressing  - Pt SBT'ing but does not follow commands to get  parameters.  Having moderate amount of thick tan/yellow secretions with some mucous plugs.  Goal: Patient will be able to express needs and understand communication  Outcome: Not Progressing  - Pt remains obtunded.

## 2022-12-19 NOTE — PROGRESS NOTES
Critical Care Progress Note    Date of admission  12/15/2022    Chief Complaint  62 y.o. female admitted 12/15/2022 with altered mental status    Hospital Course  Poonam Mayo is a 62-year-old female with PMH significant for uncontrolled DM 1 (most recent A1c 11.6), HTN, HLD, tobacco dependence, hypothyroidism, methamphetamine abuse, and questionable medication compliance who presented to the emergency department via EMS on 12/15 after being found down with the vacuum running.  Blood glucose was 32.  She received 250 cc of D10 with repeat blood sugar 38.  Upon arrival to the emergency department her blood sugar was in the 200s.  She was intubated for airway protection, started on D10 infusion, and admitted to the ICU for close hemodynamic monitoring and ventilator management.    12/16 - hypoglycemia improving, weaning D10. Starting TF's. MRI brain pending  12/1805-vyozbr-kh MRI with possible small acute infarct to right frontal lobe;no other acute intracranial abnormality noted-patient slightly more responsive    Interval Problem Update  Reviewed last 24 hour events:    -T max 100.4  -Vent day 5-APVC 12/350/8   -Spontaneous;y breathing however unable to follow commands to assess extubation parameters   -Electrolyte replacement-magnesium  -TF at goal  -UO - 20-30cc hr  -MSSA positive sputum therefore start Ancef    Review of Systems  Review of Systems   Unable to perform ROS: Acuity of condition      Vital Signs for last 24 hours   Pulse:  [] 90  Resp:  [18-27] 27  BP: (106-151)/(59-79) 134/67  SpO2:  [93 %-100 %] 100 %    Hemodynamic parameters for last 24 hours       Respiratory Information for the last 24 hours  Vent Mode: Spont  Rate (breaths/min): 12  Vt Target (mL): 350  PEEP/CPAP: 8  P Support: 5  MAP: 11  Control VTE (exp VT): 341    Physical Exam   Physical Exam  Vitals and nursing note reviewed.   Constitutional:       Interventions: She is intubated.   HENT:      Head: Normocephalic and atraumatic.       Mouth/Throat:      Mouth: Mucous membranes are moist.   Neck:      Trachea: Trachea normal.   Cardiovascular:      Pulses:           Radial pulses are 2+ on the right side and 2+ on the left side.        Dorsalis pedis pulses are 1+ on the right side and 1+ on the left side.      Heart sounds: Heart sounds are distant.   Pulmonary:      Effort: She is intubated.      Breath sounds: Examination of the right-lower field reveals rhonchi. Examination of the left-lower field reveals rhonchi. Rhonchi present.      Comments: Intubated with decreased lung sounds bilateral bases  Abdominal:      General: Abdomen is flat. Bowel sounds are normal.      Palpations: Abdomen is soft.   Genitourinary:     Comments: Catalan catheter to down drain-clear yellow urine  Skin:     General: Skin is warm.      Capillary Refill: Capillary refill takes 2 to 3 seconds.   Neurological:      Comments: OSCAR 3 mm sluggish   Slight movement withdrawal to painful stimuli in all extremities-slightly increased movement to left upper extremity  Eyes open to painful stimuli  Intubated  + cough and gag reflexes present      Psychiatric:      Comments: Unresponsive       Medications  Current Facility-Administered Medications   Medication Dose Route Frequency Provider Last Rate Last Admin    ceFAZolin (Ancef) 2 g in  mL IVPB  2 g Intravenous Q8HRS India Khanna A.P.R.N.   Stopped at 12/19/22 1145    insulin regular (HumuLIN R,NovoLIN R) injection  3-13 Units Subcutaneous Q6HRS Jazmine Pike, A.P.R.N.   5 Units at 12/18/22 1202    And    dextrose 10 % BOLUS 25 g  25 g Intravenous Q15 MIN PRN Jazmine Pike, A.P.R.N.        insulin GLARGINE (Lantus,Semglee) injection  20 Units Subcutaneous BID Jazmine Pike, A.P.R.N.   20 Units at 12/19/22 0530    acetaminophen (Tylenol) tablet 650 mg  650 mg Enteral Tube Q6HRS PRN Jazmine Pike, A.P.R.N.   650 mg at 12/17/22 2341    Respiratory Therapy Consult   Nebulization Continuous RT Ace Pool M.D.         famotidine (PEPCID) tablet 20 mg  20 mg Enteral Tube Q12HRS Ace Pool M.D.   20 mg at 12/19/22 0515    MD Alert...ICU Electrolyte Replacement per Pharmacy   Other PHARMACY TO DOSE Ace Pool M.D.        lidocaine (XYLOCAINE) 1 % injection 2 mL  2 mL Tracheal Tube Q30 MIN PRN Ace Pool M.D.        ipratropium-albuterol (DUONEB) nebulizer solution  3 mL Nebulization Q2HRS PRN (RT) Ace Pool M.D.        enoxaparin (Lovenox) inj 40 mg  40 mg Subcutaneous DAILY AT 1800 Jazmine Pike, A.P.R.N.   40 mg at 12/18/22 1720    Pharmacy Consult: Enteral tube insertion - review meds/change route/product selection  1 Each Other PHARMACY TO DOSE Jazmine Pike, A.P.R.N.        senna-docusate (PERICOLACE or SENOKOT S) 8.6-50 MG per tablet 2 Tablet  2 Tablet Enteral Tube BID Jazmine Pike, A.P.R.N.   2 Tablet at 12/18/22 0557    And    polyethylene glycol/lytes (MIRALAX) PACKET 1 Packet  1 Packet Enteral Tube QDAY PRN Jazmine Pike A.P.R.N.   1 Packet at 12/17/22 1512    And    magnesium hydroxide (MILK OF MAGNESIA) suspension 30 mL  30 mL Enteral Tube QDAY PRN Jazmine Pike, A.P.R.N.        And    bisacodyl (DULCOLAX) suppository 10 mg  10 mg Rectal QDAY PRN Jazmine Pike, A.P.R.N.           Fluids    Intake/Output Summary (Last 24 hours) at 12/19/2022 1615  Last data filed at 12/19/2022 1400  Gross per 24 hour   Intake 1339.62 ml   Output 950 ml   Net 389.62 ml       Laboratory  Recent Labs     12/17/22  0354 12/19/22  0447   ISTATAPH 7.436 7.542*   ISTATAPCO2 35.0 35.2   ISTATAPO2 78 86   ISTATATCO2 25 31   [id number] 96 98   ISTATARTHCO3 23.6 30.2*   ISTATARTBE 0 8*   ISTATTEMP 101.3 F 99.0 F   ISTATFIO2 40 30   ISTATSPEC Arterial Arterial   ISTATAPHTC 7.414 7.539*   [id number] 87 87           Recent Labs     12/17/22  0415 12/18/22  0545 12/19/22  0420   SODIUM 132* 135 137   POTASSIUM 4.2 3.7 3.7   CHLORIDE 98 99 101   CO2 23 27 29   BUN 15 22 20   CREATININE 0.78 0.63 0.59   MAGNESIUM 2.0 1.9 2.0    PHOSPHORUS 2.9 2.9 3.1   CALCIUM 8.4* 8.6 8.8     Recent Labs     12/17/22  0415 12/18/22  0545 12/19/22  0420   ALTSGPT 21 47 29   ASTSGOT 48* 44 19   ALKPHOSPHAT 95 116* 112*   TBILIRUBIN 0.7 0.6 0.5   PREALBUMIN 15.7*  --  10.8*   GLUCOSE 341* 131* 113*     Recent Labs     12/17/22  0415 12/18/22  0545 12/19/22  0420   WBC 13.2* 10.7 8.3   ASTSGOT 48* 44 19   ALTSGPT 21 47 29   ALKPHOSPHAT 95 116* 112*   TBILIRUBIN 0.7 0.6 0.5     Recent Labs     12/17/22  0415 12/18/22  0545 12/19/22  0420   RBC 4.44 3.50* 3.50*   HEMOGLOBIN 13.7 10.9* 10.5*   HEMATOCRIT 40.9 32.8* 32.6*   PLATELETCT 313 283 242       Imaging  X-Ray:  I have personally reviewed the images and compared with prior images.  EKG:  I have personally reviewed the images and compared with prior images.  CT:    Reviewed    Assessment/Plan  * Coma (HCC)  Assessment & Plan   Initial event likely due to hypoglycemia; unknown downtime but possibly as long as 4 hours with a blood sugar of 35  -History of methamphetamine use poorly controlled diabetes melitis  -R/O anoxic brain injury  -Continuous EEG did not capture any seizure activity therefore Keppra was DC'd  -Judicious use of narcotics, sedation  -Fall precautions, aspiration precautions keep  HOB greater than 30 degrees  -MRI of head showed possible tiny acute infarcts to the right frontal lobe periventricular white matter; parenchymal atrophy; no other specific findings noted      Acute respiratory failure with hypercapnia (HCC)  Assessment & Plan  Intubated 10/4 for airway protection for GCS of 3  -Titrate FiO2 to keep sats greater than 90%   -Titrate ventilator prescription to optimize acid-base balance, oxygenation and ventilation  -HOB > 30  -daily SBT  -Early mobility, ABCDEF bundle  -DVT prophylaxis; chemical VTE  -Pepcid, chlorhexidine    MSSA infection, non-invasive  Assessment & Plan  Tracheal aspirate + MSSA  Will start Ancef    Electrolyte abnormality  Assessment &  Plan  -hypomagnesemia  -replaced magnesium  -Daily CMP, mag, phos- replace as indicated     Methamphetamine abuse (HCC)- (present on admission)  Assessment & Plan  Cessation counseling when appropriate    Protein calorie malnutrition (HCC)- (present on admission)  Assessment & Plan  Body mass index is 17.06 kg/m².  -dietary consult    Hypoglycemia- (present on admission)  Assessment & Plan  -resolved    Dyslipidemia- (present on admission)  Assessment & Plan  -Continue statin    Cigarette nicotine dependence without complication- (present on admission)  Assessment & Plan  -Cessation education and counseling when clinically appropriate    Hypothyroidism due to Hashimoto's thyroiditis- (present on admission)  Assessment & Plan  -TSH WNL  -home levothyroxine    Hypertension- (present on admission)  Assessment & Plan  -History of, unknown compliance with medications  -As needed IV antihypertensives with parameters    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 A1c in 4/2022 was 11.6%  -presented with severe hypoglycemia, now resolved  -accuchecks Q6h with SSI  -Lantus  -Hypoglycemia protocol       VTE:  Lovenox  Ulcer: H2 Antagonist  Lines: Catalan Catheter  Ongoing indication addressed    I have performed a physical exam and reviewed and updated ROS and Plan today (12/19/2022). In review of yesterday's note (12/18/2022), there are no changes except as documented above.     Discussed patient condition and risk of morbidity and/or mortality with RN, RT, Therapies, Pharmacy, Charge nurse / hot rounds, and Patient  The patient remains critically ill.  Critical care time =40 minutes in directly providing and coordinating critical care and extensive data review.  No time overlap and excludes procedures.

## 2022-12-19 NOTE — THERAPY
Physical Therapy Contact Note    Patient Name: Poonam Mayo  Age:  62 y.o., Sex:  female  Medical Record #: [id number]  Today's Date: 12/19/2022    PT orders received. Per RN pt obtunded with RASS -3. Will hold and re-attempt eval as appropriate.    Milton Patten PT, DPT

## 2022-12-19 NOTE — CARE PLAN
Problem: Ventilation  Goal: Ability to achieve and maintain unassisted ventilation or tolerate decreased levels of ventilator support  Description: Target End Date:  4 days     Document on Vent flowsheet    1.  Support and monitor invasive and noninvasive mechanical ventilation  2.  Monitor ventilator weaning response  3.  Perform ventilator associated pneumonia prevention interventions  4.  Manage ventilation therapy by monitoring diagnostic test results  Outcome: Progressing      Ventilator Daily Summary    Vent Day #5  APV/CMV 12/350/8/30   Ventilator settings changed this shift:    Weaning trials: SBT x2 for a total of two hours    Respiratory Procedures: None    Plan: Continue current ventilator settings and wean mechanical ventilation as tolerated per physician orders.

## 2022-12-19 NOTE — CARE PLAN
The patient is Watcher - Medium risk of patient condition declining or worsening    Shift Goals  Clinical Goals: Q4 neuro checks, Q2 turns  Patient Goals: N/A  Family Goals: Updates, patient comfort    Progress made toward(s) clinical / shift goals:    Problem: Knowledge Deficit - Standard  Goal: Patient and family/care givers will demonstrate understanding of plan of care, disease process/condition, diagnostic tests and medications  Outcome: Progressing   Family updated on plan of care, neurological assessment results and frequency, glucose management, and unit policies.   Problem: Skin Integrity  Goal: Skin integrity is maintained or improved  Outcome: Progressing   Skin assessed. No changes. Preventative measures in place.   Problem: Pain - Standard  Goal: Alleviation of pain or a reduction in pain to the patient’s comfort goal  Outcome: Progressing       Patient is not progressing towards the following goals:      Problem: Respiratory  Goal: Patient will achieve/maintain optimum respiratory ventilation and gas exchange  Outcome: Not Progressing   SBT performed. Patient baseline RASS -3 off sedation, unable to wean to extubate at this time.

## 2022-12-19 NOTE — THERAPY
Occupational Therapy Contact Note    Hold per RN, pt obtunded. Will attempt as appropriate.    Rosalinda Courtney, OTR/L

## 2022-12-20 NOTE — CARE PLAN
The patient is Watcher - Medium risk of patient condition declining or worsening    Shift Goals  Clinical Goals: Q4 neuro checks, EEG, blood sugar management  Patient Goals: N/A  Family Goals: Brush hair    Progress made toward(s) clinical / shift goals:    Problem: Knowledge Deficit - Standard  Goal: Patient and family/care givers will demonstrate understanding of plan of care, disease process/condition, diagnostic tests and medications  Outcome: Progressing   Family updated on plan of care, pending tests, and neuro exam.   Problem: Skin Integrity  Goal: Skin integrity is maintained or improved  Outcome: Progressing     Problem: Safety - Medical Restraint  Goal: Remains free of injury from restraints (Restraint for Interference with Medical Device)  Outcome: Progressing   Skin assessed under restraint. Patient not able to follow commands.   Patient is not progressing towards the following goals:      Problem: Respiratory  Goal: Patient will achieve/maintain optimum respiratory ventilation and gas exchange  Outcome: Not Progressing   SBT attempted, patient unable to follow commands to assess safety of extubation. Ancef administered for positive respiratory cultures.

## 2022-12-20 NOTE — ASSESSMENT & PLAN NOTE
-Tracheal aspirate positive MSSA  -Patient really completed a 13-day course of cefazolin on 12/31   -No indications of sepsis, hemodynamically stable off pressor  -Continue to monitor off antibiotics

## 2022-12-20 NOTE — PROGRESS NOTES
Critical Care Progress Note    Date of admission  12/15/2022    Chief Complaint  Altered mental status    Hospital Course  Poonam Mayo is a 62-year-old female with PMH significant for uncontrolled DM 1 (most recent A1c 11.6), HTN, HLD, tobacco dependence, hypothyroidism, methamphetamine abuse, and questionable medication compliance who presented to the emergency department via EMS on 12/15 after being found down with the vacuum running.  Blood glucose was 32.  She received 250 cc of D10 with repeat blood sugar 38.  Upon arrival to the emergency department her blood sugar was in the 200s.  She was intubated for airway protection, started on D10 infusion, and admitted to the ICU for close hemodynamic monitoring and ventilator management.    12/16 - hypoglycemia improving, weaning D10. Starting TF's. MRI brain pending  12/1840-fltryk-id MRI with possible small acute infarct to right frontal lobe;no other acute intracranial abnormality noted-patient slightly more responsive    Interval Problem Update  Hypoglycemia overnight requiring intervention. TF's at goal.  Withdraws all 4. GCS 7. RASS -2. No sedation.  Does not follow commands.  PERRLA  SR 80-90's  SBP's 130-140's  Tmax 100.4  4 BM's ovnierhgt. 4 BM's on previous day shift -holding bowel protocol  PIV's, brown  I/O: 2000/1500  Vent day #6: APVC 12/350/Peep 8/30%  SBT x 1 hour. Does not follow for parameters. RSBI 80-90's. Thick secretions ETT  Lovenox, Pepcid  MSSA sputum, on ABX  Mobility 2    Review of Systems  Review of Systems   Unable to perform ROS: Intubated      Vital Signs for last 24 hours   Pulse:  [81-98] 81  Resp:  [12-38] 14  BP: (119-160)/(65-86) 132/75  SpO2:  [96 %-100 %] 100 %    Hemodynamic parameters for last 24 hours       Respiratory Information for the last 24 hours  Vent Mode: APVCMV  Rate (breaths/min): 12  Vt Target (mL): 360  PEEP/CPAP: 8  P Support: 6  MAP: 11  Length of Weaning Trial (Hours): 1.25  Control VTE (exp VT): 288    Physical  Exam   Physical Exam  Vitals and nursing note reviewed.   Constitutional:       Appearance: She is cachectic. She is ill-appearing. She is not toxic-appearing.      Interventions: She is intubated.   HENT:      Head: Normocephalic.      Nose: Nose normal.      Mouth/Throat:      Lips: Pink.      Mouth: Mucous membranes are moist.   Eyes:      Pupils: Pupils are equal, round, and reactive to light.      Comments: Sluggish   Cardiovascular:      Rate and Rhythm: Tachycardia present.      Pulses: Normal pulses.      Heart sounds: Normal heart sounds.   Pulmonary:      Effort: Pulmonary effort is normal. She is intubated.      Breath sounds: Normal breath sounds. No wheezing.   Abdominal:      General: Bowel sounds are decreased.      Palpations: Abdomen is soft.   Musculoskeletal:      Comments: Does not follow commands   Skin:     General: Skin is warm and dry.      Capillary Refill: Capillary refill takes less than 2 seconds.   Neurological:      GCS: GCS eye subscore is 1. GCS verbal subscore is 1. GCS motor subscore is 4.      Comments: Intubated   Psychiatric:      Comments: Intubated       Medications  Current Facility-Administered Medications   Medication Dose Route Frequency Provider Last Rate Last Admin    potassium bicarbonate (KLYTE) effervescent tablet 50 mEq  50 mEq Enteral Tube Q2HRS Jeremy M Gonda, M.D.   50 mEq at 12/20/22 1227    insulin GLARGINE (Lantus,Semglee) injection  10 Units Subcutaneous BID Jazmine Pike, A.P.R.N.        ceFAZolin (Ancef) 2 g in  mL IVPB  2 g Intravenous Q8HRS RAVINDER Patel.P.R.N.   Stopped at 12/20/22 0554    insulin regular (HumuLIN R,NovoLIN R) injection  3-13 Units Subcutaneous Q6HRS Jazmine Pike, A.P.R.N.   3 Units at 12/20/22 1227    And    dextrose 10 % BOLUS 25 g  25 g Intravenous Q15 MIN PRN Jazmine Pike, A.P.R.N.   Stopped at 12/20/22 0304    acetaminophen (Tylenol) tablet 650 mg  650 mg Enteral Tube Q6HRS PRN Jazmine Pike A.P.R.N.   650 mg at 12/17/22  2341    Respiratory Therapy Consult   Nebulization Continuous RT Ace Pool M.D.        famotidine (PEPCID) tablet 20 mg  20 mg Enteral Tube Q12HRS Ace Pool M.D.   20 mg at 12/20/22 0520    MD Alert...ICU Electrolyte Replacement per Pharmacy   Other PHARMACY TO DOSE Ace Pool M.D.        lidocaine (XYLOCAINE) 1 % injection 2 mL  2 mL Tracheal Tube Q30 MIN PRN Ace Pool M.D.        ipratropium-albuterol (DUONEB) nebulizer solution  3 mL Nebulization Q2HRS PRN (RT) Ace Pool M.D.        enoxaparin (Lovenox) inj 40 mg  40 mg Subcutaneous DAILY AT 1800 Jazmine Pike A.P.R.N.   40 mg at 12/19/22 1705    Pharmacy Consult: Enteral tube insertion - review meds/change route/product selection  1 Each Other PHARMACY TO DOSE Jazmine Pike, A.P.R.N.        senna-docusate (PERICOLACE or SENOKOT S) 8.6-50 MG per tablet 2 Tablet  2 Tablet Enteral Tube BID Jazmine Pike, A.P.R.N.   2 Tablet at 12/18/22 0557    And    polyethylene glycol/lytes (MIRALAX) PACKET 1 Packet  1 Packet Enteral Tube QDAY PRN Jazmine Pike, A.P.R.N.   1 Packet at 12/17/22 1512    And    magnesium hydroxide (MILK OF MAGNESIA) suspension 30 mL  30 mL Enteral Tube QDAY PRN Jazmine Pike, A.P.R.N.        And    bisacodyl (DULCOLAX) suppository 10 mg  10 mg Rectal QDAY PRN Jazmine Pike, A.P.R.N.           Fluids    Intake/Output Summary (Last 24 hours) at 12/20/2022 1445  Last data filed at 12/20/2022 1000  Gross per 24 hour   Intake 1675.68 ml   Output 1570 ml   Net 105.68 ml       Laboratory  Recent Labs     12/19/22  0447 12/20/22  0228   ISTATAPH 7.542* 7.510*   ISTATAPCO2 35.2 36.6   ISTATAPO2 86 80   ISTATATCO2 31 30   [id number] 98 97   ISTATARTHCO3 30.2* 29.2*   ISTATARTBE 8* 6*   ISTATTEMP 99.0 F 100.4 F   ISTATFIO2 30 30   ISTATSPEC Arterial Arterial   ISTATAPHTC 7.539* 7.494   [id number] 87 85         Recent Labs     12/18/22  0545 12/19/22  0420 12/20/22  0505   SODIUM 135 137 134*   POTASSIUM 3.7 3.7 3.8    CHLORIDE 99 101 98   CO2 27 29 27   BUN 22 20 15   CREATININE 0.63 0.59 0.47*   MAGNESIUM 1.9 2.0 1.8   PHOSPHORUS 2.9 3.1 3.8   CALCIUM 8.6 8.8 8.6     Recent Labs     12/18/22  0545 12/19/22  0420 12/20/22  0505   ALTSGPT 47 29 18   ASTSGOT 44 19 15   ALKPHOSPHAT 116* 112* 99   TBILIRUBIN 0.6 0.5 0.3   PREALBUMIN  --  10.8*  --    GLUCOSE 131* 113* 175*     Recent Labs     12/18/22  0545 12/19/22  0420 12/20/22  0505   WBC 10.7 8.3 7.3   ASTSGOT 44 19 15   ALTSGPT 47 29 18   ALKPHOSPHAT 116* 112* 99   TBILIRUBIN 0.6 0.5 0.3     Recent Labs     12/18/22  0545 12/19/22  0420 12/20/22  0505   RBC 3.50* 3.50* 3.24*   HEMOGLOBIN 10.9* 10.5* 9.8*   HEMATOCRIT 32.8* 32.6* 30.4*   PLATELETCT 283 242 257       Imaging  MRI:   Reviewed    Assessment/Plan  * Coma (HCC)  Assessment & Plan   Initial event likely due to hypoglycemia; unknown downtime but possibly as long as 4 hours with a blood sugar of 35  -History of methamphetamine use poorly controlled diabetes melitis  -R/O anoxic brain injury  -Continuous EEG did not capture any seizure activity therefore Keppra was DC'd  -Judicious use of narcotics, sedation  -Fall precautions, aspiration precautions keep  HOB greater than 30 degrees  -MRI of head showed possible tiny acute infarcts to the right frontal lobe periventricular white matter; parenchymal atrophy; no other specific findings noted    MSSA infection, non-invasive  Assessment & Plan  -Tracheal aspirate positive MSSA  -Continue ABX  -No indications of sepsis, hemodynamically stable off pressors    Acute respiratory failure with hypercapnia (HCC)  Assessment & Plan  -Intubated 10/4 for airway protection for GCS of 3  -Titrate FiO2 to keep sats greater than 90%   -optimize acid-base balance, oxygenation and ventilation  -HOB > 30  -daily SBT  -Early mobility, ABCDEF bundle  -DVT prophylaxis; chemical VTE  -Pepcid, chlorhexidine    Methamphetamine abuse (HCC)- (present on admission)  Assessment & Plan  Cessation  counseling when appropriate    Protein calorie malnutrition (HCC)- (present on admission)  Assessment & Plan  Body mass index is 17.06 kg/m².  -dietary consult    Hypoglycemia- (present on admission)  Assessment & Plan  -Episode of hypoglycemia overnight requiring intervention  -Currently resolved today  -Continue tube feedings at goal  -Continue Accu-Cheks every 6 hours and as needed  -Hypoglycemia protocol    Dyslipidemia- (present on admission)  Assessment & Plan  -Continue statin    Cigarette nicotine dependence without complication- (present on admission)  Assessment & Plan  -Cessation education and counseling when clinically appropriate    Hypothyroidism due to Hashimoto's thyroiditis- (present on admission)  Assessment & Plan  -TSH WNL  -home levothyroxine    Hypertension- (present on admission)  Assessment & Plan  -History of, unknown compliance with medications  -As needed IV antihypertensives with parameters    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 A1c in 4/2022 was 11.6%  -presented with severe hypoglycemia  -accuchecks Q6h with SSI  -Lantus -dosing decreased by half on 12/20 for hypoglycemia episode  -Hypoglycemia protocol       VTE:  Lovenox  Ulcer: H2 Antagonist  Lines: Catalan Catheter  Ongoing indication addressed    I have performed a physical exam and reviewed and updated ROS and Plan today (12/20/2022). In review of yesterday's note (12/19/2022), there are no changes except as documented above.     Discussed patient condition and risk of morbidity and/or mortality with Family, RN, RT, Pharmacy, Charge nurse / hot rounds, and my attending Dr. Gonda  The patient remains critically ill.  Critical care time = 44 minutes in directly providing and coordinating critical care and extensive data review.  No time overlap and excludes procedures.    Please note that this dictation was created using voice  recognition software. I have made every reasonable attempt to correct obvious errors, but there may be errors of grammar and possibly content that I did not discover before finalizing the note.    TATA Neumann.

## 2022-12-20 NOTE — THERAPY
Occupational Therapy   Initial Evaluation     Patient Name: Poonam Mayo  Age:  62 y.o., Sex:  female  Medical Record #: [id number]  Today's Date: 12/20/2022     Precautions  Precautions: Fall Risk, Nasogastric Tube  Comments: ETT    Assessment  Patient is 62 y.o. female with a diagnosis of hypoglycemia, being found down at home, acute respiratory failure, meth abuse. [place] still ongoing. Possible tiny R frontal acute infarct. She was seen today on the ventilator off of all sedation. She did not open her eyes or respond to therapist besides due to pain. Extensor tone in body noted while sitting EOB. Decorticate posturing noted with L side of body at end of session.      Plan    Recommend Occupational Therapy 3 times per week until therapy goals are met for the following treatments:  Adaptive Equipment, Cognitive Skill Development, Neuro Re-Education / Balance, Self Care/Activities of Daily Living, Therapeutic Activities, and Therapeutic Exercises.    DC Equipment Recommendations: Unable to determine at this time  Discharge Recommendations: Recommend post-acute placement for additional occupational therapy services prior to discharge home        Objective       12/20/22 1408   Prior Living Situation   Comments Pt unable to provide PLOF/PLS at this time due to being orally intubated and lethargy. Pt was found down vacuuming so assuming she was fairly independent.   Precautions   Precautions Fall Risk;Nasogastric Tube   Comments ETT   Cognition    Cognition / Consciousness X   Level of Consciousness Responds to pain   Comments pt not on any sedation. did not follow any commands. eyes kept closed throughout. responded to pain.   Active ROM Upper Body   Active ROM Upper Body  X   Comments no AROM or purposeful movement noted in B UE   Upper Body Muscle Tone   Upper Body Muscle Tone  X   Lt Upper Extremity Muscle Tone Hypertonic  (flexor synergy pattern LUE)   Balance Assessment   Sitting Balance (Static) Dependent    Sitting Balance (Dynamic) Dependent   Weight Shift Sitting Absent   Comments pt w/ rigidity EOB, pushing into extension throughout duration of sitting EOB   Bed Mobility    Supine to Sit Total Assist   Sit to Supine Total Assist   Scooting Total Assist   Rolling Total Assist to Rt.;Total Assist to Lt.   Comments x2 person   ADL Assessment   Grooming Total Assist;Seated   Lower Body Dressing Total Assist   Toileting Total Assist   How much help from another person does the patient currently need...   Putting on and taking off regular lower body clothing? 1   Bathing (including washing, rinsing, and drying)? 1   Toileting, which includes using a toilet, bedpan, or urinal? 1   Putting on and taking off regular upper body clothing? 1   Taking care of personal grooming such as brushing teeth? 1   Eating meals? 1   6 Clicks Daily Activity Score 6   mRS Prior to admission   Prior to admission mRS 0   Modified Vass (mRS)   Modified Vass Score 5   Functional Mobility   Sit to Stand Unable to Participate   Mobility EOB only   Visual Perception   Comments would not open eyes   Patient / Family Goals   Patient / Family Goal #1 none stated   Short Term Goals   Short Term Goal # 1 pt will follow 1 step direction 25% of the time   Short Term Goal # 2 pt will maintain fair sitting balance for 10 sec in prep for seated ADLs   Short Term Goal # 3 pt will complete light grooming with supv

## 2022-12-20 NOTE — PROGRESS NOTES
0000- Blood sugar 79. Rechecked after 0200, FSBS 69. Followed hypoglycemia protocol and administered dextrose per MAR. SOCRATES Flanagan notified.

## 2022-12-20 NOTE — CARE PLAN
The patient is Watcher - Medium risk of patient condition declining or worsening    Shift Goals  Clinical Goals: Q4 neuro checks, Q2 turns and wound prevention, oral care  Patient Goals: N/A  Family Goals: Updates, patient comfort    Progress made toward(s) clinical / shift goals:  Turns every 2 hours, oral care completed with turns.     Patient is not progressing towards the following goals:      Problem: Knowledge Deficit - Standard  Goal: Patient and family/care givers will demonstrate understanding of plan of care, disease process/condition, diagnostic tests and medications  Outcome: Not Progressing   Patient unable to participate in education. Daughter educated.

## 2022-12-20 NOTE — FLOWSHEET NOTE
12/20/22 0820   Spontaneous Breathing Trial (SBT)   Spontaneous breathing trial (SBT) outcome Pt weaned for 1 hour and returned to rest settings per protocol - SBT Pass   Length of Weaning Trial (Hours) 1.25   Pt passed SBT but not ready to extubate Not ready - continue daily assessments for extubation   Weaning Parameters   RR (bpm) 25   $ FVC / Vital Capacity (liters)    (Not Following for parameters)   Rapid Shallow Breathing Index (RR/VT) 96   Spontaneous VE 7.2   Spontaneous

## 2022-12-20 NOTE — DOCUMENTATION QUERY
[facility]                                                                       Query Response Note      PATIENT:               PAMELA CENTENO  ACCT #:                  [id number]  MRN:                     [id number]  :                      1960  ADMIT DATE:       12/15/2022 7:13 PM  DISCH DATE:          RESPONDING  PROVIDER #:        [id number]           QUERY TEXT:    Malnutrition is documented in the Medical Record.  Please specify the severity.    The patient's Clinical Indicators include:  Findings:  --Per H&P and progress notes, ''Protein calorie malnutrition'' stated  --Per H&P and progress notes, ''she is cachectic'' stated  --Per RD consult on , ''malnutrition risk:  severe malnutrition in the context of starvation-related as      evidenced by severe muscle and severe fat wasting'' stated  --BMI:  17.76, weight:  48.4kg, height:  1.651m    Treatment:  --RD consult  --Start Diabetisource AC at 25ml/hr and advance per protocol to goal rate of 50ml/hr to provide 1440 kcals,      72 gm protein, and 984 ml free water per day  --Fluids per MD  --Daily weights  --Monitor electrolytes and replete as needed    Risk Factors:  --DM1 with hypoglycemia  --Intubated and ventilated status  --History of methamphetamine abuse  --Dehydration    Thank you,  Sofia Araiza RN, BSN  Clinical   Connect via [employer]  Options provided:   -- Mild protein calorie malnutrition   -- Moderate protein calorie malnutrition   -- Severe protein calorie malnutrition   -- Other explanation, please specify   -- Unable to determine      Query created by: Sofia Araiza on 2022 5:15 AM    RESPONSE TEXT:    Severe protein calorie malnutrition          Electronically signed by:  JEREMY M GONDA MD 2022 12:52 PM

## 2022-12-20 NOTE — PROCEDURES
[facility]    Standard Inpatient Video Electroencephalogram Report      Patient Name: Poonam Mayo  MRN: [id number]  #: [id number]  Date of Service: 12/20/2022  Total Recording Time: 0 hours and 23 minutes.  Referring Provider: Jeremy M Gonda, M.D.    INDICATION:  Poonam Mayo 62 y.o. female presenting with altered mental status, who was found down in context of hypoglycemia.     CURRENT ANTI-SEIZURE MEDICATIONS:     Current Facility-Administered Medications:     magnesium sulfate    potassium bicarbonate    insulin GLARGINE    ceFAZolin    insulin regular **AND** POC blood glucose manual result **AND** NOTIFY MD and PharmD **AND** Administer 20 grams of glucose (approximately 8 ounces of fruit juice) every 15 minutes PRN FSBG less than 70 mg/dL **AND** dextrose bolus    acetaminophen    Respiratory Therapy Consult    famotidine **OR** [DISCONTINUED] famotidine    MD Alert...Adult ICU Electrolyte Replacement per Pharmacy    lidocaine    ipratropium-albuterol    enoxaparin (LOVENOX) injection    Pharmacy    senna-docusate **AND** polyethylene glycol/lytes **AND** magnesium hydroxide **AND** bisacodyl    TECHNIQUE: CVEEG was set up by a Neurodiagnostic technologist who performed education to the patient and staff. A minimum of 23 electrodes and 23 channel recording was setup and performed by Neurodiagnostic technologist, in accordance with the international 10-20 system. Impedence, electrode integrity, and technical impressions were documented a minimum of every 2-24 hour period by a Neurodiagnostic Technologist and reviewed by Interpreting Physician. The study was reviewed in bipolar and referential montages. The recording examined the patient in the awake, drowsy, and sleep state(s).     DESCRIPTION OF THE RECORD:  During maximal wakefulness, the background was continuous, symmetrical, and poorly defined and/or fragmented 7 Hz posterior dominant rhythm.  Some reactivity and somewhat notable state changes  were present.  The background was represented by predominantly theta and delta frequencies. There were at times poorly formed triphasic waveforms with frontal predominance. During drowsiness, a loss of myogenic artifact and theta/delta frequencies were seen.     Occasional N2 sleep transients in the form of rudimentary and/or ill-defined sleep spindles fragments and vertex waves were seen in the leads over the central regions.     ACTIVATION PROCEDURES:   Intermittent Photic stimulation was performed in a stepwise fashion from 1 to 30 Hz and did not elicited any abnormalities on EEG.     ICTAL AND INTERICTAL FINDINGS:   No focal or generalized epileptiform activity noted.     No persistent regional slowing was seen during this routine study.      No electroclinical or electrographic seizures were reported or recorded during the study.     EKG: Sampling of the EKG recording did not demonstrate any abnormalities    EVENTS:  No clinical events recorded or reported.    INTERPRETATION: This was an abnormal EEG due to:  Moderate to moderate-severe diffuse slowing of the background, suggestive of diffuse and/or multifocal cerebral dysfunction. This finding might be seen in a myriad of encephalopathies and in itself is a non-specific finding, though the presence of triphasic waveforms is suggestive of, at least partially, toxic-metabolic etiology of the above noted encephalopathy.  There were no epileptiform discharges noted.  There were no electrographic seizures captured.       Note: This EEG does not rule the possibility of seizures  If the clinical suspicion remains high for seizures, a prolonged recording to capture clinical or subclinical events may be helpful.    Barrera Azar MD  Neurology Attending, Epilepsy Program  [facility]

## 2022-12-21 PROBLEM — B37.0 ORAL THRUSH: Status: ACTIVE | Noted: 2022-01-01

## 2022-12-21 NOTE — CARE PLAN
Problem: Ventilation  Goal: Ability to achieve and maintain unassisted ventilation or tolerate decreased levels of ventilator support  Description: Target End Date:  4 days     Document on Vent flowsheet    1.  Support and monitor invasive and noninvasive mechanical ventilation  2.  Monitor ventilator weaning response  3.  Perform ventilator associated pneumonia prevention interventions  4.  Manage ventilation therapy by monitoring diagnostic test results  Outcome: Not Met      Ventilator Daily Summary    Vent Day # 6    Ventilator settings: 12, 350, +8, 30%    Weaning trials: SBT x2, not following for parameters.    Plan: Continue current ventilator settings and wean mechanical ventilation as tolerated per physician orders.

## 2022-12-21 NOTE — CARE PLAN
The patient is Watcher - Medium risk of patient condition declining or worsening    Shift Goals  Clinical Goals: comfort, Q4 neuro checks, mobilize  Patient Goals: N/A  Family Goals: Brush hair    Progress made toward(s) clinical / shift goals:      Patient is not progressing towards the following goals:      Problem: Knowledge Deficit - Standard  Goal: Patient and family/care givers will demonstrate understanding of plan of care, disease process/condition, diagnostic tests and medications  Outcome: Not Progressing    Patient unable to participate in education d/t LOC.

## 2022-12-21 NOTE — THERAPY
Physical Therapy   Initial Evaluation     Patient Name: Poonam Mayo  Age:  62 y.o., Sex:  female  Medical Record #: [id number]  Today's Date: 12/20/2022     Precautions  Precautions: Fall Risk;Nasogastric Tube  Comments: ETT, L wrist restraint    Assessment  Patient is 62 y.o. female admitted after found down vacuuming with diagnosis of severe hypoglycemia and acute respiratory failure. PMHx of meth abuse, uncontrolled type I DM, HTN, HLD, tobacco use, hypothyroidism. Workup for encephalopathy still ongoing, found possible tiny R frontal acute infarct. Pt required total assist for bed mobility and EOB. Pt presents with impaired cognition, L>R tone, weakness, coordination, balance, and activity tolerance. Recommend placement.     Plan    Recommend Physical Therapy 3 times per week until therapy goals are met for the following treatments:  Bed Mobility, Equipment, Gait Training, Manual Therapy, Neuro Re-Education / Balance, Self Care/Home Evaluation, Stair Training, Therapeutic Activities, and Therapeutic Exercises    DC Equipment Recommendations: Unable to determine at this time  Discharge Recommendations: Recommend post-acute placement for additional physical therapy services prior to discharge home       Subjective    Pt with no attempts to communicate this session     Objective     12/20/22 1429   Vitals   O2 Delivery Device Ventilator   Pain 0 - 10 Group   Therapist Pain Assessment   (no s/s of pain)   Prior Living Situation   Comments Unable to provide above given oral intubation and lethargy   Prior Level of Functional Mobility   Comments same as above   History of Falls   Date of Last Fall   (found down reason for admit)   Cognition    Cognition / Consciousness X   Level of Consciousness Responds to pain   Comments Pt with no command following, eye remained closed. Noted withdrawl to pain, no attempts to communicate   Passive ROM Lower Body   Passive ROM Lower Body WDL   Active ROM Lower Body    Active ROM  Lower Body  X   Comments only L toes noted with active movement, not on command, rhythmic. Otherwise noted extensor posturing but not true active movement   Strength Lower Body   Lower Body Strength  X   Comments unable to formally assess 2/2 lethargy and absent command following   Sensation Lower Body   Lower Extremity Sensation   X   Comments generalized withdraw to pain on L, no note on R   Lower Body Muscle Tone   Lower Body Muscle Tone  X   Comments in supine noted increased tone of L hip internally rotated, L toes pointed inward. Appeared decorticate posturing like, however, only on L. Then at EOB, noted moments of strong extensor posturing BLE   Strength Upper Body   Comments no active movement in BUE   Upper Body Muscle Tone   Upper Body Muscle Tone  X   Lt Upper Extremity Muscle Tone Hypotonic   Comments LUE 3 to almost 3 on modified poly, able to break tone to maintain LUE in extension at end of session, initially sustaining L flexor synergy pattern   Neurological Concerns   Neurological Concerns Yes   Sitting Posture During ADL's Posterior Push   Coordination Upper Body   Coordination X   Comments FMC and GMC impaired LUE   Coordination Lower Body    Coordination Lower Body  X   Comments impaired BLE 2/2 cognition currently unable to formally assess   Balance Assessment   Sitting Balance (Static) Dependent   Sitting Balance (Dynamic) Dependent   Weight Shift Sitting Absent   Gait Analysis   Gait Level Of Assist Unable to Participate   Bed Mobility    Supine to Sit Total Assist   Sit to Supine Total Assist   Scooting Total Assist   Rolling Total Assist to Rt.;Total Assist to Lt.   Comments x2 persons   Functional Mobility   Sit to Stand Unable to Participate   Bed, Chair, Wheelchair Transfer Unable to Participate   Mobility EOB only   ICU Target Mobility Level   ICU Mobility - Targeted Level Level 2   How much difficulty does the patient currently have...   Turning over in bed (including adjusting  bedclothes, sheets and blankets)? 1   Sitting down on and standing up from a chair with arms (e.g., wheelchair, bedside commode, etc.) 1   Moving from lying on back to sitting on the side of the bed? 1   How much help from another person does the patient currently need...   Moving to and from a bed to a chair (including a wheelchair)? 1   Need to walk in a hospital room? 1   Climbing 3-5 steps with a railing? 1   6 clicks Mobility Score 6   Activity Tolerance   Sitting Edge of Bed 5 min   Comments limited 2/2 lethargy, encephalopathy   Edema / Skin Assessment   Edema / Skin  X   Comments BLE cold and clammy   Short Term Goals    Short Term Goal # 1 Pt will follow 5 out of 10 1 step commands in 6 visits to improve active participation   Short Term Goal # 2 Pt will require ModA for supine to sit in 6 visits to improve functional independence   Short Term Goal # 3 Pt will tolerated static sitting at EOB for 3 min with Poor + balance in 6 visits to prepare for OOB mobility   Short Term Goal # 4 Pt will required MaxA for STS in 6 visits to initiate OOB mobility   Problem List    Problems Impaired Bed Mobility;Impaired Ambulation;Impaired Transfers;Functional ROM Deficit;Functional Strength Deficit;Impaired Balance;Impaired Coordination;Safety Awareness Deficits / Cognition;Decreased Activity Tolerance;Motor Planning / Sequencing;Impaired Vision   Anticipated Discharge Equipment and Recommendations   DC Equipment Recommendations Unable to determine at this time   Discharge Recommendations Recommend post-acute placement for additional physical therapy services prior to discharge home   Interdisciplinary Plan of Care Collaboration   IDT Collaboration with  Nursing;Occupational Therapist   Patient Position at End of Therapy In Bed;Wrist Restraints Applied;Call Light within Reach  (L wrist restraint only as found)   Collaboration Comments RN updated   Session Information   Date / Session Number  12/2- 1 (1/3, 12/26)

## 2022-12-21 NOTE — CARE PLAN
The patient is Stable - Low risk of patient condition declining or worsening    Shift Goals  Clinical Goals: Increase wakefullness, SBT, mobilize  Patient Goals: erlinda  Family Goals: erlinda    Progress made toward(s) clinical / shift goals:   - Neuro status remains unchanged - pt not opening eyes, rare non-purposeful movement with left upper extremity, withdraws on all fours.  - Pt SBT but unable to follows for parameters  - Pt mobilized to edge of bed for approximately 7 minutes in attempt to help wake pt up.      Patient is not progressing towards the following goals:    Problem: Knowledge Deficit - Standard  Goal: Patient and family/care givers will demonstrate understanding of plan of care, disease process/condition, diagnostic tests and medications  Outcome: Not Progressing  - Pt remains obtunded.       Problem: Psychosocial  Goal: Patient's ability to verbalize feelings about condition will improve  Outcome: Not Progressing  - Pt remains obtunded.  Goal: Patient's ability to re-evaluate and adapt role responsibilities will improve  Outcome: Not Progressing  - Pt remains obtunded     Problem: Mechanical Ventilation  Goal: Successful weaning off mechanical ventilator, spontaneously maintains adequate gas exchange  Outcome: Not Progressing  - Pt unable to follow for parameters, but tolerates SBT.  Goal: Patient will be able to express needs and understand communication  Outcome: Not Progressing  - Pt obtunded.

## 2022-12-21 NOTE — PROGRESS NOTES
Critical Care Progress Note    Date of admission  12/15/2022    Chief Complaint  Altered Mental Status    Hospital Course  Poonam Mayo is a 62-year-old female with PMH significant for uncontrolled DM 1 (most recent A1c 11.6), HTN, HLD, tobacco dependence, hypothyroidism, methamphetamine abuse, and questionable medication compliance who presented to the emergency department via EMS on 12/15 after being found down with the vacuum running.  Blood glucose was 32.  She received 250 cc of D10 with repeat blood sugar 38.  Upon arrival to the emergency department her blood sugar was in the 200s.  She was intubated for airway protection, started on D10 infusion, and admitted to the ICU for close hemodynamic monitoring and ventilator management.    12/16 - hypoglycemia improving, weaning D10. Starting TF's. MRI brain pending  12/1876-aoffmq-pi MRI with possible small acute infarct to right frontal lobe;no other acute intracranial abnormality noted-patient slightly more responsive  12/19 - Vent day #5. Tolerating SBT's. Not following for parameters  12/20 - spontaneous movements, nothing purposeful.  Repeat EEG captured no seizures.  Presence of triphasic waveforms consistent with toxic metabolic etiology.    Interval Problem Update for TODAY 12/21:  No hypoglycemia events overnight.  Decreasing Lantus from twice daily to daily dosing  Some left arm spontaneous movements. Not opening eyes  Temp max 100.4  RASS -3 to -4, no sedation  SR 80-90  -140s -no drips  OG with TF's at goal. BM this am  Copious oral secretions -adding scopolamine patch today  Vent day #7: APVC 12/350/PEEP 8/30%  SAT/SBT: Yes/yes.  Does not follow for parameters  I/O: 1800/2200  Pepcid, Lovenox, no ABX  Catalan, PIV x 2  Mobility 2    Called and spoke with patient's daughter and NOK, Lanie, to discuss advance care planning and possible trach placement in the upcoming days.  Lanie would like some time to discuss with her younger siblings and will  reconvene with me at bedside around 1500 today.    Interval Problem Update for 12/20:  Hypoglycemia overnight requiring intervention. TF's at goal.  Withdraws all 4. GCS 7. RASS -2. No sedation.  Does not follow commands.  PERRLA  SR 80-90's  SBP's 130-140's  Tmax 100.4  4 BM's ovnierhgt. 4 BM's on previous day shift -holding bowel protocol  PIV's, brown  I/O: 2000/1500  Vent day #6: APVC 12/350/Peep 8/30%  SBT x 1 hour. Does not follow for parameters. RSBI 80-90's. Thick secretions ETT  Lovenox, Pepcid  MSSA sputum, on ABX  Mobility 2    Review of Systems  Review of Systems   Unable to perform ROS: Intubated      Vital Signs for last 24 hours   Pulse:  [] 89  Resp:  [12-28] 21  BP: (108-148)/(62-80) 133/80  SpO2:  [94 %-100 %] 100 %    Hemodynamic parameters for last 24 hours       Respiratory Information for the last 24 hours  Vent Mode: APVCMV  Rate (breaths/min): 12  Vt Target (mL): 350  PEEP/CPAP: 8  P Support: 5  MAP: 12  Length of Weaning Trial (Hours): 1 hr  Control VTE (exp VT): 359    Physical Exam   Physical Exam  Vitals and nursing note reviewed.   Constitutional:       Appearance: She is cachectic. She is ill-appearing. She is not toxic-appearing.      Interventions: She is intubated.   HENT:      Head: Normocephalic.      Nose: Nose normal.      Mouth/Throat:      Lips: Pink.      Mouth: Mucous membranes are moist.   Eyes:      Pupils: Pupils are equal, round, and reactive to light.      Comments: Sluggish   Cardiovascular:      Rate and Rhythm: Tachycardia present.      Pulses: Normal pulses.      Heart sounds: Normal heart sounds.   Pulmonary:      Effort: Pulmonary effort is normal. She is intubated.      Breath sounds: Normal breath sounds. No wheezing.   Abdominal:      General: Bowel sounds are decreased.      Palpations: Abdomen is soft.   Musculoskeletal:      Comments: Does not follow commands   Skin:     General: Skin is warm and dry.      Capillary Refill: Capillary refill takes less  than 2 seconds.   Neurological:      GCS: GCS eye subscore is 1. GCS verbal subscore is 1. GCS motor subscore is 4.      Comments: Intubated   Psychiatric:      Comments: Intubated       Medications  Current Facility-Administered Medications   Medication Dose Route Frequency Provider Last Rate Last Admin    [START ON 12/22/2022] insulin GLARGINE (Lantus,Semglee) injection  10 Units Subcutaneous QAM INSULIN Jazmine Pike, A.P.R.N.        scopolamine (TRANSDERM-SCOP) patch 1 Patch  1 Patch Transdermal Q72HRS Jazmine Pike, A.P.R.N.   1 Patch at 12/21/22 1223    nystatin (MYCOSTATIN) [id number] UNIT/ML suspension 500,000 Units  5 mL Topical 4X/DAY Jazmine Pike, A.P.R.N.   500,000 Units at 12/21/22 1223    ceFAZolin (Ancef) 2 g in  mL IVPB  2 g Intravenous Q8HRS India Khanna A.P.R.N.   Stopped at 12/21/22 0835    insulin regular (HumuLIN R,NovoLIN R) injection  3-13 Units Subcutaneous Q6HRS Jazmine Pike, A.P.R.N.   3 Units at 12/20/22 1723    And    dextrose 10 % BOLUS 25 g  25 g Intravenous Q15 MIN PRN Jazmine Pike, A.P.R.N.   Stopped at 12/20/22 0304    acetaminophen (Tylenol) tablet 650 mg  650 mg Enteral Tube Q6HRS PRN Jazmine Pike A.P.R.N.   650 mg at 12/17/22 2341    Respiratory Therapy Consult   Nebulization Continuous RT Ace Pool M.D.        famotidine (PEPCID) tablet 20 mg  20 mg Enteral Tube Q12HRS Ace Pool M.D.   20 mg at 12/21/22 0520    MD Alert...ICU Electrolyte Replacement per Pharmacy   Other PHARMACY TO DOSE Ace Pool M.D.        lidocaine (XYLOCAINE) 1 % injection 2 mL  2 mL Tracheal Tube Q30 MIN PRN Ace Pool M.D.        ipratropium-albuterol (DUONEB) nebulizer solution  3 mL Nebulization Q2HRS PRN (RT) Ace Pool M.D.        enoxaparin (Lovenox) inj 40 mg  40 mg Subcutaneous DAILY AT 1800 Jazmine Pike A.P.R.NDuane   40 mg at 12/20/22 9572    Pharmacy Consult: Enteral tube insertion - review meds/change route/product selection  1 Each Other PHARMACY TO DOSE  Jazmine Pike, A.P.R.N.        senna-docusate (PERICOLACE or SENOKOT S) 8.6-50 MG per tablet 2 Tablet  2 Tablet Enteral Tube BID Jazmine Pike, A.P.R.N.   2 Tablet at 12/18/22 0557    And    polyethylene glycol/lytes (MIRALAX) PACKET 1 Packet  1 Packet Enteral Tube QDAY PRN Jazmine Pike, A.P.R.N.   1 Packet at 12/17/22 1512    And    magnesium hydroxide (MILK OF MAGNESIA) suspension 30 mL  30 mL Enteral Tube QDAY PRN Jazmine Pike, A.P.R.N.        And    bisacodyl (DULCOLAX) suppository 10 mg  10 mg Rectal QDAY PRN Jazmine Pike, A.P.R.N.           Fluids    Intake/Output Summary (Last 24 hours) at 12/21/2022 1239  Last data filed at 12/21/2022 1000  Gross per 24 hour   Intake 1563 ml   Output 1645 ml   Net -82 ml       Laboratory  Recent Labs     12/19/22  0447 12/20/22  0228 12/21/22  0418   ISTATAPH 7.542* 7.510* 7.427   ISTATAPCO2 35.2 36.6 42.4*   ISTATAPO2 86 80 261*   ISTATATCO2 31 30 29   [id number] 98 97 100*   ISTATARTHCO3 30.2* 29.2* 27.9*   ISTATARTBE 8* 6* 3   ISTATTEMP 99.0 F 100.4 F 99.7 F   ISTATFIO2 30 30 30   ISTATSPEC Arterial Arterial Arterial   ISTATAPHTC 7.539* 7.494 7.418   [id number] 87 85 264*         Recent Labs     12/19/22  0420 12/20/22  0505 12/21/22  0510   SODIUM 137 134* 138   POTASSIUM 3.7 3.8 3.9   CHLORIDE 101 98 104   CO2 29 27 25   BUN 20 15 18   CREATININE 0.59 0.47* 0.70   MAGNESIUM 2.0 1.8 2.4   PHOSPHORUS 3.1 3.8 3.9   CALCIUM 8.8 8.6 8.8     Recent Labs     12/19/22  0420 12/20/22  0505 12/21/22  0510   ALTSGPT 29 18  --    ASTSGOT 19 15  --    ALKPHOSPHAT 112* 99  --    TBILIRUBIN 0.5 0.3  --    PREALBUMIN 10.8*  --   --    GLUCOSE 113* 175* 112*     Recent Labs     12/19/22  0420 12/20/22  0505 12/21/22  0510   WBC 8.3 7.3 7.7   ASTSGOT 19 15  --    ALTSGPT 29 18  --    ALKPHOSPHAT 112* 99  --    TBILIRUBIN 0.5 0.3  --      Recent Labs     12/19/22  0420 12/20/22  0505 12/21/22  0510   RBC 3.50* 3.24* 3.25*   HEMOGLOBIN 10.5* 9.8* 10.0*   HEMATOCRIT 32.6* 30.4*  30.7*   PLATELETCT 242 944 291       Imaging  X-Ray:  I have personally reviewed the images and compared with prior images.    Assessment/Plan  * Coma (HCC)  Assessment & Plan  -Initial event likely due to hypoglycemia; unknown downtime but possibly as long as 4 hours with a blood sugar of 35  -History of methamphetamine use poorly controlled diabetes melitis  -cEEG captured no seizures, pattern of encephalopathy.  Keppra discontinued  -Judicious use of narcotics, sedation  -Fall precautions, aspiration precautions keep  HOB greater than 30 degrees  -MRI of head showed possible tiny acute infarcts to the right frontal lobe periventricular white matter; parenchymal atrophy; no other specific findings noted  -Serial neurologic exams      Oral thrush  Assessment & Plan  -Nystatin swab 4 times daily x7 days    MSSA infection, non-invasive  Assessment & Plan  -Tracheal aspirate positive MSSA  -Continue ABX  -No indications of sepsis, hemodynamically stable off pressors    Acute respiratory failure with hypercapnia (HCC)  Assessment & Plan  -Intubated 10/4 for airway protection for GCS of 3  -Titrate FiO2 to keep sats greater than 90%   -optimize acid-base balance, oxygenation and ventilation  -HOB > 30  -daily SBT  -Early mobility, ABCDEF bundle  -DVT prophylaxis; chemical VTE  -Pepcid, chlorhexidine    Electrolyte abnormality  Assessment & Plan  -Follow BMP and replace electrolytes as indicated    Methamphetamine abuse (HCC)- (present on admission)  Assessment & Plan  Cessation counseling when appropriate    Protein calorie malnutrition (HCC)- (present on admission)  Assessment & Plan  Body mass index is 17.06 kg/m².  -dietary consult    Hypoglycemia- (present on admission)  Assessment & Plan  -A1c 13.2  -No hypoglycemia events overnight.  Chemsticks 100s to 120s past 24 hours.  Decrease Lantus to one-time daily dosing today.  Continue to follow closely and adjust as indicated (patient on 40 units Lantus as outpatient.   "Unknown compliance)  -Continue tube feedings at goal  -Continue Accu-Cheks every 6 hours and as needed with SSI (has not been requiring)  -Hypoglycemia protocol    Dyslipidemia- (present on admission)  Assessment & Plan  -Continue statin    Cigarette nicotine dependence without complication- (present on admission)  Assessment & Plan  -Cessation education and counseling when clinically appropriate    Hypothyroidism due to Hashimoto's thyroiditis- (present on admission)  Assessment & Plan  -TSH WNL  -home levothyroxine    Hypertension- (present on admission)  Assessment & Plan  -History of, unknown compliance with medications  -As needed IV antihypertensives with parameters    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 In review of yesterday's note (12/20/2022), there are no changes except as documented above.     Discussed patient condition and risk of morbidity and/or mortality with Family, RN, RT, Pharmacy, Patient, and my attending Dr. Gonda  The patient remains critically ill.  Critical care time = 46 minutes in directly providing and coordinating critical care and extensive data review.  No time overlap and excludes procedures.    Please note that this dictation was created using voice recognition software. I have made every reasonable attempt to correct obvious errors, but there may be errors of grammar and possibly content that I did not discover before finalizing the note.    TATA Neumann.    "

## 2022-12-22 NOTE — PROCEDURES
Intubation    Date/Time: 12/22/2022 1:08 PM  Performed by: SANDRA Patel  Authorized by: SANDRA Patel     Consent:     Consent obtained:  Emergent situation  Pre-procedure details:     Patient status:  Unresponsive    Pretreatment meds: etomidate.    Paralytics:  Rocuronium  Procedure details:     Preoxygenation:  Nasal cannula    CPR in progress: no      Intubation method:  Oral    Oral intubation technique:  Video-assisted    Laryngoscope type:  GlideScope    Laryngoscope size: #3.    Tube size (mm):  7.5    Tube type:  Cuffed    Number of attempts:  1    Ventilation between attempts: no      Cricoid pressure: no      Tube visualized through cords: yes    Placement assessment:     ETT to lip:  @ 21 cm    Tube secured with:  ETT stephen    Breath sounds:  Equal and absent over the epigastrium    Placement verification: chest rise, CXR verification, direct visualization, equal breath sounds, ETCO2 detector and tube exhalation      CXR findings:  ETT in proper place  Post-procedure details:     Patient tolerance of procedure:  Tolerated well, no immediate complications

## 2022-12-22 NOTE — PROGRESS NOTES
Extubation    Cuff leak noted: Yes  Stridor present: No     FiO2%: 30 % (12/22/22 0922)    Events/Summary/Plan: Extubation (12/22/22 3382)

## 2022-12-22 NOTE — PROGRESS NOTES
Critical Care Progress Note    Date of admission  12/15/2022    Chief Complaint  62 y.o. female admitted 12/15/2022 for altered loc, unresponsive     Hospital Course  Poonam Mayo is a 62-year-old female with PMH significant for uncontrolled DM 1 (most recent A1c 11.6), HTN, HLD, tobacco dependence, hypothyroidism, methamphetamine abuse, and questionable medication compliance who presented to the emergency department via EMS on 12/15 after being found down with the vacuum running.  Blood glucose was 32.  She received 250 cc of D10 with repeat blood sugar 38.  Upon arrival to the emergency department her blood sugar was in the 200s.  She was intubated for airway protection, started on D10 infusion, and admitted to the ICU for close hemodynamic monitoring and ventilator management.  12/16 - hypoglycemia improving, weaning D10. Starting TF's. MRI brain pending  12/1819-ovumqm-yy MRI with possible small acute infarct to right frontal lobe;no other acute intracranial abnormality noted-patient slightly more responsive  12/19 - Vent day #5. Tolerating SBT's. Not following for parameters  12/20 - spontaneous movements, nothing purposeful.  Repeat EEG captured no seizures.  Presence of triphasic waveforms consistent with toxic metabolic etiology.  12/21- Extubated however pt failed and required reintubation due to tachypnea and difficulty supporting her airway     Interval Problem Update  Reviewed last 24 hour events:    -T max- 100  -Precedex for agitation- waned off   -Vent day # 8   -APVC 12/350/8  -24 hr +800/since admission +4000  -Extubated- failed and re intubated   -Last BM 12/21  -Ancef -MSSA pneumonia   -I/O: 1800/2200    Review of Systems  Review of Systems   Unable to perform ROS: Medical condition      Vital Signs for last 24 hours   Pulse:  [58-99] 83  Resp:  [11-29] 15  BP: ()/() 116/67  SpO2:  [91 %-100 %] 97 %    Hemodynamic parameters for last 24 hours       Respiratory Information for the last 24  hours  Vent Mode: APVCMV  Rate (breaths/min): 12  Vt Target (mL): 350  PEEP/CPAP: 8  P Support: 5  MAP: 10  Length of Weaning Trial (Hours): 3  Control VTE (exp VT): 320    Physical Exam   Physical Exam  Vitals and nursing note reviewed.   Constitutional:       Comments: Unresponsive    HENT:      Head: Normocephalic and atraumatic.      Mouth/Throat:      Mouth: Mucous membranes are moist.   Eyes:      General: Lids are normal.      Pupils: Pupils are equal, round, and reactive to light.   Cardiovascular:      Rate and Rhythm: Normal rate and regular rhythm.      Pulses:           Radial pulses are 2+ on the right side and 2+ on the left side.        Dorsalis pedis pulses are 2+ on the right side and 2+ on the left side.      Heart sounds: Heart sounds are distant.   Pulmonary:      Breath sounds: Examination of the right-lower field reveals decreased breath sounds and rhonchi. Examination of the left-lower field reveals decreased breath sounds and rhonchi. Decreased breath sounds and rhonchi present.      Comments: Vent support without any resp distress   Chest:   Breasts:     Breasts are symmetrical.   Abdominal:      General: Abdomen is flat. Bowel sounds are normal.      Palpations: Abdomen is soft.   Genitourinary:     Comments: Catalan cath to down drain- clear yellow urine  Skin:     General: Skin is warm.      Capillary Refill: Capillary refill takes less than 2 seconds.   Neurological:      Comments: Unresponsive and intubated   Moving all extremities purposefully, slightly to painful stimulation  OSCAR 3-4 mm     Psychiatric:      Comments: Unresponsive  intubated       Medications  Current Facility-Administered Medications   Medication Dose Route Frequency Provider Last Rate Last Admin    fentaNYL (SUBLIMAZE) injection  mcg   mcg Intravenous Q HOUR PRN MANDEEP PatelP.RDuaneN.   100 mcg at 12/22/22 1339    insulin GLARGINE (Lantus,Semglee) injection  10 Units Subcutaneous JOSE INSULIN Jazmine PECK  Shahzad, A.P.R.N.   10 Units at 12/22/22 0514    scopolamine (TRANSDERM-SCOP) patch 1 Patch  1 Patch Transdermal Q72HRS Jazmine Pike, A.P.R.N.   1 Patch at 12/21/22 1223    nystatin (MYCOSTATIN) [id number] UNIT/ML suspension 500,000 Units  5 mL Topical 4X/DAY Jazmine Pike, A.P.R.N.   500,000 Units at 12/22/22 1305    dexmedetomidine (PRECEDEX) 400 mcg/100mL NS premix infusion  0.1-1.5 mcg/kg/hr (Ideal) Intravenous Continuous Yu Tejada, A.P.R.N.   Stopped at 12/22/22 0713    ceFAZolin (Ancef) 2 g in  mL IVPB  2 g Intravenous Q8HRS India Khanna A.P.R.N.   Stopped at 12/22/22 0830    insulin regular (HumuLIN R,NovoLIN R) injection  3-13 Units Subcutaneous Q6HRS Jazmine Pike, A.P.R.N.   7 Units at 12/22/22 1126    And    dextrose 10 % BOLUS 25 g  25 g Intravenous Q15 MIN PRN Jazmine Pike, A.P.R.N.   Stopped at 12/20/22 0304    acetaminophen (Tylenol) tablet 650 mg  650 mg Enteral Tube Q6HRS PRN Jazmine Pike, A.P.R.N.   650 mg at 12/17/22 2341    Respiratory Therapy Consult   Nebulization Continuous RT Ace Pool M.D.        famotidine (PEPCID) tablet 20 mg  20 mg Enteral Tube Q12HRS Ace Pool M.D.   20 mg at 12/22/22 0513    MD Alert...ICU Electrolyte Replacement per Pharmacy   Other PHARMACY TO DOSE Ace Pool M.D.        lidocaine (XYLOCAINE) 1 % injection 2 mL  2 mL Tracheal Tube Q30 MIN PRN Ace Pool M.D.        ipratropium-albuterol (DUONEB) nebulizer solution  3 mL Nebulization Q2HRS PRN (RT) Ace Pool M.D.        enoxaparin (Lovenox) inj 40 mg  40 mg Subcutaneous DAILY AT 1800 Jazmine Pike A.P.R.N.   40 mg at 12/21/22 1801    Pharmacy Consult: Enteral tube insertion - review meds/change route/product selection  1 Each Other PHARMACY TO DOSE SILVIANO NeumannRMARICRUZ        senna-docusate (PERICOLACE or SENOKOT S) 8.6-50 MG per tablet 2 Tablet  2 Tablet Enteral Tube BID SILVIANO NeumannRMARICRUZ   2 Tablet at 12/18/22 0557    And    polyethylene glycol/lytes (MIRALAX)  PACKET 1 Packet  1 Packet Enteral Tube QDAY PRN Jazmine Pike, A.P.R.N.   1 Packet at 12/17/22 1512    And    magnesium hydroxide (MILK OF MAGNESIA) suspension 30 mL  30 mL Enteral Tube QDAY PRN Jazmine Pike, A.P.R.N.        And    bisacodyl (DULCOLAX) suppository 10 mg  10 mg Rectal QDAY PRN Jazmien Pike, A.P.R.N.           Fluids    Intake/Output Summary (Last 24 hours) at 12/22/2022 1430  Last data filed at 12/22/2022 1200  Gross per 24 hour   Intake 1516.99 ml   Output 605 ml   Net 911.99 ml       Laboratory  Recent Labs     12/20/22  0228 12/21/22  0418   ISTATAPH 7.510* 7.427   ISTATAPCO2 36.6 42.4*   ISTATAPO2 80 261*   ISTATATCO2 30 29   [id number] 97 100*   ISTATARTHCO3 29.2* 27.9*   ISTATARTBE 6* 3   ISTATTEMP 100.4 F 99.7 F   ISTATFIO2 30 30   ISTATSPEC Arterial Arterial   ISTATAPHTC 7.494 7.418   [id number] 85 264*         Recent Labs     12/20/22  0505 12/21/22  0510 12/22/22  0400   SODIUM 134* 138 142   POTASSIUM 3.8 3.9 4.0   CHLORIDE 98 104 110   CO2 27 25 24   BUN 15 18 25*   CREATININE 0.47* 0.70 0.54   MAGNESIUM 1.8 2.4 2.0   PHOSPHORUS 3.8 3.9 3.5   CALCIUM 8.6 8.8 8.2*     Recent Labs     12/20/22  0505 12/21/22  0510 12/22/22  0400   ALTSGPT 18  --   --    ASTSGOT 15  --   --    ALKPHOSPHAT 99  --   --    TBILIRUBIN 0.3  --   --    GLUCOSE 175* 112* 143*     Recent Labs     12/20/22  0505 12/21/22  0510 12/22/22  0400   WBC 7.3 7.7 7.9   ASTSGOT 15  --   --    ALTSGPT 18  --   --    ALKPHOSPHAT 99  --   --    TBILIRUBIN 0.3  --   --      Recent Labs     12/20/22  0505 12/21/22  0510 12/22/22  0400   RBC 3.24* 3.25* 2.98*   HEMOGLOBIN 9.8* 10.0* 9.0*   HEMATOCRIT 30.4* 30.7* 28.8*   PLATELETCT 257 291 276       Imaging  X-Ray:  I have personally reviewed the images and compared with prior images.  MRI:   Reviewed    Assessment/Plan  * Coma (HCC)  Assessment & Plan  -Initial event likely due to hypoglycemia; unknown downtime but possibly as long as 4 hours with a blood sugar of  35  -History of methamphetamine use poorly controlled diabetes melitis  -cEEG captured no seizures, pattern of encephalopathy.  Keppra discontinued  -Judicious use of narcotics, sedation  -Fall precautions, aspiration precautions keep  HOB greater than 30 degrees  -MRI of head showed possible tiny acute infarcts to the right frontal lobe periventricular white matter; parenchymal atrophy; no other specific findings noted  -Serial neurologic exams      Acute respiratory failure with hypercapnia (HCC)  Assessment & Plan  -Intubated 10/4 for airway protection for GCS of 3  -Titrate FiO2 to keep sats greater than 90%   -optimize acid-base balance, oxygenation and ventilation  -HOB > 30  -daily SBT  -Early mobility, ABCDEF bundle  -DVT prophylaxis; chemical VTE  -Pepcid, chlorhexidine    Oral thrush  Assessment & Plan  -Nystatin swab 4 times daily x7 days    MSSA infection, non-invasive  Assessment & Plan  -Tracheal aspirate positive MSSA  -Continue ABX  -No indications of sepsis, hemodynamically stable off pressors    Electrolyte abnormality  Assessment & Plan  -Follow BMP and replace electrolytes as indicated    Methamphetamine abuse (HCC)- (present on admission)  Assessment & Plan  Cessation counseling when appropriate    Protein calorie malnutrition (HCC)- (present on admission)  Assessment & Plan  Body mass index is 17.06 kg/m².  -dietary consult    Hypoglycemia- (present on admission)  Assessment & Plan  -A1c 13.2  -No hypoglycemia events overnight.  Chemsticks 100s to 120s past 24 hours.  Decrease Lantus to one-time daily dosing today.  Continue to follow closely and adjust as indicated (patient on 40 units Lantus as outpatient.  Unknown compliance)  -Continue tube feedings at goal  -Continue Accu-Cheks every 6 hours and as needed with SSI (has not been requiring)  -Hypoglycemia protocol    Dyslipidemia- (present on admission)  Assessment & Plan  -Continue statin    Cigarette nicotine dependence without  "complication- (present on admission)  Assessment & Plan  -Cessation education and counseling when clinically appropriate    Hypothyroidism due to Hashimoto's thyroiditis- (present on admission)  Assessment & Plan  -TSH WNL  -home levothyroxine    Hypertension- (present on admission)  Assessment & Plan  -History of, unknown compliance with medications  -As needed IV antihypertensives with parameters    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 In review of yesterday's note (12/21/2022), there are no changes except as documented above.     Discussed patient condition and risk of morbidity and/or mortality with Family, RN, RT, Therapies, Pharmacy, Dietary, Charge nurse / hot rounds, and my attending Dr. Gonda   The patient remains critically ill.  Critical care time = 45 minutes in directly providing and coordinating critical care and extensive data review.  No time overlap and excludes procedures.  Please note that this dictation was created using voice recognition software. The accuracy of the dictation is limited to the abilities of the software. I have made every reasonable attempt to correct obvious errors, but I expect that there are errors of grammar and possibly content that I did not discover before finalizing the note.     "

## 2022-12-22 NOTE — CARE PLAN
The patient is Watcher - Medium risk of patient condition declining or worsening    Shift Goals  Clinical Goals: Increase wakefullness, SBT, moibilize  Patient Goals: erlinda  Family Goals: erlinda    Progress made toward(s) clinical / shift goals:      Patient is not progressing towards the following goals:      Problem: Knowledge Deficit - Standard  Goal: Patient and family/care givers will demonstrate understanding of plan of care, disease process/condition, diagnostic tests and medications  Outcome: Not Progressing     Problem: Skin Integrity  Goal: Skin integrity is maintained or improved  Outcome: Not Progressing     Problem: Psychosocial  Goal: Patient's level of anxiety will decrease  Outcome: Not Progressing  Goal: Patient's ability to verbalize feelings about condition will improve  Outcome: Not Progressing  Goal: Patient's ability to re-evaluate and adapt role responsibilities will improve  Outcome: Not Progressing  Goal: Patient and family will demonstrate ability to cope with life altering diagnosis and/or procedure  Outcome: Not Progressing     Problem: Mechanical Ventilation  Goal: Patient will be able to express needs and understand communication  Outcome: Not Progressing     Report received from day shift RN. Neuro assessment conducted with day shift RN. See MAR and Flow sheets for medication administration and assessments. The patient was agitated last night and was started on a drip. Report was given to oncoming RN.

## 2022-12-23 NOTE — PROGRESS NOTES
0520: Dariela CROWELL notified of hypotension and decreased UO. Orders received for 500 cc LR bolus

## 2022-12-23 NOTE — CARE PLAN
Problem: Ventilation  Goal: Ability to achieve and maintain unassisted ventilation or tolerate decreased levels of ventilator support  Description: Target End Date:  4 days     Document on Vent flowsheet    1.  Support and monitor invasive and noninvasive mechanical ventilation  2.  Monitor ventilator weaning response  3.  Perform ventilator associated pneumonia prevention interventions  4.  Manage ventilation therapy by monitoring diagnostic test results  Outcome: Not Met       Respiratory Update     Ventilator Daily Summary    Vent Day #8    Ventilator settings changed this shift: No (12/350/+8/30%)    Weaning trials: SBT x 1    Respiratory Procedures: Extubated/re-intubated.     Plan: Continue current ventilator settings and wean mechanical ventilation as tolerated per physician orders.

## 2022-12-23 NOTE — CARE PLAN
The patient is Watcher - Medium risk of patient condition declining or worsening    Shift Goals  Clinical Goals: SBT, Mobilize, increase wakefullness  Patient Goals: erlinda  Family Goals: erlinda    Progress made toward(s) clinical / shift goals:   - Pt SBT'd this morning, extubated and re-intubated  - Pt continues to not open eyes or follow commands but moves all extremities.  - Pt not mobilized today as pt extubated and re-intubated.  RASS = -3 to -4 post re-intubation.      Patient is not progressing towards the following goals:    Problem: Knowledge Deficit - Standard  Goal: Patient and family/care givers will demonstrate understanding of plan of care, disease process/condition, diagnostic tests and medications  Outcome: Not Progressing  - Pt remains obtunded     Problem: Safety - Medical Restraint  Goal: Free from restraint(s) (Restraint for Interference with Medical Device)  Outcome: Not Progressing  - Pt restrained via left wrist last night and restrained via bilateral wrists as hands will go up towards tubes/lines     Problem: Psychosocial  Goal: Patient's ability to verbalize feelings about condition will improve  Outcome: Not Progressing  - Pt remains obtunded    Goal: Patient's ability to re-evaluate and adapt role responsibilities will improve  Outcome: Not Progressing  - Pt remains obtunded    Goal: Patient and family will demonstrate ability to cope with life altering diagnosis and/or procedure  Outcome: Not Progressing  - Pt remains obtunded     Problem: Mechanical Ventilation  Goal: Successful weaning off mechanical ventilator, spontaneously maintains adequate gas exchange  Outcome: Not Progressing  - Pt extubated at approximately 1030 and re-intubated at approximately 1300 due to unable to inability to clear secretions.  Goal: Patient will be able to express needs and understand communication  Outcome: Not Progressing  - Pt remains obtunded     Problem: Risk for Aspiration  Goal: Patient's risk for  aspiration will be absent or decrease  Outcome: Not Progressing  - Pt re-intubated as unable to clear secretions.

## 2022-12-23 NOTE — PROGRESS NOTES
Critical Care Progress Note    Date of admission  12/15/2022    Chief Complaint  62 y.o. female admitted 12/15/2022 for altered loc, unresponsive     Hospital Course  Poonam Mayo is a 62-year-old female with PMH significant for uncontrolled DM 1 (most recent A1c 11.6), HTN, HLD, tobacco dependence, hypothyroidism, methamphetamine abuse, and questionable medication compliance who presented to the emergency department via EMS on 12/15 after being found down with the vacuum running.  Blood glucose was 32.  She received 250 cc of D10 with repeat blood sugar 38.  Upon arrival to the emergency department her blood sugar was in the 200s.  She was intubated for airway protection, started on D10 infusion, and admitted to the ICU for close hemodynamic monitoring and ventilator management.  12/16 - hypoglycemia improving, weaning D10. Starting TF's. MRI brain pending  12/[id number] MRI with possible small acute infarct to right frontal lobe;no other acute intracranial abnormality noted-patient slightly more responsive  12/19 - Vent day #5. Tolerating SBT's. Not following for parameters  12/20 - spontaneous movements, nothing purposeful.  Repeat EEG captured no seizures.  Presence of triphasic waveforms consistent with toxic metabolic etiology.  12/21- Extubated however pt failed and required reintubation due to tachypnea and difficulty supporting her airway     12/22 reintubated midday back on full vent support, febrile    Interval Problem Update  Reviewed last 24 hour events:    Intubated yesterday, failed in 3 hrs  Sedate on ventilator  Dexmedetomidine -> SAT  Not following, opens eyes  EOB 5 min  SR 60-70s  Soft Bp this AM - 500cc NS bolus  No pressors  SBP 90-110s  UO adequate  APV CMV 12/350/8/30%  CXR subtle left base opacity better?  Secretions small  ABG 7.45/41/81  Tmax 101.7  WBC 8.7  12/17 tracheal aspirate growing MSSA  Ancef  TF goal  Hemoglobin 9.6  131/creatinine 0.55  Glucose  231  SSI/glargine  Famotidine/Lovenox PPx    Up lantus?  Hold SBT     YESTERDAY  -T max- 100  -Precedex for agitation- waned off   -Vent day # 8   -APVC 12/350/8  -24 hr +800/since admission +4000  -Extubated- failed and re intubated   -Last BM 12/21  -Ancef -MSSA pneumonia   -I/O: 1800/2200    Review of Systems  Review of Systems   Unable to perform ROS: Medical condition      Vital Signs for last 24 hours   Pulse:  [67-93] 69  Resp:  [12-24] 17  BP: ()/(51-81) 87/55  SpO2:  [97 %-100 %] 98 %    Hemodynamic parameters for last 24 hours       Respiratory Information for the last 24 hours  Vent Mode: APVCMV  Rate (breaths/min): 12  Vt Target (mL): 350  PEEP/CPAP: 8  P Support: 5  MAP: 10  Control VTE (exp VT): 383    Physical Exam   Physical Exam  Vitals reviewed.   Constitutional:       Appearance: She is underweight. She is ill-appearing.      Interventions: She is intubated and restrained.      Comments: Unresponsive    HENT:      Head: Normocephalic and atraumatic.      Mouth/Throat:      Mouth: Mucous membranes are moist.   Eyes:      General: Lids are normal.      Pupils: Pupils are equal, round, and reactive to light.   Neck:      Vascular: No JVD.   Cardiovascular:      Rate and Rhythm: Normal rate and regular rhythm.      Pulses:           Radial pulses are 2+ on the right side and 2+ on the left side.        Dorsalis pedis pulses are 2+ on the right side and 2+ on the left side.      Heart sounds: Heart sounds are distant. No murmur heard.  Pulmonary:      Effort: She is intubated.      Breath sounds: Examination of the right-lower field reveals decreased breath sounds and rhonchi. Examination of the left-lower field reveals decreased breath sounds and rhonchi. Decreased breath sounds and rhonchi present. No wheezing or rales.   Chest:   Breasts:     Breasts are symmetrical.   Abdominal:      General: Abdomen is flat. Bowel sounds are normal.      Palpations: Abdomen is soft.   Genitourinary:      Comments: Catalan  Musculoskeletal:      Cervical back: Neck supple.   Skin:     General: Skin is warm and dry.      Capillary Refill: Capillary refill takes less than 2 seconds.      Coloration: Skin is not cyanotic or mottled.      Nails: There is no clubbing.   Neurological:      Comments: Unresponsive and intubated again  Moving all extremities purposefully, slightly to painful stimulation  CN intact, min gag     Psychiatric:      Comments: GREGORIO       Medications  Current Facility-Administered Medications   Medication Dose Route Frequency Provider Last Rate Last Admin    fentaNYL (SUBLIMAZE) injection  mcg   mcg Intravenous Q HOUR PRN India Khanna A.P.R.N.   50 mcg at 12/23/22 1635    insulin GLARGINE (Lantus,Semglee) injection  10 Units Subcutaneous QAM INSULIN Jazmine Pike, A.P.R.N.   10 Units at 12/23/22 0557    scopolamine (TRANSDERM-SCOP) patch 1 Patch  1 Patch Transdermal Q72HRS Jazmine Pike, A.P.R.N.   1 Patch at 12/21/22 1223    nystatin (MYCOSTATIN) 299309 UNIT/ML suspension 500,000 Units  5 mL Topical 4X/DAY Jazmine Pike, A.P.R.N.   500,000 Units at 12/23/22 1711    dexmedetomidine (PRECEDEX) 400 mcg/100mL NS premix infusion  0.1-1.5 mcg/kg/hr (Ideal) Intravenous Continuous Yu Tejada, A.P.R.N. 1.4 mL/hr at 12/23/22 1846 0.1 mcg/kg/hr at 12/23/22 1846    ceFAZolin (Ancef) 2 g in  mL IVPB  2 g Intravenous Q8HRS India Khanna A.P.R.N.   Stopped at 12/23/22 1632    insulin regular (HumuLIN R,NovoLIN R) injection  3-13 Units Subcutaneous Q6HRS Jazmine Pike, A.P.R.N.   7 Units at 12/23/22 1725    And    dextrose 10 % BOLUS 25 g  25 g Intravenous Q15 MIN PRN Jazmine Pike, A.P.R.N.   Stopped at 12/20/22 0304    acetaminophen (Tylenol) tablet 650 mg  650 mg Enteral Tube Q6HRS PRN SILVIANO NeumannRDuaneN.   650 mg at 12/23/22 1711    Respiratory Therapy Consult   Nebulization Continuous RT Ace Pool M.D.        famotidine (PEPCID) tablet 20 mg  20 mg Enteral Tube Q12HRS Ace HADLEY  ARIELLA Pool   20 mg at 12/23/22 1711    MD Alert...ICU Electrolyte Replacement per Pharmacy   Other PHARMACY TO DOSE Ace Pool M.D.        lidocaine (XYLOCAINE) 1 % injection 2 mL  2 mL Tracheal Tube Q30 MIN PRN Ace Pool M.D.        ipratropium-albuterol (DUONEB) nebulizer solution  3 mL Nebulization Q2HRS PRN (RT) Ace Pool M.D.        enoxaparin (Lovenox) inj 40 mg  40 mg Subcutaneous DAILY AT 1800 Jazmine Pike, A.P.R.N.   40 mg at 12/23/22 1710    Pharmacy Consult: Enteral tube insertion - review meds/change route/product selection  1 Each Other PHARMACY TO DOSE Jazmine Pike, A.P.R.N.        senna-docusate (PERICOLACE or SENOKOT S) 8.6-50 MG per tablet 2 Tablet  2 Tablet Enteral Tube BID Jazmine Pike, A.P.R.N.   2 Tablet at 12/23/22 1710    And    polyethylene glycol/lytes (MIRALAX) PACKET 1 Packet  1 Packet Enteral Tube QDAY PRN Jazmine Pike, A.P.R.N.   1 Packet at 12/17/22 1512    And    magnesium hydroxide (MILK OF MAGNESIA) suspension 30 mL  30 mL Enteral Tube QDAY PRN Jazmine Pike, A.P.R.N.        And    bisacodyl (DULCOLAX) suppository 10 mg  10 mg Rectal QDAY PRN Jazmine Pike, A.P.R.N.           Fluids    Intake/Output Summary (Last 24 hours) at 12/23/2022 1856  Last data filed at 12/23/2022 1800  Gross per 24 hour   Intake 2095.09 ml   Output 885 ml   Net 1210.09 ml       Laboratory  Recent Labs     12/21/22  0418 12/23/22  0503   ISTATAPH 7.427 7.467   ISTATAPCO2 42.4* 38.4*   ISTATAPO2 261* 75   ISTATATCO2 29 29   ELAVCZX6ERJ 100* 96   ISTATARTHCO3 27.9* 27.8*   ISTATARTBE 3 4*   ISTATTEMP 99.7 F 100.9 F   ISTATFIO2 30 30   ISTATSPEC Arterial Arterial   ISTATAPHTC 7.418 7.448   [id number] 264* 81         Recent Labs     12/21/22  0510 12/22/22  0400 12/23/22  0427   SODIUM 138 142 141   POTASSIUM 3.9 4.0 4.0   CHLORIDE 104 110 107   CO2 25 24 25   BUN 18 25* 31*   CREATININE 0.70 0.54 0.55   MAGNESIUM 2.4 2.0  --    PHOSPHORUS 3.9 3.5  --    CALCIUM 8.8 8.2* 8.7      Recent Labs     12/21/22  0510 12/22/22  0400 12/23/22  0427   GLUCOSE 112* 143* 231*     Recent Labs     12/21/22  0510 12/22/22  0400 12/23/22  0427   WBC 7.7 7.9 8.6     Recent Labs     12/21/22  0510 12/22/22  0400 12/23/22  0427   RBC 3.25* 2.98* 3.19*   HEMOGLOBIN 10.0* 9.0* 9.6*   HEMATOCRIT 30.7* 28.8* 30.8*   PLATELETCT 291 276 302       Imaging  X-Ray:  I have personally reviewed the images and compared with prior images.  MRI:   Reviewed    Assessment/Plan  * Coma (HCC)  Assessment & Plan  -Initial event likely due to hypoglycemia; unknown downtime but possibly as long as 4 hours with a blood sugar of 35  -History of methamphetamine use poorly controlled diabetes melitis  -cEEG captured no seizures, pattern of encephalopathy.  Keppra discontinued  -Judicious use of narcotics, sedation  -Fall precautions, aspiration precautions keep  HOB greater than 30 degrees  -MRI of head showed possible tiny acute infarcts to the right frontal lobe periventricular white matter; parenchymal atrophy; no other specific findings noted  -Serial neurologic exams-continuing to slowly improve  -Extubated-after 2 hours reintubated due to tachypnea with difficulty controlling airway maintaining patent airway  -No change    Oral thrush  Assessment & Plan  -Nystatin swab 4 times daily x7 days and reassess    MSSA infection, non-invasive  Assessment & Plan  -Tracheal aspirate positive MSSA  -Continue ABX-Ancef  -No indications of sepsis, hemodynamically stable off pressor  -momnitor    Acute respiratory failure with hypercapnia (HCC)  Assessment & Plan  -Intubated 10/4 for airway protection for GCS of 3  -Titrate FiO2 to keep sats greater than 90%   -optimize acid-base balance, oxygenation and ventilation  -Extubated 12/- failed extubation after approx 2 hrs due to tachypnea, difficulty maintaining patent aireway  -HOB > 30  -daily SBT  -Early mobility, ABCDEF bundle  -DVT prophylaxis; chemical VTE  -Pepcid,  "chlorhexidine  -Hold SBT for now  -Trach for goals of care discussions?    Electrolyte abnormality  Assessment & Plan  -Follow BMP/Mg/Phos and replace electrolytes as indicated    Methamphetamine abuse (HCC)- (present on admission)  Assessment & Plan  Cessation counseling when appropriate  UDS + 12/15    Protein calorie malnutrition (HCC)- (present on admission)  Assessment & Plan  Body mass index is 17.06 kg/m².  -dietary consult-TF/supplements - watch for refeeding issues    Hypoglycemia- (present on admission)  Assessment & Plan  -A1c 13.2  -No hypoglycemia events overnight.  -Continue Lantus to one-time daily dosing today.    -Continue to follow closely and adjust as indicated (patient on 40 units Lantus as outpatient.  Unknown compliance)  -Continue tube feedings at goal  -Continue Accu-Cheks every 6 hours and as needed with SSI (has not been requiring)  -Hypoglycemia protocols    Dyslipidemia- (present on admission)  Assessment & Plan  -Continue statin    Cigarette nicotine dependence without complication- (present on admission)  Assessment & Plan  -Cessation education and counseling when clinically appropriate    Hypothyroidism due to Hashimoto's thyroiditis- (present on admission)  Assessment & Plan  -TSH WNL  -continue home levothyroxine    Hypertension- (present on admission)  Assessment & Plan  -History of, unknown compliance with medications  -As needed IV antihypertensives with parameters-out patient regimen when clinically appropriate    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 In review of yesterday's note (12/22/2022), there are no changes except as documented above.     Discussed patient condition and risk of morbidity and/or " mortality with RN, RT, Pharmacy, Code status disscussed, and Charge nurse / hot rounds  The patient remains critically ill.  Critical care time = 45 minutes in directly providing and coordinating critical care and extensive data review.  No time overlap and excludes procedures.

## 2022-12-23 NOTE — PROGRESS NOTES
Pt arrival to S116 at 0140     Vitals:   BP: 101/61  Temp 101.7 f   HR: 77  RR: 14  SaO2: 98  Wt: 45.2 kg     _____________________________________________________________     2 RN skin check completed with REESE Rhodes     Areas of concern/Skin observations:  Bilat heels- red/blanching.   Bruising right arm/hand  Bruising left arm/hand   Blanching/redness- bilat ears  Right nare blanching  Coccyx sacrum- blanching/redness  Right hip- red/blanching     Devices in use, assessed under and interventions (as appropriate) for skin protection:  EKG leads electrodes; NIBP; SpO2 probe; SCDs; indwelling catheter; Right nare NG; ET tube w/ securement device; sacral mepilex; bilat heel mepilex    Interventions in place such as:   q2 hour turns  Keeping skin clean and dry  Bed Type: low airloss  Mobility: as tolerated  Rotating ET tube q2h (in coordination with RTs): Yes     ______________________________________________________________     Personal belongings:   Personal belongings: plush animal; book; candy(assorted); wellness card; ovi ornament; deodorant

## 2022-12-23 NOTE — CARE PLAN
Problem: Safety - Medical Restraint  Goal: Remains free of injury from restraints (Restraint for Interference with Medical Device)  Outcome: Progressing  Goal: Free from restraint(s) (Restraint for Interference with Medical Device)  Outcome: Not Progressing     Problem: Pain - Standard  Goal: Alleviation of pain or a reduction in pain to the patient’s comfort goal  Outcome: Progressing   The patient is Watcher - Medium risk of patient condition declining or worsening    Shift Goals  Clinical Goals: Neuro stability  Patient Goals: GREGORIO  Family Goals: No family present    Progress made toward(s) clinical / shift goals:  Pt restraints assessed every two hours and prn. Pt pulling at tubes and lines. Pt given pain medication, Pt resting comfortably after medication.     Patient is not progressing towards the following goals:      Problem: Safety - Medical Restraint  Goal: Free from restraint(s) (Restraint for Interference with Medical Device)  Outcome: Not Progressing

## 2022-12-23 NOTE — THERAPY
Physical Therapy Contact Note    Patient Name: Poonam Mayo  Age:  62 y.o., Sex:  female  Medical Record #: [id number]  Today's Date: 12/23/2022    Discussed with nsg, pt remains intubated with sedation (decreasing per MAR). Per RN, pt still not following commands. Boris defer PT eval until pt able to follow and participate in skill therapeutic activities/intervention.    Polly Lal, PT, DPT  Ext. [phone]

## 2022-12-23 NOTE — DIETARY
Nutrition support weekly update:  Day 8 of admit.  Poonam Mayo is a 62 y.o. female with admitting DX of Hypoglycemia.  Tube feeding initiated on 12/16. Current TF via NG is Hypoglycemia.    Assessment:  Weight 12/23: 45.2 kg via bed scale. Weight down slightly from initial bed scale weight of 46.5 kg on 12/15, but weight has varied up and down and was 48.7 kg on 12/22.   Re-estimate of nutritional needs not indicated at this time.     Evaluation:   Tube feed with Diabetisource AC documented at goal rate of 50 ml/hour.  Medications include Precedex, Ancef, Pepcid, Lantus, SSI, Senna  Labs 12/23 include Na 141, K+ 4, Glu 231 (H), BUN 31 (H), Creat 0.55, 12/20: Alb 2.9 (L). 12/19: Pre-Alb 10.8 (L), CRP 15.77 (H)  LBM 12/21  Current CHO controlled formula remains appropriate.    Malnutrition risk: Severe per RD assessment 12/15.    Recommendations/Plan:  Continue Diabetisource AC goal rate of 50 ml/hour.  Fluids per MD.  Monitor weight.     RD following

## 2022-12-23 NOTE — CARE PLAN
Problem: Ventilation  Goal: Ability to achieve and maintain unassisted ventilation or tolerate decreased levels of ventilator support  Description: Target End Date:  4 days     Document on Vent flowsheet    1.  Support and monitor invasive and noninvasive mechanical ventilation  2.  Monitor ventilator weaning response  3.  Perform ventilator associated pneumonia prevention interventions  4.  Manage ventilation therapy by monitoring diagnostic test results  Outcome: Not Progressing      Ventilator Daily Summary    Vent Day # 9  7.5 @ 21    APVCMV: 12/350/+8/30%    Plan: Continue current ventilator settings and wean mechanical ventilation as tolerated per physician orders.

## 2022-12-23 NOTE — CARE PLAN
The patient is Watcher - Medium risk of patient condition declining or worsening    Shift Goals  Clinical Goals: Stable or improve neuro status, wean sedation  Patient Goals: GREGORIO  Family Goals: No family present    Progress made toward(s) clinical / shift goals:   Patient remaining free of falls, pain managed per MAR and appropriate interventions, maintaining safe management of airway  Problem: Fall Risk  Goal: Patient will remain free from falls  Outcome: Progressing     Problem: Pain - Standard  Goal: Alleviation of pain or a reduction in pain to the patient’s comfort goal  Outcome: Progressing     Problem: Mechanical Ventilation  Goal: Safe management of artificial airway and ventilation  Outcome: Progressing       Patient is not progressing towards the following goals:  Patient still requires medical restraints at this time    Problem: Safety - Medical Restraint  Goal: Remains free of injury from restraints (Restraint for Interference with Medical Device)  Outcome: Not Progressing  Goal: Free from restraint(s) (Restraint for Interference with Medical Device)  Outcome: Not Progressing

## 2022-12-23 NOTE — CARE PLAN
The patient is Watcher - Medium risk of patient condition declining or worsening    Shift Goals  Clinical Goals: Monitor neuro status, minimize sedation, monitor hemodynamics, monitor for signs of infection  Patient Goals: GREGORIO  Family Goals: GREGORIO    Progress made toward(s) clinical / shift goals:    Problem: Knowledge Deficit - Standard  Goal: Patient and family/care givers will demonstrate understanding of plan of care, disease process/condition, diagnostic tests and medications  Outcome: Progressing     Problem: Skin Integrity  Goal: Skin integrity is maintained or improved  Outcome: Progressing     Problem: Fall Risk  Goal: Patient will remain free from falls  Outcome: Progressing     Problem: Safety - Medical Restraint  Goal: Remains free of injury from restraints (Restraint for Interference with Medical Device)  Outcome: Progressing  Goal: Free from restraint(s) (Restraint for Interference with Medical Device)  Outcome: Progressing     Problem: Pain - Standard  Goal: Alleviation of pain or a reduction in pain to the patient’s comfort goal  Outcome: Progressing     Problem: Psychosocial  Goal: Patient's level of anxiety will decrease  Outcome: Progressing  Goal: Patient's ability to verbalize feelings about condition will improve  Outcome: Progressing  Goal: Patient's ability to re-evaluate and adapt role responsibilities will improve  Outcome: Progressing  Goal: Patient and family will demonstrate ability to cope with life altering diagnosis and/or procedure  Outcome: Progressing     Problem: Hemodynamics  Goal: Patient's hemodynamics, fluid balance and neurologic status will be stable or improve  Outcome: Progressing     Problem: Respiratory  Goal: Patient will achieve/maintain optimum respiratory ventilation and gas exchange  Outcome: Progressing     Problem: Mechanical Ventilation  Goal: Safe management of artificial airway and ventilation  Outcome: Progressing  Goal: Successful weaning off mechanical  ventilator, spontaneously maintains adequate gas exchange  Outcome: Progressing  Goal: Patient will be able to express needs and understand communication  Outcome: Progressing     Problem: Risk for Aspiration  Goal: Patient's risk for aspiration will be absent or decrease  Outcome: Progressing     Problem: Urinary - Renal Perfusion  Goal: Ability to achieve and maintain adequate renal perfusion and functioning will improve  Outcome: Progressing     Problem: Venous Thromboembolism (VTE) Prevention  Goal: The patient will remain free from venous thromboembolism (VTE)  Outcome: Progressing     Problem: Nutrition  Goal: Patient's nutritional and fluid intake will be adequate or improve  Outcome: Progressing  Goal: Enteral nutrition will be maintained or improve  Outcome: Progressing     Problem: Urinary Elimination  Goal: Establish and maintain regular urinary output  Outcome: Progressing     Problem: Bowel Elimination  Goal: Establish and maintain regular bowel function  Outcome: Progressing       Patient is not progressing towards the following goals:

## 2022-12-24 NOTE — PROGRESS NOTES
Critical Care Progress Note    Date of admission  12/15/2022    Chief Complaint  62 y.o. female admitted 12/15/2022 for altered loc, unresponsive     Hospital Course  Poonam Mayo is a 62-year-old female with PMH significant for uncontrolled DM 1 (most recent A1c 11.6), HTN, HLD, tobacco dependence, hypothyroidism, methamphetamine abuse, and questionable medication compliance who presented to the emergency department via EMS on 12/15 after being found down with the vacuum running.  Blood glucose was 32.  She received 250 cc of D10 with repeat blood sugar 38.  Upon arrival to the emergency department her blood sugar was in the 200s.  She was intubated for airway protection, started on D10 infusion, and admitted to the ICU for close hemodynamic monitoring and ventilator management.  12/16 - hypoglycemia improving, weaning D10. Starting TF's. MRI brain pending  [id number] MRI with possible small acute infarct to right frontal lobe;no other acute intracranial abnormality noted-patient slightly more responsive  12/19 - Vent day #5. Tolerating SBT's. Not following for parameters  12/20 - spontaneous movements, nothing purposeful.  Repeat EEG captured no seizures.  Presence of triphasic waveforms consistent with toxic metabolic etiology.  12/21- Extubated however pt failed and required reintubation due to tachypnea and difficulty supporting her airway     12/22 reintubated midday back on full vent support, febrile  12/23 full vent, not following    Interval Problem Update  Reviewed last 24 hour events:    Poorly responsive on vent  DEX 0.8, restless  SR 60-70s  SBp 90-100s  UO adequate  B/c 28/0.65  APV CMV 12/350/8/50%  Day #10  CXR no film  Secretions mod  Tm 101.7  WBC 10.8  12/17 trach asp MSSA  ANCEF  Hgb 10.2  Glucose 158, 288, 207, 295, 195  Glargine 10  Famotidine/lovenox PPx  TF goal  EOB 7 min    Dr. Gonda discussed with family re: trach few days ago consider next week, that family was from out of town  I  reviewd case at length with daughter who lives in [place]    CXR  Watch for HCAP  Consider Neuro F/u next week  Consider trach     YESTERDAY  Intubated yesterday, failed in 3 hrs  Sedate on ventilator  Dexmedetomidine -> SAT  Not following, opens eyes  EOB 5 min  SR 60-70s  Soft Bp this AM - 500cc NS bolus  No pressors  SBP 90-110s  UO adequate  APV CMV 12/350/8/30%  CXR subtle left base opacity better?  Secretions small  ABG 7.45/41/81  Tmax 101.7  WBC 8.7  12/17 tracheal aspirate growing MSSA  Ancef  TF goal  Hemoglobin 9.6  131/creatinine 0.55  Glucose 231  SSI/glargine  Famotidine/Lovenox PPx    Up lantus?  Hold SBT    Review of Systems  Review of Systems   Unable to perform ROS: Medical condition      Vital Signs for last 24 hours   Pulse:  [] 64  Resp:  [12-52] 12  BP: ()/(52-70) 95/57  SpO2:  [67 %-100 %] 99 %    Hemodynamic parameters for last 24 hours       Respiratory Information for the last 24 hours  Vent Mode: APVCMV  Rate (breaths/min): 12  Vt Target (mL): 350  PEEP/CPAP: 8  MAP: 11  Control VTE (exp VT): 492    Physical Exam   Physical Exam  Vitals reviewed.   Constitutional:       Appearance: She is underweight. She is ill-appearing.      Interventions: She is intubated and restrained.      Comments: Unresponsive    HENT:      Head: Normocephalic and atraumatic.      Mouth/Throat:      Mouth: Mucous membranes are moist.   Eyes:      General: Lids are normal.      Pupils: Pupils are equal, round, and reactive to light.   Neck:      Vascular: No JVD.   Cardiovascular:      Rate and Rhythm: Normal rate and regular rhythm.      Pulses:           Radial pulses are 2+ on the right side and 2+ on the left side.        Dorsalis pedis pulses are 2+ on the right side and 2+ on the left side.      Heart sounds: Heart sounds are distant. No murmur heard.  Pulmonary:      Effort: She is intubated.      Breath sounds: Examination of the right-lower field reveals decreased breath sounds and rhonchi.  Examination of the left-lower field reveals decreased breath sounds and rhonchi. Decreased breath sounds and rhonchi present. No wheezing or rales.   Chest:   Breasts:     Breasts are symmetrical.   Abdominal:      General: Abdomen is flat. Bowel sounds are normal.      Palpations: Abdomen is soft.   Genitourinary:     Comments: Catalan  Musculoskeletal:      Cervical back: Neck supple.   Skin:     General: Skin is warm and dry.      Capillary Refill: Capillary refill takes less than 2 seconds.      Coloration: Skin is not cyanotic or mottled.      Nails: There is no clubbing.   Neurological:      Comments: Unresponsive and intubated again  Moving all extremities purposefully, slightly to painful stimulation  CN intact, min gag, spont opens eys not tracking     Psychiatric:      Comments: GREGORIO       Medications  Current Facility-Administered Medications   Medication Dose Route Frequency Provider Last Rate Last Admin    dexmedetomidine (PRECEDEX) 400 mcg/100mL NS premix infusion  0.1-1.5 mcg/kg/hr Intravenous Continuous Thierry Pepper M.D. 10.7 mL/hr at 12/24/22 1540 1 mcg/kg/hr at 12/24/22 1540    enoxaparin (Lovenox) inj 30 mg  30 mg Subcutaneous DAILY AT 1800 Thierry Pepper M.D.   30 mg at 12/24/22 1720    fentaNYL (SUBLIMAZE) injection  mcg   mcg Intravenous Q HOUR PRN RAVINDER Patel.P.R.N.   50 mcg at 12/24/22 1353    insulin GLARGINE (Lantus,Semglee) injection  10 Units Subcutaneous QAM INSULIN Jazmine Pike A.P.R.N.   10 Units at 12/24/22 0616    scopolamine (TRANSDERM-SCOP) patch 1 Patch  1 Patch Transdermal Q72HRS Jazmine Pike A.P.R.N.   1 Patch at 12/24/22 1115    nystatin (MYCOSTATIN) [id number] UNIT/ML suspension 500,000 Units  5 mL Topical 4X/DAY Jazmine Pike A.P.R.N.   500,000 Units at 12/24/22 2022    ceFAZolin (Ancef) 2 g in  mL IVPB  2 g Intravenous Q8HRS RAVINDER Patel.P.R.N.   Stopped at 12/24/22 1753    insulin regular (HumuLIN R,NovoLIN R) injection  3-13 Units  Subcutaneous Q6HRS Jazmine Pike, A.P.R.N.   3 Units at 12/24/22 1705    And    dextrose 10 % BOLUS 25 g  25 g Intravenous Q15 MIN PRN Jazmine Pike, A.P.R.N.   Stopped at 12/20/22 0304    acetaminophen (Tylenol) tablet 650 mg  650 mg Enteral Tube Q6HRS PRN Jazmine Pike, A.P.R.N.   650 mg at 12/24/22 0647    Respiratory Therapy Consult   Nebulization Continuous RT Ace Pool M.D.        famotidine (PEPCID) tablet 20 mg  20 mg Enteral Tube Q12HRS Ace Pool M.D.   20 mg at 12/24/22 1720    MD Alert...ICU Electrolyte Replacement per Pharmacy   Other PHARMACY TO DOSE Ace Pool M.D.        lidocaine (XYLOCAINE) 1 % injection 2 mL  2 mL Tracheal Tube Q30 MIN PRN Ace Pool M.D.   2 mL at 12/23/22 2115    ipratropium-albuterol (DUONEB) nebulizer solution  3 mL Nebulization Q2HRS PRN (RT) Ace Pool M.D.        Pharmacy Consult: Enteral tube insertion - review meds/change route/product selection  1 Each Other PHARMACY TO DOSE Jazmine Pike, A.P.R.N.        senna-docusate (PERICOLACE or SENOKOT S) 8.6-50 MG per tablet 2 Tablet  2 Tablet Enteral Tube BID Jazmine Pike, A.P.R.N.   2 Tablet at 12/24/22 1720    And    polyethylene glycol/lytes (MIRALAX) PACKET 1 Packet  1 Packet Enteral Tube QDAY PRN Jazmine Pike, A.P.R.N.   1 Packet at 12/24/22 0608    And    magnesium hydroxide (MILK OF MAGNESIA) suspension 30 mL  30 mL Enteral Tube QDAY PRN Jazmine Pike, A.P.R.N.   30 mL at 12/24/22 1720    And    bisacodyl (DULCOLAX) suppository 10 mg  10 mg Rectal QDAY PRN Jazmine Pike, A.P.R.N.           Fluids    Intake/Output Summary (Last 24 hours) at 12/24/2022 2120  Last data filed at 12/24/2022 2000  Gross per 24 hour   Intake 1636.87 ml   Output 740 ml   Net 896.87 ml       Laboratory  Recent Labs     12/23/22  0503   ISTATAPH 7.467   ISTATAPCO2 38.4*   ISTATAPO2 75   ISTATATCO2 29   [id number] 96   ISTATARTHCO3 27.8*   ISTATARTBE 4*   ISTATTEMP 100.9 F   ISTATFIO2 30   ISTATSPEC Arterial    ISTATAPHTC 7.448   [id number] 81         Recent Labs     12/22/22  0400 12/23/22  0427 12/24/22  0430   SODIUM 142 141 142   POTASSIUM 4.0 4.0 3.9   CHLORIDE 110 107 106   CO2 24 25 27   BUN 25* 31* 28*   CREATININE 0.54 0.55 0.65   MAGNESIUM 2.0  --   --    PHOSPHORUS 3.5  --   --    CALCIUM 8.2* 8.7 8.6     Recent Labs     12/22/22  0400 12/23/22  0427 12/24/22  0430   GLUCOSE 143* 231* 116*     Recent Labs     12/22/22  0400 12/23/22  0427 12/24/22  0430   WBC 7.9 8.6 10.8     Recent Labs     12/22/22  0400 12/23/22 0427 12/24/22  0430   RBC 2.98* 3.19* 3.32*   HEMOGLOBIN 9.0* 9.6* 10.2*   HEMATOCRIT 28.8* 30.8* 32.1*   PLATELETCT 276 302 292       Imaging  X-Ray:  I have personally reviewed the images and compared with prior images.  MRI:   Reviewed    Assessment/Plan  * Coma (HCC)  Assessment & Plan  -Initial event likely due to hypoglycemia; unknown downtime but possibly as long as 4 hours with a blood sugar of 35  -History of methamphetamine use poorly controlled diabetes melitis  -cEEG captured no seizures, pattern of encephalopathy.  Keppra discontinued  -Judicious use of narcotics, sedation  -Fall precautions, aspiration precautions keep  HOB greater than 30 degrees  -MRI of head showed possible tiny acute infarcts to the right frontal lobe periventricular white matter; parenchymal atrophy; no other specific findings noted  -Serial neurologic exams-continuing to slowly improve  -Extubated-after 2 hours reintubated due to tachypnea with difficulty controlling airway maintaining patent airway  -No change    Slt better per daughter vs last week  Consider formal Neuro eval for prognostication    Oral thrush  Assessment & Plan  -Nystatin swab 4 times daily x7 days and reassess    MSSA infection, non-invasive  Assessment & Plan  -Tracheal aspirate positive MSSA  -Continue ABX-Ancef  -No indications of sepsis, hemodynamically stable off pressor  -momnitor    Acute respiratory failure with hypercapnia  (HCC)  Assessment & Plan  -Intubated 10/4 for airway protection for GCS of 3  -Titrate FiO2 to keep sats greater than 90%   -optimize acid-base balance, oxygenation and ventilation  -Extubated 12/- failed extubation after approx 2 hrs due to tachypnea, difficulty maintaining patent aireway  -HOB > 30  -daily SBT  -Early mobility, ABCDEF bundle  -DVT prophylaxis; chemical VTE  -Pepcid, chlorhexidine  -Hold SBT for now  -Discussed trach option with daughter  -Goals of care discussions with neurology?    Electrolyte abnormality  Assessment & Plan  -Follow BMP/Mg/Phos and replace electrolytes as indicated    Methamphetamine abuse (HCC)- (present on admission)  Assessment & Plan  Cessation counseling when appropriate  UDS + 12/15    Protein calorie malnutrition (HCC)- (present on admission)  Assessment & Plan  Body mass index is 17.06 kg/m².  -dietary consult-TF/supplements - watch for refeeding issues - phos/Mg/K    Hypoglycemia- (present on admission)  Assessment & Plan  -A1c 13.2  -No hypoglycemia events overnight.  -Continue Lantus to one-time daily dosing today - caution with increasing dose  -Continue to follow closely and adjust as indicated (patient on 40 units Lantus as outpatient.  Unknown compliance)  -Continue tube feedings at goal  -Continue Accu-Cheks every 6 hours and as needed with SSI (has not been requiring much)  -Hypoglycemia protocols    Dyslipidemia- (present on admission)  Assessment & Plan  -Continue statin    Cigarette nicotine dependence without complication- (present on admission)  Assessment & Plan  -Cessation education and counseling when clinically appropriate  COPD?  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 In review of yesterday's note (12/23/2022), there are no changes except as documented above.     Discussed patient condition and risk of morbidity and/or mortality with Family, RN, RT, Pharmacy, Code status disscussed, and Charge nurse / hot rounds  The patient remains critically ill.  Critical care time = 50 minutes in directly providing and coordinating critical care and extensive data review.  No time overlap and excludes procedures.      "

## 2022-12-24 NOTE — CARE PLAN
Problem: Ventilation  Goal: Ability to achieve and maintain unassisted ventilation or tolerate decreased levels of ventilator support  Description: Target End Date:  4 days     Document on Vent flowsheet    1.  Support and monitor invasive and noninvasive mechanical ventilation  2.  Monitor ventilator weaning response  3.  Perform ventilator associated pneumonia prevention interventions  4.  Manage ventilation therapy by monitoring diagnostic test results  Outcome: Not Progressing      Ventilator Daily Summary    Vent Day # 10  7.5 @ 21    APVCMV: 12/350/+8/30%    Plan: Continue current ventilator settings and wean mechanical ventilation as tolerated per physician orders.

## 2022-12-24 NOTE — CARE PLAN
Problem: Safety - Medical Restraint  Goal: Remains free of injury from restraints (Restraint for Interference with Medical Device)  Outcome: Progressing  Goal: Free from restraint(s) (Restraint for Interference with Medical Device)  Outcome: Not Progressing     Problem: Pain - Standard  Goal: Alleviation of pain or a reduction in pain to the patient’s comfort goal  Outcome: Progressing   The patient is Watcher - Medium risk of patient condition declining or worsening    Shift Goals  Clinical Goals: Improve neuro exam  Patient Goals: GREGORIO  Family Goals: No family present    Progress made toward(s) clinical / shift goals:  Pt restraints assessed every two hours and prn. Pt pulling at lines and tubes. Pt given pain medication per scale. Pt resting comfortably after medication.     Patient is not progressing towards the following goals:      Problem: Safety - Medical Restraint  Goal: Free from restraint(s) (Restraint for Interference with Medical Device)  Outcome: Not Progressing

## 2022-12-24 NOTE — CARE PLAN
The patient is Watcher - Medium risk of patient condition declining or worsening    Shift Goals  Clinical Goals: Improve neuro exam  Patient Goals: GREGORIO  Family Goals: No family present    Progress made toward(s) clinical / shift goals:    Plan of care discussed with MD and RN, pain managed per MAR, maintaining safe management of airway    Problem: Knowledge Deficit - Standard  Goal: Patient and family/care givers will demonstrate understanding of plan of care, disease process/condition, diagnostic tests and medications  Outcome: Progressing     Problem: Pain - Standard  Goal: Alleviation of pain or a reduction in pain to the patient’s comfort goal  Outcome: Progressing     Problem: Mechanical Ventilation  Goal: Safe management of artificial airway and ventilation  Outcome: Progressing          Patient is not progressing towards the following goals:  Patient still requiring medical restraints at this time    Problem: Safety - Medical Restraint  Goal: Remains free of injury from restraints (Restraint for Interference with Medical Device)  Outcome: Not Progressing  Goal: Free from restraint(s) (Restraint for Interference with Medical Device)  Outcome: Not Progressing

## 2022-12-25 NOTE — CARE PLAN
Problem: Ventilation  Goal: Ability to achieve and maintain unassisted ventilation or tolerate decreased levels of ventilator support  Description: Target End Date:  4 days     Document on Vent flowsheet    1.  Support and monitor invasive and noninvasive mechanical ventilation  2.  Monitor ventilator weaning response  3.  Perform ventilator associated pneumonia prevention interventions  4.  Manage ventilation therapy by monitoring diagnostic test results  Outcome: Not Progressing      Ventilator Daily Summary    Vent Day # 11  7.5 @21    APVCMV: 12/350/+8/30%    Weaning trials: Failed SAT    Plan: Continue current ventilator settings and wean mechanical ventilation as tolerated per physician orders.

## 2022-12-25 NOTE — PROGRESS NOTES
Critical Care Progress Note    Date of admission  12/15/2022    Chief Complaint  62 y.o. female admitted 12/15/2022 for altered loc, unresponsive     Hospital Course  Poonam Mayo is a 62-year-old female with PMH significant for uncontrolled DM 1 (most recent A1c 11.6), HTN, HLD, tobacco dependence, hypothyroidism, methamphetamine abuse, and questionable medication compliance who presented to the emergency department via EMS on 12/15 after being found down with the vacuum running.  Blood glucose was 32.  She received 250 cc of D10 with repeat blood sugar 38.  Upon arrival to the emergency department her blood sugar was in the 200s.  She was intubated for airway protection, started on D10 infusion, and admitted to the ICU for close hemodynamic monitoring and ventilator management.  12/16 - hypoglycemia improving, weaning D10. Starting TF's. MRI brain pending  12/[id number] MRI with possible small acute infarct to right frontal lobe;no other acute intracranial abnormality noted-patient slightly more responsive  12/19 - Vent day #5. Tolerating SBT's. Not following for parameters  12/20 - spontaneous movements, nothing purposeful.  Repeat EEG captured no seizures.  Presence of triphasic waveforms consistent with toxic metabolic etiology.  12/21- Extubated however pt failed and required reintubation due to tachypnea and difficulty supporting her airway     12/22 reintubated midday back on full vent support, febrile  12/23 full vent, not following  12/24 Full vent, no change LOC, daughter updated    Interval Problem Update  Reviewed last 24 hour events:    No change neurologically, restless  Dexmedetomidine 1.0  FENT PRN  EOB 5 min, desats  Not following- opens eyes to voice - withdraws  SR 60s  -110s  UO adequate  APV CMV 12/350/8  Secretions  CXR unchanged left base PTX/opacities retrocardiac/minimal  T-max 101.7  WBC 11.3  Ancef  Cultures MSSA  Hemoglobin 8.6  No bleeding clinically  Glucose 258  glucose    Neuro eval tomorrow for prognostication  Trach & PEG vs CC-low C slight better versus last week  Limit sedation  D/c scopalamine patch       YESTERDAY  Poorly responsive on vent  DEX 0.8, restless  SR 60-70s  SBp 90-100s  UO adequate  B/c 28/0.65  APV CMV 12/350/8/50%  Day #10  CXR no film  Secretions mod  Tm 101.7  WBC 10.8  12/17 trach asp MSSA  ANCEF  Hgb 10.2  Glucose 158, 288, 207, 295, 195  Glargine 10  Famotidine/lovenox PPx  TF goal  EOB 7 min    Dr. Gonda discussed with family re: trach few days ago consider next week, that family was from out of town  I reviewd case at length with daughter who lives in [place]    CXR  Watch for HCAP  Consider Neuro F/u next week  Consider trach    Review of Systems  Review of Systems   Unable to perform ROS: Medical condition      Vital Signs for last 24 hours   Pulse:  [59-91] 91  Resp:  [11-33] 27  BP: ()/(51-70) 108/65  SpO2:  [96 %-100 %] 98 %    Hemodynamic parameters for last 24 hours       Respiratory Information for the last 24 hours  Vent Mode: APVCMV  Rate (breaths/min): 12  Vt Target (mL): 350  PEEP/CPAP: 8  MAP: 114  Control VTE (exp VT): 355    Physical Exam   Physical Exam  Vitals reviewed.   Constitutional:       Appearance: She is underweight. She is ill-appearing.      Interventions: She is intubated and restrained.      Comments: Unresponsive    HENT:      Head: Normocephalic and atraumatic.      Mouth/Throat:      Mouth: Mucous membranes are moist.   Eyes:      General: Lids are normal.      Pupils: Pupils are equal, round, and reactive to light.   Neck:      Vascular: No JVD.   Cardiovascular:      Rate and Rhythm: Normal rate and regular rhythm.      Pulses:           Radial pulses are 2+ on the right side and 2+ on the left side.        Dorsalis pedis pulses are 2+ on the right side and 2+ on the left side.      Heart sounds: Heart sounds are distant. No murmur heard.  Pulmonary:      Effort: She is intubated.      Breath sounds:  Examination of the right-lower field reveals decreased breath sounds and rhonchi. Examination of the left-lower field reveals decreased breath sounds and rhonchi. Decreased breath sounds and rhonchi present. No wheezing or rales.   Chest:   Breasts:     Breasts are symmetrical.   Abdominal:      General: Abdomen is flat. Bowel sounds are normal.      Palpations: Abdomen is soft.   Genitourinary:     Comments: Catalan  Musculoskeletal:      Cervical back: Neck supple.   Skin:     General: Skin is warm and dry.      Capillary Refill: Capillary refill takes less than 2 seconds.      Coloration: Skin is not cyanotic or mottled.      Nails: There is no clubbing.   Neurological:      Comments: Unresponsive and intubated again  Moving all extremities purposefully, slightly to painful stimulation  CN intact, min gag, spont opens eys not tracking     Psychiatric:      Comments: GREGORIO       Medications  Current Facility-Administered Medications   Medication Dose Route Frequency Provider Last Rate Last Admin    dexmedetomidine (PRECEDEX) 400 mcg/100mL NS premix infusion  0.1-1.5 mcg/kg/hr Intravenous Continuous Thierry Pepper M.D. 6.4 mL/hr at 12/25/22 1519 0.6 mcg/kg/hr at 12/25/22 1519    enoxaparin (Lovenox) inj 30 mg  30 mg Subcutaneous DAILY AT 1800 Thierry Pepper M.D.   30 mg at 12/24/22 1720    fentaNYL (SUBLIMAZE) injection  mcg   mcg Intravenous Q HOUR PRN India Khanna, A.P.R.N.   100 mcg at 12/25/22 1443    insulin GLARGINE (Lantus,Semglee) injection  10 Units Subcutaneous QAM INSULIN Jazmine Pike A.P.R.N.   10 Units at 12/25/22 0531    nystatin (MYCOSTATIN) [id number] UNIT/ML suspension 500,000 Units  5 mL Topical 4X/DAY Jazmine Pike A.P.R.N.   500,000 Units at 12/25/22 1212    ceFAZolin (Ancef) 2 g in  mL IVPB  2 g Intravenous Q8HRS hTierry Pepper M.D. 200 mL/hr at 12/25/22 1521 2 g at 12/25/22 1521    insulin regular (HumuLIN R,NovoLIN R) injection  3-13 Units Subcutaneous Q6HRS Jazmine PECK  Shahzad, A.P.R.N.   3 Units at 12/25/22 1200    And    dextrose 10 % BOLUS 25 g  25 g Intravenous Q15 MIN PRN Jazmine Pike A.P.R.N.   Stopped at 12/20/22 0304    acetaminophen (Tylenol) tablet 650 mg  650 mg Enteral Tube Q6HRS PRN Jazmine Pike A.P.R.N.   650 mg at 12/24/22 0647    Respiratory Therapy Consult   Nebulization Continuous RT Ace Pool M.D.        famotidine (PEPCID) tablet 20 mg  20 mg Enteral Tube Q12HRS Ace Pool M.D.   20 mg at 12/25/22 0527    MD Alert...ICU Electrolyte Replacement per Pharmacy   Other PHARMACY TO DOSE Ace Pool M.D.        lidocaine (XYLOCAINE) 1 % injection 2 mL  2 mL Tracheal Tube Q30 MIN PRN Ace Pool M.D.   2 mL at 12/23/22 2115    ipratropium-albuterol (DUONEB) nebulizer solution  3 mL Nebulization Q2HRS PRN (RT) Ace Pool M.D.        Pharmacy Consult: Enteral tube insertion - review meds/change route/product selection  1 Each Other PHARMACY TO DOSE Jazmine Pike A.P.R.N.        senna-docusate (PERICOLACE or SENOKOT S) 8.6-50 MG per tablet 2 Tablet  2 Tablet Enteral Tube BID Jazmine Pike A.P.R.N.   2 Tablet at 12/25/22 0527    And    polyethylene glycol/lytes (MIRALAX) PACKET 1 Packet  1 Packet Enteral Tube QDAY PRN Jazmine Pike A.P.R.N.   1 Packet at 12/24/22 0608    And    magnesium hydroxide (MILK OF MAGNESIA) suspension 30 mL  30 mL Enteral Tube QDAY PRN Jazmine Pike A.P.R.N.   30 mL at 12/24/22 1720    And    bisacodyl (DULCOLAX) suppository 10 mg  10 mg Rectal QDAY PRN Jazmine Pike A.P.R.N.   10 mg at 12/25/22 0527       Fluids    Intake/Output Summary (Last 24 hours) at 12/25/2022 1525  Last data filed at 12/25/2022 1400  Gross per 24 hour   Intake 1632.2 ml   Output 840 ml   Net 792.2 ml       Laboratory  Recent Labs     12/23/22  0503   ISTATAPH 7.467   ISTATAPCO2 38.4*   ISTATAPO2 75   ISTATATCO2 29   DIHFXAO1GIF 96   ISTATARTHCO3 27.8*   ISTATARTBE 4*   ISTATTEMP 100.9 F   ISTATFIO2 30   ISTATSPEC Arterial   ISTATAPHTC  7.448   [id number] 81         Recent Labs     12/23/22  0427 12/24/22  0430 12/25/22  0518   SODIUM 141 142 140   POTASSIUM 4.0 3.9 4.4   CHLORIDE 107 106 103   CO2 25 27 27   BUN 31* 28* 26*   CREATININE 0.55 0.65 0.61   CALCIUM 8.7 8.6 8.7     Recent Labs     12/23/22  0427 12/24/22  0430 12/25/22  0518   GLUCOSE 231* 116* 258*     Recent Labs     12/23/22  0427 12/24/22  0430 12/25/22  0420   WBC 8.6 10.8 11.3*     Recent Labs     12/23/22  0427 12/24/22  0430 12/25/22  0420   RBC 3.19* 3.32* 2.82*   HEMOGLOBIN 9.6* 10.2* 8.6*   HEMATOCRIT 30.8* 32.1* 26.8*   PLATELETCT 302 292 274       Imaging  X-Ray:  I have personally reviewed the images and compared with prior images.  MRI:   Reviewed    Assessment/Plan  * Coma (HCC)  Assessment & Plan  -Initial event likely due to hypoglycemia; unknown downtime but possibly as long as 4 hours with a blood sugar of 35  -History of methamphetamine use poorly controlled diabetes melitis  -cEEG captured no seizures, pattern of encephalopathy.  Keppra discontinued  -Judicious use of narcotics, sedation  -Fall precautions, aspiration precautions keep  HOB greater than 30 degrees  -MRI of head showed possible tiny acute infarcts to the right frontal lobe periventricular white matter; parenchymal atrophy; no other specific findings noted  -Serial neurologic exams-continuing to slowly improve  -Extubated-after 2 hours reintubated due to tachypnea with difficulty controlling airway maintaining patent airway  -No change    Slt better per daughter vs last week  Daughter requesting neuro consultation for prognostication, obtain in a.m.    Oral thrush  Assessment & Plan  -Nystatin swab 4 times daily x7 days and reassess    MSSA infection, non-invasive  Assessment & Plan  -Tracheal aspirate positive MSSA  -Continue ABX-Ancef  -No indications of sepsis, hemodynamically stable off pressor  -momnitor    Acute respiratory failure with hypercapnia (HCC)  Assessment & Plan  -Intubated 10/4 for  airway protection for GCS of 3  -Titrate FiO2 to keep sats greater than 90%   -optimize acid-base balance, oxygenation and ventilation  -Extubated 12/- failed extubation after approx 2 hrs due to tachypnea, difficulty maintaining patent airway  -Unlikely to do well with another trial of extubation-trach versus CC  -HOB > 30  -daily SBT  -Early mobility, ABCDEF bundle  -DVT prophylaxis; chemical VTE  -Pepcid, chlorhexidine  -Hold SBT for now  -Discussed trach option with daughter  -Goals of care discussions with neurology?    Electrolyte abnormality  Assessment & Plan  -Follow BMP/Mg/Phos and replace electrolytes as indicated    Methamphetamine abuse (HCC)- (present on admission)  Assessment & Plan  Cessation counseling when appropriate  UDS + 12/15    Protein calorie malnutrition (HCC)- (present on admission)  Assessment & Plan  Body mass index is 17.06 kg/m².  -dietary consult-TF/supplements - watch for refeeding issues -replete phos/Mg/K  Mobilize    Hypoglycemia- (present on admission)  Assessment & Plan  -A1c 13.2  -No hypoglycemia events overnight.  -Continue Lantus to one-time daily dosing today - caution with increasing dose  -Continue to follow closely and adjust as indicated (patient on 40 units Lantus as outpatient.  Unknown compliance)  -Continue tube feedings at goal  -Continue Accu-Cheks every 6 hours and as needed with SSI (has not been requiring much)  -Hypoglycemia protocols    Dyslipidemia- (present on admission)  Assessment & Plan  -Continue statin    Cigarette nicotine dependence without complication- (present on admission)  Assessment & Plan  -Cessation education and counseling when clinically appropriate  COPD?  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 In review of yesterday's note (12/24/2022), there are no changes except as documented above.     Discussed patient condition and risk of morbidity and/or mortality with RN, RT, Pharmacy, and Charge nurse / hot rounds  The patient remains critically ill.  Critical care time = 40 minutes in directly providing and coordinating critical care and extensive data review.  No time overlap and excludes procedures.      "

## 2022-12-25 NOTE — CARE PLAN
The patient is Watcher - Medium risk of patient condition declining or worsening    Shift Goals  Clinical Goals: Q4 neuros exams, rest  Patient Goals: GREGORIO  Family Goals: GREGORIO    Progress made toward(s) clinical / shift goals:    Problem: Skin Integrity  Goal: Skin integrity is maintained or improved  Outcome: Progressing     Problem: Fall Risk  Goal: Patient will remain free from falls  Outcome: Progressing     Problem: Safety - Medical Restraint  Goal: Remains free of injury from restraints (Restraint for Interference with Medical Device)  Outcome: Progressing  Goal: Free from restraint(s) (Restraint for Interference with Medical Device)  Outcome: Progressing     Problem: Pain - Standard  Goal: Alleviation of pain or a reduction in pain to the patient’s comfort goal  Outcome: Progressing       Patient is not progressing towards the following goals:

## 2022-12-25 NOTE — DISCHARGE PLANNING
Chart review and assessment completed. Admitted on 12/15/22 after bring found down in her home by family. Family involved with care, no needs at this time. Patient is intubated on full vent. Not following. LMSW will continue to follow and assist with social/dc needs.     Care Transition Team Assessment    Information Source  Orientation Level: Unable to assess  Information Given By: Other (Comments)  Informant's Name: EMR  Who is responsible for making decisions for patient? : Patient    Readmission Evaluation  Is this a readmission?: No    Elopement Risk  Legal Hold: No  Ambulatory or Self Mobile in Wheelchair: No-Not an Elopement Risk  Elopement Risk: Not at Risk for Elopement    Interdisciplinary Discharge Planning  Patient or legal guardian wants to designate a caregiver: No    Discharge Preparedness  What is your plan after discharge?: Uncertain - pending medical team collaboration  What are your discharge supports?: Child  Prior Functional Level: Ambulatory, Independent with Activities of Daily Living, Independent with Medication Management    Functional Assesment  Prior Functional Level: Ambulatory, Independent with Activities of Daily Living, Independent with Medication Management    Finances  Financial Barriers to Discharge: No  Prescription Coverage: Yes    Advance Directive  Advance Directive?: Clinically incapacitated / Inappropriate    Domestic Abuse  Have you ever been the victim of abuse or violence?: Not Sure  Physical Abuse or Sexual Abuse: Unable to Assess due to Patient Condition  Verbal Abuse or Emotional Abuse: Unable to Assess due to Patient Condition    Psychological Assessment  History of Substance Abuse: None  History of Psychiatric Problems: No  Non-compliant with Treatment: No  Newly Diagnosed Illness: Yes    Discharge Risks or Barriers  Discharge risks or barriers?: Post-acute placement / services, Complex medical needs  Patient risk factors: Complex medical needs    Anticipated Discharge  Information  Discharge Disposition: Disch to a long term care facility

## 2022-12-25 NOTE — CARE PLAN
Problem: Safety - Medical Restraint  Goal: Remains free of injury from restraints (Restraint for Interference with Medical Device)  Outcome: Progressing  Goal: Free from restraint(s) (Restraint for Interference with Medical Device)  Outcome: Not Progressing     Problem: Pain - Standard  Goal: Alleviation of pain or a reduction in pain to the patient’s comfort goal  Outcome: Progressing   The patient is Watcher - Medium risk of patient condition declining or worsening    Shift Goals  Clinical Goals: Improve neuro exams  Patient Goals: GREGORIO  Family Goals: no family present    Progress made toward(s) clinical / shift goals: Pt restraints assessed every two hours and prn. Pt pulling at tubes and lines. Pt given pain medication per scale. Pt resting comfortably after medication.      Patient is not progressing towards the following goals:      Problem: Safety - Medical Restraint  Goal: Free from restraint(s) (Restraint for Interference with Medical Device)  Outcome: Not Progressing

## 2022-12-25 NOTE — PROGRESS NOTES
Updated APRN of pt's decrease in urine output. UOP being consistent 60mL in 2 hours. No orders at this time.

## 2022-12-26 PROBLEM — G93.40 ACUTE ENCEPHALOPATHY: Status: ACTIVE | Noted: 2022-01-01

## 2022-12-26 PROBLEM — J15.211 PNEUMONIA DUE TO METHICILLIN SUSCEPTIBLE STAPHYLOCOCCUS AUREUS (MSSA) (HCC): Status: ACTIVE | Noted: 2022-01-01

## 2022-12-26 NOTE — CARE PLAN
The patient is Watcher - Medium risk of patient condition declining or worsening    Shift Goals  Clinical Goals: Improve neuro exams  Patient Goals: GREGORIO  Family Goals: GREGORIO    Progress made toward(s) clinical / shift goals:    Problem: Fall Risk  Goal: Patient will remain free from falls  Outcome: Progressing     Problem: Safety - Medical Restraint  Goal: Remains free of injury from restraints (Restraint for Interference with Medical Device)  Outcome: Progressing  Goal: Free from restraint(s) (Restraint for Interference with Medical Device)  Outcome: Progressing     Problem: Pain - Standard  Goal: Alleviation of pain or a reduction in pain to the patient’s comfort goal  Outcome: Progressing       Patient is not progressing towards the following goals:

## 2022-12-26 NOTE — CARE PLAN
The patient is Watcher - Medium risk of patient condition declining or worsening    Shift Goals  Clinical Goals: Improved neuro exam, hemodynamic and oxygenation stability  Patient Goals: Unable to assess  Family Goals: No family present    Progress made toward(s) clinical / shift goals:    Problem: Skin Integrity  Goal: Skin integrity is maintained or improved  Outcome: Progressing     Problem: Fall Risk  Goal: Patient will remain free from falls  Outcome: Progressing     Problem: Safety - Medical Restraint  Goal: Remains free of injury from restraints (Restraint for Interference with Medical Device)  Outcome: Progressing   Q2h restraint monitoring.

## 2022-12-26 NOTE — PROGRESS NOTES
CRITICAL CARE MEDICINE ATTENDING PROGRESS NOTE    Date of admission  12/15/2022    Chief Complaint  62 y.o. female admitted 12/15/2022 with acute encephalopathy, profound hypoglycemia.  She has a history of DM 1, HTN, hypothyroidism, dyslipidemia, methamphetamine abuse.    Hospital Course        12/16 - hypoglycemia improving, weaning D10. Starting TF's. MRI brain pending  12/1809-iqnjkl-[id number] MRI with possible small acute infarct to right frontal lobe;no other acute intracranial abnormality noted-patient slightly more responsive  12/19 - Vent day #5. Tolerating SBT's. Not following for parameters  12/20 - spontaneous movements, nothing purposeful.  Repeat EEG captured no seizures.  Presence of triphasic waveforms consistent with toxic metabolic etiology.  12/21- Extubated however pt failed and required reintubation due to tachypnea and difficulty supporting her airway      12/22 reintubated midday back on full vent support, febrile  12/23 full vent, not following  12/24 Full vent, no change LOC, daughter updated    12/26 -    vent day 12.  Continue cefazolin.  SAT/SBT.  12/27 -    vent day 13.  SAT/SBT.  Continue cefazolin.      Interval Problem Update  Reviewed last 24 hour events:      SR  Desats with SAT/SBT  Agitated with SAT, but will not track or follow - not purposeful  100.0  +150 mL in the last 24  +8719 mL since admit  Replete potassium and magnesium      Review of Systems  Review of Systems   Unable to perform ROS: Acuity of condition     Vital Signs for the last 24 hours  Pulse:  [56-99] 60  Resp:  [0-66] 19  BP: ()/(55-98) 94/57  SpO2:  [85 %-100 %] 93 %    Hemodynamic parameters for the last 24 hours       Vent Settings for the last 24 hours  Vent Mode: ASV  PEEP/CPAP: 8  MAP: 12  Control VTE (exp VT): 373    Physical Exam  Physical Exam  Constitutional:       Appearance: She is not diaphoretic.      Comments: On ventilator   HENT:      Head: Normocephalic.      Mouth/Throat:      Pharynx: Oropharynx  is clear.   Eyes:      Pupils: Pupils are equal, round, and reactive to light.   Cardiovascular:      Comments: Sinus rhythm  Pulmonary:      Breath sounds: Rales (Very few crackles) present. No wheezing.   Abdominal:      General: There is no distension.      Tenderness: There is no abdominal tenderness.      Comments: Tolerating enteral tube feedings   Musculoskeletal:      Right lower leg: No edema.      Left lower leg: No edema.   Skin:     General: Skin is warm.   Neurological:      Comments: Awake.  Agitated with SAT.  Moves all 4.  Does not track or follow.       Medications  Current Facility-Administered Medications   Medication Dose Route Frequency Provider Last Rate Last Admin    magnesium sulfate IVPB premix 2 g  2 g Intravenous Once Harry Castaneda M.D.        potassium bicarbonate (KLYTE) effervescent tablet 25 mEq  25 mEq Enteral Tube Q2HRS Harry Castaneda M.D.        insulin GLARGINE (Lantus,Semglee) injection  15 Units Subcutaneous QAM INSULIN Harry Castaneda M.D.   15 Units at 12/27/22 0624    aspirin (ASA) tablet 325 mg  325 mg Enteral Tube DAILY Harry Castaneda M.D.   325 mg at 12/27/22 0623    atorvastatin (LIPITOR) tablet 10 mg  10 mg Enteral Tube DAILY Harry Castaneda M.D.   10 mg at 12/27/22 0623    levothyroxine (SYNTHROID) tablet 112 mcg  112 mcg Enteral Tube DAILY Harry Castaneda M.D.   112 mcg at 12/27/22 0623    lisinopril (PRINIVIL) tablet 5 mg  5 mg Enteral Tube DAILY Harry Castaneda M.D.   5 mg at 12/27/22 0623    HYDROmorphone (Dilaudid) injection 0.5-1 mg  0.5-1 mg Intravenous Q2HRS PRN Harry Castaneda M.D.   1 mg at 12/27/22 0845    dexmedetomidine (PRECEDEX) 400 mcg/100mL NS premix infusion  0.1-1.5 mcg/kg/hr Intravenous Continuous Thierry Pepper M.D. 16 mL/hr at 12/27/22 1000 1.5 mcg/kg/hr at 12/27/22 1000    enoxaparin (Lovenox) inj 30 mg  30 mg Subcutaneous DAILY AT 1800 Thierry Pepper M.D.   30 mg at 12/26/22 7598     nystatin (MYCOSTATIN) 567939 UNIT/ML suspension 500,000 Units  5 mL Topical 4X/DAY Jazmine Pike, A.P.R.N.   500,000 Units at 12/27/22 1239    ceFAZolin (Ancef) 2 g in  mL IVPB  2 g Intravenous Q8HRS Thierry Pepper M.D.   Stopped at 12/27/22 0858    insulin regular (HumuLIN R,NovoLIN R) injection  3-13 Units Subcutaneous Q6HRS Jazmine Pike, A.P.R.N.   5 Units at 12/27/22 1239    And    dextrose 10 % BOLUS 25 g  25 g Intravenous Q15 MIN PRN Jazmine Pike, A.P.R.N.   Stopped at 12/20/22 0304    acetaminophen (Tylenol) tablet 650 mg  650 mg Enteral Tube Q6HRS PRN Jazmine Pike A.P.R.N.   650 mg at 12/24/22 0647    Respiratory Therapy Consult   Nebulization Continuous RT Ace Pool M.D.        famotidine (PEPCID) tablet 20 mg  20 mg Enteral Tube Q12HRS Ace Pool M.D.   20 mg at 12/27/22 0623    MD Alert...ICU Electrolyte Replacement per Pharmacy   Other PHARMACY TO DOSE Ace Pool M.D.        lidocaine (XYLOCAINE) 1 % injection 2 mL  2 mL Tracheal Tube Q30 MIN PRN Ace Pool M.D.   2 mL at 12/23/22 2115    ipratropium-albuterol (DUONEB) nebulizer solution  3 mL Nebulization Q2HRS PRN (RT) Ace Pool M.D.        Pharmacy Consult: Enteral tube insertion - review meds/change route/product selection  1 Each Other PHARMACY TO DOSE Jazmine Pike, A.P.R.N.        senna-docusate (PERICOLACE or SENOKOT S) 8.6-50 MG per tablet 2 Tablet  2 Tablet Enteral Tube BID Jazmine Pike, A.P.R.N.   2 Tablet at 12/27/22 0623    And    polyethylene glycol/lytes (MIRALAX) PACKET 1 Packet  1 Packet Enteral Tube QDAY PRN Jazmine Pike A.P.R.N.   1 Packet at 12/24/22 0608    And    magnesium hydroxide (MILK OF MAGNESIA) suspension 30 mL  30 mL Enteral Tube QDAY PRN Jazmine Pike, A.P.R.N.   30 mL at 12/26/22 1715    And    bisacodyl (DULCOLAX) suppository 10 mg  10 mg Rectal QDAY PRN Jazmine Pike, A.P.R.N.   10 mg at 12/25/22 0527       Fluids    Intake/Output Summary (Last 24 hours) at  12/27/2022 1249  Last data filed at 12/27/2022 1000  Gross per 24 hour   Intake 1191.07 ml   Output 870 ml   Net 321.07 ml       Laboratory  Recent Labs     12/26/22 0445   ISTATAPH 7.440   ISTATAPCO2 44.5*   ISTATAPO2 78   ISTATATCO2 32   [id number] 96   ISTATARTHCO3 30.2*   ISTATARTBE 5*   ISTATTEMP 100.0 F   ISTATFIO2 30   ISTATSPEC Arterial   ISTATAPHTC 7.428   [id number] 83     Recent Labs     12/25/22  0518 12/26/22 0417 12/27/22  0430   SODIUM 140 139 137   POTASSIUM 4.4 4.8 3.6   CHLORIDE 103 102 101   CO2 27 30 26   BUN 26* 26* 22   CREATININE 0.61 0.72 0.56   MAGNESIUM  --  2.0 1.9   PHOSPHORUS  --  3.2 2.4*   CALCIUM 8.7 8.8 8.4*     Recent Labs     12/25/22 0518 12/26/22 0417 12/27/22  0430   PREALBUMIN  --  9.0*  --    GLUCOSE 258* 296* 128*     Recent Labs     12/25/22  0420 12/26/22  0417 12/27/22  0430   WBC 11.3* 11.4* 7.8   NEUTSPOLYS  --  79.50* 71.10   LYMPHOCYTES  --  12.40* 19.00*   MONOCYTES  --  5.30 6.80   EOSINOPHILS  --  1.60 2.20   BASOPHILS  --  0.60 0.40     Recent Labs     12/25/22 0420 12/26/22  0417 12/27/22  0430   RBC 2.82* 2.75* 2.94*   HEMOGLOBIN 8.6* 8.4* 8.9*   HEMATOCRIT 26.8* 26.7* 27.7*   PLATELETCT 274 319 349       Imaging  None    Assessment/Plan      Acute hypoxemic respiratory failure   Intubated 12/15   Failed attempt at liberation on 12/22   ABCDEF bundle   SAT/SBT as appropriate   Mobility level 2    Acute encephalopathy   Found down with profound hypoglycemia   MRI with possible tiny acute infarct in right frontal lobe   EEG without evidence of seizures   Cortisol level elevated   Ammonia normal   TSH normal    MSSA pneumonia   Continue cefazolin    Diabetes mellitus type 1   Abysmal control with glycohemoglobin of 13.2   Continue glargine, 15 units daily   Sliding scale insulin    Primary hypertension   Goal SBP less than 160   Continue lisinopril, 5 mg daily    Methamphetamine abuse   Urine drug screen positive for amphetamines on 12/15   Cessation  counseling when clinically appropriate    Hypothyroidism   Continue levothyroxine, 112 mcg daily    Dyslipidemia   Statin      VTE:  Lovenox  Ulcer: PPI  Lines: Catalan Catheter  Ongoing indication addressed    I have performed a physical exam and reviewed and updated ROS and Plan today (12/27/2022). In review of yesterday's note (12/26/2022), there are no changes except as documented above.    I have assessed and reassessed her respiratory status with spontaneous breathing trials and ventilator adjustments, airway mechanics, ventilator waveforms, blood pressure, hemodynamics, cardiovascular status and her neurologic status with the titration of dexmedetomidine.  She is at increased risk for worsening respiratory and CNS system dysfunction.    Discussed patient condition and risk of morbidity and/or mortality with RN, RT, Pharmacy, Charge nurse / hot rounds, and QA team    The patient remains critically ill.  Critical care time = 40 minutes in directly providing and coordinating critical care and extensive data review.  No time overlap and excludes procedures.    A Critical Care Medicine progress note may have been authored by a resident physician or advanced practitioner of nursing under my direct supervision on this date of service.  As the supervising and attending physician, I have either attested to or cosigned that document.  IN THE EVENT THAT DISCREPANCIES EXIST BETWEEN THIS DOCUMENT AND ANY DOCUMENT THAT I HAVE ATTESTED TO OR COSIGNED ON THIS DATE OF SERVICE, THEN THIS DOCUMENT REMAINS THE FINAL AUTHORITY AS TO MY ASSESSMENT AND PLAN REGARDING THE CARE OF THIS PATIENT.    Harry Castaneda MD  Pulmonary and Critical Care Medicine

## 2022-12-26 NOTE — THERAPY
Occupational Therapy Contact Note    Patient Name: Poonam Mayo  Age:  62 y.o., Sex:  female  Medical Record #: [id number]  Today's Date: 12/26/2022    Discussed missed therapy with RN.       12/26/22 9526   Occupational Therapy Treatment Plan   O.T. Treatment Plan Modify Current Treatment Plan   Modified Plan Change to Two Times per Week   Interdisciplinary Plan of Care Collaboration   Collaboration Comments RN reports pt continues to be vented, sedated, not following or able to participate. Hold OT tx due to inability to participate. Continue to monitor.

## 2022-12-26 NOTE — THERAPY
Physical Therapy Contact Note    Patient Name: Poonam Mayo  Age:  62 y.o., Sex:  female  Medical Record #: [id number]  Today's Date: 12/26/2022 12/26/22 1212   Interdisciplinary Plan of Care Collaboration   Collaboration Comments Discussed with RN, pt remains intubated, sedated, not following commands.  Will continue to HOLD until pt able to follow and participate in skilled therapy.

## 2022-12-26 NOTE — PROGRESS NOTES
CRITICAL CARE MEDICINE ATTENDING PROGRESS NOTE    Date of admission  12/15/2022    Chief Complaint  62 y.o. female admitted 12/15/2022 with acute encephalopathy, profound hypoglycemia.  She has a history of DM 1, HTN, hypothyroidism, dyslipidemia, methamphetamine abuse.    Hospital Course        12/16 - hypoglycemia improving, weaning D10. Starting TF's. MRI brain pending  12/1811-twliew-[id number] MRI with possible small acute infarct to right frontal lobe;no other acute intracranial abnormality noted-patient slightly more responsive  12/19 - Vent day #5. Tolerating SBT's. Not following for parameters  12/20 - spontaneous movements, nothing purposeful.  Repeat EEG captured no seizures.  Presence of triphasic waveforms consistent with toxic metabolic etiology.  12/21- Extubated however pt failed and required reintubation due to tachypnea and difficulty supporting her airway      12/22 reintubated midday back on full vent support, febrile  12/23 full vent, not following  12/24 Full vent, no change LOC, daughter updated    12/26 -    vent day 12.  Continue cefazolin.  SAT/SBT.      Interval Problem Update  Reviewed last 24 hour events:      SR  Dex  100.9  +1077 mL in the last 24  +8569 mL since admit      Review of Systems  Review of Systems   Unable to perform ROS: Acuity of condition     Vital Signs for the last 24 hours  Pulse:  [] 92  Resp:  [12-58] 23  BP: ()/(51-79) 127/77  SpO2:  [96 %-100 %] 100 %    Hemodynamic parameters for the last 24 hours       Vent Settings for the last 24 hours  Vent Mode: APVCMV  Rate (breaths/min): 12  Vt Target (mL): 350  PEEP/CPAP: 8  MAP: 11  Control VTE (exp VT): 402    Physical Exam  Physical Exam  Constitutional:       Appearance: She is not diaphoretic.      Comments: On ventilator   HENT:      Head: Normocephalic.      Mouth/Throat:      Pharynx: Oropharynx is clear.   Eyes:      Pupils: Pupils are equal, round, and reactive to light.   Cardiovascular:      Comments: Sinus  rhythm  Pulmonary:      Breath sounds: Rales (Very few crackles) present. No wheezing.   Abdominal:      General: There is no distension.      Tenderness: There is no abdominal tenderness.      Comments: Tolerating enteral tube feedings   Musculoskeletal:      Right lower leg: No edema.      Left lower leg: No edema.   Skin:     General: Skin is warm.   Neurological:      Comments: Awake.  Agitated with SAT.  Moves all 4.  Does not track or follow.       Medications  Current Facility-Administered Medications   Medication Dose Route Frequency Provider Last Rate Last Admin    [START ON 12/27/2022] insulin GLARGINE (Lantus,Semglee) injection  15 Units Subcutaneous QAM INSULIN Harry Castaneda M.D.        aspirin (ASA) tablet 325 mg  325 mg Oral DAILY Harry Castaneda M.D.        atorvastatin (LIPITOR) tablet 10 mg  10 mg Oral DAILY Harry Castaneda M.D.        levothyroxine (SYNTHROID) tablet 112 mcg  112 mcg Oral QDAY Harry Castaneda M.D.        lisinopril (PRINIVIL) tablet 5 mg  5 mg Oral DAILY Harry Castaneda M.D.        HYDROmorphone (Dilaudid) injection 0.5-1 mg  0.5-1 mg Intravenous Q2HRS PRN Harry Castaneda M.D.        dexmedetomidine (PRECEDEX) 400 mcg/100mL NS premix infusion  0.1-1.5 mcg/kg/hr Intravenous Continuous Thierry Pepper M.D. 10.7 mL/hr at 12/26/22 1202 1 mcg/kg/hr at 12/26/22 1202    enoxaparin (Lovenox) inj 30 mg  30 mg Subcutaneous DAILY AT 1800 Thierry Pepper M.D.   30 mg at 12/25/22 1716    nystatin (MYCOSTATIN) [id number] UNIT/ML suspension 500,000 Units  5 mL Topical 4X/DAY MANDEEP NeumannP.R.N.   500,000 Units at 12/26/22 1211    ceFAZolin (Ancef) 2 g in  mL IVPB  2 g Intravenous Q8HRS Thierry Pepper M.D.   Stopped at 12/26/22 0924    insulin regular (HumuLIN R,NovoLIN R) injection  3-13 Units Subcutaneous Q6HRS Jazmine Pike A.P.R.N.   3 Units at 12/26/22 1218    And    dextrose 10 % BOLUS 25 g  25 g Intravenous Q15 MIN PRN Jazmine PECK  Shahzad A.P.R.N.   Stopped at 12/20/22 0304    acetaminophen (Tylenol) tablet 650 mg  650 mg Enteral Tube Q6HRS PRN Jazmine Pike A.P.R.N.   650 mg at 12/24/22 0647    Respiratory Therapy Consult   Nebulization Continuous RT Ace Pool M.D.        famotidine (PEPCID) tablet 20 mg  20 mg Enteral Tube Q12HRS Ace Pool M.D.   20 mg at 12/26/22 0508    MD Alert...ICU Electrolyte Replacement per Pharmacy   Other PHARMACY TO DOSE Ace Pool M.D.        lidocaine (XYLOCAINE) 1 % injection 2 mL  2 mL Tracheal Tube Q30 MIN PRN Ace Pool M.D.   2 mL at 12/23/22 2115    ipratropium-albuterol (DUONEB) nebulizer solution  3 mL Nebulization Q2HRS PRN (RT) Ace Pool M.D.        Pharmacy Consult: Enteral tube insertion - review meds/change route/product selection  1 Each Other PHARMACY TO DOSE RAVINDER Neumann.P.R.N.        senna-docusate (PERICOLACE or SENOKOT S) 8.6-50 MG per tablet 2 Tablet  2 Tablet Enteral Tube BID Jazmine Pike A.P.R.N.   2 Tablet at 12/26/22 0508    And    polyethylene glycol/lytes (MIRALAX) PACKET 1 Packet  1 Packet Enteral Tube QDAY PRN Jazmine Pike, A.P.R.N.   1 Packet at 12/24/22 0608    And    magnesium hydroxide (MILK OF MAGNESIA) suspension 30 mL  30 mL Enteral Tube QDAY PRN Jazmine Pike, A.P.R.N.   30 mL at 12/24/22 1720    And    bisacodyl (DULCOLAX) suppository 10 mg  10 mg Rectal QDAY PRN Jazmine Pike, A.P.R.N.   10 mg at 12/25/22 0527       Fluids    Intake/Output Summary (Last 24 hours) at 12/26/2022 1259  Last data filed at 12/26/2022 1200  Gross per 24 hour   Intake 1731.69 ml   Output 790 ml   Net 941.69 ml       Laboratory  Recent Labs     12/26/22  0445   ISTATAPH 7.440   ISTATAPCO2 44.5*   ISTATAPO2 78   ISTATATCO2 32   [id number] 96   ISTATARTHCO3 30.2*   ISTATARTBE 5*   ISTATTEMP 100.0 F   ISTATFIO2 30   ISTATSPEC Arterial   ISTATAPHTC 7.428   [id number] 83     Recent Labs     12/24/22  0430 12/25/22  0518 12/26/22  0417   SODIUM 142 140 139    POTASSIUM 3.9 4.4 4.8   CHLORIDE 106 103 102   CO2 27 27 30   BUN 28* 26* 26*   CREATININE 0.65 0.61 0.72   MAGNESIUM  --   --  2.0   PHOSPHORUS  --   --  3.2   CALCIUM 8.6 8.7 8.8     Recent Labs     12/24/22 0430 12/25/22 0518 12/26/22 0417   PREALBUMIN  --   --  9.0*   GLUCOSE 116* 258* 296*     Recent Labs     12/24/22 0430 12/25/22 0420 12/26/22 0417   WBC 10.8 11.3* 11.4*   NEUTSPOLYS  --   --  79.50*   LYMPHOCYTES  --   --  12.40*   MONOCYTES  --   --  5.30   EOSINOPHILS  --   --  1.60   BASOPHILS  --   --  0.60     Recent Labs     12/24/22 0430 12/25/22 0420 12/26/22 0417   RBC 3.32* 2.82* 2.75*   HEMOGLOBIN 10.2* 8.6* 8.4*   HEMATOCRIT 32.1* 26.8* 26.7*   PLATELETCT 292 274 319       Imaging  X-Ray:  I have personally reviewed the images and compared with prior images. and My impression is: Unchanged minimal left lower lobe opacification    Assessment/Plan      Acute hypoxemic respiratory failure   Intubated 12/15   Failed attempt at liberation on 12/22   ABCDEF bundle   SAT/SBT as appropriate   Mobility level 2    Acute encephalopathy   Found down with profound hypoglycemia   MRI with possible tiny acute infarct in right frontal lobe   EEG without evidence of seizures   Cortisol level elevated   TSH normal    MSSA pneumonia   Continue cefazolin    Diabetes mellitus type 1   Abysmal control with glycohemoglobin of 13.2   Increase glargine, 15 units daily   Sliding scale insulin    Primary hypertension   Goal SBP less than 160   Resume lisinopril, 5 mg daily    Methamphetamine abuse   Urine drug screen positive for amphetamines on 12/15   Cessation counseling when clinically appropriate    Hypothyroidism   Resume levothyroxine, 112 mcg daily    Dyslipidemia   Resume statin      VTE:  Lovenox  Ulcer: PPI  Lines: Catalan Catheter  Ongoing indication addressed    I have performed a physical exam and reviewed and updated ROS and Plan today (12/26/2022). In review of yesterday's note (12/25/2022), there  are no changes except as documented above.     I have assessed and reassessed her respiratory status with ventilator adjustments and spontaneous breathing trials, airway mechanics, ventilator waveforms, blood pressure, hemodynamics, cardiovascular status and neurologic status with the titration of dexmedetomidine.  She is at increased risk for worsening respiratory and CNS system dysfunction.    Discussed patient condition and risk of morbidity and/or mortality with RN, RT, Pharmacy, Charge nurse / hot rounds, and QA team    The patient remains critically ill.  Critical care time = 35 minutes in directly providing and coordinating critical care and extensive data review.  No time overlap and excludes procedures.    A Critical Care Medicine progress note may have been authored by a resident physician or advanced practitioner of nursing under my direct supervision on this date of service.  As the supervising and attending physician, I have either attested to or cosigned that document.  IN THE EVENT THAT DISCREPANCIES EXIST BETWEEN THIS DOCUMENT AND ANY DOCUMENT THAT I HAVE ATTESTED TO OR COSIGNED ON THIS DATE OF SERVICE, THEN THIS DOCUMENT REMAINS THE FINAL AUTHORITY AS TO MY ASSESSMENT AND PLAN REGARDING THE CARE OF THIS PATIENT.    Harry Castaneda MD  Pulmonary and Critical Care Medicine

## 2022-12-27 NOTE — PROGRESS NOTES
"0845: Patient off Dex from 0630 on SAT, attempted Spont setting on Vent. Patient desaturating to 80% multiple times, RT to bedside. Patient became agitated and restless. Patient sticking out tongue multiple times and had extremity stiffness with tremulous movement. Patient not following commands and was not tracking. Patient placed back on Vent on ASV setting and Dex restarted.     1340: Patient desating down to 70% and became agitated and restless with \"Guitar-Strumming like\" movement in the right arm. The patient was sticking out her tongue multiple times and was not following commands or tracking. Pain medication given per MAR. Dr. Jorgensen to bedside to assess the patient. PRN Ativan given per Dr order. Orders received.   "

## 2022-12-27 NOTE — CARE PLAN
The patient is Watcher - Medium risk of patient condition declining or worsening    Shift Goals  Clinical Goals: Stable neuro exam, hemodynamic stability.  Patient Goals: GREGORIO  Family Goals: GREGORIO    Progress made toward(s) clinical / shift goals:      Problem: Skin Integrity  Goal: Skin integrity is maintained or improved  Outcome: Progressing     Problem: Fall Risk  Goal: Patient will remain free from falls  Outcome: Progressing     Problem: Safety - Medical Restraint  Goal: Remains free of injury from restraints (Restraint for Interference with Medical Device)  Outcome: Progressing     Problem: Pain - Standard  Goal: Alleviation of pain or a reduction in pain to the patient’s comfort goal  Outcome: Progressing     Problem: Hemodynamics  Goal: Patient's hemodynamics, fluid balance and neurologic status will be stable or improve  Outcome: Progressing     Problem: Mechanical Ventilation  Goal: Safe management of artificial airway and ventilation  Outcome: Progressing     Problem: Nutrition  Goal: Enteral nutrition will be maintained or improve  Outcome: Progressing     Problem: Urinary Elimination  Goal: Establish and maintain regular urinary output  Outcome: Progressing       Patient is not progressing towards the following goals: Patient and family updated on plan of care. The patient is still requiring restraints at this time due to risk of  removal of ETT. SAT/SBT attempted this morning and the patient became very restless and agitated.     Problem: Knowledge Deficit - Standard  Goal: Patient and family/care givers will demonstrate understanding of plan of care, disease process/condition, diagnostic tests and medications  Outcome: Not Progressing     Problem: Safety - Medical Restraint  Goal: Free from restraint(s) (Restraint for Interference with Medical Device)  Outcome: Not Progressing     Problem: Mechanical Ventilation  Goal: Successful weaning off mechanical ventilator, spontaneously maintains adequate gas  exchange  Outcome: Not Progressing

## 2022-12-27 NOTE — PROGRESS NOTES
CRITICAL CARE MEDICINE ATTENDING PROGRESS NOTE    Date of admission  12/15/2022    Chief Complaint  62 y.o. female admitted 12/15/2022 with acute encephalopathy, profound hypoglycemia.  She has a history of DM 1, HTN, hypothyroidism, dyslipidemia, methamphetamine abuse.    Hospital Course        12/16 - hypoglycemia improving, weaning D10. Starting TF's. MRI brain pending  12/1832-[id number] MRI with possible small acute infarct to right frontal lobe;no other acute intracranial abnormality noted-patient slightly more responsive  12/19 - Vent day #5. Tolerating SBT's. Not following for parameters  12/20 - spontaneous movements, nothing purposeful.  Repeat EEG captured no seizures.  Presence of triphasic waveforms consistent with toxic metabolic etiology.  12/21- Extubated however pt failed and required reintubation due to tachypnea and difficulty supporting her airway      12/22 reintubated midday back on full vent support, febrile  12/23 full vent, not following  12/24 Full vent, no change LOC, daughter updated    12/26 -    vent day 12.  Continue cefazolin.  SAT/SBT.  12/27 -    vent day 13.  SAT/SBT.  Continue cefazolin.  12/28 -    vent day 14.  SAT/SBT.  Continue cefazolin.  Continuous video EEG without evidence of seizures.      Interval Problem Update  Reviewed last 24 hour events:      SR  100.4  Dex  +503 mL in the last 24  +9611 mL since admit  Replete potassium and magnesium      Review of Systems  Review of Systems   Unable to perform ROS: Acuity of condition     Vital Signs for the last 24 hours  Pulse:  [58-88] 65  Resp:  [2-57] 12  BP: ()/(50-82) 93/53  SpO2:  [90 %-100 %] 98 %    Hemodynamic parameters for the last 24 hours       Vent Settings for the last 24 hours  Vent Mode: ASV  PEEP/CPAP: 8  MAP: 12  Control VTE (exp VT): 409    Physical Exam  Physical Exam  Constitutional:       Appearance: She is not diaphoretic.      Comments: On ventilator   HENT:      Head: Normocephalic.       Mouth/Throat:      Pharynx: Oropharynx is clear.   Eyes:      Pupils: Pupils are equal, round, and reactive to light.   Cardiovascular:      Comments: Sinus rhythm  Pulmonary:      Breath sounds: Rales (Scattered coarse crackles) present. No wheezing.   Abdominal:      General: There is no distension.      Tenderness: There is no abdominal tenderness.      Comments: Tolerating enteral tube feedings   Musculoskeletal:      Right lower leg: No edema.      Left lower leg: No edema.   Skin:     General: Skin is warm.   Neurological:      Comments: Sedated.  Restless at times.  Does not track or follow.       Medications  Current Facility-Administered Medications   Medication Dose Route Frequency Provider Last Rate Last Admin    phosphorus (K-Phos-Neutral) per tablet 500 mg  500 mg Enteral Tube Q6HRS Harry Castaneda M.D.   500 mg at 12/28/22 1215    LORazepam (ATIVAN) injection 2-4 mg  2-4 mg Intravenous Q4HRS PRN Harry Castaneda M.D.   2 mg at 12/28/22 0917    insulin GLARGINE (Lantus,Semglee) injection  15 Units Subcutaneous QAM INSULIN Harry Castaneda M.D.   15 Units at 12/28/22 0505    aspirin (ASA) tablet 325 mg  325 mg Enteral Tube DAILY Harry Castaneda M.D.   325 mg at 12/28/22 0503    atorvastatin (LIPITOR) tablet 10 mg  10 mg Enteral Tube DAILY Harry Castaneda M.D.   10 mg at 12/28/22 0503    levothyroxine (SYNTHROID) tablet 112 mcg  112 mcg Enteral Tube DAILY Harry Castaneda M.D.   112 mcg at 12/28/22 0503    lisinopril (PRINIVIL) tablet 5 mg  5 mg Enteral Tube DAILY Harry Castaneda M.D.   5 mg at 12/27/22 0623    HYDROmorphone (Dilaudid) injection 0.5-1 mg  0.5-1 mg Intravenous Q2HRS PRN Harry Castaneda M.D.   1 mg at 12/28/22 1215    dexmedetomidine (PRECEDEX) 400 mcg/100mL NS premix infusion  0.1-1.5 mcg/kg/hr Intravenous Continuous Thierry Pepper M.D. 14.9 mL/hr at 12/28/22 1530 1.4 mcg/kg/hr at 12/28/22 1530    enoxaparin (Lovenox) inj 30 mg  30  mg Subcutaneous DAILY AT 1800 Thierry Pepper M.D.   30 mg at 12/27/22 1715    ceFAZolin (Ancef) 2 g in  mL IVPB  2 g Intravenous Q8HRS Thierry Pepper M.D.   Stopped at 12/28/22 0940    insulin regular (HumuLIN R,NovoLIN R) injection  3-13 Units Subcutaneous Q6HRS Jazmine Pike, A.P.R.N.   5 Units at 12/28/22 1226    And    dextrose 10 % BOLUS 25 g  25 g Intravenous Q15 MIN PRN Jazmine Pike, A.P.R.N.   Stopped at 12/20/22 0304    acetaminophen (Tylenol) tablet 650 mg  650 mg Enteral Tube Q6HRS PRN Jazmine Pike, A.P.R.N.   650 mg at 12/24/22 0647    Respiratory Therapy Consult   Nebulization Continuous RT Ace Pool M.D.        famotidine (PEPCID) tablet 20 mg  20 mg Enteral Tube Q12HRS Ace Pool M.D.   20 mg at 12/28/22 0503    MD Alert...ICU Electrolyte Replacement per Pharmacy   Other PHARMACY TO DOSE Ace Pool M.D.        lidocaine (XYLOCAINE) 1 % injection 2 mL  2 mL Tracheal Tube Q30 MIN PRN Ace Pool M.D.   2 mL at 12/23/22 2115    ipratropium-albuterol (DUONEB) nebulizer solution  3 mL Nebulization Q2HRS PRN (RT) Ace Pool M.D.        Pharmacy Consult: Enteral tube insertion - review meds/change route/product selection  1 Each Other PHARMACY TO DOSE Jazmine Pike, A.P.R.N.        senna-docusate (PERICOLACE or SENOKOT S) 8.6-50 MG per tablet 2 Tablet  2 Tablet Enteral Tube BID Jazmine Pike, A.P.R.N.   2 Tablet at 12/28/22 0503    And    polyethylene glycol/lytes (MIRALAX) PACKET 1 Packet  1 Packet Enteral Tube QDAY PRN Jazmine Pike, A.P.R.N.   1 Packet at 12/24/22 0608    And    magnesium hydroxide (MILK OF MAGNESIA) suspension 30 mL  30 mL Enteral Tube QDAY PRN MANDEEP NeumannP.R.N.   30 mL at 12/26/22 1715    And    bisacodyl (DULCOLAX) suppository 10 mg  10 mg Rectal QDAY PRN RAVINDER Neumann.P.R.N.   10 mg at 12/28/22 0504       Fluids    Intake/Output Summary (Last 24 hours) at 12/28/2022 1541  Last data filed at 12/28/2022 1400  Gross per 24 hour    Intake 1394.1 ml   Output 1055 ml   Net 339.1 ml       Laboratory  Recent Labs     12/26/22  0445   ISTATAPH 7.440   ISTATAPCO2 44.5*   ISTATAPO2 78   ISTATATCO2 32   QZDBNVS1LQE 96   ISTATARTHCO3 30.2*   ISTATARTBE 5*   ISTATTEMP 100.0 F   ISTATFIO2 30   ISTATSPEC Arterial   ISTATAPHTC 7.428   [id number] 83     Recent Labs     12/26/22  0417 12/27/22  0430 12/28/22  0415   SODIUM 139 137 139   POTASSIUM 4.8 3.6 3.7   CHLORIDE 102 101 105   CO2 30 26 23   BUN 26* 22 20   CREATININE 0.72 0.56 0.58   MAGNESIUM 2.0 1.9 1.8   PHOSPHORUS 3.2 2.4* 2.4*   CALCIUM 8.8 8.4* 7.5*     Recent Labs     12/26/22  0417 12/27/22  0430 12/28/22  0415   PREALBUMIN 9.0*  --   --    GLUCOSE 296* 128* 157*     Recent Labs     12/26/22  0417 12/27/22  0430 12/28/22  0415   WBC 11.4* 7.8 9.2   NEUTSPOLYS 79.50* 71.10 69.90   LYMPHOCYTES 12.40* 19.00* 17.80*   MONOCYTES 5.30 6.80 9.20   EOSINOPHILS 1.60 2.20 2.20   BASOPHILS 0.60 0.40 0.20     Recent Labs     12/26/22 0417 12/27/22  0430 12/28/22  0415   RBC 2.75* 2.94* 2.72*   HEMOGLOBIN 8.4* 8.9* 8.3*   HEMATOCRIT 26.7* 27.7* 25.9*   PLATELETCT 319 349 336       Imaging  X-Ray:  I have personally reviewed the images and compared with prior images. and My impression is: Slightly increased left lower lobe opacification    Assessment/Plan      Acute hypoxemic respiratory failure   Intubated 12/15   Failed attempt at liberation on 12/22   ABCDEF bundle   SAT/SBT as appropriate   Mobility level 2    Acute encephalopathy   This appears to be due to hypoglycemia - found down with profound hypoglycemia   MRI with possible tiny acute infarct in right frontal lobe   EEGs and continuous video EEG without evidence of seizures   Cortisol level elevated   Ammonia normal   TSH normal    MSSA pneumonia   Continue cefazolin    Diabetes mellitus type 1   Abysmal control with glycohemoglobin of 13.2   Continue glargine, 15 units daily   Sliding scale insulin    Primary hypertension   Goal SBP less than  160   Continue lisinopril, 5 mg daily    Methamphetamine abuse   Urine drug screen positive for amphetamines on 12/15   Cessation counseling when clinically appropriate    Hypothyroidism   Continue levothyroxine, 112 mcg daily    Dyslipidemia   Statin    Goals of care discussion   I called the daughter Sindy Leblanc at [phone] and reviewed this lady's condition, discussed goals of care and presented options going further including tracheostomy with gastrostomy tube placement versus comfort measures.  She will discuss with her brothers.      VTE:  Lovenox  Ulcer: PPI  Lines: Catalan Catheter  Ongoing indication addressed    I have performed a physical exam and reviewed and updated ROS and Plan today (12/28/2022). In review of yesterday's note (12/27/2022), there are no changes except as documented above.    I have assessed and reassessed her respiratory status with ventilator adjustments and spontaneous breathing trials, airway mechanics, ventilator waveforms, blood pressure, hemodynamics, cardiovascular status and her neurologic status.  She is at increased risk for worsening respiratory and CNS system dysfunction.    Discussed patient condition and risk of morbidity and/or mortality with RN, RT, Pharmacy, Charge nurse / hot rounds, and QA team    The patient remains critically ill.  Critical care time = 35 minutes in directly providing and coordinating critical care and extensive data review.  No time overlap and excludes procedures.    A Critical Care Medicine progress note may have been authored by a resident physician or advanced practitioner of nursing under my direct supervision on this date of service.  As the supervising and attending physician, I have either attested to or cosigned that document.  IN THE EVENT THAT DISCREPANCIES EXIST BETWEEN THIS DOCUMENT AND ANY DOCUMENT THAT I HAVE ATTESTED TO OR COSIGNED ON THIS DATE OF SERVICE, THEN THIS DOCUMENT REMAINS THE FINAL AUTHORITY AS TO MY ASSESSMENT AND  PLAN REGARDING THE CARE OF THIS PATIENT.    Harry Castaneda MD  Pulmonary and Critical Care Medicine

## 2022-12-27 NOTE — PROCEDURES
INPATIENT ROUTINE VIDEO ELECTROENCEPHALOGRAM REPORT      REFERRING PROVIDER: Dr. Pool    DOS: 12/27/2022     TOTAL RECORDING TIME: 13 hours and 48 minutes of total recording time    INDICATION:  Poonam Mayo 62 y.o. female presenting with altered mental status    CURRENT ANTI-SEIZURE MEDICATIONS:   No AEDs    dexmedetomidine (Precedex) infusion, Last Rate: 1.3 mcg/kg/hr (12/27/22 2202)      TECHNIQUE: Routine VEEG was set up by a Neurodiagnostic technologist who performed education to the patient and staff. A minimum of 23 electrodes and 23 channel recording was setup and performed by Neurodiagnostic technologist, in accordance with the international 10-20 system. The study was reviewed in bipolar and referential montages. The recording examined the patient in the  drowsy/sleep and/or comatose state(s).     DESCRIPTION OF THE RECORD:  The background was continuous, symmetrical, and rarely showed a fragmented 5-6 Hz posterior dominant rhythm The background was composed of medium voltage delta and theta activity, at times with rhythmic and/or periodic 1-2.5 Hz sharply contoured delta with triphasic morphology. Reactivity was present. Spontaneous variability and state changes were present.     Occasional poorly formed N2 sleep transients in the form of fragmented vertex waves and sleep spindles were seen.      ACTIVATION PROCEDURES:   NA    ICTAL AND INTERICTAL FINDINGS:   No focal or generalized epileptiform activity noted.     No regional slowing was seen during this routine study.      No definite electrographic or electroclinical seizures.     EKG: Sampling of the EKG recording did not demonstrate any abnormalities      EVENTS:  None    INTERPRETATION:   Abnormal video EEG recording in the drowsy/sleep and/or comatose state(s):  - Moderate background slowing suggestive of diffuse/multifocal cerebral dysfunction and consistent with a nonspecific encephalopathy.  Clinical correlation recommended.   - No  persistent focal asymmetries seen.  - No definitive epileptiform discharges or other epileptiform phenomena seen.   - No definitive seizures. Clinical correlation is recommended.  -Compared to the prior EEG on 12/25/2022, this is similar.        Gurwinder Ingram MD  Department of Neurology at [facility]  General Neurologist and Epileptologist  Director of [facility]'s Level III Comprehensive Epilepsy Program  Professor of Clinical Neurology, [facility].   Phone: [phone]  Fax: [phone]  E-mail: [email or web address]

## 2022-12-28 NOTE — CARE PLAN
The patient is Watcher - Medium risk of patient condition declining or worsening    Shift Goals  Clinical Goals: Stable neuro exam, hemodynamic stability, Continuous EEG.  Patient Goals: GREGORIO  Family Goals: GREGORIO    Progress made toward(s) clinical / shift goals:      Problem: Skin Integrity  Goal: Skin integrity is maintained or improved  Outcome: Progressing     Problem: Fall Risk  Goal: Patient will remain free from falls  Outcome: Progressing     Problem: Safety - Medical Restraint  Goal: Remains free of injury from restraints (Restraint for Interference with Medical Device)  Outcome: Progressing     Problem: Pain - Standard  Goal: Alleviation of pain or a reduction in pain to the patient’s comfort goal  Outcome: Progressing     Problem: Hemodynamics  Goal: Patient's hemodynamics, fluid balance and neurologic status will be stable or improve  Outcome: Progressing     Problem: Mechanical Ventilation  Goal: Safe management of artificial airway and ventilation  Outcome: Progressing     Problem: Nutrition  Goal: Enteral nutrition will be maintained or improve  Outcome: Progressing       Patient is not progressing towards the following goals: Patient and family updated on Plan of Care, recommended family conference. Patient still requiring restraints due to risk of removal of lines and ETT.     Problem: Knowledge Deficit - Standard  Goal: Patient and family/care givers will demonstrate understanding of plan of care, disease process/condition, diagnostic tests and medications  Outcome: Not Progressing     Problem: Safety - Medical Restraint  Goal: Free from restraint(s) (Restraint for Interference with Medical Device)  Outcome: Not Progressing

## 2022-12-28 NOTE — CONSULTS
Referring Physician: Dr. Harry Castaneda    Referral Reason: Seizure    HPI:  Ms. Poonam Mayo is a 62 y.o. right-handed female with past medical history significant for hypertension, hyperlipidemia, polysubstance abuse who was found down unresponsive and incontinent by her daughter on 12/15/2022.  Paramedics were called and patient was found to have profound hypoglycemia with blood glucose of 32.  She underwent EEG which did not reveal acute abnormalities.  She has been in the hospital since then and remains intubated for respiratory failure.  She underwent a brain MRI on 2022 which revealed questionable tiny acute infarct in right frontal white matter.  Today she had an spell which was concerning for seizure for which I was asked to see her.  A 24-hour EEG has been ordered.    ROS:   Unable to obtain.      Past Medical History:   Past Medical History:   Diagnosis Date    1996    Dupuytren contracture     bilateral hands    HTN (hypertension) 3/18/2011    Hyperlipidemia     Hypertension     Hypothyroid        Past Surgical History:   Past Surgical History:   Procedure Laterality Date    TUBAL COAGULATION LAPAROSCOPIC BILATERAL  1984    APPENDECTOMY      TONSILLECTOMY         Social History:   Social History     Socioeconomic History    Marital status: Single     Spouse name: Not on file    Number of children: Not on file    Years of education: Not on file    Highest education level: Not on file   Occupational History    Not on file   Tobacco Use    Smoking status: Every Day     Packs/day: 0.10     Years: 40.00     Pack years: 4.00     Types: Cigarettes     Last attempt to quit: 2016     Years since quittin.5    Smokeless tobacco: Never    Tobacco comments:     4 cigs a day   Vaping Use    Vaping Use: Never used   Substance and Sexual Activity    Alcohol use: Yes     Comment: occasional    Drug use: No    Sexual activity: Never   Other Topics Concern    Not on file   Social History  Narrative    Not on file     Social Determinants of Health     Financial Resource Strain: Not on file   Food Insecurity: Not on file   Transportation Needs: Not on file   Physical Activity: Not on file   Stress: Not on file   Social Connections: Not on file   Intimate Partner Violence: Not on file   Housing Stability: Not on file       Family Hx:   Family History   Problem Relation Age of Onset    Lung Disease Father     Diabetes Son         type 1       Current Medications:   Current Facility-Administered Medications   Medication Dose Route Frequency Provider Last Rate Last Admin    LORazepam (ATIVAN) injection 2-4 mg  2-4 mg Intravenous Q4HRS PRN Harry Castaneda M.D.   2 mg at 12/27/22 1705    insulin GLARGINE (Lantus,Semglee) injection  15 Units Subcutaneous QAM INSULIN Harry Castaneda M.D.   15 Units at 12/27/22 0624    aspirin (ASA) tablet 325 mg  325 mg Enteral Tube DAILY Harry Castaneda M.D.   325 mg at 12/27/22 0623    atorvastatin (LIPITOR) tablet 10 mg  10 mg Enteral Tube DAILY Harry Castaneda M.D.   10 mg at 12/27/22 0623    levothyroxine (SYNTHROID) tablet 112 mcg  112 mcg Enteral Tube DAILY Harry Castaneda M.D.   112 mcg at 12/27/22 0623    lisinopril (PRINIVIL) tablet 5 mg  5 mg Enteral Tube DAILY Harry Castaneda M.D.   5 mg at 12/27/22 0623    HYDROmorphone (Dilaudid) injection 0.5-1 mg  0.5-1 mg Intravenous Q2HRS PRN Harry Castaneda M.D.   1 mg at 12/27/22 1347    dexmedetomidine (PRECEDEX) 400 mcg/100mL NS premix infusion  0.1-1.5 mcg/kg/hr Intravenous Continuous Thierry Pepper M.D. 16 mL/hr at 12/27/22 1800 1.5 mcg/kg/hr at 12/27/22 1800    enoxaparin (Lovenox) inj 30 mg  30 mg Subcutaneous DAILY AT 1800 Thierry Pepper M.D.   30 mg at 12/27/22 1715    nystatin (MYCOSTATIN) 733811 UNIT/ML suspension 500,000 Units  5 mL Topical 4X/DAY Jazmine F Pike, A.P.R.N.   500,000 Units at 12/27/22 1715    ceFAZolin (Ancef) 2 g in  mL IVPB  2 g  Intravenous Q8HRS Thierry Pepper M.D.   Stopped at 12/27/22 1624    insulin regular (HumuLIN R,NovoLIN R) injection  3-13 Units Subcutaneous Q6HRS Jazmine Pike, A.P.R.N.   3 Units at 12/27/22 1808    And    dextrose 10 % BOLUS 25 g  25 g Intravenous Q15 MIN PRN Jazmine Pike, A.P.R.N.   Stopped at 12/20/22 0304    acetaminophen (Tylenol) tablet 650 mg  650 mg Enteral Tube Q6HRS PRN Jazmine Pike, A.P.R.N.   650 mg at 12/24/22 0647    Respiratory Therapy Consult   Nebulization Continuous RT Ace Pool M.D.        famotidine (PEPCID) tablet 20 mg  20 mg Enteral Tube Q12HRS Ace Pool M.D.   20 mg at 12/27/22 1715    MD Alert...ICU Electrolyte Replacement per Pharmacy   Other PHARMACY TO DOSE Ace Pool M.D.        lidocaine (XYLOCAINE) 1 % injection 2 mL  2 mL Tracheal Tube Q30 MIN PRN Ace Pool M.D.   2 mL at 12/23/22 2115    ipratropium-albuterol (DUONEB) nebulizer solution  3 mL Nebulization Q2HRS PRN (RT) Ace Pool M.D.        Pharmacy Consult: Enteral tube insertion - review meds/change route/product selection  1 Each Other PHARMACY TO DOSE Jazmine Pike, A.P.R.N.        senna-docusate (PERICOLACE or SENOKOT S) 8.6-50 MG per tablet 2 Tablet  2 Tablet Enteral Tube BID Jazmine Pike, A.P.R.N.   2 Tablet at 12/27/22 1715    And    polyethylene glycol/lytes (MIRALAX) PACKET 1 Packet  1 Packet Enteral Tube QDAY PRN Jazmine Pike, A.P.R.N.   1 Packet at 12/24/22 0608    And    magnesium hydroxide (MILK OF MAGNESIA) suspension 30 mL  30 mL Enteral Tube QDAY PRN Jazmine Pike, A.P.R.N.   30 mL at 12/26/22 1715    And    bisacodyl (DULCOLAX) suppository 10 mg  10 mg Rectal QDAY PRN MANDEEP NeumannPDuaneRDuaneNDuane   10 mg at 12/25/22 0527       Allergies: No Known Allergies    Physical Exam:   Vitals:    12/27/22 1500 12/27/22 1600 12/27/22 1700 12/27/22 1800   BP: (!) 87/53 90/54 105/62 99/59   Pulse: 62 (!) 58 63 61   Resp: 19 20 (!) 37 19   Temp:       TempSrc:  Bladder  Bladder   SpO2:  97% 99% 98% 99%   Weight:       Height:           Physical Exam   GENERAL:  Lying in the hospital bed intubated and sedated.  Head: Normocephalic and atraumatic.   Eyes: Pupils are  equal, round, and reactive to light, although sluggish. EOM cannot be tested.  Cardiovascular: Normal rate and regular rhythm.    Pulmonary/Chest: Breath sounds normal.   Abdominal: Soft. Bowel sounds are normal. He exhibits no distension. There is no tenderness.   Skin: Skin is warm and dry. No rash noted. No erythema.  Neuro Exam  The patient is intubated, sedated on Precedex.  I do not appreciate facial asymmetry.  Facial sensation cannot be tested.  He withdraws minimally to physical stimulation in all 4 extremity.  Motor, sensory and coordination cannot be tested.  Deep tendon reflexes are diffusely diminished.  Plantar reflexes are equivocal.  Rest of his neurological examination is limited.          Labs:  Recent Labs     12/25/22 0420 12/26/22 0417 12/27/22 0430   WBC 11.3* 11.4* 7.8   RBC 2.82* 2.75* 2.94*   HEMOGLOBIN 8.6* 8.4* 8.9*   HEMATOCRIT 26.8* 26.7* 27.7*   MCV 95.0 97.1 94.2   MCH 30.5 30.5 30.3   MCHC 32.1* 31.5* 32.1*   RDW 42.9 44.6 42.4   PLATELETCT 274 319 349   MPV 9.7 9.4 9.3     Recent Labs     12/25/22 0518 12/26/22 0417 12/27/22  0430   SODIUM 140 139 137   POTASSIUM 4.4 4.8 3.6   CHLORIDE 103 102 101   CO2 27 30 26   GLUCOSE 258* 296* 128*   BUN 26* 26* 22   CREATININE 0.61 0.72 0.56   CALCIUM 8.7 8.8 8.4*                     Recent Labs     12/25/22 0518 12/26/22 0417 12/27/22  0430   SODIUM 140 139 137   POTASSIUM 4.4 4.8 3.6   CHLORIDE 103 102 101   CO2 27 30 26   GLUCOSE 258* 296* 128*   BUN 26* 26* 22     Recent Labs     12/25/22 0518 12/26/22 0417 12/27/22  0430   SODIUM 140 139 137   POTASSIUM 4.4 4.8 3.6   CHLORIDE 103 102 101   CO2 27 30 26   BUN 26* 26* 22   CREATININE 0.61 0.72 0.56   MAGNESIUM  --  2.0 1.9   PHOSPHORUS  --  3.2 2.4*   CALCIUM 8.7 8.8 8.4*         No results found for  this or any previous visit.      Imaging reviewed:    DX-CHEST-PORTABLE (1 VIEW)   Final Result         1.  Left lower pulmonary lobe consolidation again identified.      2.  No new infiltrates or consolidations are identified.      DX-CHEST-LIMITED (1 VIEW)   Final Result      1.  Stable minimal left lower lobe opacity which could be atelectasis or resolving pneumonitis.      DX-CHEST-LIMITED (1 VIEW)   Final Result         1.  Left lower lobe infiltrates, slightly decreased since prior study.   2.  Atherosclerosis      DX-CHEST-PORTABLE (1 VIEW)   Final Result      1.  Retrocardiac airspace disease, increased.   2.  Support apparatus as above.      DX-ABDOMEN FOR TUBE PLACEMENT   Final Result      1.  NG tube tip projects at the peripyloric region.   2.  See evaluation of chest on dedicated imaging.      DX-CHEST-LIMITED (1 VIEW)   Final Result         1.  Slight hazy left lower lobe opacity suggests infiltrate.   2.  Atherosclerosis      MR-BRAIN-W/O   Final Result      1.  Questionable, tiny acute infarct of the right frontal lobe periventricular white matter.   2.  No other acute intracranial abnormality.   3.  No specific focal finding for seizure.   4.  Parenchymal atrophy.   5.  White matter disease likely representing microvascular ischemic changes.      DX-CHEST-PORTABLE (1 VIEW)   Final Result      No radiographic evidence of acute cardiopulmonary process.      DX-HAND 2- RIGHT   Final Result      1.  Multiple sites of osteoarthritis      2.  Old, healed 4th and 5th metacarpal fracture      3.  Vascular calcification      DX-CHEST-PORTABLE (1 VIEW)   Final Result         1.  Slight hazy left midlung and lower lobe opacity suggests infiltrate.   2.  Trace left pleural effusion, new since prior   3.  Right lung base nodular densities, location and appearance favors nipple shadow. Similar to prior study.   4.  Atherosclerosis      DX-ABDOMEN FOR TUBE PLACEMENT   Final Result      1.  NG tube courses in the  fundus of stomach.      DX-CHEST-PORTABLE (1 VIEW)   Final Result         1.  No acute cardiopulmonary disease.   2.  Bilateral lower lobe nodular densities, location and appearance favors nipple shadows. Could be definitively characterized with repeat chest x-ray with nipple markers to exclude pulmonary nodule.   3.  Atherosclerosis      CT-HEAD W/O   Final Result      No acute intracranial abnormality.                Assessment/Plan:  62 y.o. right-handed female with past medical history significant for hypertension, hyperlipidemia, polysubstance abuse who was found down unresponsive and incontinent by her daughter on 12/15/2022.  Paramedics were called and patient was found to have profound hypoglycemia with blood glucose of 32.  She underwent EEG which did not reveal acute abnormalities.  She has been in the hospital since then and remains intubated for respiratory failure.  She underwent a brain MRI on 12/18/2022 which revealed questionable tiny acute infarct in right frontal white matter.  Today she had an spell which was concerning for seizure for which I was asked to see her.  A 24-hour EEG has been ordered.  Would not recommend antiepileptic medication at this point.  Continue with seizure precaution.  Further recommendation after the results of continuous EEG monitoring.

## 2022-12-28 NOTE — CARE PLAN
Problem: Ventilation  Goal: Ability to achieve and maintain unassisted ventilation or tolerate decreased levels of ventilator support  Description: Target End Date:  4 days     Document on Vent flowsheet    1.  Support and monitor invasive and noninvasive mechanical ventilation  2.  Monitor ventilator weaning response  3.  Perform ventilator associated pneumonia prevention interventions  4.  Manage ventilation therapy by monitoring diagnostic test results  Outcome: Not Met  Note:    Ventilator Daily Summary    Vent Day # 14    Ventilator settings changed this shift: n/a    Weaning trials: n/a    Respiratory Procedures: n/a    Plan: Continue current ventilator settings and wean mechanical ventilation as tolerated per physician orders.

## 2022-12-28 NOTE — PROCEDURES
INPATIENT ROUTINE VIDEO ELECTROENCEPHALOGRAM REPORT      REFERRING PROVIDER: Dr. Pool    DOS: 12/28/2022     TOTAL RECORDING TIME: 4 hours and 25 minutes of total recording time    INDICATION:  Poonam Mayo 62 y.o. female presenting with altered mental status    CURRENT ANTI-SEIZURE MEDICATIONS:   No AEDs    dexmedetomidine (Precedex) infusion, Last Rate: 1.2 mcg/kg/hr (12/28/22 1000)      TECHNIQUE: Routine VEEG was set up by a Neurodiagnostic technologist who performed education to the patient and staff. A minimum of 23 electrodes and 23 channel recording was setup and performed by Neurodiagnostic technologist, in accordance with the international 10-20 system. The study was reviewed in bipolar and referential montages. The recording examined the patient in the  drowsy/sleep and/or comatose state(s).     DESCRIPTION OF THE RECORD:  The background was continuous, symmetrical, and rarely showed a fragmented 5-6 Hz posterior dominant rhythm The background was composed of medium voltage delta and theta activity, at times with rhythmic and/or periodic 1-2.5 Hz sharply contoured delta with triphasic morphology. Reactivity was present. Spontaneous variability and state changes were present.     Occasional poorly formed N2 sleep transients in the form of fragmented vertex waves and sleep spindles were seen.      ACTIVATION PROCEDURES:   NA    ICTAL AND INTERICTAL FINDINGS:   No focal or generalized epileptiform activity noted.     No regional slowing was seen during this routine study.      No definite electrographic or electroclinical seizures.     EKG: Sampling of the EKG recording did not demonstrate any abnormalities      EVENTS:  None    INTERPRETATION:   Abnormal video EEG recording in the drowsy/sleep and/or comatose state(s):  - Moderate background slowing suggestive of diffuse/multifocal cerebral dysfunction and consistent with a nonspecific encephalopathy.  Clinical correlation recommended.   - No  persistent focal asymmetries seen.  - No definitive epileptiform discharges or other epileptiform phenomena seen.   - No definitive seizures. Clinical correlation is recommended.  -Compared to yesterday's study and to the prior EEG on 12/25/2022, no major changes were seen        Gurwinder Ingram MD  Department of Neurology at [facility]  General Neurologist and Epileptologist  Director of [facility]'s Level III Comprehensive Epilepsy Program  Professor of Clinical Neurology, [facility].   Phone: [phone]  Fax: [phone]  E-mail: [email or web address]

## 2022-12-28 NOTE — PROGRESS NOTES
CRITICAL CARE MEDICINE ATTENDING PROGRESS NOTE    Date of admission  12/15/2022    Chief Complaint  62 y.o. female admitted 12/15/2022 with acute encephalopathy, profound hypoglycemia.  She has a history of DM 1, HTN, hypothyroidism, dyslipidemia, methamphetamine abuse.    Hospital Course        12/16 - hypoglycemia improving, weaning D10. Starting TF's. MRI brain pending  [id number] MRI with possible small acute infarct to right frontal lobe;no other acute intracranial abnormality noted-patient slightly more responsive  12/19 - Vent day #5. Tolerating SBT's. Not following for parameters  12/20 - spontaneous movements, nothing purposeful.  Repeat EEG captured no seizures.  Presence of triphasic waveforms consistent with toxic metabolic etiology.  12/21- Extubated however pt failed and required reintubation due to tachypnea and difficulty supporting her airway      12/22 reintubated midday back on full vent support, febrile  12/23 full vent, not following  12/24 Full vent, no change LOC, daughter updated    12/26 -    vent day 12.  Continue cefazolin.  SAT/SBT.  12/27 -    vent day 13.  SAT/SBT.  Continue cefazolin.  12/28 -    vent day 14.  SAT/SBT.  Continue cefazolin.  Continuous video EEG without evidence of seizures  12/29 -    vent day 15.  SAT/SBT.  Continue cefazolin.  DNR status.      Interval Problem Update  Reviewed last 24 hour events:      SR  100.9  +494 mL in the last 24  +10,105 mL since admit          Review of Systems  Review of Systems   Unable to perform ROS: Acuity of condition     Vital Signs for the last 24 hours  Pulse:  [61-86] 83  Resp:  [12-57] 31  BP: ()/(51-75) 103/55  SpO2:  [82 %-100 %] 96 %    Hemodynamic parameters for the last 24 hours       Vent Settings for the last 24 hours  Vent Mode: Spont  PEEP/CPAP: 8  P Support: 5  MAP: 11  Control VTE (exp VT): 264    Physical Exam  Physical Exam  Constitutional:       Appearance: She is not diaphoretic.      Comments: On  ventilator   HENT:      Head: Normocephalic.      Mouth/Throat:      Pharynx: Oropharynx is clear.   Eyes:      Pupils: Pupils are equal, round, and reactive to light.   Cardiovascular:      Comments: Sinus rhythm  Pulmonary:      Breath sounds: Rales (Few coarse crackles) present. No wheezing.   Abdominal:      General: There is no distension.      Tenderness: There is no abdominal tenderness.      Comments: Tolerating enteral tube feedings   Musculoskeletal:      Right lower leg: No edema.      Left lower leg: No edema.   Skin:     General: Skin is warm.   Neurological:      Comments: Restless at times.  Does not track or follow.       Medications  Current Facility-Administered Medications   Medication Dose Route Frequency Provider Last Rate Last Admin    LORazepam (ATIVAN) injection 1-2 mg  1-2 mg Intravenous Q4HRS PRN Harry Castaneda M.D.        insulin GLARGINE (Lantus,Semglee) injection  15 Units Subcutaneous QAM INSULIN Harry Castaneda M.D.   15 Units at 12/29/22 0507    aspirin (ASA) tablet 325 mg  325 mg Enteral Tube DAILY Harry Castaneda M.D.   325 mg at 12/29/22 0507    atorvastatin (LIPITOR) tablet 10 mg  10 mg Enteral Tube DAILY Harry Castaneda M.D.   10 mg at 12/29/22 0507    levothyroxine (SYNTHROID) tablet 112 mcg  112 mcg Enteral Tube DAILY Harry Castaneda M.D.   112 mcg at 12/29/22 0507    lisinopril (PRINIVIL) tablet 5 mg  5 mg Enteral Tube DAILY Harry Castaneda M.D.   5 mg at 12/27/22 0623    HYDROmorphone (Dilaudid) injection 0.5-1 mg  0.5-1 mg Intravenous Q2HRS PRN Harry Castaneda M.D.   1 mg at 12/28/22 1215    dexmedetomidine (PRECEDEX) 400 mcg/100mL NS premix infusion  0.1-1.5 mcg/kg/hr Intravenous Continuous Thierry Pepper M.D.   Stopped at 12/29/22 0700    enoxaparin (Lovenox) inj 30 mg  30 mg Subcutaneous DAILY AT 1800 Thierry Pepper M.D.   30 mg at 12/28/22 1812    ceFAZolin (Ancef) 2 g in  mL IVPB  2 g Intravenous Q8HRS  Thierry Pepper M.D.   Stopped at 12/29/22 0934    insulin regular (HumuLIN R,NovoLIN R) injection  3-13 Units Subcutaneous Q6HRS Jazmine Pike, A.P.R.N.   3 Units at 12/29/22 0507    And    dextrose 10 % BOLUS 25 g  25 g Intravenous Q15 MIN PRN Jazmine Pike, A.P.R.N.   Stopped at 12/20/22 0304    acetaminophen (Tylenol) tablet 650 mg  650 mg Enteral Tube Q6HRS PRN Jazmine Pike, A.P.R.N.   650 mg at 12/29/22 0246    Respiratory Therapy Consult   Nebulization Continuous RT Ace Pool M.D.        famotidine (PEPCID) tablet 20 mg  20 mg Enteral Tube Q12HRS Ace Pool M.D.   20 mg at 12/29/22 0507    MD Alert...ICU Electrolyte Replacement per Pharmacy   Other PHARMACY TO DOSE Ace Pool M.D.        lidocaine (XYLOCAINE) 1 % injection 2 mL  2 mL Tracheal Tube Q30 MIN PRN Ace Pool M.D.   2 mL at 12/23/22 2115    ipratropium-albuterol (DUONEB) nebulizer solution  3 mL Nebulization Q2HRS PRN (RT) Ace Pool M.D.        Pharmacy Consult: Enteral tube insertion - review meds/change route/product selection  1 Each Other PHARMACY TO DOSE Jazmine Pike, A.P.R.N.        senna-docusate (PERICOLACE or SENOKOT S) 8.6-50 MG per tablet 2 Tablet  2 Tablet Enteral Tube BID Jazmine Pike, A.P.R.N.   2 Tablet at 12/28/22 1812    And    polyethylene glycol/lytes (MIRALAX) PACKET 1 Packet  1 Packet Enteral Tube QDAY PRN Jazmine Pike, A.P.R.N.   1 Packet at 12/28/22 1812    And    magnesium hydroxide (MILK OF MAGNESIA) suspension 30 mL  30 mL Enteral Tube QDAY PRN Jazmine Pike, A.P.R.N.   30 mL at 12/26/22 1715    And    bisacodyl (DULCOLAX) suppository 10 mg  10 mg Rectal QDAY PRN SILVIANO NeumannRDuaneN.   10 mg at 12/28/22 0504       Fluids    Intake/Output Summary (Last 24 hours) at 12/29/2022 1020  Last data filed at 12/29/2022 0800  Gross per 24 hour   Intake 1618.85 ml   Output 1230 ml   Net 388.85 ml       Laboratory      Recent Labs     12/27/22  0430 12/28/22  0415 12/29/22  0415   SODIUM  137 139 137   POTASSIUM 3.6 3.7 3.8   CHLORIDE 101 105 102   CO2 26 23 27   BUN 22 20 18   CREATININE 0.56 0.58 0.60   MAGNESIUM 1.9 1.8 2.0   PHOSPHORUS 2.4* 2.4* 4.0   CALCIUM 8.4* 7.5* 7.9*     Recent Labs     12/27/22  0430 12/28/22 0415 12/29/22 0415   GLUCOSE 128* 157* 172*     Recent Labs     12/27/22  0430 12/28/22 0415 12/29/22 0415   WBC 7.8 9.2 8.6   NEUTSPOLYS 71.10 69.90 76.00*   LYMPHOCYTES 19.00* 17.80* 14.50*   MONOCYTES 6.80 9.20 7.10   EOSINOPHILS 2.20 2.20 1.50   BASOPHILS 0.40 0.20 0.20     Recent Labs     12/27/22 0430 12/28/22 0415 12/29/22 0415   RBC 2.94* 2.72* 2.94*   HEMOGLOBIN 8.9* 8.3* 8.8*   HEMATOCRIT 27.7* 25.9* 27.6*   PLATELETCT 349 336 365       Imaging  None    Assessment/Plan      Acute hypoxemic respiratory failure   Intubated 12/15   Failed attempt at liberation on 12/22   ABCDEF bundle   SAT/SBT as appropriate   Mobility level 2    Acute encephalopathy   Due to hypoglycemia - found down with profound hypoglycemia   MRI with possible tiny acute infarct in right frontal lobe   EEGs and continuous video EEG without evidence of seizures   Cortisol level elevated   Ammonia normal   TSH normal    MSSA pneumonia   Continue cefazolin    Diabetes mellitus type 1   Abysmal control with glycohemoglobin of 13.2   Continue glargine, 15 units daily   Sliding scale insulin    Primary hypertension   Goal SBP less than 160   Continue lisinopril, 5 mg daily    Methamphetamine abuse   Urine drug screen positive for amphetamines on 12/15   Cessation counseling when clinically appropriate    Hypothyroidism   Continue levothyroxine, 112 mcg daily    Dyslipidemia   Statin    Goals of care discussion   I had the pleasure of talking to the daughter Sindy Leblanc at the bedside.  I once again reviewed this lady's condition, goals of care and presented further options as I did yesterday.  She is going to discuss with her brothers.  She agrees with changing CODE STATUS to DNR.      VTE:   Lovenox  Ulcer: PPI  Lines: Catalan Catheter  Ongoing indication addressed    I have performed a physical exam and reviewed and updated ROS and Plan today (12/29/2022). In review of yesterday's note (12/28/2022), there are no changes except as documented above.    I have assessed and reassessed her respiratory status with spontaneous breathing trials and ventilator adjustments, airway mechanics, ventilator waveforms, blood pressure, hemodynamics, cardiovascular status or neurologic status.  She is at increased risk for worsening respiratory and CNS system dysfunction.    Discussed patient condition and risk of morbidity and/or mortality with RN, RT, Pharmacy, Charge nurse / hot rounds, and QA team    The patient remains critically ill.  Critical care time = 40 minutes in directly providing and coordinating critical care and extensive data review.  No time overlap and excludes procedures.    A Critical Care Medicine progress note may have been authored by a resident physician or advanced practitioner of nursing under my direct supervision on this date of service.  As the supervising and attending physician, I have either attested to or cosigned that document.  IN THE EVENT THAT DISCREPANCIES EXIST BETWEEN THIS DOCUMENT AND ANY DOCUMENT THAT I HAVE ATTESTED TO OR COSIGNED ON THIS DATE OF SERVICE, THEN THIS DOCUMENT REMAINS THE FINAL AUTHORITY AS TO MY ASSESSMENT AND PLAN REGARDING THE CARE OF THIS PATIENT.    Harry Castaneda MD  Pulmonary and Critical Care Medicine

## 2022-12-28 NOTE — CARE PLAN
Problem: Knowledge Deficit - Standard  Goal: Patient and family/care givers will demonstrate understanding of plan of care, disease process/condition, diagnostic tests and medications  Outcome: Not Progressing     Problem: Mechanical Ventilation  Goal: Successful weaning off mechanical ventilator, spontaneously maintains adequate gas exchange  Outcome: Not Progressing  Goal: Patient will be able to express needs and understand communication  Outcome: Not Progressing   The patient is Watcher - Medium risk of patient condition declining or worsening    Shift Goals  Clinical Goals: Stable neuro exam, hemodynamic stability.  Patient Goals: GREGORIO  Family Goals: GREGORIO    Progress made toward(s) clinical / shift goals:        Patient is not progressing towards the following goals:      Problem: Knowledge Deficit - Standard  Goal: Patient and family/care givers will demonstrate understanding of plan of care, disease process/condition, diagnostic tests and medications  Outcome: Not Progressing     Problem: Mechanical Ventilation  Goal: Successful weaning off mechanical ventilator, spontaneously maintains adequate gas exchange  Outcome: Not Progressing  Goal: Patient will be able to express needs and understand communication  Outcome: Not Progressing

## 2022-12-29 NOTE — CONSULTS
Referral # [id number]    Please call [phone] select 1, then select 2, and use the last [phone] the referral number to contact the on-call coordinator with any updates.     Date: 12/29/22    [facility] will continue to follow this case.      *Donation may be an option:    -This patient may meet the criteria for DCD (donation after circulatory death). Further evaluation will be needed should the family decide to transition to comfort care.    -Please contact the clinical coordinator with any changes in the patient's neurological or hemodynamic status, or anticipated comfort care.     Zara Jeffers, MSW, LSW  In-   [facility]

## 2022-12-29 NOTE — PROGRESS NOTES
4 Eyes Skin Assessment Completed by REESE Drew and REESE Faulkner.    Head WDL  Ears Redness and Blanching bilateral  Nose Redness and Blanching  Mouth WDL  Neck WDL  Breast/Chest Abrasion right chest  Shoulder Blades WDL  Spine Discoloration  (R) Arm/Elbow/Hand Redness, Blanching, Bruising, and Discoloration  (L) Arm/Elbow/Hand Redness, Blanching, Bruising and Discoloration  Abdomen Scar   Groin Redness, Blanching, and Excoriation  Scrotum/Coccyx/Buttocks Redness and Blanching, bilateral hips red and slow to josé  (R) Leg Redness, Blanching, and Abrasion right hip, redess/blanching/discoloration to knee  (L) Leg Redness and Blanching discoloration to knee  (R) Heel/Foot/Toe Redness and Blanching toes  (L) Heel/Foot/Toe Redness and Blanching toes          Devices In Places ECG, Blood Pressure Cuff, Pulse Ox, Catalan, SCD's, ET Tube, and OG/NG      Interventions In Place Heel Mepilex, Sacral Mepilex, TAP System, Pillows, Elbow Mepilex, Q2 Turns, and Low Air Loss Mattress    Possible Skin Injury Yes    Pictures Uploaded Into Epic Yes  Wound Consult Placed N/A  RN Wound Prevention Protocol Ordered Yes

## 2022-12-29 NOTE — PROGRESS NOTES
NEUROLOGY PROGRESS NOTE      BACKGROUND:    62 y.o. female was admitted on 12/15/2022  7:13 PM for Hypoglycemia [E16.2].  She is being followed by Neurology for possible seizure and unresponsiveness.    SUBJECTIVE:   No significant changes, remains unresponsive on vent.    VITALS:  Vitals:    12/28/22 1400 12/28/22 1500 12/28/22 1600 12/28/22 1807   BP: 93/53 98/54 129/66    Pulse: 65 61 64 81   Resp: 12 20 15 20   Temp:       TempSrc: Bladder  Bladder    SpO2: 98% 97% 98% 95%   Weight:       Height:           NEUROLOGICAL EXAM:    GENERAL:  Lying in the hospital bed intubated and sedated.  Head: Normocephalic and atraumatic.   Eyes: Pupils are  equal, round, and reactive to light, although sluggish. EOM cannot be tested.  Cardiovascular: Normal rate and regular rhythm.    Pulmonary/Chest: Breath sounds normal.   Abdominal: Soft. Bowel sounds are normal. He exhibits no distension. There is no tenderness.   Skin: Skin is warm and dry. No rash noted. No erythema.  Neuro Exam  The patient is intubated, sedated on Precedex.  I do not appreciate facial asymmetry.  Facial sensation cannot be tested.  She withdraws minimally to physical stimulation in all 4 extremity.  Motor, sensory and coordination cannot be tested.  Deep tendon reflexes are diffusely diminished.  Plantar reflexes are equivocal.  Rest of his neurological examination is limited.    OBJECTIVE:    NEUROIMAGING:    DX-CHEST-LIMITED (1 VIEW)   Final Result      Worsening LEFT lung base consolidation.      DX-CHEST-PORTABLE (1 VIEW)   Final Result         1.  Left lower pulmonary lobe consolidation again identified.      2.  No new infiltrates or consolidations are identified.      DX-CHEST-LIMITED (1 VIEW)   Final Result      1.  Stable minimal left lower lobe opacity which could be atelectasis or resolving pneumonitis.      DX-CHEST-LIMITED (1 VIEW)   Final Result         1.  Left lower lobe infiltrates, slightly decreased since prior study.   2.   Atherosclerosis      DX-CHEST-PORTABLE (1 VIEW)   Final Result      1.  Retrocardiac airspace disease, increased.   2.  Support apparatus as above.      DX-ABDOMEN FOR TUBE PLACEMENT   Final Result      1.  NG tube tip projects at the peripyloric region.   2.  See evaluation of chest on dedicated imaging.      DX-CHEST-LIMITED (1 VIEW)   Final Result         1.  Slight hazy left lower lobe opacity suggests infiltrate.   2.  Atherosclerosis      MR-BRAIN-W/O   Final Result      1.  Questionable, tiny acute infarct of the right frontal lobe periventricular white matter.   2.  No other acute intracranial abnormality.   3.  No specific focal finding for seizure.   4.  Parenchymal atrophy.   5.  White matter disease likely representing microvascular ischemic changes.      DX-CHEST-PORTABLE (1 VIEW)   Final Result      No radiographic evidence of acute cardiopulmonary process.      DX-HAND 2- RIGHT   Final Result      1.  Multiple sites of osteoarthritis      2.  Old, healed 4th and 5th metacarpal fracture      3.  Vascular calcification      DX-CHEST-PORTABLE (1 VIEW)   Final Result         1.  Slight hazy left midlung and lower lobe opacity suggests infiltrate.   2.  Trace left pleural effusion, new since prior   3.  Right lung base nodular densities, location and appearance favors nipple shadow. Similar to prior study.   4.  Atherosclerosis      DX-ABDOMEN FOR TUBE PLACEMENT   Final Result      1.  NG tube courses in the fundus of stomach.      DX-CHEST-PORTABLE (1 VIEW)   Final Result         1.  No acute cardiopulmonary disease.   2.  Bilateral lower lobe nodular densities, location and appearance favors nipple shadows. Could be definitively characterized with repeat chest x-ray with nipple markers to exclude pulmonary nodule.   3.  Atherosclerosis      CT-HEAD W/O   Final Result      No acute intracranial abnormality.             MEDICATIONS:  Current Facility-Administered Medications   Medication Dose Route  Frequency Provider Last Rate Last Admin    phosphorus (K-Phos-Neutral) per tablet 500 mg  500 mg Enteral Tube Q6HRS Harry Castaneda M.D.   500 mg at 12/28/22 1812    LORazepam (ATIVAN) injection 2-4 mg  2-4 mg Intravenous Q4HRS PRN Harry Castaneda M.D.   2 mg at 12/28/22 0917    insulin GLARGINE (Lantus,Semglee) injection  15 Units Subcutaneous QAM INSULIN Harry Castaneda M.D.   15 Units at 12/28/22 0505    aspirin (ASA) tablet 325 mg  325 mg Enteral Tube DAILY Harry Castaneda M.D.   325 mg at 12/28/22 0503    atorvastatin (LIPITOR) tablet 10 mg  10 mg Enteral Tube DAILY Harry Castaneda M.D.   10 mg at 12/28/22 0503    levothyroxine (SYNTHROID) tablet 112 mcg  112 mcg Enteral Tube DAILY Harry Castaneda M.D.   112 mcg at 12/28/22 0503    lisinopril (PRINIVIL) tablet 5 mg  5 mg Enteral Tube DAILY Harry Castaneda M.D.   5 mg at 12/27/22 0623    HYDROmorphone (Dilaudid) injection 0.5-1 mg  0.5-1 mg Intravenous Q2HRS PRN Harry Castaneda M.D.   1 mg at 12/28/22 1215    dexmedetomidine (PRECEDEX) 400 mcg/100mL NS premix infusion  0.1-1.5 mcg/kg/hr Intravenous Continuous Thierry Pepper M.D. 14.9 mL/hr at 12/28/22 1600 1.4 mcg/kg/hr at 12/28/22 1600    enoxaparin (Lovenox) inj 30 mg  30 mg Subcutaneous DAILY AT 1800 Thierry Pepper M.D.   30 mg at 12/28/22 1812    ceFAZolin (Ancef) 2 g in  mL IVPB  2 g Intravenous Q8HRS Thierry Pepper M.D.   Stopped at 12/28/22 1644    insulin regular (HumuLIN R,NovoLIN R) injection  3-13 Units Subcutaneous Q6HRS SANDRA Neumann   3 Units at 12/28/22 1812    And    dextrose 10 % BOLUS 25 g  25 g Intravenous Q15 MIN PRN MANDEEP NeumannP.R.N.   Stopped at 12/20/22 0304    acetaminophen (Tylenol) tablet 650 mg  650 mg Enteral Tube Q6HRS PRN MANDEEP NeumannP.R.N.   650 mg at 12/24/22 0647    Respiratory Therapy Consult   Nebulization Continuous RT Ace Pool M.D.        famotidine (PEPCID) tablet 20 mg   20 mg Enteral Tube Q12HRS Ace Pool M.D.   20 mg at 12/28/22 1812    MD Alert...ICU Electrolyte Replacement per Pharmacy   Other PHARMACY TO DOSE Ace Pool M.D.        lidocaine (XYLOCAINE) 1 % injection 2 mL  2 mL Tracheal Tube Q30 MIN PRN Ace Pool M.D.   2 mL at 12/23/22 2115    ipratropium-albuterol (DUONEB) nebulizer solution  3 mL Nebulization Q2HRS PRN (RT) Ace Pool M.D.        Pharmacy Consult: Enteral tube insertion - review meds/change route/product selection  1 Each Other PHARMACY TO DOSE Jazmine Pike, A.P.R.N.        senna-docusate (PERICOLACE or SENOKOT S) 8.6-50 MG per tablet 2 Tablet  2 Tablet Enteral Tube BID Jazmine Pike, A.P.R.N.   2 Tablet at 12/28/22 1812    And    polyethylene glycol/lytes (MIRALAX) PACKET 1 Packet  1 Packet Enteral Tube QDAY PRN Jazmine Pike, A.P.R.N.   1 Packet at 12/28/22 1812    And    magnesium hydroxide (MILK OF MAGNESIA) suspension 30 mL  30 mL Enteral Tube QDAY PRN Jazmine Pike, A.P.R.N.   30 mL at 12/26/22 1715    And    bisacodyl (DULCOLAX) suppository 10 mg  10 mg Rectal QDAY PRN Jazmine Pike, A.P.R.N.   10 mg at 12/28/22 0504       LABS:      Recent Labs     12/26/22  0417 12/27/22  0430 12/28/22  0415   WBC 11.4* 7.8 9.2   RBC 2.75* 2.94* 2.72*   HEMOGLOBIN 8.4* 8.9* 8.3*   HEMATOCRIT 26.7* 27.7* 25.9*   MCV 97.1 94.2 95.2   MCH 30.5 30.3 30.5   MCHC 31.5* 32.1* 32.0*   RDW 44.6 42.4 43.3   PLATELETCT 319 349 336   MPV 9.4 9.3 9.4     Recent Labs     12/26/22  0417 12/27/22  0430 12/28/22  0415   SODIUM 139 137 139   POTASSIUM 4.8 3.6 3.7   CHLORIDE 102 101 105   CO2 30 26 23   GLUCOSE 296* 128* 157*   BUN 26* 22 20     INR   Date Value Ref Range Status   03/19/2011 0.91 0.86 - 1.14 Final     Comment:     INR - Non-therapeutic Reference Range: 0.86-1.14  INR - Therapeutic Reference Range: 2.0-4.0     No results found for: POCINR  Lab Results   Component Value Date/Time    CREATININE 0.58 12/28/2022 0415     Lab Results   Component  Value Date/Time    IFAFRICA >60 12/15/2021 1034    IFNOTAFR >60 12/15/2021 1034       cEEG 12/2080/22:  Abnormal video EEG recording in the drowsy/sleep and/or comatose state(s):  - Moderate background slowing suggestive of diffuse/multifocal cerebral dysfunction and consistent with a nonspecific encephalopathy.  Clinical correlation recommended.   - No persistent focal asymmetries seen.  - No definitive epileptiform discharges or other epileptiform phenomena seen.   - No definitive seizures. Clinical correlation is recommended.  -Compared to yesterday's study and to the prior EEG on 12/25/2022, no major changes were seen        ASSESSMENT AND PLAN:  62 y.o. right-handed female with past medical history significant for hypertension, hyperlipidemia, polysubstance abuse who was found down unresponsive and incontinent by her daughter on 12/15/2022.  Paramedics were called and patient was found to have profound hypoglycemia with blood glucose of 32.  She underwent EEG which did not reveal acute abnormalities.  She has been in the hospital since then and remains intubated for respiratory failure.  Although she was extubated on 12/21 however failed and required reintubation due to difficulty supporting her airway.  She underwent a brain MRI on 12/18/2022 which revealed questionable tiny acute infarct in right frontal white matter.  Yesterday on 12/27/2022 she had an spell which was concerning for seizure for which I was asked to see her.  A 24-hour EEG has been ordered and there is no definitive seizure.  The EEG is slow consistent with encephalopathy and in her case likely hypoglycemic in etiology which can be irreversible.  I would not recommend antiepileptic medication at this point.  I met her daughter yesterday, however she was not at bedside today.    Discussed with Dr. Castaneda who will discussed with the family for goals of care as patient has been intubated for 14 days and requires trach and  PEG.    Neurology will follow as needed, please call us if there is any question.

## 2022-12-29 NOTE — PROGRESS NOTES
CRITICAL CARE MEDICINE ATTENDING PROGRESS NOTE    Date of admission  12/15/2022    Chief Complaint  62 y.o. female admitted 12/15/2022 with acute encephalopathy, profound hypoglycemia.  She has a history of DM 1, HTN, hypothyroidism, dyslipidemia, methamphetamine abuse.    Hospital Course        12/16 - hypoglycemia improving, weaning D10. Starting TF's. MRI brain pending  12/1803-kmbiif-jd MRI with possible small acute infarct to right frontal lobe;no other acute intracranial abnormality noted-patient slightly more responsive  12/19 - Vent day #5. Tolerating SBT's. Not following for parameters  12/20 - spontaneous movements, nothing purposeful.  Repeat EEG captured no seizures.  Presence of triphasic waveforms consistent with toxic metabolic etiology.  12/21- Extubated however pt failed and required reintubation due to tachypnea and difficulty supporting her airway      12/22 reintubated midday back on full vent support, febrile  12/23 full vent, not following  12/24 Full vent, no change LOC, daughter updated    12/26 -    vent day 12.  Continue cefazolin.  SAT/SBT.  12/27 -    vent day 13.  SAT/SBT.  Continue cefazolin.  12/28 -    vent day 14.  SAT/SBT.  Continue cefazolin.  Continuous video EEG without evidence of seizures  12/29 -    vent day 15.  SAT/SBT.  Continue cefazolin.  DNR status.  12/30 -    vent day 16.  Continue cefazolin.  SAT/SBT.      Interval Problem Update  Reviewed last 24 hour events:      SR  99.7  -725 mL in the last 24  +8643 mL since admit      Review of Systems  Review of Systems   Unable to perform ROS: Acuity of condition     Vital Signs for the last 24 hours  Pulse:  [] 84  Resp:  [0-31] 18  BP: (123-182)/(58-92) 133/69  SpO2:  [83 %-100 %] 100 %    Hemodynamic parameters for the last 24 hours       Vent Settings for the last 24 hours  Vent Mode: ASV  PEEP/CPAP: 8  P Support: 5  MAP: 11  Length of Weaning Trial (Hours): 1 hour  Control VTE (exp VT): 535    Physical Exam  Physical  Exam  Constitutional:       Appearance: She is not diaphoretic.      Comments: On ventilator   HENT:      Head: Normocephalic.      Mouth/Throat:      Pharynx: Oropharynx is clear.   Eyes:      Pupils: Pupils are equal, round, and reactive to light.   Cardiovascular:      Comments: Sinus rhythm  Pulmonary:      Breath sounds: Rales (Scattered coarse crackles) present. No wheezing.   Abdominal:      General: There is no distension.      Tenderness: There is no abdominal tenderness.      Comments: Tolerating enteral tube feedings   Musculoskeletal:      Right lower leg: No edema.      Left lower leg: No edema.   Skin:     General: Skin is warm.   Neurological:      Comments: She will arouse, but will not track or follow.  Moves all 4.       Medications  Current Facility-Administered Medications   Medication Dose Route Frequency Provider Last Rate Last Admin    LORazepam (ATIVAN) injection 1-2 mg  1-2 mg Intravenous Q4HRS PRN Harry Castaneda M.D.        insulin GLARGINE (Lantus,Semglee) injection  15 Units Subcutaneous QAM INSULIN Harry Castaneda M.D.   15 Units at 12/30/22 0540    aspirin (ASA) tablet 325 mg  325 mg Enteral Tube DAILY Harry Castaneda M.D.   325 mg at 12/30/22 0533    atorvastatin (LIPITOR) tablet 10 mg  10 mg Enteral Tube DAILY Harry Castaneda M.D.   10 mg at 12/30/22 0533    levothyroxine (SYNTHROID) tablet 112 mcg  112 mcg Enteral Tube DAILY Harry Castaneda M.D.   112 mcg at 12/30/22 0533    lisinopril (PRINIVIL) tablet 5 mg  5 mg Enteral Tube DAILY Harry Castaneda M.D.   5 mg at 12/30/22 0533    HYDROmorphone (Dilaudid) injection 0.5-1 mg  0.5-1 mg Intravenous Q2HRS PRN Harry Castaneda M.D.   1 mg at 12/30/22 0415    enoxaparin (Lovenox) inj 30 mg  30 mg Subcutaneous DAILY AT 1800 Thierry Pepper M.D.   30 mg at 12/29/22 1711    ceFAZolin (Ancef) 2 g in  mL IVPB  2 g Intravenous Q8HRS Thierry Pepper M.D.   Stopped at 12/30/22 0810     insulin regular (HumuLIN R,NovoLIN R) injection  3-13 Units Subcutaneous Q6HRS Jazmine Pike, A.P.R.N.   3 Units at 12/30/22 0023    And    dextrose 10 % BOLUS 25 g  25 g Intravenous Q15 MIN PRN Jazmine Pike A.P.R.N.   Stopped at 12/20/22 0304    acetaminophen (Tylenol) tablet 650 mg  650 mg Enteral Tube Q6HRS PRN Jazmine Pike A.P.R.N.   650 mg at 12/29/22 0246    Respiratory Therapy Consult   Nebulization Continuous RT Ace Pool M.D.        famotidine (PEPCID) tablet 20 mg  20 mg Enteral Tube Q12HRS Ace Pool M.D.   20 mg at 12/30/22 0534    MD Alert...ICU Electrolyte Replacement per Pharmacy   Other PHARMACY TO DOSE Ace Pool M.D.        lidocaine (XYLOCAINE) 1 % injection 2 mL  2 mL Tracheal Tube Q30 MIN PRN Ace Pool M.D.   2 mL at 12/23/22 2115    ipratropium-albuterol (DUONEB) nebulizer solution  3 mL Nebulization Q2HRS PRN (RT) Ace Pool M.D.        Pharmacy Consult: Enteral tube insertion - review meds/change route/product selection  1 Each Other PHARMACY TO DOSE Jazmine Pike, A.P.R.N.        senna-docusate (PERICOLACE or SENOKOT S) 8.6-50 MG per tablet 2 Tablet  2 Tablet Enteral Tube BID Jazmine Pike A.P.R.N.   2 Tablet at 12/28/22 1812    And    polyethylene glycol/lytes (MIRALAX) PACKET 1 Packet  1 Packet Enteral Tube QDAY PRN Jazmine Pike A.P.R.N.   1 Packet at 12/28/22 1812    And    magnesium hydroxide (MILK OF MAGNESIA) suspension 30 mL  30 mL Enteral Tube QDAY PRN Jazmine Pike, A.P.R.N.   30 mL at 12/26/22 1715    And    bisacodyl (DULCOLAX) suppository 10 mg  10 mg Rectal QDAY PRN Jazmine iPke A.P.R.N.   10 mg at 12/28/22 0504       Fluids    Intake/Output Summary (Last 24 hours) at 12/30/2022 1105  Last data filed at 12/30/2022 1000  Gross per 24 hour   Intake 1380 ml   Output 2130 ml   Net -750 ml       Laboratory      Recent Labs     12/28/22  0415 12/29/22  0415 12/30/22  0425   SODIUM 139 137 139   POTASSIUM 3.7 3.8 4.0   CHLORIDE 105 102 103    CO2 23 27 26   BUN 20 18 15   CREATININE 0.58 0.60 0.59   MAGNESIUM 1.8 2.0 1.9   PHOSPHORUS 2.4* 4.0 3.2   CALCIUM 7.5* 7.9* 8.8     Recent Labs     12/28/22 0415 12/29/22 0415 12/30/22 0425   GLUCOSE 157* 172* 166*     Recent Labs     12/28/22 0415 12/29/22 0415 12/30/22 0425   WBC 9.2 8.6 7.6   NEUTSPOLYS 69.90 76.00* 75.90*   LYMPHOCYTES 17.80* 14.50* 13.40*   MONOCYTES 9.20 7.10 8.30   EOSINOPHILS 2.20 1.50 1.30   BASOPHILS 0.20 0.20 0.30     Recent Labs     12/28/22 0415 12/29/22 0415 12/30/22 0425   RBC 2.72* 2.94* 3.38*   HEMOGLOBIN 8.3* 8.8* 10.2*   HEMATOCRIT 25.9* 27.6* 32.3*   PLATELETCT 336 365 422       Imaging  None    Assessment/Plan      Acute hypoxemic respiratory failure   Intubated 12/15   Failed attempt at liberation on 12/22   ABCDEF bundle   SAT/SBT as appropriate   Mobility level 2    Acute encephalopathy   Due to hypoglycemia - found down with profound hypoglycemia   MRI with possible tiny acute infarct in right frontal lobe   EEGs and continuous video EEG without evidence of seizures   Cortisol level elevated   Ammonia normal   TSH normal    MSSA pneumonia   Continue cefazolin    Diabetes mellitus type 1   Abysmal control with glycohemoglobin of 13.2   Continue glargine, 15 units daily   Sliding scale insulin    Primary hypertension   Goal SBP less than 160   Continue lisinopril, 5 mg daily    Methamphetamine abuse   Urine drug screen positive for amphetamines on 12/15   Cessation counseling when clinically appropriate    Hypothyroidism   Continue levothyroxine, 112 mcg daily    Dyslipidemia   Statin    DNR      VTE:  Lovenox  Ulcer: PPI  Lines: Catalan Catheter  Ongoing indication addressed    I have performed a physical exam and reviewed and updated ROS and Plan today (12/30/2022). In review of yesterday's note (12/29/2022), there are no changes except as documented above.    I have assessed and reassessed her respiratory status with ventilator adjustments and spontaneous  breathing trials, ventilator waveforms, airway mechanics, blood pressure, hemodynamics, cardiovascular status and her neurologic status.  She is at increased risk for worsening respiratory and CNS system dysfunction.    Discussed patient condition and risk of morbidity and/or mortality with RN, RT, Pharmacy, Charge nurse / hot rounds, and QA team    The patient remains critically ill.  Critical care time = 35 minutes in directly providing and coordinating critical care and extensive data review.  No time overlap and excludes procedures.    A Critical Care Medicine progress note may have been authored by a resident physician or advanced practitioner of nursing under my direct supervision on this date of service.  As the supervising and attending physician, I have either attested to or cosigned that document.  IN THE EVENT THAT DISCREPANCIES EXIST BETWEEN THIS DOCUMENT AND ANY DOCUMENT THAT I HAVE ATTESTED TO OR COSIGNED ON THIS DATE OF SERVICE, THEN THIS DOCUMENT REMAINS THE FINAL AUTHORITY AS TO MY ASSESSMENT AND PLAN REGARDING THE CARE OF THIS PATIENT.    Harry Castaneda MD  Pulmonary and Critical Care Medicine

## 2022-12-29 NOTE — DIETARY
Nutrition support weekly update:  Day 14 of admit.  Poonam Mayo is a 62 y.o. female with admitting DX of Hypoglycemia.  Tube feeding initiated on . Current TF via NG is Diabetisource AC at goal rate of 50 ml/hour which provides 1440 kcal, 72 gm protein, and 984 ml free water in 24 hours.    Assessment:  Weight : 48.5 kg. Estimated needs based on first bed scale weight of 46.5 kg.    Estimated needs with weight 46.5 kg:  Calculation/Equation: REE per MSJ x 1.2 = 1232 kcal  RMR per PSU (VE: 7.3 L/min and Tmax: 38.3 C) = 1555  Total Calories / day: 1200 - 1500 (Calories / k - 32)  Total Grams Protein / day: 56 - 70 (Grams Protein / k.2 - 1.5)    Evaluation:   Diabetisource AC running at goal rate.  Medications include Lipitor, Ancef, Pepcid, Lantus, SSI,   Labs  include Glu 172 (H). : Pre-Alb 9 (L). : CRP 13.3 (H)  LBM   Free water flush 30 ml q 4 hours.  CHO controlled formula Diabetisource AC remains appropriate.    Malnutrition risk: Severe per RD assessment .    Recommendations/Plan:  Continue Diabetisource AC at goal rate of 60 ml/hour.  Fluids per MD.  Monitor weight.     RD following

## 2022-12-29 NOTE — THERAPY
Physical Therapy Contact Note    Patient Name: Poonam Mayo  Age:  62 y.o., Sex:  female  Medical Record #: [id number]  Today's Date: 12/29/2022 12/29/22 [id number]   Interdisciplinary Plan of Care Collaboration   Collaboration Comments Spoke with RN, pt still not following commands, not able to participate in skilled physical therapy.  HOLD PT at this time, will monitor and resume PT as able/appropriate.

## 2022-12-30 NOTE — CARE PLAN
Problem: Safety - Medical Restraint  Goal: Remains free of injury from restraints (Restraint for Interference with Medical Device)  Outcome: Progressing     Problem: Pain - Standard  Goal: Alleviation of pain or a reduction in pain to the patient’s comfort goal  Outcome: Progressing   The patient is Watcher - Medium risk of patient condition declining or worsening    Shift Goals  Clinical Goals: Stable Neuro, stable hemodynamics  Patient Goals: erlinda  Family Goals: erlinda    Progress made toward(s) clinical / shift goals:  Met      Patient is not progressing towards the following goals:

## 2022-12-30 NOTE — PROGRESS NOTES
Pulmonary and Critical Care Medicine Progress Note    I had the pleasure of calling this lady's son, Maurizio Mayo ([phone]) and discussing this lady's condition and goals of care with him.  Yesterday, I had a nice chat with Sindy Zhuon, this lady's daughter and her POA.  I answered all of Maurizio's questions and we also had a nice chat.  Maurizio is leaning towards this lady having a tracheostomy and gastrostomy tube and giving her more time to see if she improves neurologically.  I do not disagree with this plan.  He is going to contact his sister Sindy and discuss with her.  I told him that once a decision is made, to have Sindy contact us.    Harry Castaneda MD  Pulmonary and Critical Care Medicine

## 2022-12-30 NOTE — CARE PLAN
Problem: Ventilation  Goal: Ability to achieve and maintain unassisted ventilation or tolerate decreased levels of ventilator support  Description: Target End Date:  4 days     Document on Vent flowsheet    1.  Support and monitor invasive and noninvasive mechanical ventilation  2.  Monitor ventilator weaning response  3.  Perform ventilator associated pneumonia prevention interventions  4.  Manage ventilation therapy by monitoring diagnostic test results  Outcome: Not Progressing      Ventilator Daily Summary    Vent Day # 16    Ventilator settings changed this shift:  +8 30%    Weaning trials:    Respiratory Procedures:    Plan: Continue current ventilator settings and wean mechanical ventilation as tolerated per physician orders.

## 2022-12-30 NOTE — THERAPY
Occupational Therapy Contact Note    Patient Name: Poonam Mayo  Age:  62 y.o., Sex:  female  Medical Record #: [id number]  Today's Date: 12/30/2022    Discussed missed therapy with RN       12/30/22 5108   Occupational Therapy Treatment Plan   O.T. Treatment Plan Modify Current Treatment Plan   Modified Plan   HOLD due to inability to participate   Interdisciplinary Plan of Care Collaboration   Collaboration Comments Per nursing pt continues to be total assist with no command following. OT will place pt on HOLD due to inability to participate. Please re-order OT when pt demos at least minimal command following/participation.

## 2022-12-30 NOTE — CARE PLAN
Problem: Ventilation  Goal: Ability to achieve and maintain unassisted ventilation or tolerate decreased levels of ventilator support  Description: Target End Date:  4 days     Document on Vent flowsheet    1.  Support and monitor invasive and noninvasive mechanical ventilation  2.  Monitor ventilator weaning response  3.  Perform ventilator associated pneumonia prevention interventions  4.  Manage ventilation therapy by monitoring diagnostic test results  Outcome: Progressing   Pt tolerating SBT well. Unable to follow commands for extubation.

## 2022-12-30 NOTE — CARE PLAN
Problem: Ventilation  Goal: Ability to achieve and maintain unassisted ventilation or tolerate decreased levels of ventilator support  Description: Target End Date:  4 days     Document on Vent flowsheet    1.  Support and monitor invasive and noninvasive mechanical ventilation  2.  Monitor ventilator weaning response  3.  Perform ventilator associated pneumonia prevention interventions  4.  Manage ventilation therapy by monitoring diagnostic test results  Outcome: Not Met  Note:    Ventilator Daily Summary    Vent Day #15    Ventilator settings changed this shift: n/a    Weaning trials: pt weaned x3.5 hours, not following commands    Respiratory Procedures: n/a    Plan: Continue current ventilator settings and wean mechanical ventilation as tolerated per physician orders.

## 2022-12-30 NOTE — THERAPY
Physical Therapy Contact Note    Patient Name: Poonam Mayo  Age:  62 y.o., Sex:  female  Medical Record #: [id number]  Today's Date: 12/30/2022 12/30/22 0805   Interdisciplinary Plan of Care Collaboration   Collaboration Comments Spoke with bedside RN, pt still not following commands & not able to participate in skilled physical therapy intervention.  Pt has been on HOLD with acute PT since 12/22.  RN also reported potential GOC discussion.  Will sign off at this time, please reconsult as approriate when pt able to participate in skilled therapy.

## 2022-12-30 NOTE — CARE PLAN
The patient is Watcher - Medium risk of patient condition declining or worsening    Shift Goals  Clinical Goals: Stable Neuro, stable hemodynamics  Patient Goals: erlinda  Family Goals: erlinda    Problem: Knowledge Deficit - Standard  Goal: Patient and family/care givers will demonstrate understanding of plan of care, disease process/condition, diagnostic tests and medications  Outcome: Progressing     Problem: Skin Integrity  Goal: Skin integrity is maintained or improved  Outcome: Progressing     Problem: Fall Risk  Goal: Patient will remain free from falls  Outcome: Progressing     Problem: Safety - Medical Restraint  Goal: Remains free of injury from restraints (Restraint for Interference with Medical Device)  Outcome: Progressing  Goal: Free from restraint(s) (Restraint for Interference with Medical Device)  Outcome: Progressing     Problem: Pain - Standard  Goal: Alleviation of pain or a reduction in pain to the patient’s comfort goal  Outcome: Progressing     Problem: Psychosocial  Goal: Patient's level of anxiety will decrease  Outcome: Progressing  Goal: Patient's ability to verbalize feelings about condition will improve  Outcome: Progressing  Goal: Patient's ability to re-evaluate and adapt role responsibilities will improve  Outcome: Progressing  Goal: Patient and family will demonstrate ability to cope with life altering diagnosis and/or procedure  Outcome: Progressing     Problem: Hemodynamics  Goal: Patient's hemodynamics, fluid balance and neurologic status will be stable or improve  Outcome: Progressing     Problem: Respiratory  Goal: Patient will achieve/maintain optimum respiratory ventilation and gas exchange  Outcome: Progressing     Problem: Mechanical Ventilation  Goal: Safe management of artificial airway and ventilation  Outcome: Progressing  Goal: Successful weaning off mechanical ventilator, spontaneously maintains adequate gas exchange  Outcome: Progressing  Goal: Patient will be able to express  needs and understand communication  Outcome: Progressing     Problem: Risk for Aspiration  Goal: Patient's risk for aspiration will be absent or decrease  Outcome: Progressing     Problem: Urinary - Renal Perfusion  Goal: Ability to achieve and maintain adequate renal perfusion and functioning will improve  Outcome: Progressing     Problem: Venous Thromboembolism (VTE) Prevention  Goal: The patient will remain free from venous thromboembolism (VTE)  Outcome: Progressing     Problem: Nutrition  Goal: Patient's nutritional and fluid intake will be adequate or improve  Outcome: Progressing  Goal: Enteral nutrition will be maintained or improve  Outcome: Progressing     Problem: Urinary Elimination  Goal: Establish and maintain regular urinary output  Outcome: Progressing     Problem: Bowel Elimination  Goal: Establish and maintain regular bowel function  Outcome: Progressing

## 2022-12-31 NOTE — PROGRESS NOTES
CRITICAL CARE MEDICINE ATTENDING PROGRESS NOTE    Date of admission  12/15/2022    Chief Complaint  62 y.o. female admitted 12/15/2022 with acute encephalopathy, profound hypoglycemia.  She has a history of DM 1, HTN, hypothyroidism, dyslipidemia, methamphetamine abuse.    Hospital Course        12/16 - hypoglycemia improving, weaning D10. Starting TF's. MRI brain pending  12/1886-twzclo-se MRI with possible small acute infarct to right frontal lobe;no other acute intracranial abnormality noted-patient slightly more responsive  12/19 - Vent day #5. Tolerating SBT's. Not following for parameters  12/20 - spontaneous movements, nothing purposeful.  Repeat EEG captured no seizures.  Presence of triphasic waveforms consistent with toxic metabolic etiology.  12/21- Extubated however pt failed and required reintubation due to tachypnea and difficulty supporting her airway      12/22 reintubated midday back on full vent support, febrile  12/23 full vent, not following  12/24 Full vent, no change LOC, daughter updated    12/26 -    vent day 12.  Continue cefazolin.  SAT/SBT.  12/27 -    vent day 13.  SAT/SBT.  Continue cefazolin.  12/28 -    vent day 14.  SAT/SBT.  Continue cefazolin.  Continuous video EEG without evidence of seizures  12/29 -    vent day 15.  SAT/SBT.  Continue cefazolin.  DNR status.  12/30 -    vent day 16.  Continue cefazolin.  SAT/SBT.  12/31 -    vent day 17.  Continue cefazolin.  SAT/SBT.  Increase lisinopril, 10 mg daily.      Interval Problem Update  Reviewed last 24 hour events:      SR  100.0  -876 mL in the last 24  +6720 mL since admit      Review of Systems  Review of Systems   Unable to perform ROS: Acuity of condition     Vital Signs for the last 24 hours  Pulse:  [] 73  Resp:  [0-33] 18  BP: (108-166)/() 131/71  SpO2:  [84 %-100 %] 98 %    Hemodynamic parameters for the last 24 hours       Vent Settings for the last 24 hours  Vent Mode: ASV  PEEP/CPAP: 8  MAP: 11  Length of  Weaning Trial (Hours): 1 hour  Control VTE (exp VT): 327    Physical Exam  Physical Exam  Constitutional:       Appearance: She is not diaphoretic.      Comments: On ventilator   HENT:      Head: Normocephalic.      Mouth/Throat:      Pharynx: Oropharynx is clear.   Eyes:      Pupils: Pupils are equal, round, and reactive to light.   Cardiovascular:      Comments: Sinus rhythm  Pulmonary:      Breath sounds: Rales (Coarse crackles are about the same) present. No wheezing.   Abdominal:      General: There is no distension.      Tenderness: There is no abdominal tenderness.      Comments: Tolerating enteral tube feedings   Musculoskeletal:      Right lower leg: No edema.      Left lower leg: No edema.   Skin:     General: Skin is warm.   Neurological:      Comments: She will arouse, but will not track or follow.  Moves all 4.       Medications  Current Facility-Administered Medications   Medication Dose Route Frequency Provider Last Rate Last Admin    potassium bicarbonate (KLYTE) effervescent tablet 25 mEq  25 mEq Enteral Tube Once Harry Castaneda M.D.        [START ON 1/1/2023] lisinopril (PRINIVIL) tablet 10 mg  10 mg Enteral Tube DAILY Harry Castaneda M.D.        lisinopril (PRINIVIL) tablet 5 mg  5 mg Enteral Tube Once Harry Castaneda M.D.        LORazepam (ATIVAN) injection 1-2 mg  1-2 mg Intravenous Q4HRS PRN Harry Castaneda M.D.        insulin GLARGINE (Lantus,Semglee) injection  15 Units Subcutaneous QAM INSULIN Harry Castaneda M.D.   15 Units at 12/31/22 0601    aspirin (ASA) tablet 325 mg  325 mg Enteral Tube DAILY Harry Castaneda M.D.   325 mg at 12/31/22 0600    atorvastatin (LIPITOR) tablet 10 mg  10 mg Enteral Tube DAILY Harry Castaneda M.D.   10 mg at 12/31/22 0600    levothyroxine (SYNTHROID) tablet 112 mcg  112 mcg Enteral Tube DAILY Harry Castaneda M.D.   112 mcg at 12/31/22 0600    HYDROmorphone (Dilaudid) injection 0.5-1 mg  0.5-1 mg  Intravenous Q2HRS PRN Harry Castaneda M.D.   1 mg at 12/31/22 0508    enoxaparin (Lovenox) inj 30 mg  30 mg Subcutaneous DAILY AT 1800 Thierry Pepper M.D.   30 mg at 12/30/22 1710    ceFAZolin (Ancef) 2 g in  mL IVPB  2 g Intravenous Q8HRS Thierry Pepper M.D.   Stopped at 12/31/22 0025    insulin regular (HumuLIN R,NovoLIN R) injection  3-13 Units Subcutaneous Q6HRS Jazmine Pike, A.P.R.N.   3 Units at 12/31/22 0601    And    dextrose 10 % BOLUS 25 g  25 g Intravenous Q15 MIN PRN Jazmine Pike A.P.R.N.   Stopped at 12/20/22 0304    acetaminophen (Tylenol) tablet 650 mg  650 mg Enteral Tube Q6HRS PRN Jazmine Pike A.P.R.N.   650 mg at 12/29/22 0246    Respiratory Therapy Consult   Nebulization Continuous RT cAe Pool M.D.        famotidine (PEPCID) tablet 20 mg  20 mg Enteral Tube Q12HRS Ace Pool M.D.   20 mg at 12/31/22 0600    MD Alert...ICU Electrolyte Replacement per Pharmacy   Other PHARMACY TO DOSE Ace Pool M.D.        lidocaine (XYLOCAINE) 1 % injection 2 mL  2 mL Tracheal Tube Q30 MIN PRN Ace Pool M.D.   2 mL at 12/23/22 2115    ipratropium-albuterol (DUONEB) nebulizer solution  3 mL Nebulization Q2HRS PRN (RT) Ace Pool M.D.        Pharmacy Consult: Enteral tube insertion - review meds/change route/product selection  1 Each Other PHARMACY TO DOSE Jazmine Pike, A.P.R.N.        senna-docusate (PERICOLACE or SENOKOT S) 8.6-50 MG per tablet 2 Tablet  2 Tablet Enteral Tube BID Jazmine Pike A.P.R.N.   2 Tablet at 12/31/22 0600    And    polyethylene glycol/lytes (MIRALAX) PACKET 1 Packet  1 Packet Enteral Tube QDAY PRN Jazmine Pike A.P.R.N.   1 Packet at 12/28/22 1812    And    magnesium hydroxide (MILK OF MAGNESIA) suspension 30 mL  30 mL Enteral Tube QDAY PRN RAVINDER Neumann.P.R.N.   30 mL at 12/26/22 1715    And    bisacodyl (DULCOLAX) suppository 10 mg  10 mg Rectal QDAY PRN RAVINDER Neumann.P.R.N.   10 mg at 12/28/22 2136        Fluids    Intake/Output Summary (Last 24 hours) at 12/31/2022 0716  Last data filed at 12/31/2022 0600  Gross per 24 hour   Intake 1299.49 ml   Output 2175 ml   Net -875.51 ml       Laboratory      Recent Labs     12/29/22 0415 12/30/22 0425 12/31/22 0347   SODIUM 137 139 138   POTASSIUM 3.8 4.0 3.9   CHLORIDE 102 103 103   CO2 27 26 27   BUN 18 15 15   CREATININE 0.60 0.59 0.57   MAGNESIUM 2.0 1.9 2.1   PHOSPHORUS 4.0 3.2 3.2   CALCIUM 7.9* 8.8 8.5     Recent Labs     12/29/22 0415 12/30/22 0425 12/31/22 0347   GLUCOSE 172* 166* 188*     Recent Labs     12/29/22 0415 12/30/22 0425 12/31/22 0347   WBC 8.6 7.6 6.6   NEUTSPOLYS 76.00* 75.90* 61.00   LYMPHOCYTES 14.50* 13.40* 20.40*   MONOCYTES 7.10 8.30 15.00*   EOSINOPHILS 1.50 1.30 2.70   BASOPHILS 0.20 0.30 0.60     Recent Labs     12/29/22 0415 12/30/22 0425 12/31/22 0347   RBC 2.94* 3.38* 3.34*   HEMOGLOBIN 8.8* 10.2* 9.9*   HEMATOCRIT 27.6* 32.3* 31.4*   PLATELETCT 365 422 425       Imaging  None    Assessment/Plan      Acute hypoxemic respiratory failure   Intubated 12/15   Failed attempt at liberation on 12/22   ABCDEF bundle   SAT/SBT as appropriate   Mobility level 2    Acute encephalopathy   Due to hypoglycemia - found down with profound hypoglycemia   MRI with possible tiny acute infarct in right frontal lobe   EEGs and continuous video EEG without evidence of seizures   Cortisol level elevated   Ammonia normal   TSH normal    MSSA pneumonia   Continue cefazolin    Diabetes mellitus type 1   Abysmal control with glycohemoglobin of 13.2   Continue glargine, 15 units daily   Sliding scale insulin    Primary hypertension   Goal SBP less than 160   Increase lisinopril, 10 mg daily    Methamphetamine abuse   Urine drug screen positive for amphetamines on 12/15   Cessation counseling when clinically appropriate    Hypothyroidism   Continue levothyroxine, 112 mcg daily    Dyslipidemia   Statin    DNR      VTE:  Lovenox  Ulcer: PPI  Lines: [place]an  Catheter  Ongoing indication addressed    I have performed a physical exam and reviewed and updated ROS and Plan today (12/31/2022). In review of yesterday's note (12/30/2022), there are no changes except as documented above.    I have assessed and reassessed her respiratory status with spontaneous breathing trials and ventilator adjustments, airway mechanics, ventilator waveforms, blood pressure, hemodynamics, cardiovascular status and her neurologic status.  She is at increased risk for worsening respiratory and CNS system dysfunction.    Discussed patient condition and risk of morbidity and/or mortality with RN, RT, Pharmacy, Charge nurse / hot rounds, and QA team    The patient remains critically ill.  Critical care time = 40 minutes in directly providing and coordinating critical care and extensive data review.  No time overlap and excludes procedures.    A Critical Care Medicine progress note may have been authored by a resident physician or advanced practitioner of nursing under my direct supervision on this date of service.  As the supervising and attending physician, I have either attested to or cosigned that document.  IN THE EVENT THAT DISCREPANCIES EXIST BETWEEN THIS DOCUMENT AND ANY DOCUMENT THAT I HAVE ATTESTED TO OR COSIGNED ON THIS DATE OF SERVICE, THEN THIS DOCUMENT REMAINS THE FINAL AUTHORITY AS TO MY ASSESSMENT AND PLAN REGARDING THE CARE OF THIS PATIENT.    Harry Castaneda MD  Pulmonary and Critical Care Medicine

## 2022-12-31 NOTE — CARE PLAN
The patient is Stable - Low risk of patient condition declining or worsening    Shift Goals  Clinical Goals: stable neuro and hemodynamics  Patient Goals: erlinda  Family Goals: erlinda    Progress made toward(s) clinical / shift goals:  pt has been stable this shift. On no drips. Withdraws to pain, does not follow commands, has not been opening her eyes this shift.       Problem: Knowledge Deficit - Standard  Goal: Patient and family/care givers will demonstrate understanding of plan of care, disease process/condition, diagnostic tests and medications  Outcome: Progressing     Problem: Skin Integrity  Goal: Skin integrity is maintained or improved  Outcome: Progressing     Problem: Fall Risk  Goal: Patient will remain free from falls  Outcome: Progressing     Problem: Safety - Medical Restraint  Goal: Remains free of injury from restraints (Restraint for Interference with Medical Device)  Outcome: Progressing  Goal: Free from restraint(s) (Restraint for Interference with Medical Device)  Outcome: Progressing     Problem: Pain - Standard  Goal: Alleviation of pain or a reduction in pain to the patient’s comfort goal  Outcome: Progressing     Problem: Psychosocial  Goal: Patient's level of anxiety will decrease  Outcome: Progressing  Goal: Patient's ability to verbalize feelings about condition will improve  Outcome: Progressing  Goal: Patient's ability to re-evaluate and adapt role responsibilities will improve  Outcome: Progressing  Goal: Patient and family will demonstrate ability to cope with life altering diagnosis and/or procedure  Outcome: Progressing     Problem: Hemodynamics  Goal: Patient's hemodynamics, fluid balance and neurologic status will be stable or improve  Outcome: Progressing     Problem: Respiratory  Goal: Patient will achieve/maintain optimum respiratory ventilation and gas exchange  Outcome: Progressing     Problem: Mechanical Ventilation  Goal: Safe management of artificial airway and  ventilation  Outcome: Progressing  Goal: Successful weaning off mechanical ventilator, spontaneously maintains adequate gas exchange  Outcome: Progressing  Goal: Patient will be able to express needs and understand communication  Outcome: Progressing     Problem: Risk for Aspiration  Goal: Patient's risk for aspiration will be absent or decrease  Outcome: Progressing     Problem: Urinary - Renal Perfusion  Goal: Ability to achieve and maintain adequate renal perfusion and functioning will improve  Outcome: Progressing     Problem: Venous Thromboembolism (VTE) Prevention  Goal: The patient will remain free from venous thromboembolism (VTE)  Outcome: Progressing     Problem: Nutrition  Goal: Patient's nutritional and fluid intake will be adequate or improve  Outcome: Progressing  Goal: Enteral nutrition will be maintained or improve  Outcome: Progressing     Problem: Urinary Elimination  Goal: Establish and maintain regular urinary output  Outcome: Progressing     Problem: Bowel Elimination  Goal: Establish and maintain regular bowel function  Outcome: Progressing       Patient is not progressing towards the following goals:

## 2023-01-01 ENCOUNTER — APPOINTMENT (OUTPATIENT)
Dept: RADIOLOGY | Facility: MEDICAL CENTER | Age: 63
DRG: 004 | End: 2023-01-01
Attending: INTERNAL MEDICINE
Payer: MEDICARE

## 2023-01-01 VITALS
WEIGHT: 97.66 LBS | HEIGHT: 65 IN | DIASTOLIC BLOOD PRESSURE: 57 MMHG | HEART RATE: 104 BPM | RESPIRATION RATE: 20 BRPM | TEMPERATURE: 97.1 F | OXYGEN SATURATION: 69 % | BODY MASS INDEX: 16.27 KG/M2 | SYSTOLIC BLOOD PRESSURE: 98 MMHG

## 2023-01-01 PROBLEM — J98.11 ATELECTASIS: Status: ACTIVE | Noted: 2023-01-01

## 2023-01-01 LAB
ANION GAP SERPL CALC-SCNC: 10 MMOL/L (ref 7–16)
ANION GAP SERPL CALC-SCNC: 10 MMOL/L (ref 7–16)
ANION GAP SERPL CALC-SCNC: 11 MMOL/L (ref 7–16)
ANION GAP SERPL CALC-SCNC: 7 MMOL/L (ref 7–16)
ANION GAP SERPL CALC-SCNC: 8 MMOL/L (ref 7–16)
ANION GAP SERPL CALC-SCNC: 9 MMOL/L (ref 7–16)
BACTERIA BRONCH AEROBE CULT: NORMAL
BASOPHILS # BLD AUTO: 0.3 % (ref 0–1.8)
BASOPHILS # BLD AUTO: 0.4 % (ref 0–1.8)
BASOPHILS # BLD AUTO: 0.5 % (ref 0–1.8)
BASOPHILS # BLD AUTO: 0.9 % (ref 0–1.8)
BASOPHILS # BLD AUTO: 0.9 % (ref 0–1.8)
BASOPHILS # BLD: 0.02 K/UL (ref 0–0.12)
BASOPHILS # BLD: 0.03 K/UL (ref 0–0.12)
BASOPHILS # BLD: 0.04 K/UL (ref 0–0.12)
BASOPHILS # BLD: 0.05 K/UL (ref 0–0.12)
BASOPHILS # BLD: 0.05 K/UL (ref 0–0.12)
BUN SERPL-MCNC: 13 MG/DL (ref 8–22)
BUN SERPL-MCNC: 14 MG/DL (ref 8–22)
BUN SERPL-MCNC: 15 MG/DL (ref 8–22)
BUN SERPL-MCNC: 16 MG/DL (ref 8–22)
BUN SERPL-MCNC: 17 MG/DL (ref 8–22)
BUN SERPL-MCNC: 18 MG/DL (ref 8–22)
BUN SERPL-MCNC: 19 MG/DL (ref 8–22)
BUN SERPL-MCNC: 20 MG/DL (ref 8–22)
BUN SERPL-MCNC: 20 MG/DL (ref 8–22)
BUN SERPL-MCNC: 21 MG/DL (ref 8–22)
BUN SERPL-MCNC: 22 MG/DL (ref 8–22)
CALCIUM SERPL-MCNC: 8.1 MG/DL (ref 8.5–10.5)
CALCIUM SERPL-MCNC: 8.2 MG/DL (ref 8.5–10.5)
CALCIUM SERPL-MCNC: 8.2 MG/DL (ref 8.5–10.5)
CALCIUM SERPL-MCNC: 8.3 MG/DL (ref 8.5–10.5)
CALCIUM SERPL-MCNC: 8.3 MG/DL (ref 8.5–10.5)
CALCIUM SERPL-MCNC: 8.4 MG/DL (ref 8.5–10.5)
CALCIUM SERPL-MCNC: 8.5 MG/DL (ref 8.5–10.5)
CALCIUM SERPL-MCNC: 8.5 MG/DL (ref 8.5–10.5)
CALCIUM SERPL-MCNC: 8.6 MG/DL (ref 8.5–10.5)
CALCIUM SERPL-MCNC: 8.9 MG/DL (ref 8.5–10.5)
CALCIUM SERPL-MCNC: 8.9 MG/DL (ref 8.5–10.5)
CHLORIDE SERPL-SCNC: 100 MMOL/L (ref 96–112)
CHLORIDE SERPL-SCNC: 101 MMOL/L (ref 96–112)
CHLORIDE SERPL-SCNC: 101 MMOL/L (ref 96–112)
CHLORIDE SERPL-SCNC: 102 MMOL/L (ref 96–112)
CHLORIDE SERPL-SCNC: 103 MMOL/L (ref 96–112)
CHLORIDE SERPL-SCNC: 104 MMOL/L (ref 96–112)
CHLORIDE SERPL-SCNC: 98 MMOL/L (ref 96–112)
CO2 SERPL-SCNC: 24 MMOL/L (ref 20–33)
CO2 SERPL-SCNC: 25 MMOL/L (ref 20–33)
CO2 SERPL-SCNC: 26 MMOL/L (ref 20–33)
CO2 SERPL-SCNC: 27 MMOL/L (ref 20–33)
CREAT SERPL-MCNC: 0.4 MG/DL (ref 0.5–1.4)
CREAT SERPL-MCNC: 0.47 MG/DL (ref 0.5–1.4)
CREAT SERPL-MCNC: 0.48 MG/DL (ref 0.5–1.4)
CREAT SERPL-MCNC: 0.48 MG/DL (ref 0.5–1.4)
CREAT SERPL-MCNC: 0.49 MG/DL (ref 0.5–1.4)
CREAT SERPL-MCNC: 0.52 MG/DL (ref 0.5–1.4)
CREAT SERPL-MCNC: 0.54 MG/DL (ref 0.5–1.4)
CREAT SERPL-MCNC: 0.54 MG/DL (ref 0.5–1.4)
CREAT SERPL-MCNC: 0.55 MG/DL (ref 0.5–1.4)
CREAT SERPL-MCNC: 0.56 MG/DL (ref 0.5–1.4)
CREAT SERPL-MCNC: 0.62 MG/DL (ref 0.5–1.4)
CRP SERPL HS-MCNC: 2.12 MG/DL (ref 0–0.75)
CRP SERPL HS-MCNC: 6.92 MG/DL (ref 0–0.75)
EOSINOPHIL # BLD AUTO: 0.12 K/UL (ref 0–0.51)
EOSINOPHIL # BLD AUTO: 0.12 K/UL (ref 0–0.51)
EOSINOPHIL # BLD AUTO: 0.14 K/UL (ref 0–0.51)
EOSINOPHIL # BLD AUTO: 0.16 K/UL (ref 0–0.51)
EOSINOPHIL # BLD AUTO: 0.16 K/UL (ref 0–0.51)
EOSINOPHIL # BLD AUTO: 0.17 K/UL (ref 0–0.51)
EOSINOPHIL # BLD AUTO: 0.2 K/UL (ref 0–0.51)
EOSINOPHIL # BLD AUTO: 0.24 K/UL (ref 0–0.51)
EOSINOPHIL # BLD AUTO: 0.27 K/UL (ref 0–0.51)
EOSINOPHIL # BLD AUTO: 0.27 K/UL (ref 0–0.51)
EOSINOPHIL # BLD AUTO: 0.29 K/UL (ref 0–0.51)
EOSINOPHIL NFR BLD: 2.1 % (ref 0–6.9)
EOSINOPHIL NFR BLD: 2.2 % (ref 0–6.9)
EOSINOPHIL NFR BLD: 2.3 % (ref 0–6.9)
EOSINOPHIL NFR BLD: 2.4 % (ref 0–6.9)
EOSINOPHIL NFR BLD: 2.8 % (ref 0–6.9)
EOSINOPHIL NFR BLD: 3 % (ref 0–6.9)
EOSINOPHIL NFR BLD: 3 % (ref 0–6.9)
EOSINOPHIL NFR BLD: 3.2 % (ref 0–6.9)
EOSINOPHIL NFR BLD: 4.1 % (ref 0–6.9)
EOSINOPHIL NFR BLD: 4.8 % (ref 0–6.9)
EOSINOPHIL NFR BLD: 5.2 % (ref 0–6.9)
ERYTHROCYTE [DISTWIDTH] IN BLOOD BY AUTOMATED COUNT: 40.8 FL (ref 35.9–50)
ERYTHROCYTE [DISTWIDTH] IN BLOOD BY AUTOMATED COUNT: 40.9 FL (ref 35.9–50)
ERYTHROCYTE [DISTWIDTH] IN BLOOD BY AUTOMATED COUNT: 41.3 FL (ref 35.9–50)
ERYTHROCYTE [DISTWIDTH] IN BLOOD BY AUTOMATED COUNT: 41.6 FL (ref 35.9–50)
ERYTHROCYTE [DISTWIDTH] IN BLOOD BY AUTOMATED COUNT: 43 FL (ref 35.9–50)
ERYTHROCYTE [DISTWIDTH] IN BLOOD BY AUTOMATED COUNT: 43.2 FL (ref 35.9–50)
ERYTHROCYTE [DISTWIDTH] IN BLOOD BY AUTOMATED COUNT: 43.4 FL (ref 35.9–50)
ERYTHROCYTE [DISTWIDTH] IN BLOOD BY AUTOMATED COUNT: 43.6 FL (ref 35.9–50)
ERYTHROCYTE [DISTWIDTH] IN BLOOD BY AUTOMATED COUNT: 43.9 FL (ref 35.9–50)
ERYTHROCYTE [DISTWIDTH] IN BLOOD BY AUTOMATED COUNT: 44.4 FL (ref 35.9–50)
ERYTHROCYTE [DISTWIDTH] IN BLOOD BY AUTOMATED COUNT: 44.6 FL (ref 35.9–50)
GFR SERPLBLD CREATININE-BSD FMLA CKD-EPI: 101 ML/MIN/1.73 M 2
GFR SERPLBLD CREATININE-BSD FMLA CKD-EPI: 103 ML/MIN/1.73 M 2
GFR SERPLBLD CREATININE-BSD FMLA CKD-EPI: 103 ML/MIN/1.73 M 2
GFR SERPLBLD CREATININE-BSD FMLA CKD-EPI: 104 ML/MIN/1.73 M 2
GFR SERPLBLD CREATININE-BSD FMLA CKD-EPI: 104 ML/MIN/1.73 M 2
GFR SERPLBLD CREATININE-BSD FMLA CKD-EPI: 105 ML/MIN/1.73 M 2
GFR SERPLBLD CREATININE-BSD FMLA CKD-EPI: 106 ML/MIN/1.73 M 2
GFR SERPLBLD CREATININE-BSD FMLA CKD-EPI: 107 ML/MIN/1.73 M 2
GFR SERPLBLD CREATININE-BSD FMLA CKD-EPI: 112 ML/MIN/1.73 M 2
GLUCOSE BLD STRIP.AUTO-MCNC: 104 MG/DL (ref 65–99)
GLUCOSE BLD STRIP.AUTO-MCNC: 107 MG/DL (ref 65–99)
GLUCOSE BLD STRIP.AUTO-MCNC: 112 MG/DL (ref 65–99)
GLUCOSE BLD STRIP.AUTO-MCNC: 118 MG/DL (ref 65–99)
GLUCOSE BLD STRIP.AUTO-MCNC: 124 MG/DL (ref 65–99)
GLUCOSE BLD STRIP.AUTO-MCNC: 127 MG/DL (ref 65–99)
GLUCOSE BLD STRIP.AUTO-MCNC: 130 MG/DL (ref 65–99)
GLUCOSE BLD STRIP.AUTO-MCNC: 134 MG/DL (ref 65–99)
GLUCOSE BLD STRIP.AUTO-MCNC: 135 MG/DL (ref 65–99)
GLUCOSE BLD STRIP.AUTO-MCNC: 138 MG/DL (ref 65–99)
GLUCOSE BLD STRIP.AUTO-MCNC: 139 MG/DL (ref 65–99)
GLUCOSE BLD STRIP.AUTO-MCNC: 152 MG/DL (ref 65–99)
GLUCOSE BLD STRIP.AUTO-MCNC: 152 MG/DL (ref 65–99)
GLUCOSE BLD STRIP.AUTO-MCNC: 156 MG/DL (ref 65–99)
GLUCOSE BLD STRIP.AUTO-MCNC: 156 MG/DL (ref 65–99)
GLUCOSE BLD STRIP.AUTO-MCNC: 160 MG/DL (ref 65–99)
GLUCOSE BLD STRIP.AUTO-MCNC: 164 MG/DL (ref 65–99)
GLUCOSE BLD STRIP.AUTO-MCNC: 171 MG/DL (ref 65–99)
GLUCOSE BLD STRIP.AUTO-MCNC: 171 MG/DL (ref 65–99)
GLUCOSE BLD STRIP.AUTO-MCNC: 174 MG/DL (ref 65–99)
GLUCOSE BLD STRIP.AUTO-MCNC: 177 MG/DL (ref 65–99)
GLUCOSE BLD STRIP.AUTO-MCNC: 180 MG/DL (ref 65–99)
GLUCOSE BLD STRIP.AUTO-MCNC: 180 MG/DL (ref 65–99)
GLUCOSE BLD STRIP.AUTO-MCNC: 184 MG/DL (ref 65–99)
GLUCOSE BLD STRIP.AUTO-MCNC: 185 MG/DL (ref 65–99)
GLUCOSE BLD STRIP.AUTO-MCNC: 185 MG/DL (ref 65–99)
GLUCOSE BLD STRIP.AUTO-MCNC: 187 MG/DL (ref 65–99)
GLUCOSE BLD STRIP.AUTO-MCNC: 188 MG/DL (ref 65–99)
GLUCOSE BLD STRIP.AUTO-MCNC: 192 MG/DL (ref 65–99)
GLUCOSE BLD STRIP.AUTO-MCNC: 198 MG/DL (ref 65–99)
GLUCOSE BLD STRIP.AUTO-MCNC: 201 MG/DL (ref 65–99)
GLUCOSE BLD STRIP.AUTO-MCNC: 207 MG/DL (ref 65–99)
GLUCOSE BLD STRIP.AUTO-MCNC: 215 MG/DL (ref 65–99)
GLUCOSE BLD STRIP.AUTO-MCNC: 216 MG/DL (ref 65–99)
GLUCOSE BLD STRIP.AUTO-MCNC: 221 MG/DL (ref 65–99)
GLUCOSE BLD STRIP.AUTO-MCNC: 221 MG/DL (ref 65–99)
GLUCOSE BLD STRIP.AUTO-MCNC: 225 MG/DL (ref 65–99)
GLUCOSE BLD STRIP.AUTO-MCNC: 240 MG/DL (ref 65–99)
GLUCOSE BLD STRIP.AUTO-MCNC: 254 MG/DL (ref 65–99)
GLUCOSE BLD STRIP.AUTO-MCNC: 280 MG/DL (ref 65–99)
GLUCOSE BLD STRIP.AUTO-MCNC: 298 MG/DL (ref 65–99)
GLUCOSE BLD STRIP.AUTO-MCNC: 80 MG/DL (ref 65–99)
GLUCOSE BLD STRIP.AUTO-MCNC: 81 MG/DL (ref 65–99)
GLUCOSE BLD STRIP.AUTO-MCNC: 87 MG/DL (ref 65–99)
GLUCOSE BLD STRIP.AUTO-MCNC: 90 MG/DL (ref 65–99)
GLUCOSE BLD STRIP.AUTO-MCNC: 98 MG/DL (ref 65–99)
GLUCOSE SERPL-MCNC: 135 MG/DL (ref 65–99)
GLUCOSE SERPL-MCNC: 136 MG/DL (ref 65–99)
GLUCOSE SERPL-MCNC: 177 MG/DL (ref 65–99)
GLUCOSE SERPL-MCNC: 183 MG/DL (ref 65–99)
GLUCOSE SERPL-MCNC: 184 MG/DL (ref 65–99)
GLUCOSE SERPL-MCNC: 201 MG/DL (ref 65–99)
GLUCOSE SERPL-MCNC: 254 MG/DL (ref 65–99)
GLUCOSE SERPL-MCNC: 255 MG/DL (ref 65–99)
GLUCOSE SERPL-MCNC: 300 MG/DL (ref 65–99)
GLUCOSE SERPL-MCNC: 91 MG/DL (ref 65–99)
GLUCOSE SERPL-MCNC: 93 MG/DL (ref 65–99)
GRAM STN SPEC: NORMAL
GRAM STN SPEC: NORMAL
HCT VFR BLD AUTO: 24.3 % (ref 37–47)
HCT VFR BLD AUTO: 26.5 % (ref 37–47)
HCT VFR BLD AUTO: 27.7 % (ref 37–47)
HCT VFR BLD AUTO: 28.7 % (ref 37–47)
HCT VFR BLD AUTO: 29 % (ref 37–47)
HCT VFR BLD AUTO: 29.3 % (ref 37–47)
HCT VFR BLD AUTO: 29.8 % (ref 37–47)
HCT VFR BLD AUTO: 30.8 % (ref 37–47)
HCT VFR BLD AUTO: 31.2 % (ref 37–47)
HCT VFR BLD AUTO: 32.8 % (ref 37–47)
HCT VFR BLD AUTO: 33.3 % (ref 37–47)
HGB BLD-MCNC: 10.6 G/DL (ref 12–16)
HGB BLD-MCNC: 10.6 G/DL (ref 12–16)
HGB BLD-MCNC: 7.6 G/DL (ref 12–16)
HGB BLD-MCNC: 8.6 G/DL (ref 12–16)
HGB BLD-MCNC: 8.6 G/DL (ref 12–16)
HGB BLD-MCNC: 9 G/DL (ref 12–16)
HGB BLD-MCNC: 9.2 G/DL (ref 12–16)
HGB BLD-MCNC: 9.2 G/DL (ref 12–16)
HGB BLD-MCNC: 9.3 G/DL (ref 12–16)
HGB BLD-MCNC: 9.6 G/DL (ref 12–16)
HGB BLD-MCNC: 9.9 G/DL (ref 12–16)
IMM GRANULOCYTES # BLD AUTO: 0.01 K/UL (ref 0–0.11)
IMM GRANULOCYTES # BLD AUTO: 0.01 K/UL (ref 0–0.11)
IMM GRANULOCYTES # BLD AUTO: 0.02 K/UL (ref 0–0.11)
IMM GRANULOCYTES # BLD AUTO: 0.03 K/UL (ref 0–0.11)
IMM GRANULOCYTES NFR BLD AUTO: 0.2 % (ref 0–0.9)
IMM GRANULOCYTES NFR BLD AUTO: 0.2 % (ref 0–0.9)
IMM GRANULOCYTES NFR BLD AUTO: 0.3 % (ref 0–0.9)
IMM GRANULOCYTES NFR BLD AUTO: 0.4 % (ref 0–0.9)
IMM GRANULOCYTES NFR BLD AUTO: 0.5 % (ref 0–0.9)
LYMPHOCYTES # BLD AUTO: 0.8 K/UL (ref 1–4.8)
LYMPHOCYTES # BLD AUTO: 0.93 K/UL (ref 1–4.8)
LYMPHOCYTES # BLD AUTO: 0.96 K/UL (ref 1–4.8)
LYMPHOCYTES # BLD AUTO: 0.98 K/UL (ref 1–4.8)
LYMPHOCYTES # BLD AUTO: 1.04 K/UL (ref 1–4.8)
LYMPHOCYTES # BLD AUTO: 1.08 K/UL (ref 1–4.8)
LYMPHOCYTES # BLD AUTO: 1.11 K/UL (ref 1–4.8)
LYMPHOCYTES # BLD AUTO: 1.17 K/UL (ref 1–4.8)
LYMPHOCYTES # BLD AUTO: 1.18 K/UL (ref 1–4.8)
LYMPHOCYTES # BLD AUTO: 1.27 K/UL (ref 1–4.8)
LYMPHOCYTES # BLD AUTO: 1.28 K/UL (ref 1–4.8)
LYMPHOCYTES NFR BLD: 11.5 % (ref 22–41)
LYMPHOCYTES NFR BLD: 14.8 % (ref 22–41)
LYMPHOCYTES NFR BLD: 16.3 % (ref 22–41)
LYMPHOCYTES NFR BLD: 16.4 % (ref 22–41)
LYMPHOCYTES NFR BLD: 16.4 % (ref 22–41)
LYMPHOCYTES NFR BLD: 17.6 % (ref 22–41)
LYMPHOCYTES NFR BLD: 18.6 % (ref 22–41)
LYMPHOCYTES NFR BLD: 18.7 % (ref 22–41)
LYMPHOCYTES NFR BLD: 19.1 % (ref 22–41)
LYMPHOCYTES NFR BLD: 22.7 % (ref 22–41)
LYMPHOCYTES NFR BLD: 23.3 % (ref 22–41)
MAGNESIUM SERPL-MCNC: 1.6 MG/DL (ref 1.5–2.5)
MAGNESIUM SERPL-MCNC: 1.8 MG/DL (ref 1.5–2.5)
MAGNESIUM SERPL-MCNC: 1.9 MG/DL (ref 1.5–2.5)
MAGNESIUM SERPL-MCNC: 2 MG/DL (ref 1.5–2.5)
MAGNESIUM SERPL-MCNC: 2 MG/DL (ref 1.5–2.5)
MCH RBC QN AUTO: 28.7 PG (ref 27–33)
MCH RBC QN AUTO: 29 PG (ref 27–33)
MCH RBC QN AUTO: 29.3 PG (ref 27–33)
MCH RBC QN AUTO: 29.4 PG (ref 27–33)
MCH RBC QN AUTO: 29.4 PG (ref 27–33)
MCH RBC QN AUTO: 29.6 PG (ref 27–33)
MCH RBC QN AUTO: 29.7 PG (ref 27–33)
MCH RBC QN AUTO: 30 PG (ref 27–33)
MCHC RBC AUTO-ENTMCNC: 30.8 G/DL (ref 33.6–35)
MCHC RBC AUTO-ENTMCNC: 31 G/DL (ref 33.6–35)
MCHC RBC AUTO-ENTMCNC: 31.2 G/DL (ref 33.6–35)
MCHC RBC AUTO-ENTMCNC: 31.3 G/DL (ref 33.6–35)
MCHC RBC AUTO-ENTMCNC: 31.4 G/DL (ref 33.6–35)
MCHC RBC AUTO-ENTMCNC: 31.4 G/DL (ref 33.6–35)
MCHC RBC AUTO-ENTMCNC: 31.7 G/DL (ref 33.6–35)
MCHC RBC AUTO-ENTMCNC: 31.8 G/DL (ref 33.6–35)
MCHC RBC AUTO-ENTMCNC: 32.1 G/DL (ref 33.6–35)
MCHC RBC AUTO-ENTMCNC: 32.3 G/DL (ref 33.6–35)
MCHC RBC AUTO-ENTMCNC: 32.5 G/DL (ref 33.6–35)
MCV RBC AUTO: 88.9 FL (ref 81.4–97.8)
MCV RBC AUTO: 91 FL (ref 81.4–97.8)
MCV RBC AUTO: 91.1 FL (ref 81.4–97.8)
MCV RBC AUTO: 91.4 FL (ref 81.4–97.8)
MCV RBC AUTO: 93.6 FL (ref 81.4–97.8)
MCV RBC AUTO: 94.3 FL (ref 81.4–97.8)
MCV RBC AUTO: 94.4 FL (ref 81.4–97.8)
MCV RBC AUTO: 94.5 FL (ref 81.4–97.8)
MCV RBC AUTO: 94.6 FL (ref 81.4–97.8)
MCV RBC AUTO: 95.2 FL (ref 81.4–97.8)
MCV RBC AUTO: 96.3 FL (ref 81.4–97.8)
MONOCYTES # BLD AUTO: 0.5 K/UL (ref 0–0.85)
MONOCYTES # BLD AUTO: 0.55 K/UL (ref 0–0.85)
MONOCYTES # BLD AUTO: 0.57 K/UL (ref 0–0.85)
MONOCYTES # BLD AUTO: 0.59 K/UL (ref 0–0.85)
MONOCYTES # BLD AUTO: 0.59 K/UL (ref 0–0.85)
MONOCYTES # BLD AUTO: 0.61 K/UL (ref 0–0.85)
MONOCYTES # BLD AUTO: 0.62 K/UL (ref 0–0.85)
MONOCYTES # BLD AUTO: 0.63 K/UL (ref 0–0.85)
MONOCYTES # BLD AUTO: 0.68 K/UL (ref 0–0.85)
MONOCYTES # BLD AUTO: 0.73 K/UL (ref 0–0.85)
MONOCYTES # BLD AUTO: 0.83 K/UL (ref 0–0.85)
MONOCYTES NFR BLD AUTO: 10.6 % (ref 0–13.4)
MONOCYTES NFR BLD AUTO: 10.9 % (ref 0–13.4)
MONOCYTES NFR BLD AUTO: 11 % (ref 0–13.4)
MONOCYTES NFR BLD AUTO: 11.6 % (ref 0–13.4)
MONOCYTES NFR BLD AUTO: 12.9 % (ref 0–13.4)
MONOCYTES NFR BLD AUTO: 8.6 % (ref 0–13.4)
MONOCYTES NFR BLD AUTO: 9 % (ref 0–13.4)
MONOCYTES NFR BLD AUTO: 9.2 % (ref 0–13.4)
MONOCYTES NFR BLD AUTO: 9.6 % (ref 0–13.4)
MONOCYTES NFR BLD AUTO: 9.7 % (ref 0–13.4)
MONOCYTES NFR BLD AUTO: 9.9 % (ref 0–13.4)
NEUTROPHILS # BLD AUTO: 3.22 K/UL (ref 2–7.15)
NEUTROPHILS # BLD AUTO: 3.4 K/UL (ref 2–7.15)
NEUTROPHILS # BLD AUTO: 3.57 K/UL (ref 2–7.15)
NEUTROPHILS # BLD AUTO: 3.62 K/UL (ref 2–7.15)
NEUTROPHILS # BLD AUTO: 3.81 K/UL (ref 2–7.15)
NEUTROPHILS # BLD AUTO: 3.92 K/UL (ref 2–7.15)
NEUTROPHILS # BLD AUTO: 4.07 K/UL (ref 2–7.15)
NEUTROPHILS # BLD AUTO: 4.44 K/UL (ref 2–7.15)
NEUTROPHILS # BLD AUTO: 4.73 K/UL (ref 2–7.15)
NEUTROPHILS # BLD AUTO: 5.09 K/UL (ref 2–7.15)
NEUTROPHILS # BLD AUTO: 6.38 K/UL (ref 2–7.15)
NEUTROPHILS NFR BLD: 60.2 % (ref 44–72)
NEUTROPHILS NFR BLD: 63.6 % (ref 44–72)
NEUTROPHILS NFR BLD: 64.1 % (ref 44–72)
NEUTROPHILS NFR BLD: 64.2 % (ref 44–72)
NEUTROPHILS NFR BLD: 67.1 % (ref 44–72)
NEUTROPHILS NFR BLD: 69.2 % (ref 44–72)
NEUTROPHILS NFR BLD: 70.5 % (ref 44–72)
NEUTROPHILS NFR BLD: 70.7 % (ref 44–72)
NEUTROPHILS NFR BLD: 71.2 % (ref 44–72)
NEUTROPHILS NFR BLD: 71.4 % (ref 44–72)
NEUTROPHILS NFR BLD: 74.7 % (ref 44–72)
NRBC # BLD AUTO: 0 K/UL
NRBC BLD-RTO: 0 /100 WBC
PHOSPHATE SERPL-MCNC: 3 MG/DL (ref 2.5–4.5)
PHOSPHATE SERPL-MCNC: 3.3 MG/DL (ref 2.5–4.5)
PHOSPHATE SERPL-MCNC: 3.5 MG/DL (ref 2.5–4.5)
PHOSPHATE SERPL-MCNC: 3.6 MG/DL (ref 2.5–4.5)
PHOSPHATE SERPL-MCNC: 3.7 MG/DL (ref 2.5–4.5)
PHOSPHATE SERPL-MCNC: 3.8 MG/DL (ref 2.5–4.5)
PHOSPHATE SERPL-MCNC: 4 MG/DL (ref 2.5–4.5)
PHOSPHATE SERPL-MCNC: 4 MG/DL (ref 2.5–4.5)
PHOSPHATE SERPL-MCNC: 4.1 MG/DL (ref 2.5–4.5)
PLATELET # BLD AUTO: 294 K/UL (ref 164–446)
PLATELET # BLD AUTO: 314 K/UL (ref 164–446)
PLATELET # BLD AUTO: 362 K/UL (ref 164–446)
PLATELET # BLD AUTO: 374 K/UL (ref 164–446)
PLATELET # BLD AUTO: 376 K/UL (ref 164–446)
PLATELET # BLD AUTO: 381 K/UL (ref 164–446)
PLATELET # BLD AUTO: 395 K/UL (ref 164–446)
PLATELET # BLD AUTO: 395 K/UL (ref 164–446)
PLATELET # BLD AUTO: 404 K/UL (ref 164–446)
PLATELET # BLD AUTO: 412 K/UL (ref 164–446)
PLATELET # BLD AUTO: 426 K/UL (ref 164–446)
PMV BLD AUTO: 8.4 FL (ref 9–12.9)
PMV BLD AUTO: 8.4 FL (ref 9–12.9)
PMV BLD AUTO: 8.5 FL (ref 9–12.9)
PMV BLD AUTO: 8.7 FL (ref 9–12.9)
PMV BLD AUTO: 8.7 FL (ref 9–12.9)
PMV BLD AUTO: 8.8 FL (ref 9–12.9)
PMV BLD AUTO: 8.9 FL (ref 9–12.9)
PMV BLD AUTO: 8.9 FL (ref 9–12.9)
PMV BLD AUTO: 9 FL (ref 9–12.9)
POTASSIUM SERPL-SCNC: 3.5 MMOL/L (ref 3.6–5.5)
POTASSIUM SERPL-SCNC: 3.8 MMOL/L (ref 3.6–5.5)
POTASSIUM SERPL-SCNC: 3.9 MMOL/L (ref 3.6–5.5)
POTASSIUM SERPL-SCNC: 4 MMOL/L (ref 3.6–5.5)
POTASSIUM SERPL-SCNC: 4 MMOL/L (ref 3.6–5.5)
POTASSIUM SERPL-SCNC: 4.1 MMOL/L (ref 3.6–5.5)
POTASSIUM SERPL-SCNC: 4.2 MMOL/L (ref 3.6–5.5)
POTASSIUM SERPL-SCNC: 4.3 MMOL/L (ref 3.6–5.5)
POTASSIUM SERPL-SCNC: 4.9 MMOL/L (ref 3.6–5.5)
PREALB SERPL-MCNC: 12 MG/DL (ref 18–38)
PREALB SERPL-MCNC: 8.9 MG/DL (ref 18–38)
RBC # BLD AUTO: 2.57 M/UL (ref 4.2–5.4)
RBC # BLD AUTO: 2.9 M/UL (ref 4.2–5.4)
RBC # BLD AUTO: 2.91 M/UL (ref 4.2–5.4)
RBC # BLD AUTO: 3.04 M/UL (ref 4.2–5.4)
RBC # BLD AUTO: 3.07 M/UL (ref 4.2–5.4)
RBC # BLD AUTO: 3.13 M/UL (ref 4.2–5.4)
RBC # BLD AUTO: 3.16 M/UL (ref 4.2–5.4)
RBC # BLD AUTO: 3.24 M/UL (ref 4.2–5.4)
RBC # BLD AUTO: 3.38 M/UL (ref 4.2–5.4)
RBC # BLD AUTO: 3.66 M/UL (ref 4.2–5.4)
RBC # BLD AUTO: 3.69 M/UL (ref 4.2–5.4)
SIGNIFICANT IND 70042: NORMAL
SIGNIFICANT IND 70042: NORMAL
SITE SITE: NORMAL
SITE SITE: NORMAL
SODIUM SERPL-SCNC: 131 MMOL/L (ref 135–145)
SODIUM SERPL-SCNC: 134 MMOL/L (ref 135–145)
SODIUM SERPL-SCNC: 134 MMOL/L (ref 135–145)
SODIUM SERPL-SCNC: 135 MMOL/L (ref 135–145)
SODIUM SERPL-SCNC: 135 MMOL/L (ref 135–145)
SODIUM SERPL-SCNC: 136 MMOL/L (ref 135–145)
SODIUM SERPL-SCNC: 137 MMOL/L (ref 135–145)
SOURCE SOURCE: NORMAL
SOURCE SOURCE: NORMAL
WBC # BLD AUTO: 5.1 K/UL (ref 4.8–10.8)
WBC # BLD AUTO: 5.4 K/UL (ref 4.8–10.8)
WBC # BLD AUTO: 5.6 K/UL (ref 4.8–10.8)
WBC # BLD AUTO: 5.7 K/UL (ref 4.8–10.8)
WBC # BLD AUTO: 5.9 K/UL (ref 4.8–10.8)
WBC # BLD AUTO: 6.3 K/UL (ref 4.8–10.8)
WBC # BLD AUTO: 6.8 K/UL (ref 4.8–10.8)
WBC # BLD AUTO: 7.1 K/UL (ref 4.8–10.8)
WBC # BLD AUTO: 8.5 K/UL (ref 4.8–10.8)

## 2023-01-01 PROCEDURE — 700111 HCHG RX REV CODE 636 W/ 250 OVERRIDE (IP): Performed by: INTERNAL MEDICINE

## 2023-01-01 PROCEDURE — 700111 HCHG RX REV CODE 636 W/ 250 OVERRIDE (IP)

## 2023-01-01 PROCEDURE — 85025 COMPLETE CBC W/AUTO DIFF WBC: CPT

## 2023-01-01 PROCEDURE — 700111 HCHG RX REV CODE 636 W/ 250 OVERRIDE (IP): Performed by: HOSPITALIST

## 2023-01-01 PROCEDURE — A9270 NON-COVERED ITEM OR SERVICE: HCPCS | Performed by: INTERNAL MEDICINE

## 2023-01-01 PROCEDURE — 82962 GLUCOSE BLOOD TEST: CPT

## 2023-01-01 PROCEDURE — 94760 N-INVAS EAR/PLS OXIMETRY 1: CPT

## 2023-01-01 PROCEDURE — 94799 UNLISTED PULMONARY SVC/PX: CPT

## 2023-01-01 PROCEDURE — 71045 X-RAY EXAM CHEST 1 VIEW: CPT

## 2023-01-01 PROCEDURE — 31600 PLANNED TRACHEOSTOMY: CPT | Performed by: SURGERY

## 2023-01-01 PROCEDURE — 31624 DX BRONCHOSCOPE/LAVAGE: CPT | Performed by: INTERNAL MEDICINE

## 2023-01-01 PROCEDURE — A9270 NON-COVERED ITEM OR SERVICE: HCPCS | Performed by: NURSE PRACTITIONER

## 2023-01-01 PROCEDURE — 770022 HCHG ROOM/CARE - ICU (200)

## 2023-01-01 PROCEDURE — 31645 BRNCHSC W/THER ASPIR 1ST: CPT | Performed by: INTERNAL MEDICINE

## 2023-01-01 PROCEDURE — 700105 HCHG RX REV CODE 258: Performed by: NURSE PRACTITIONER

## 2023-01-01 PROCEDURE — 700105 HCHG RX REV CODE 258: Performed by: INTERNAL MEDICINE

## 2023-01-01 PROCEDURE — 700102 HCHG RX REV CODE 250 W/ 637 OVERRIDE(OP): Performed by: INTERNAL MEDICINE

## 2023-01-01 PROCEDURE — 0B9J8ZX DRAINAGE OF LEFT LOWER LUNG LOBE, VIA NATURAL OR ARTIFICIAL OPENING ENDOSCOPIC, DIAGNOSTIC: ICD-10-PCS | Performed by: INTERNAL MEDICINE

## 2023-01-01 PROCEDURE — 0DH63UZ INSERTION OF FEEDING DEVICE INTO STOMACH, PERCUTANEOUS APPROACH: ICD-10-PCS | Performed by: INTERNAL MEDICINE

## 2023-01-01 PROCEDURE — 82962 GLUCOSE BLOOD TEST: CPT | Mod: 91

## 2023-01-01 PROCEDURE — 94003 VENT MGMT INPAT SUBQ DAY: CPT

## 2023-01-01 PROCEDURE — 31600 PLANNED TRACHEOSTOMY: CPT

## 2023-01-01 PROCEDURE — 84100 ASSAY OF PHOSPHORUS: CPT

## 2023-01-01 PROCEDURE — 700102 HCHG RX REV CODE 250 W/ 637 OVERRIDE(OP): Performed by: NURSE PRACTITIONER

## 2023-01-01 PROCEDURE — 83735 ASSAY OF MAGNESIUM: CPT

## 2023-01-01 PROCEDURE — 80048 BASIC METABOLIC PNL TOTAL CA: CPT

## 2023-01-01 PROCEDURE — 99291 CRITICAL CARE FIRST HOUR: CPT | Performed by: INTERNAL MEDICINE

## 2023-01-01 PROCEDURE — A9270 NON-COVERED ITEM OR SERVICE: HCPCS | Performed by: HOSPITALIST

## 2023-01-01 PROCEDURE — 770001 HCHG ROOM/CARE - MED/SURG/GYN PRIV*

## 2023-01-01 PROCEDURE — 302978 HCHG BRONCHOSCOPY-DIAGNOSTIC

## 2023-01-01 PROCEDURE — 99233 SBSQ HOSP IP/OBS HIGH 50: CPT | Performed by: HOSPITALIST

## 2023-01-01 PROCEDURE — 160207 HCHG ENDO MINUTES - EA ADDL 1 MIN LEVEL 3: Performed by: INTERNAL MEDICINE

## 2023-01-01 PROCEDURE — 87070 CULTURE OTHR SPECIMN AEROBIC: CPT

## 2023-01-01 PROCEDURE — 99152 MOD SED SAME PHYS/QHP 5/>YRS: CPT

## 2023-01-01 PROCEDURE — 110372 HCHG SHELL REV 278: Performed by: INTERNAL MEDICINE

## 2023-01-01 PROCEDURE — 43246 EGD PLACE GASTROSTOMY TUBE: CPT | Performed by: INTERNAL MEDICINE

## 2023-01-01 PROCEDURE — 94640 AIRWAY INHALATION TREATMENT: CPT

## 2023-01-01 PROCEDURE — 700101 HCHG RX REV CODE 250: Performed by: HOSPITALIST

## 2023-01-01 PROCEDURE — 84134 ASSAY OF PREALBUMIN: CPT

## 2023-01-01 PROCEDURE — 700101 HCHG RX REV CODE 250: Performed by: INTERNAL MEDICINE

## 2023-01-01 PROCEDURE — 700102 HCHG RX REV CODE 250 W/ 637 OVERRIDE(OP): Performed by: HOSPITALIST

## 2023-01-01 PROCEDURE — 5A1955Z RESPIRATORY VENTILATION, GREATER THAN 96 CONSECUTIVE HOURS: ICD-10-PCS | Performed by: SURGERY

## 2023-01-01 PROCEDURE — 99291 CRITICAL CARE FIRST HOUR: CPT | Mod: 25 | Performed by: INTERNAL MEDICINE

## 2023-01-01 PROCEDURE — 99233 SBSQ HOSP IP/OBS HIGH 50: CPT | Performed by: INTERNAL MEDICINE

## 2023-01-01 PROCEDURE — 99223 1ST HOSP IP/OBS HIGH 75: CPT | Performed by: HOSPITALIST

## 2023-01-01 PROCEDURE — 160202 HCHG ENDO MINUTES - 1ST 30 MINS LEVEL 3: Performed by: INTERNAL MEDICINE

## 2023-01-01 PROCEDURE — 160048 HCHG OR STATISTICAL LEVEL 1-5: Performed by: INTERNAL MEDICINE

## 2023-01-01 PROCEDURE — 86140 C-REACTIVE PROTEIN: CPT

## 2023-01-01 PROCEDURE — 99231 SBSQ HOSP IP/OBS SF/LOW 25: CPT | Performed by: INTERNAL MEDICINE

## 2023-01-01 PROCEDURE — 87205 SMEAR GRAM STAIN: CPT

## 2023-01-01 PROCEDURE — 0B113F4 BYPASS TRACHEA TO CUTANEOUS WITH TRACHEOSTOMY DEVICE, PERCUTANEOUS APPROACH: ICD-10-PCS | Performed by: SURGERY

## 2023-01-01 PROCEDURE — 770000 HCHG ROOM/CARE - INTERMEDIATE ICU *

## 2023-01-01 DEVICE — KIT 20 FRENCH PULL SAFETY PEG: Type: IMPLANTABLE DEVICE | Status: FUNCTIONAL

## 2023-01-01 RX ORDER — DEXTROSE MONOHYDRATE 100 MG/ML
INJECTION, SOLUTION INTRAVENOUS CONTINUOUS
Status: DISCONTINUED | OUTPATIENT
Start: 2023-01-01 | End: 2023-01-01

## 2023-01-01 RX ORDER — MAGNESIUM SULFATE HEPTAHYDRATE 40 MG/ML
2 INJECTION, SOLUTION INTRAVENOUS ONCE
Status: COMPLETED | OUTPATIENT
Start: 2023-01-01 | End: 2023-01-01

## 2023-01-01 RX ORDER — MIDAZOLAM HYDROCHLORIDE 1 MG/ML
5 INJECTION INTRAMUSCULAR; INTRAVENOUS ONCE
Status: COMPLETED | OUTPATIENT
Start: 2023-01-01 | End: 2023-01-01

## 2023-01-01 RX ORDER — HYDROMORPHONE HYDROCHLORIDE 1 MG/ML
.5-1 INJECTION, SOLUTION INTRAMUSCULAR; INTRAVENOUS; SUBCUTANEOUS
Status: DISCONTINUED | OUTPATIENT
Start: 2023-01-01 | End: 2023-01-01 | Stop reason: HOSPADM

## 2023-01-01 RX ORDER — ROCURONIUM BROMIDE 10 MG/ML
50 INJECTION, SOLUTION INTRAVENOUS ONCE
Status: COMPLETED | OUTPATIENT
Start: 2023-01-01 | End: 2023-01-01

## 2023-01-01 RX ORDER — MORPHINE SULFATE 100 MG/5ML
10 SOLUTION ORAL
Status: DISCONTINUED | OUTPATIENT
Start: 2023-01-01 | End: 2023-01-01 | Stop reason: HOSPADM

## 2023-01-01 RX ORDER — SODIUM CHLORIDE, SODIUM LACTATE, POTASSIUM CHLORIDE, CALCIUM CHLORIDE 600; 310; 30; 20 MG/100ML; MG/100ML; MG/100ML; MG/100ML
INJECTION, SOLUTION INTRAVENOUS CONTINUOUS
Status: ACTIVE | OUTPATIENT
Start: 2023-01-01 | End: 2023-01-01

## 2023-01-01 RX ORDER — SODIUM CHLORIDE 9 MG/ML
INJECTION, SOLUTION INTRAVENOUS CONTINUOUS
Status: ACTIVE | OUTPATIENT
Start: 2023-01-01 | End: 2023-01-01

## 2023-01-01 RX ORDER — LORAZEPAM 2 MG/1
2 TABLET ORAL
Status: DISCONTINUED | OUTPATIENT
Start: 2023-01-01 | End: 2023-01-01 | Stop reason: HOSPADM

## 2023-01-01 RX ORDER — MIDAZOLAM HYDROCHLORIDE 1 MG/ML
INJECTION INTRAMUSCULAR; INTRAVENOUS
Status: COMPLETED
Start: 2023-01-01 | End: 2023-01-01

## 2023-01-01 RX ORDER — MIDAZOLAM HYDROCHLORIDE 1 MG/ML
1-5 INJECTION INTRAMUSCULAR; INTRAVENOUS ONCE
Status: COMPLETED | OUTPATIENT
Start: 2023-01-01 | End: 2023-01-01

## 2023-01-01 RX ORDER — SODIUM CHLORIDE, SODIUM LACTATE, POTASSIUM CHLORIDE, AND CALCIUM CHLORIDE .6; .31; .03; .02 G/100ML; G/100ML; G/100ML; G/100ML
500 INJECTION, SOLUTION INTRAVENOUS ONCE
Status: COMPLETED | OUTPATIENT
Start: 2023-01-01 | End: 2023-01-01

## 2023-01-01 RX ORDER — ATROPINE SULFATE 10 MG/ML
2 SOLUTION/ DROPS OPHTHALMIC EVERY 4 HOURS PRN
Status: DISCONTINUED | OUTPATIENT
Start: 2023-01-01 | End: 2023-01-01 | Stop reason: HOSPADM

## 2023-01-01 RX ADMIN — FAMOTIDINE 20 MG: 20 TABLET, FILM COATED ORAL at 18:28

## 2023-01-01 RX ADMIN — HYDROMORPHONE HYDROCHLORIDE 1 MG: 1 INJECTION, SOLUTION INTRAMUSCULAR; INTRAVENOUS; SUBCUTANEOUS at 13:02

## 2023-01-01 RX ADMIN — SODIUM CHLORIDE, POTASSIUM CHLORIDE, SODIUM LACTATE AND CALCIUM CHLORIDE 500 ML: 600; 310; 30; 20 INJECTION, SOLUTION INTRAVENOUS at 02:45

## 2023-01-01 RX ADMIN — HYDROMORPHONE HYDROCHLORIDE 1 MG: 1 INJECTION, SOLUTION INTRAMUSCULAR; INTRAVENOUS; SUBCUTANEOUS at 21:18

## 2023-01-01 RX ADMIN — FAMOTIDINE 20 MG: 20 TABLET, FILM COATED ORAL at 18:29

## 2023-01-01 RX ADMIN — LORAZEPAM 1 MG: 2 INJECTION INTRAMUSCULAR; INTRAVENOUS at 10:48

## 2023-01-01 RX ADMIN — HYDROMORPHONE HYDROCHLORIDE 1 MG: 1 INJECTION, SOLUTION INTRAMUSCULAR; INTRAVENOUS; SUBCUTANEOUS at 19:46

## 2023-01-01 RX ADMIN — MAGNESIUM SULFATE HEPTAHYDRATE 2 G: 40 INJECTION, SOLUTION INTRAVENOUS at 08:59

## 2023-01-01 RX ADMIN — LORAZEPAM 2 MG: 2 INJECTION INTRAMUSCULAR; INTRAVENOUS at 21:44

## 2023-01-01 RX ADMIN — MAGNESIUM SULFATE HEPTAHYDRATE 2 G: 40 INJECTION, SOLUTION INTRAVENOUS at 07:49

## 2023-01-01 RX ADMIN — LEVOTHYROXINE SODIUM 112 MCG: 0.11 TABLET ORAL at 05:14

## 2023-01-01 RX ADMIN — HYDROMORPHONE HYDROCHLORIDE 1 MG: 1 INJECTION, SOLUTION INTRAMUSCULAR; INTRAVENOUS; SUBCUTANEOUS at 12:41

## 2023-01-01 RX ADMIN — LORAZEPAM 2 MG: 2 TABLET ORAL at 18:08

## 2023-01-01 RX ADMIN — HYDROMORPHONE HYDROCHLORIDE 1 MG: 1 INJECTION, SOLUTION INTRAMUSCULAR; INTRAVENOUS; SUBCUTANEOUS at 17:29

## 2023-01-01 RX ADMIN — MAGNESIUM SULFATE HEPTAHYDRATE 2 G: 40 INJECTION, SOLUTION INTRAVENOUS at 09:27

## 2023-01-01 RX ADMIN — ATORVASTATIN CALCIUM 10 MG: 10 TABLET, FILM COATED ORAL at 06:06

## 2023-01-01 RX ADMIN — HYDROMORPHONE HYDROCHLORIDE 1 MG: 1 INJECTION, SOLUTION INTRAMUSCULAR; INTRAVENOUS; SUBCUTANEOUS at 16:49

## 2023-01-01 RX ADMIN — LISINOPRIL 10 MG: 10 TABLET ORAL at 05:03

## 2023-01-01 RX ADMIN — HYDROMORPHONE HYDROCHLORIDE 1 MG: 1 INJECTION, SOLUTION INTRAMUSCULAR; INTRAVENOUS; SUBCUTANEOUS at 10:09

## 2023-01-01 RX ADMIN — LEVOTHYROXINE SODIUM 112 MCG: 0.11 TABLET ORAL at 05:03

## 2023-01-01 RX ADMIN — ASPIRIN 325 MG: 325 TABLET ORAL at 05:03

## 2023-01-01 RX ADMIN — HYDROMORPHONE HYDROCHLORIDE 1 MG: 1 INJECTION, SOLUTION INTRAMUSCULAR; INTRAVENOUS; SUBCUTANEOUS at 13:30

## 2023-01-01 RX ADMIN — INSULIN GLARGINE-YFGN 18 UNITS: 100 INJECTION, SOLUTION SUBCUTANEOUS at 05:15

## 2023-01-01 RX ADMIN — LEVOTHYROXINE SODIUM 112 MCG: 0.11 TABLET ORAL at 05:20

## 2023-01-01 RX ADMIN — ASPIRIN 325 MG: 325 TABLET ORAL at 05:20

## 2023-01-01 RX ADMIN — HYDROMORPHONE HYDROCHLORIDE 1 MG: 1 INJECTION, SOLUTION INTRAMUSCULAR; INTRAVENOUS; SUBCUTANEOUS at 19:40

## 2023-01-01 RX ADMIN — ENOXAPARIN SODIUM 30 MG: 30 INJECTION SUBCUTANEOUS at 17:38

## 2023-01-01 RX ADMIN — ENOXAPARIN SODIUM 30 MG: 30 INJECTION SUBCUTANEOUS at 17:43

## 2023-01-01 RX ADMIN — MAGNESIUM SULFATE HEPTAHYDRATE 2 G: 40 INJECTION, SOLUTION INTRAVENOUS at 12:32

## 2023-01-01 RX ADMIN — DOCUSATE SODIUM 50 MG AND SENNOSIDES 8.6 MG 2 TABLET: 8.6; 5 TABLET, FILM COATED ORAL at 06:05

## 2023-01-01 RX ADMIN — LORAZEPAM 2 MG: 2 INJECTION INTRAMUSCULAR; INTRAVENOUS at 02:33

## 2023-01-01 RX ADMIN — LEVOTHYROXINE SODIUM 112 MCG: 0.11 TABLET ORAL at 05:32

## 2023-01-01 RX ADMIN — LISINOPRIL 10 MG: 10 TABLET ORAL at 05:40

## 2023-01-01 RX ADMIN — SODIUM CHLORIDE: 9 INJECTION, SOLUTION INTRAVENOUS at 08:16

## 2023-01-01 RX ADMIN — HYDROMORPHONE HYDROCHLORIDE 1 MG: 1 INJECTION, SOLUTION INTRAMUSCULAR; INTRAVENOUS; SUBCUTANEOUS at 04:17

## 2023-01-01 RX ADMIN — DOCUSATE SODIUM 50 MG AND SENNOSIDES 8.6 MG 2 TABLET: 8.6; 5 TABLET, FILM COATED ORAL at 05:40

## 2023-01-01 RX ADMIN — ASPIRIN 325 MG: 325 TABLET ORAL at 05:04

## 2023-01-01 RX ADMIN — ASPIRIN 325 MG: 325 TABLET ORAL at 05:32

## 2023-01-01 RX ADMIN — LORAZEPAM 2 MG: 2 TABLET ORAL at 15:47

## 2023-01-01 RX ADMIN — FAMOTIDINE 20 MG: 20 TABLET, FILM COATED ORAL at 05:40

## 2023-01-01 RX ADMIN — MORPHINE SULFATE 10 MG: 100 SOLUTION ORAL at 18:08

## 2023-01-01 RX ADMIN — HYDROMORPHONE HYDROCHLORIDE 1 MG: 1 INJECTION, SOLUTION INTRAMUSCULAR; INTRAVENOUS; SUBCUTANEOUS at 06:09

## 2023-01-01 RX ADMIN — INSULIN GLARGINE-YFGN 18 UNITS: 100 INJECTION, SOLUTION SUBCUTANEOUS at 06:19

## 2023-01-01 RX ADMIN — MAGNESIUM SULFATE HEPTAHYDRATE 2 G: 40 INJECTION, SOLUTION INTRAVENOUS at 07:51

## 2023-01-01 RX ADMIN — FAMOTIDINE 20 MG: 20 TABLET, FILM COATED ORAL at 17:14

## 2023-01-01 RX ADMIN — LEVOTHYROXINE SODIUM 112 MCG: 0.11 TABLET ORAL at 05:58

## 2023-01-01 RX ADMIN — FAMOTIDINE 20 MG: 20 TABLET, FILM COATED ORAL at 05:32

## 2023-01-01 RX ADMIN — DOCUSATE SODIUM 50 MG AND SENNOSIDES 8.6 MG 2 TABLET: 8.6; 5 TABLET, FILM COATED ORAL at 05:32

## 2023-01-01 RX ADMIN — DOCUSATE SODIUM 50 MG AND SENNOSIDES 8.6 MG 2 TABLET: 8.6; 5 TABLET, FILM COATED ORAL at 05:04

## 2023-01-01 RX ADMIN — HYDROMORPHONE HYDROCHLORIDE 1 MG: 1 INJECTION, SOLUTION INTRAMUSCULAR; INTRAVENOUS; SUBCUTANEOUS at 22:13

## 2023-01-01 RX ADMIN — ENOXAPARIN SODIUM 30 MG: 30 INJECTION SUBCUTANEOUS at 17:08

## 2023-01-01 RX ADMIN — FAMOTIDINE 20 MG: 20 TABLET, FILM COATED ORAL at 05:03

## 2023-01-01 RX ADMIN — FAMOTIDINE 20 MG: 20 TABLET, FILM COATED ORAL at 17:02

## 2023-01-01 RX ADMIN — FAMOTIDINE 20 MG: 20 TABLET, FILM COATED ORAL at 05:04

## 2023-01-01 RX ADMIN — LISINOPRIL 10 MG: 10 TABLET ORAL at 06:05

## 2023-01-01 RX ADMIN — FAMOTIDINE 20 MG: 20 TABLET, FILM COATED ORAL at 05:58

## 2023-01-01 RX ADMIN — ATORVASTATIN CALCIUM 10 MG: 10 TABLET, FILM COATED ORAL at 05:20

## 2023-01-01 RX ADMIN — LORAZEPAM 1 MG: 2 INJECTION INTRAMUSCULAR; INTRAVENOUS at 22:41

## 2023-01-01 RX ADMIN — ROCURONIUM BROMIDE 50 MG: 10 INJECTION, SOLUTION INTRAVENOUS at 12:31

## 2023-01-01 RX ADMIN — INSULIN GLARGINE-YFGN 18 UNITS: 100 INJECTION, SOLUTION SUBCUTANEOUS at 05:16

## 2023-01-01 RX ADMIN — FAMOTIDINE 20 MG: 20 TABLET, FILM COATED ORAL at 17:08

## 2023-01-01 RX ADMIN — ASPIRIN 325 MG: 325 TABLET ORAL at 05:15

## 2023-01-01 RX ADMIN — HYDROMORPHONE HYDROCHLORIDE 1 MG: 1 INJECTION, SOLUTION INTRAMUSCULAR; INTRAVENOUS; SUBCUTANEOUS at 08:09

## 2023-01-01 RX ADMIN — LORAZEPAM 2 MG: 2 INJECTION INTRAMUSCULAR; INTRAVENOUS at 13:02

## 2023-01-01 RX ADMIN — FAMOTIDINE 20 MG: 20 TABLET, FILM COATED ORAL at 17:39

## 2023-01-01 RX ADMIN — FAMOTIDINE 20 MG: 20 TABLET, FILM COATED ORAL at 06:05

## 2023-01-01 RX ADMIN — ATORVASTATIN CALCIUM 10 MG: 10 TABLET, FILM COATED ORAL at 05:40

## 2023-01-01 RX ADMIN — HYDROMORPHONE HYDROCHLORIDE 1 MG: 1 INJECTION, SOLUTION INTRAMUSCULAR; INTRAVENOUS; SUBCUTANEOUS at 10:31

## 2023-01-01 RX ADMIN — HYDROMORPHONE HYDROCHLORIDE 1 MG: 1 INJECTION, SOLUTION INTRAMUSCULAR; INTRAVENOUS; SUBCUTANEOUS at 01:34

## 2023-01-01 RX ADMIN — HYDROMORPHONE HYDROCHLORIDE 0.5 MG: 1 INJECTION, SOLUTION INTRAMUSCULAR; INTRAVENOUS; SUBCUTANEOUS at 00:30

## 2023-01-01 RX ADMIN — LORAZEPAM 1 MG: 2 INJECTION INTRAMUSCULAR; INTRAVENOUS at 19:40

## 2023-01-01 RX ADMIN — HYDROMORPHONE HYDROCHLORIDE 1 MG: 1 INJECTION, SOLUTION INTRAMUSCULAR; INTRAVENOUS; SUBCUTANEOUS at 15:47

## 2023-01-01 RX ADMIN — HYDROMORPHONE HYDROCHLORIDE 1 MG: 1 INJECTION, SOLUTION INTRAMUSCULAR; INTRAVENOUS; SUBCUTANEOUS at 22:15

## 2023-01-01 RX ADMIN — DOCUSATE SODIUM 50 MG AND SENNOSIDES 8.6 MG 2 TABLET: 8.6; 5 TABLET, FILM COATED ORAL at 05:03

## 2023-01-01 RX ADMIN — DOCUSATE SODIUM 50 MG AND SENNOSIDES 8.6 MG 2 TABLET: 8.6; 5 TABLET, FILM COATED ORAL at 18:29

## 2023-01-01 RX ADMIN — HYDROMORPHONE HYDROCHLORIDE 1 MG: 1 INJECTION, SOLUTION INTRAMUSCULAR; INTRAVENOUS; SUBCUTANEOUS at 22:28

## 2023-01-01 RX ADMIN — HYDROMORPHONE HYDROCHLORIDE 1 MG: 1 INJECTION, SOLUTION INTRAMUSCULAR; INTRAVENOUS; SUBCUTANEOUS at 00:26

## 2023-01-01 RX ADMIN — DOCUSATE SODIUM 50 MG AND SENNOSIDES 8.6 MG 2 TABLET: 8.6; 5 TABLET, FILM COATED ORAL at 17:42

## 2023-01-01 RX ADMIN — LORAZEPAM 2 MG: 2 INJECTION INTRAMUSCULAR; INTRAVENOUS at 12:39

## 2023-01-01 RX ADMIN — ATORVASTATIN CALCIUM 10 MG: 10 TABLET, FILM COATED ORAL at 05:03

## 2023-01-01 RX ADMIN — LISINOPRIL 10 MG: 10 TABLET ORAL at 05:58

## 2023-01-01 RX ADMIN — ATORVASTATIN CALCIUM 10 MG: 10 TABLET, FILM COATED ORAL at 05:04

## 2023-01-01 RX ADMIN — HYDROMORPHONE HYDROCHLORIDE 1 MG: 1 INJECTION, SOLUTION INTRAMUSCULAR; INTRAVENOUS; SUBCUTANEOUS at 18:23

## 2023-01-01 RX ADMIN — LEVOTHYROXINE SODIUM 112 MCG: 0.11 TABLET ORAL at 05:40

## 2023-01-01 RX ADMIN — ENOXAPARIN SODIUM 30 MG: 30 INJECTION SUBCUTANEOUS at 17:18

## 2023-01-01 RX ADMIN — HYDROMORPHONE HYDROCHLORIDE 1 MG: 1 INJECTION, SOLUTION INTRAMUSCULAR; INTRAVENOUS; SUBCUTANEOUS at 06:28

## 2023-01-01 RX ADMIN — ATROPINE SULFATE 2 DROP: 10 SOLUTION/ DROPS OPHTHALMIC at 16:52

## 2023-01-01 RX ADMIN — HYDROMORPHONE HYDROCHLORIDE 1 MG: 1 INJECTION, SOLUTION INTRAMUSCULAR; INTRAVENOUS; SUBCUTANEOUS at 18:11

## 2023-01-01 RX ADMIN — MIDAZOLAM HYDROCHLORIDE 5 MG: 1 INJECTION, SOLUTION INTRAMUSCULAR; INTRAVENOUS at 12:30

## 2023-01-01 RX ADMIN — ASPIRIN 325 MG: 325 TABLET ORAL at 05:40

## 2023-01-01 RX ADMIN — LORAZEPAM 1 MG: 2 INJECTION INTRAMUSCULAR; INTRAVENOUS at 20:16

## 2023-01-01 RX ADMIN — HYDROMORPHONE HYDROCHLORIDE 1 MG: 1 INJECTION, SOLUTION INTRAMUSCULAR; INTRAVENOUS; SUBCUTANEOUS at 18:18

## 2023-01-01 RX ADMIN — LISINOPRIL 10 MG: 10 TABLET ORAL at 05:15

## 2023-01-01 RX ADMIN — HYDROMORPHONE HYDROCHLORIDE 1 MG: 1 INJECTION, SOLUTION INTRAMUSCULAR; INTRAVENOUS; SUBCUTANEOUS at 13:29

## 2023-01-01 RX ADMIN — HYDROMORPHONE HYDROCHLORIDE 1 MG: 1 INJECTION, SOLUTION INTRAMUSCULAR; INTRAVENOUS; SUBCUTANEOUS at 01:21

## 2023-01-01 RX ADMIN — SODIUM CHLORIDE, POTASSIUM CHLORIDE, SODIUM LACTATE AND CALCIUM CHLORIDE: 600; 310; 30; 20 INJECTION, SOLUTION INTRAVENOUS at 06:24

## 2023-01-01 RX ADMIN — MAGNESIUM SULFATE HEPTAHYDRATE 2 G: 40 INJECTION, SOLUTION INTRAVENOUS at 07:47

## 2023-01-01 RX ADMIN — HYDROMORPHONE HYDROCHLORIDE 1 MG: 1 INJECTION, SOLUTION INTRAMUSCULAR; INTRAVENOUS; SUBCUTANEOUS at 15:36

## 2023-01-01 RX ADMIN — INSULIN GLARGINE-YFGN 18 UNITS: 100 INJECTION, SOLUTION SUBCUTANEOUS at 05:10

## 2023-01-01 RX ADMIN — MAGNESIUM SULFATE HEPTAHYDRATE 2 G: 40 INJECTION, SOLUTION INTRAVENOUS at 08:44

## 2023-01-01 RX ADMIN — LEVOTHYROXINE SODIUM 112 MCG: 0.11 TABLET ORAL at 06:06

## 2023-01-01 RX ADMIN — ATORVASTATIN CALCIUM 10 MG: 10 TABLET, FILM COATED ORAL at 05:14

## 2023-01-01 RX ADMIN — DEXTROSE MONOHYDRATE: 100 INJECTION, SOLUTION INTRAVENOUS at 10:16

## 2023-01-01 RX ADMIN — LISINOPRIL 10 MG: 10 TABLET ORAL at 05:05

## 2023-01-01 RX ADMIN — HYDROMORPHONE HYDROCHLORIDE 1 MG: 1 INJECTION, SOLUTION INTRAMUSCULAR; INTRAVENOUS; SUBCUTANEOUS at 11:04

## 2023-01-01 RX ADMIN — HYDROMORPHONE HYDROCHLORIDE 0.5 MG: 1 INJECTION, SOLUTION INTRAMUSCULAR; INTRAVENOUS; SUBCUTANEOUS at 11:48

## 2023-01-01 RX ADMIN — ATORVASTATIN CALCIUM 10 MG: 10 TABLET, FILM COATED ORAL at 05:32

## 2023-01-01 RX ADMIN — FAMOTIDINE 20 MG: 20 TABLET, FILM COATED ORAL at 17:18

## 2023-01-01 RX ADMIN — FAMOTIDINE 20 MG: 20 TABLET, FILM COATED ORAL at 05:15

## 2023-01-01 RX ADMIN — POTASSIUM BICARBONATE 50 MEQ: 978 TABLET, EFFERVESCENT ORAL at 08:35

## 2023-01-01 RX ADMIN — DOCUSATE SODIUM 50 MG AND SENNOSIDES 8.6 MG 2 TABLET: 8.6; 5 TABLET, FILM COATED ORAL at 05:20

## 2023-01-01 RX ADMIN — HYDROMORPHONE HYDROCHLORIDE 1 MG: 1 INJECTION, SOLUTION INTRAMUSCULAR; INTRAVENOUS; SUBCUTANEOUS at 03:59

## 2023-01-01 RX ADMIN — MAGNESIUM SULFATE HEPTAHYDRATE 2 G: 40 INJECTION, SOLUTION INTRAVENOUS at 07:48

## 2023-01-01 RX ADMIN — HYDROMORPHONE HYDROCHLORIDE 1 MG: 1 INJECTION, SOLUTION INTRAMUSCULAR; INTRAVENOUS; SUBCUTANEOUS at 10:04

## 2023-01-01 RX ADMIN — ENOXAPARIN SODIUM 30 MG: 30 INJECTION SUBCUTANEOUS at 17:46

## 2023-01-01 RX ADMIN — MIDAZOLAM HYDROCHLORIDE 3 MG: 1 INJECTION, SOLUTION INTRAMUSCULAR; INTRAVENOUS at 13:21

## 2023-01-01 RX ADMIN — ATORVASTATIN CALCIUM 10 MG: 10 TABLET, FILM COATED ORAL at 05:59

## 2023-01-01 RX ADMIN — INSULIN GLARGINE-YFGN 18 UNITS: 100 INJECTION, SOLUTION SUBCUTANEOUS at 05:49

## 2023-01-01 RX ADMIN — ASPIRIN 325 MG: 325 TABLET ORAL at 05:41

## 2023-01-01 RX ADMIN — LEVOTHYROXINE SODIUM 112 MCG: 0.11 TABLET ORAL at 05:41

## 2023-01-01 RX ADMIN — HYDROMORPHONE HYDROCHLORIDE 1 MG: 1 INJECTION, SOLUTION INTRAMUSCULAR; INTRAVENOUS; SUBCUTANEOUS at 03:16

## 2023-01-01 RX ADMIN — POTASSIUM BICARBONATE 50 MEQ: 978 TABLET, EFFERVESCENT ORAL at 13:27

## 2023-01-01 RX ADMIN — FAMOTIDINE 20 MG: 20 TABLET, FILM COATED ORAL at 17:46

## 2023-01-01 RX ADMIN — FAMOTIDINE 20 MG: 20 TABLET, FILM COATED ORAL at 17:43

## 2023-01-01 RX ADMIN — HYDROMORPHONE HYDROCHLORIDE 1 MG: 1 INJECTION, SOLUTION INTRAMUSCULAR; INTRAVENOUS; SUBCUTANEOUS at 01:44

## 2023-01-01 RX ADMIN — DEXTROSE MONOHYDRATE: 100 INJECTION, SOLUTION INTRAVENOUS at 05:39

## 2023-01-01 RX ADMIN — ASPIRIN 325 MG: 325 TABLET ORAL at 06:00

## 2023-01-01 RX ADMIN — ENOXAPARIN SODIUM 30 MG: 30 INJECTION SUBCUTANEOUS at 16:59

## 2023-01-01 RX ADMIN — INSULIN GLARGINE-YFGN 18 UNITS: 100 INJECTION, SOLUTION SUBCUTANEOUS at 05:05

## 2023-01-01 RX ADMIN — HYDROMORPHONE HYDROCHLORIDE 1 MG: 1 INJECTION, SOLUTION INTRAMUSCULAR; INTRAVENOUS; SUBCUTANEOUS at 17:22

## 2023-01-01 RX ADMIN — FAMOTIDINE 20 MG: 20 TABLET, FILM COATED ORAL at 17:29

## 2023-01-01 RX ADMIN — DOCUSATE SODIUM 50 MG AND SENNOSIDES 8.6 MG 2 TABLET: 8.6; 5 TABLET, FILM COATED ORAL at 17:18

## 2023-01-01 RX ADMIN — MAGNESIUM SULFATE HEPTAHYDRATE 2 G: 40 INJECTION, SOLUTION INTRAVENOUS at 10:08

## 2023-01-01 RX ADMIN — CEFAZOLIN 2 G: 2 INJECTION, POWDER, FOR SOLUTION INTRAMUSCULAR; INTRAVENOUS at 13:15

## 2023-01-01 RX ADMIN — POTASSIUM BICARBONATE 25 MEQ: 978 TABLET, EFFERVESCENT ORAL at 07:46

## 2023-01-01 RX ADMIN — ENOXAPARIN SODIUM 30 MG: 30 INJECTION SUBCUTANEOUS at 18:29

## 2023-01-01 RX ADMIN — DOCUSATE SODIUM 50 MG AND SENNOSIDES 8.6 MG 2 TABLET: 8.6; 5 TABLET, FILM COATED ORAL at 17:38

## 2023-01-01 RX ADMIN — LORAZEPAM 1 MG: 2 INJECTION INTRAMUSCULAR; INTRAVENOUS at 14:02

## 2023-01-01 RX ADMIN — LEVOTHYROXINE SODIUM 112 MCG: 0.11 TABLET ORAL at 05:04

## 2023-01-01 RX ADMIN — HYDROMORPHONE HYDROCHLORIDE 1 MG: 1 INJECTION, SOLUTION INTRAMUSCULAR; INTRAVENOUS; SUBCUTANEOUS at 19:47

## 2023-01-01 RX ADMIN — FAMOTIDINE 20 MG: 20 TABLET, FILM COATED ORAL at 05:20

## 2023-01-01 RX ADMIN — SODIUM CHLORIDE, POTASSIUM CHLORIDE, SODIUM LACTATE AND CALCIUM CHLORIDE: 600; 310; 30; 20 INJECTION, SOLUTION INTRAVENOUS at 15:37

## 2023-01-01 RX ADMIN — HYDROMORPHONE HYDROCHLORIDE 1 MG: 1 INJECTION, SOLUTION INTRAMUSCULAR; INTRAVENOUS; SUBCUTANEOUS at 13:49

## 2023-01-01 RX ADMIN — ENOXAPARIN SODIUM 30 MG: 30 INJECTION SUBCUTANEOUS at 17:14

## 2023-01-01 RX ADMIN — LORAZEPAM 2 MG: 2 INJECTION INTRAMUSCULAR; INTRAVENOUS at 01:39

## 2023-01-01 RX ADMIN — ENOXAPARIN SODIUM 30 MG: 30 INJECTION SUBCUTANEOUS at 17:52

## 2023-01-01 RX ADMIN — MIDAZOLAM HYDROCHLORIDE 2 MG: 1 INJECTION, SOLUTION INTRAMUSCULAR; INTRAVENOUS at 13:29

## 2023-01-01 RX ADMIN — DOCUSATE SODIUM 50 MG AND SENNOSIDES 8.6 MG 2 TABLET: 8.6; 5 TABLET, FILM COATED ORAL at 05:58

## 2023-01-01 RX ADMIN — DOCUSATE SODIUM 50 MG AND SENNOSIDES 8.6 MG 2 TABLET: 8.6; 5 TABLET, FILM COATED ORAL at 05:02

## 2023-01-01 RX ADMIN — ACETAMINOPHEN 650 MG: 325 TABLET, FILM COATED ORAL at 16:01

## 2023-01-01 RX ADMIN — FAMOTIDINE 20 MG: 20 TABLET, FILM COATED ORAL at 05:02

## 2023-01-01 RX ADMIN — INSULIN GLARGINE-YFGN 18 UNITS: 100 INJECTION, SOLUTION SUBCUTANEOUS at 05:34

## 2023-01-01 RX ADMIN — HYDROMORPHONE HYDROCHLORIDE 0.5 MG: 1 INJECTION, SOLUTION INTRAMUSCULAR; INTRAVENOUS; SUBCUTANEOUS at 10:35

## 2023-01-01 RX ADMIN — HYDROMORPHONE HYDROCHLORIDE 1 MG: 1 INJECTION, SOLUTION INTRAMUSCULAR; INTRAVENOUS; SUBCUTANEOUS at 19:12

## 2023-01-01 RX ADMIN — ENOXAPARIN SODIUM 30 MG: 30 INJECTION SUBCUTANEOUS at 18:27

## 2023-01-01 RX ADMIN — DOCUSATE SODIUM 50 MG AND SENNOSIDES 8.6 MG 2 TABLET: 8.6; 5 TABLET, FILM COATED ORAL at 17:46

## 2023-01-01 RX ADMIN — HYDROMORPHONE HYDROCHLORIDE 1 MG: 1 INJECTION, SOLUTION INTRAMUSCULAR; INTRAVENOUS; SUBCUTANEOUS at 13:45

## 2023-01-01 RX ADMIN — ASPIRIN 325 MG: 325 TABLET ORAL at 06:06

## 2023-01-01 RX ADMIN — ENOXAPARIN SODIUM 30 MG: 30 INJECTION SUBCUTANEOUS at 17:29

## 2023-01-01 ASSESSMENT — FIBROSIS 4 INDEX
FIB4 SCORE: 0.54
FIB4 SCORE: 0.61
FIB4 SCORE: 0.51
FIB4 SCORE: 0.53
FIB4 SCORE: 0.55
FIB4 SCORE: 0.58
FIB4 SCORE: 0.7
FIB4 SCORE: 0.75
FIB4 SCORE: 0.59
FIB4 SCORE: 0.55
FIB4 SCORE: 0.54
FIB4 SCORE: 0.61

## 2023-01-01 ASSESSMENT — PAIN DESCRIPTION - PAIN TYPE
TYPE: ACUTE PAIN

## 2023-01-01 ASSESSMENT — COGNITIVE AND FUNCTIONAL STATUS - GENERAL
MOBILITY SCORE: 8
DAILY ACTIVITIY SCORE: 6
EATING MEALS: TOTAL
MOVING TO AND FROM BED TO CHAIR: A LOT
MOVING FROM LYING ON BACK TO SITTING ON SIDE OF FLAT BED: UNABLE
DRESSING REGULAR LOWER BODY CLOTHING: TOTAL
STANDING UP FROM CHAIR USING ARMS: TOTAL
SUGGESTED CMS G CODE MODIFIER DAILY ACTIVITY: CN
CLIMB 3 TO 5 STEPS WITH RAILING: TOTAL
TURNING FROM BACK TO SIDE WHILE IN FLAT BAD: A LOT
HELP NEEDED FOR BATHING: TOTAL
PERSONAL GROOMING: TOTAL
WALKING IN HOSPITAL ROOM: TOTAL
TOILETING: TOTAL
DRESSING REGULAR UPPER BODY CLOTHING: TOTAL
SUGGESTED CMS G CODE MODIFIER MOBILITY: CM

## 2023-01-01 ASSESSMENT — PATIENT HEALTH QUESTIONNAIRE - PHQ9
2. FEELING DOWN, DEPRESSED, IRRITABLE, OR HOPELESS: NOT AT ALL
1. LITTLE INTEREST OR PLEASURE IN DOING THINGS: NOT AT ALL
SUM OF ALL RESPONSES TO PHQ9 QUESTIONS 1 AND 2: 0

## 2023-01-01 ASSESSMENT — LIFESTYLE VARIABLES
DOES PATIENT WANT TO STOP DRINKING: CANNOT ASSESS
REASON UNABLE TO ASSESS: PT TRACHED

## 2023-01-01 NOTE — CARE PLAN
Problem: Safety - Medical Restraint  Goal: Remains free of injury from restraints (Restraint for Interference with Medical Device)  Outcome: Progressing     Problem: Pain - Standard  Goal: Alleviation of pain or a reduction in pain to the patient’s comfort goal  Outcome: Progressing   The patient is Stable - Low risk of patient condition declining or worsening    Shift Goals  Clinical Goals: Stable neuro and hemodynamics  Patient Goals: erlinda  Family Goals: erlinda    Progress made toward(s) clinical / shift goals:  Met      Patient is not progressing towards the following goals:

## 2023-01-01 NOTE — CARE PLAN
The patient is Stable - Low risk of patient condition declining or worsening    Shift Goals  Clinical Goals: Stable neuro exams, stabl hemodynamics  Patient Goals: GREGORIO  Family Goals: GREGORIO    Progress made toward(s) clinical / shift goals:      Patient is not progressing towards the following goals:      Problem: Safety - Medical Restraint  Goal: Remains free of injury from restraints (Restraint for Interference with Medical Device)  Outcome: Progressing  Goal: Free from restraint(s) (Restraint for Interference with Medical Device)  Outcome: Progressing     Problem: Pain - Standard  Goal: Alleviation of pain or a reduction in pain to the patient’s comfort goal  Outcome: Progressing

## 2023-01-01 NOTE — PROCEDURES
Date of Procedure:  1/1/2023    Title of Procedure:  Diagnostic and therapeutic flexible fiberoptic bronchoscopy with bronchoalveolar lavage    Indication for Procedure:   Atelectasis.  Assist with percutaneous tracheostomy.    Post-procedure Diagnoses:    1.  Normal endobronchial anatomy  2.  No endobronchial tumor identified  3.  Moderate amount of juicy light yellow secretions in the left lower lobe bronchi    Narrative:    A time out was performed identifying the correct patient, correct procedure and correct location prior to this procedure.    The patient was sedated, intubated and ventilated at the time of this procedure.  The flexible fiberoptic bronchoscope was inserted through the lumen of the endotracheal tube and advanced into the distal trachea without difficulty.  The airways were examined to the subsegmental bronchus level bilaterally.    The endobronchial anatomy was normal.  No tumor was identified.    There was a moderate amount of juicy, light yellow secretions seen in the left lower lobe bronchi.  I therapeutically suctioned these secretions.    Bronchoalveolar lavage was carried out in the basilar segments of the left lower lobe bronchi using the standard technique with good fluid return.    Following this, Dr. Sushant Boyd performed percutaneous tracheostomy.  I visualized the procedure bronchoscopically.  Once the tracheostomy tube was in place, I reinserted the bronchoscope through the brand spanking new tracheostomy tube and reexamined the airways to the subsegmental bronchus level bilaterally.  There were scant bloody secretions bilaterally and these were suctioned until clear.    Bronchoalveolar lavage fluid from the left lower lobe is submitted to the laboratory for gram stain, culture and sensitivity.    The patient tolerated the procedure quite nicely.  No complications were apparent.  The heart rate and rhythm, blood pressure and oxygenation saturation were continuously  monitored.      Harry Castaneda MD  Pulmonary and Critical Care Medicine

## 2023-01-01 NOTE — CARE PLAN
Problem: Knowledge Deficit - Standard  Goal: Patient and family/care givers will demonstrate understanding of plan of care, disease process/condition, diagnostic tests and medications  Outcome: Progressing     Problem: Safety - Medical Restraint  Goal: Remains free of injury from restraints (Restraint for Interference with Medical Device)  Outcome: Progressing  Goal: Free from restraint(s) (Restraint for Interference with Medical Device)  Outcome: Progressing     Problem: Pain - Standard  Goal: Alleviation of pain or a reduction in pain to the patient’s comfort goal  Outcome: Progressing   The patient is Watcher - Medium risk of patient condition declining or worsening    Shift Goals  Clinical Goals: Obtain trache consent, mobilize to EOB  Patient Goals: unable to evaluate  Family Goals: no family present    Progress made toward(s) clinical / shift goals:  met    Patient is not progressing towards the following goals:

## 2023-01-01 NOTE — PROCEDURES
Operative Report    Date of Procedure: 1/1/2023    Surgeon: Sushant Boyd MD    Assistant: Dr. Castaneda - bronchoscopy    Pre-operative Diagnosis: encephalopathy, need for prolonged ventilator support    Post-operative Diagnosis: same     Procedure: percutaneous tracheostomy    Indications: 62 year old woman with prolonged ventilator needs.    Procedure in detail: The patient was positioned supine and the neck was hyperextended. The neck and anterior chest were prepped and draped in the usual sterile fashion.    The thyroid and cricoid cartilage were palpated and the skin and subcutaneous tissue were infiltrated with local anesthetic.     Next the bronchoscope was inserted down through the ET tube until the guillermo was well visualized. The trachea was entered using a finder needle under bronchoscopic vision. A modified Seldinger technique and serial dilations of the trachea were performed, and a size 8 tracheostomy tube was inserted under direct bronchoscopic guidance.     The orotracheal tube was then removed and the ventilator connection was connected to the tracheostomy. Adequate positioning and hemostasis was confirmed by bronchoscopy. The balloon was inflated.    The patient tolerated the procedure well.    Sushant Boyd MD  [facility]  [phone]

## 2023-01-01 NOTE — PROGRESS NOTES
CRITICAL CARE MEDICINE ATTENDING PROGRESS NOTE    Date of admission  12/15/2022    Chief Complaint  62 y.o. female admitted 12/15/2022 with acute encephalopathy, profound hypoglycemia.  She has a history of DM 1, HTN, hypothyroidism, dyslipidemia, methamphetamine abuse.    Hospital Course        12/16 - hypoglycemia improving, weaning D10. Starting TF's. MRI brain pending  12/1877-[id number] MRI with possible small acute infarct to right frontal lobe;no other acute intracranial abnormality noted-patient slightly more responsive  12/19 - Vent day #5. Tolerating SBT's. Not following for parameters  12/20 - spontaneous movements, nothing purposeful.  Repeat EEG captured no seizures.  Presence of triphasic waveforms consistent with toxic metabolic etiology.  12/21- Extubated however pt failed and required reintubation due to tachypnea and difficulty supporting her airway      12/22 reintubated midday back on full vent support, febrile  12/23 full vent, not following  12/24 Full vent, no change LOC, daughter updated    12/26 -    vent day 12.  Continue cefazolin.  SAT/SBT.  12/27 -    vent day 13.  SAT/SBT.  Continue cefazolin.  12/28 -    vent day 14.  SAT/SBT.  Continue cefazolin.  Continuous video EEG without evidence of seizures  12/29 -    vent day 15.  SAT/SBT.  Continue cefazolin.  DNR status.  12/30 -    vent day 16.  Continue cefazolin.  SAT/SBT.  12/31 -    vent day 17.  Continue cefazolin.  SAT/SBT.  Increase lisinopril, 10 mg daily.  1/1 -    vent day 18.  Completed cefazolin yesterday.  Observe off antibiotics.      Interval Problem Update  Reviewed last 24 hour events:      SR  Opens eyes, does not track or follow  99.0  +295 mL in the last 24  +5273 mL since admit  Replete potassium and magnesium      Review of Systems  Review of Systems   Unable to perform ROS: Acuity of condition     Vital Signs for the last 24 hours  Pulse:  [71-95] 84  Resp:  [3-63] 18  BP: ()/(51-85) 139/70  SpO2:  [92 %-100 %] 92  %    Hemodynamic parameters for the last 24 hours       Vent Settings for the last 24 hours  Vent Mode: Spont  PEEP/CPAP: 8  P Support: 5  MAP: 12  Control VTE (exp VT): 343    Physical Exam  Physical Exam  Constitutional:       Appearance: She is not diaphoretic.      Comments: On ventilator   HENT:      Head: Normocephalic.      Mouth/Throat:      Pharynx: Oropharynx is clear.   Eyes:      Pupils: Pupils are equal, round, and reactive to light.   Cardiovascular:      Comments: Sinus rhythm  Pulmonary:      Breath sounds: Rales (Improving crackles) present. No wheezing.   Abdominal:      General: There is no distension.      Tenderness: There is no abdominal tenderness.      Comments: Tolerating enteral tube feedings   Musculoskeletal:      Right lower leg: No edema.      Left lower leg: No edema.   Skin:     General: Skin is warm.   Neurological:      Comments: Opens eyes.  Does not track or follow.  Moves all 4.       Medications  Current Facility-Administered Medications   Medication Dose Route Frequency Provider Last Rate Last Admin    potassium bicarbonate (KLYTE) effervescent tablet 25 mEq  25 mEq Enteral Tube Once Harry Castaneda M.D.        magnesium sulfate IVPB premix 2 g  2 g Intravenous Once Harry Castaneda M.D.        lisinopril (PRINIVIL) tablet 10 mg  10 mg Enteral Tube DAILY Harry Castaneda M.D.        LORazepam (ATIVAN) injection 1-2 mg  1-2 mg Intravenous Q4HRS PRN Harry Castaneda M.D.        insulin GLARGINE (Lantus,Semglee) injection  15 Units Subcutaneous QAM INSULIN Harry Castaneda M.D.   15 Units at 01/01/23 0530    aspirin (ASA) tablet 325 mg  325 mg Enteral Tube DAILY Harry Castaneda M.D.   325 mg at 01/01/23 0532    atorvastatin (LIPITOR) tablet 10 mg  10 mg Enteral Tube DAILY Harry Castaneda M.D.   10 mg at 01/01/23 0532    levothyroxine (SYNTHROID) tablet 112 mcg  112 mcg Enteral Tube DAILY Harry Castaneda M.D.   112 mcg at 01/01/23  0532    HYDROmorphone (Dilaudid) injection 0.5-1 mg  0.5-1 mg Intravenous Q2HRS PRN Harry Castaneda M.D.   1 mg at 01/01/23 0609    enoxaparin (Lovenox) inj 30 mg  30 mg Subcutaneous DAILY AT 1800 Thierry Pepper M.D.   30 mg at 12/31/22 1755    insulin regular (HumuLIN R,NovoLIN R) injection  3-13 Units Subcutaneous Q6HRS Jazmine Pike, A.P.R.N.   3 Units at 01/01/23 0529    And    dextrose 10 % BOLUS 25 g  25 g Intravenous Q15 MIN PRN Jazmine Pike A.P.R.N.   Stopped at 12/20/22 0304    acetaminophen (Tylenol) tablet 650 mg  650 mg Enteral Tube Q6HRS PRN Jazmine Pike A.P.R.N.   650 mg at 12/29/22 0246    Respiratory Therapy Consult   Nebulization Continuous RT Aec Pool M.D.        famotidine (PEPCID) tablet 20 mg  20 mg Enteral Tube Q12HRS Ace Pool M.D.   20 mg at 01/01/23 0532    MD Alert...ICU Electrolyte Replacement per Pharmacy   Other PHARMACY TO DOSE Ace Pool M.D.        lidocaine (XYLOCAINE) 1 % injection 2 mL  2 mL Tracheal Tube Q30 MIN PRN Ace Pool M.D.   2 mL at 12/23/22 2115    ipratropium-albuterol (DUONEB) nebulizer solution  3 mL Nebulization Q2HRS PRN (RT) Ace Pool M.D.        Pharmacy Consult: Enteral tube insertion - review meds/change route/product selection  1 Each Other PHARMACY TO DOSE Jazmine Pike, A.P.R.N.        senna-docusate (PERICOLACE or SENOKOT S) 8.6-50 MG per tablet 2 Tablet  2 Tablet Enteral Tube BID Jazmine Pike A.P.R.N.   2 Tablet at 01/01/23 0532    And    polyethylene glycol/lytes (MIRALAX) PACKET 1 Packet  1 Packet Enteral Tube QDAY PRN Jazmine Pike A.P.R.N.   1 Packet at 12/28/22 1812    And    magnesium hydroxide (MILK OF MAGNESIA) suspension 30 mL  30 mL Enteral Tube QDAY PRN Jazmine Pike, A.P.R.N.   30 mL at 12/26/22 1715    And    bisacodyl (DULCOLAX) suppository 10 mg  10 mg Rectal QDAY PRN Jazmine Pike, A.P.R.N.   10 mg at 12/28/22 0504       Fluids    Intake/Output Summary (Last 24 hours) at 1/1/2023  0731  Last data filed at 1/1/2023 0600  Gross per 24 hour   Intake 1290 ml   Output 995 ml   Net 295 ml       Laboratory      Recent Labs     12/30/22 0425 12/31/22 0347 01/01/23  0330   SODIUM 139 138 136   POTASSIUM 4.0 3.9 3.8   CHLORIDE 103 103 104   CO2 26 27 24   BUN 15 15 17   CREATININE 0.59 0.57 0.54   MAGNESIUM 1.9 2.1 1.8   PHOSPHORUS 3.2 3.2 3.0   CALCIUM 8.8 8.5 8.1*     Recent Labs     12/30/22 0425 12/31/22 0347 01/01/23  0330   GLUCOSE 166* 188* 183*     Recent Labs     12/30/22 0425 12/31/22 0347 01/01/23  0330   WBC 7.6 6.6 5.1   NEUTSPOLYS 75.90* 61.00 63.60   LYMPHOCYTES 13.40* 20.40* 23.30   MONOCYTES 8.30 15.00* 9.90   EOSINOPHILS 1.30 2.70 2.40   BASOPHILS 0.30 0.60 0.40     Recent Labs     12/30/22 0425 12/31/22 0347 01/01/23  0330   RBC 3.38* 3.34* 2.91*   HEMOGLOBIN 10.2* 9.9* 8.6*   HEMATOCRIT 32.3* 31.4* 27.7*   PLATELETCT 422 425 404       Imaging  None    Assessment/Plan      Acute hypoxemic respiratory failure   Intubated 12/15   Failed attempt at liberation on 12/22   ABCDEF bundle   SAT/SBT as appropriate   Mobility level 2   Daughter consents to tracheostomy    Acute encephalopathy   Due to hypoglycemia - found down with profound hypoglycemia   MRI with possible tiny acute infarct in right frontal lobe   EEGs and continuous video EEG without evidence of seizures   Cortisol level elevated   Ammonia normal   TSH normal    MSSA pneumonia   Completed 13-day course of cefazolin on 12/31 - observe off antibiotics    Diabetes mellitus type 1   Abysmal control with glycohemoglobin of 13.2   Continue glargine, 15 units daily   Sliding scale insulin    Primary hypertension   Goal SBP less than 160   Continue lisinopril, 10 mg daily    Methamphetamine abuse   Urine drug screen positive for amphetamines on 12/15   Cessation counseling when clinically appropriate    Hypothyroidism   Continue levothyroxine, 112 mcg daily    Dyslipidemia   Statin    DNR      VTE:  Lovenox  Ulcer:  PPI  Lines: Catalan Catheter  Ongoing indication addressed    I have performed a physical exam and reviewed and updated ROS and Plan today (1/1/2023). In review of yesterday's note (12/31/2022), there are no changes except as documented above.    I have assessed and reassessed her respiratory status with ventilator adjustments and spontaneous breathing trials, airway mechanics, ventilator waveforms, blood pressure, hemodynamics, cardiovascular status and her neurologic status.  She is at increased risk for worsening respiratory and CNS system dysfunction.    Discussed patient condition and risk of morbidity and/or mortality with RN, RT, Pharmacy, Charge nurse / hot rounds, and QA team    The patient remains critically ill.  Critical care time = 35 minutes in directly providing and coordinating critical care and extensive data review.  No time overlap and excludes procedures.    A Critical Care Medicine progress note may have been authored by a resident physician or advanced practitioner of nursing under my direct supervision on this date of service.  As the supervising and attending physician, I have either attested to or cosigned that document.  IN THE EVENT THAT DISCREPANCIES EXIST BETWEEN THIS DOCUMENT AND ANY DOCUMENT THAT I HAVE ATTESTED TO OR COSIGNED ON THIS DATE OF SERVICE, THEN THIS DOCUMENT REMAINS THE FINAL AUTHORITY AS TO MY ASSESSMENT AND PLAN REGARDING THE CARE OF THIS PATIENT.    Harry Castaneda MD  Pulmonary and Critical Care Medicine

## 2023-01-02 NOTE — CARE PLAN
Problem: Ventilation  Goal: Ability to achieve and maintain unassisted ventilation or tolerate decreased levels of ventilator support  Description: Target End Date:  4 days     Document on Vent flowsheet    1.  Support and monitor invasive and noninvasive mechanical ventilation  2.  Monitor ventilator weaning response  3.  Perform ventilator associated pneumonia prevention interventions  4.  Manage ventilation therapy by monitoring diagnostic test results  Outcome: Not Met      Ventilator Daily Summary    Vent Day #18  Trach Day #1    Ventilator settings changed this shift: No (120/+8/30%)    Weaning trials: SBT x 1    Respiratory Procedures: Trach 8.0 Portex    Plan: Continue current ventilator settings and wean mechanical ventilation as tolerated per physician orders.

## 2023-01-02 NOTE — PROGRESS NOTES
Pulmonary/Critical Care Progress Note    Date of admission  12/15/2022    Chief Complaint  62 y.o. female admitted 12/15/2022 with acute encephalopathy, profound hypoglycemia.  She has a history of diabetes type 2, hypertension, hypothyroidism, dyslipidemia, methamphetamine abuse.    Hospital Course  Chronology of events during this admission as follow listed per Dr Castaneda note 1/1/23:   12/16 - hypoglycemia improving, weaning D10. Starting TF's. MRI brain pending  12/1830-[id number] MRI with possible small acute infarct to right frontal lobe;no other acute intracranial abnormality noted-patient slightly more responsive  12/19 - Vent day #5. Tolerating SBT's. Not following for parameters  12/20 - spontaneous movements, nothing purposeful.  Repeat EEG captured no seizures.  Presence of triphasic waveforms consistent with toxic metabolic etiology.  12/21- Extubated however pt failed and required reintubation due to tachypnea and difficulty supporting her airway    12/22 reintubated midday back on full vent support, febrile  12/23 full vent, not following  12/24 Full vent, no change LOC, daughter updated   12/26 -    vent day 12.  Continue cefazolin.  SAT/SBT.  12/27 -    vent day 13.  SAT/SBT.  Continue cefazolin.  12/28 -    vent day 14.  SAT/SBT.  Continue cefazolin.  Continuous video EEG without evidence of seizures  12/29 -    vent day 15.  SAT/SBT.  Continue cefazolin.  DNR status.  12/30 -    vent day 16.  Continue cefazolin.  SAT/SBT.  12/31 -    vent day 17.  Continue cefazolin.  SAT/SBT.  Increase lisinopril, 10 mg daily.  1/1 -    vent day 18.  Completed cefazolin yesterday.  Observe off antibiotics.       Interval Problem Update  Reviewed last 24 hour events:  Patient with good urine output.  Continues with diabetic sores feedings at goal at 50 cc/h.  We will repeat a chest x-ray today.  Tolerating spontaneous breathing at the vent.  Consider T-piece later on.  She does not follow commands as a baseline  although move all extremities.  Plan to start paperwork for an LTAC.    Review of Systems  Review of Systems   Unable to perform ROS: Mental acuity      Vital Signs for last 24 hours   Pulse:  [] 82  Resp:  [11-34] 18  BP: (100-151)/(55-95) 108/59  SpO2:  [95 %-100 %] 99 %    Hemodynamic parameters for last 24 hours       Respiratory Information for the last 24 hours  Vent Mode: ASV  PEEP/CPAP: 8  P Support: 5  MAP: 11  Length of Weaning Trial (Hours): 3  Control VTE (exp VT): 308    Physical Exam   Physical Exam  Vitals and nursing note reviewed.   Constitutional:       General: She is not in acute distress.     Appearance: She is not toxic-appearing.   HENT:      Head: Normocephalic and atraumatic.      Nose: Nose normal.      Mouth/Throat:      Mouth: Mucous membranes are moist.   Neck:      Comments: Tracheostomy in place.  Cardiovascular:      Rate and Rhythm: Normal rate and regular rhythm.      Pulses: Normal pulses.      Heart sounds: Normal heart sounds. No murmur heard.    No gallop.   Pulmonary:      Effort: Pulmonary effort is normal. No respiratory distress.      Breath sounds: Normal breath sounds. No stridor. No wheezing or rales.   Abdominal:      General: Bowel sounds are normal.   Musculoskeletal:         General: No swelling. Normal range of motion.      Right lower leg: No edema.      Left lower leg: No edema.   Skin:     General: Skin is warm.      Capillary Refill: Capillary refill takes less than 2 seconds.      Findings: No rash.   Neurological:      Mental Status: She is alert.      Comments: Patient does open eyes, does not follow commands, does not track although able to move all extremities.  Withdraws with pain       Medications  Current Facility-Administered Medications   Medication Dose Route Frequency Provider Last Rate Last Admin    lisinopril (PRINIVIL) tablet 10 mg  10 mg Enteral Tube DAILY Harry Castaneda M.D.   10 mg at 01/02/23 0504    LORazepam (ATIVAN) injection  1-2 mg  1-2 mg Intravenous Q4HRS PRN Harry Castaneda M.D.   1 mg at 01/01/23 1048    insulin GLARGINE (Lantus,Semglee) injection  15 Units Subcutaneous QAM INSULIN Harry Castaneda M.D.   15 Units at 01/02/23 0514    aspirin (ASA) tablet 325 mg  325 mg Enteral Tube DAILY Harry Castaneda M.D.   325 mg at 01/02/23 0503    atorvastatin (LIPITOR) tablet 10 mg  10 mg Enteral Tube DAILY Harry Castaneda M.D.   10 mg at 01/02/23 0503    levothyroxine (SYNTHROID) tablet 112 mcg  112 mcg Enteral Tube DAILY Harry Castaneda M.D.   112 mcg at 01/02/23 0503    HYDROmorphone (Dilaudid) injection 0.5-1 mg  0.5-1 mg Intravenous Q2HRS PRN Harry Castaneda M.D.   1 mg at 01/02/23 1329    enoxaparin (Lovenox) inj 30 mg  30 mg Subcutaneous DAILY AT 1800 Thierry Pepper M.D.   30 mg at 01/01/23 1738    insulin regular (HumuLIN R,NovoLIN R) injection  3-13 Units Subcutaneous Q6HRS Jazmine Pike, A.P.R.N.   3 Units at 01/02/23 1215    And    dextrose 10 % BOLUS 25 g  25 g Intravenous Q15 MIN PRN Jazmine Pike, A.P.R.N.   Stopped at 12/20/22 0304    acetaminophen (Tylenol) tablet 650 mg  650 mg Enteral Tube Q6HRS PRN Jazmine Pike A.P.R.N.   650 mg at 12/29/22 0246    Respiratory Therapy Consult   Nebulization Continuous RT Ace Pool M.D.        famotidine (PEPCID) tablet 20 mg  20 mg Enteral Tube Q12HRS Ace Pool M.D.   20 mg at 01/02/23 0502    MD Alert...ICU Electrolyte Replacement per Pharmacy   Other PHARMACY TO DOSE Ace Pool M.D.        lidocaine (XYLOCAINE) 1 % injection 2 mL  2 mL Tracheal Tube Q30 MIN PRN Ace Pool M.D.   2 mL at 12/23/22 2115    ipratropium-albuterol (DUONEB) nebulizer solution  3 mL Nebulization Q2HRS PRN (RT) Ace Pool M.D.        Pharmacy Consult: Enteral tube insertion - review meds/change route/product selection  1 Each Other PHARMACY TO DOSE SANDRA Neumann        senna-docusate (PERICOLACE or SENOKOT S) 8.6-50 MG per  tablet 2 Tablet  2 Tablet Enteral Tube BID Jazmine Pike, A.P.R.N.   2 Tablet at 01/02/23 0502    And    polyethylene glycol/lytes (MIRALAX) PACKET 1 Packet  1 Packet Enteral Tube QDAY PRN Jazmine Pike, A.P.R.N.   1 Packet at 12/28/22 1812    And    magnesium hydroxide (MILK OF MAGNESIA) suspension 30 mL  30 mL Enteral Tube QDAY PRN Jazmine Pike, A.P.R.N.   30 mL at 12/26/22 1715    And    bisacodyl (DULCOLAX) suppository 10 mg  10 mg Rectal QDAY PRN Jazmine Pike, A.P.R.N.   10 mg at 12/28/22 0504       Fluids    Intake/Output Summary (Last 24 hours) at 1/2/2023 1651  Last data filed at 1/2/2023 1600  Gross per 24 hour   Intake 1580 ml   Output 1375 ml   Net 205 ml       Laboratory          Recent Labs     12/31/22 0347 01/01/23  0330 01/02/23  0448   SODIUM 138 136 134*   POTASSIUM 3.9 3.8 4.2   CHLORIDE 103 104 102   CO2 27 24 24   BUN 15 17 18   CREATININE 0.57 0.54 0.52   MAGNESIUM 2.1 1.8 1.9   PHOSPHORUS 3.2 3.0 3.8   CALCIUM 8.5 8.1* 8.2*     Recent Labs     12/31/22 0347 01/01/23  0330 01/02/23  0448   PREALBUMIN  --   --  12.0*   GLUCOSE 188* 183* 255*     Recent Labs     12/31/22 0347 01/01/23  0330 01/02/23  0448   WBC 6.6 5.1 6.3   NEUTSPOLYS 61.00 63.60 70.50   LYMPHOCYTES 20.40* 23.30 17.60*   MONOCYTES 15.00* 9.90 9.00   EOSINOPHILS 2.70 2.40 2.20   BASOPHILS 0.60 0.40 0.50     Recent Labs     12/31/22 0347 01/01/23  0330 01/02/23  0448   RBC 3.34* 2.91* 3.04*   HEMOGLOBIN 9.9* 8.6* 9.0*   HEMATOCRIT 31.4* 27.7* 28.7*   PLATELETCT 425 404 412       Imaging  X-Ray:  I have personally reviewed the images and compared with prior images.    Assessment/Plan  * Acute encephalopathy  Assessment & Plan  -Initial event likely due to hypoglycemia; unknown downtime but possibly as long as 4 hours with a blood sugar of 35  -History of methamphetamine use poorly controlled diabetes melitis  -cEEG captured no seizures, pattern of encephalopathy.  Keppra discontinued  -Judicious use of narcotics,  sedation  -Fall precautions, aspiration precautions keep  HOB greater than 30 degrees  -MRI of head showed possible tiny acute infarcts to the right frontal lobe periventricular white matter; parenchymal atrophy; no other specific findings noted  -Serial neurologic exams-has already plateau, patient does not track, does not follow commands and intermittently withdraw with pain.  -The encephalopathy is more clear now that it is chronic  -TSH was normal.  -Ammonia levels were within normal limits.  -Cortisol levels were elevated as suspected.    Acute respiratory failure with hypoxia ([facility])  Assessment & Plan  -Intubated 10/4 for airway protection for GCS of 3  -optimize acid-base balance, oxygenation and ventilation  -Extubated 12/- failed extubation after approx 2 hrs due to tachypnea, difficulty maintaining patent airway  -HOB > 30  -daily SBT  -Early mobility, ABCDEF bundle  -DVT prophylaxis; chemical VTE  -Pepcid, chlorhexidine  -s/p trach 1/1/23  -tolerating spontaneous breathing on PS.   -consider t piece soon and start process of weaning  -routine care of tracheostomy  -CXR from 1/2/2023 with marked improvement in the infiltrates and overall picture.  -We will need to start the process for LTAC.    Hypoglycemia- (present on admission)  Assessment & Plan  -A1c 13.2  -Suspected type 2 diabetes  -No hypoglycemia events recently  -Continue Lantus - caution with increasing dose  -Continue to follow closely and adjust as indicated (patient on 40 units Lantus as outpatient.  Unknown compliance)  -Continue tube feedings at goal  -Continue Accu-Cheks every 6 hours and as needed with SSI (has not been requiring much)  -Hypoglycemia protocols  -Hypoglycemia has resolved.    Pneumonia due to methicillin susceptible Staphylococcus aureus (MSSA) ([facility])  Assessment & Plan  -Tracheal aspirate positive MSSA  -Patient really completed a 13-day course of cefazolin on 12/31   -No indications of sepsis, hemodynamically stable off  pressor  -Continue to monitor off antibiotics    Oral thrush  Assessment & Plan  -Nystatin swab 4 times daily x7 days and reassess    Protein calorie malnutrition (HCC)- (present on admission)  Assessment & Plan  Body mass index is 17.06 kg/m².  -dietary consult-TF/supplements     Dyslipidemia- (present on admission)  Assessment & Plan  -Continue statin    Methamphetamine abuse (HCC)- (present on admission)  Assessment & Plan  Cessation counseling when appropriate  UDS + 12/15    Cigarette nicotine dependence without complication- (present on admission)  Assessment & Plan  -Cessation education and counseling when clinically appropriate  -unclear if any hx of COPD  -RT protocols    Hypothyroidism due to Hashimoto's thyroiditis- (present on admission)  Assessment & Plan  -TSH WNL  -continue home levothyroxine    Primary hypertension- (present on admission)  Assessment & Plan  -History of, unknown compliance with medications  -As needed IV antihypertensives with parameters  -home oral med lisinopril 10mg daily       Dispo: Consult to social work to start the process for an LTAC.  VTE:  Lovenox  Ulcer: H2 Antagonist  Lines: Catalan Catheter  Ongoing indication addressed    I have performed a physical exam and reviewed and updated ROS and Plan today (1/2/2023). In review of yesterday's note (1/1/2023), there are no changes except as documented above.     Discussed patient condition and risk of morbidity and/or mortality with RN, RT, Pharmacy, and Charge nurse / hot rounds  The patient remains critically ill.  Critical care time = 45 minutes in directly providing and coordinating critical care and extensive data review.  No time overlap and excludes procedures.    Dionicio Watt MD FACP  Pulmonary/Critical Care

## 2023-01-03 PROBLEM — B37.0 ORAL THRUSH: Status: RESOLVED | Noted: 2022-01-01 | Resolved: 2023-01-01

## 2023-01-03 NOTE — PROGRESS NOTES
ACS staff    62 year old woman known to me s/p percutaneous tracheostomy. Critical care team requesting feeding access/G tube.    G tubes are high risk and associated with significant complication rates. Would exhaust all non surgical options first. If she has permanent oropharyngeal dysphagia and definitely requires G tube prior to LTAC placement, PEG would be the best option for this thin, frail patient. If GI or IR are unable at that time, please let us know and we will be happy to consider her for laparoscopic G tube.   Will follow peripherally so as not to delay care/dispo when appropriate.     Sushant Boyd MD  [phone]

## 2023-01-03 NOTE — PROGRESS NOTES
Pulmonary/Critical Care Progress Note    Date of admission  12/15/2022    Chief Complaint  62 y.o. female admitted 12/15/2022 with acute encephalopathy, profound hypoglycemia.  She has a history of diabetes type 2, hypertension, hypothyroidism, dyslipidemia, methamphetamine abuse.    Hospital Course  Chronology of events during this admission as follow listed per Dr Castaneda note 1/1/23:   12/16 - hypoglycemia improving, weaning D10. Starting TF's. MRI brain pending  12/1883-ciitce-oo MRI with possible small acute infarct to right frontal lobe;no other acute intracranial abnormality noted-patient slightly more responsive  12/19 - Vent day #5. Tolerating SBT's. Not following for parameters  12/20 - spontaneous movements, nothing purposeful.  Repeat EEG captured no seizures.  Presence of triphasic waveforms consistent with toxic metabolic etiology.  12/21- Extubated however pt failed and required reintubation due to tachypnea and difficulty supporting her airway    12/22 reintubated midday back on full vent support, febrile  12/23 full vent, not following  12/24 Full vent, no change LOC, daughter updated   12/26 -    vent day 12.  Continue cefazolin.  SAT/SBT.  12/27 -    vent day 13.  SAT/SBT.  Continue cefazolin.  12/28 -    vent day 14.  SAT/SBT.  Continue cefazolin.  Continuous video EEG without evidence of seizures  12/29 -    vent day 15.  SAT/SBT.  Continue cefazolin.  DNR status.  12/30 -    vent day 16.  Continue cefazolin.  SAT/SBT.  12/31 -    vent day 17.  Continue cefazolin.  SAT/SBT.  Increase lisinopril, 10 mg daily.  1/1 -    vent day 18.  Completed cefazolin yesterday.  Observe off antibiotics.   1/2: Patient with good urine output. Continues with diabetic sores feedings at goal at 50 cc/h. Follow up chest x-ray improved.  Tolerating spontaneous breathing at the vent. Continues with diabetic formulat feedings at goal at 50 cc/h through NGT.      Interval Problem Update  Reviewed last 24 hour  events:  No events overnight  Vent day #20, Trach day #3.  Tolerating spontaneous breathing at the vent, will try T-piece today  She does not follow commands as a baseline although move all extremities.  20 days recertification statement at the end of the note  Labs are stable and wnl, except glucose has been elevated every morning, consider adjusting lantus.  Will plan for PEG tube, consult surgery.  Plan to start paperwork for an LTAC.    Review of Systems  Review of Systems   Unable to perform ROS: Mental acuity      Vital Signs for last 24 hours   Pulse:  [] 81  Resp:  [11-34] 17  BP: ()/(51-95) 111/62  SpO2:  [94 %-100 %] 100 %    Hemodynamic parameters for last 24 hours       Respiratory Information for the last 24 hours  Vent Mode: Spont  PEEP/CPAP: 8  P Support: 5  MAP: 10  Length of Weaning Trial (Hours): 3  Control VTE (exp VT): 315    Physical Exam   Physical Exam  Vitals and nursing note reviewed.   Constitutional:       General: She is not in acute distress.     Appearance: She is not toxic-appearing.   HENT:      Head: Normocephalic and atraumatic.      Nose: Nose normal.      Mouth/Throat:      Mouth: Mucous membranes are moist.   Neck:      Comments: Tracheostomy in place.  Cardiovascular:      Rate and Rhythm: Normal rate and regular rhythm.      Pulses: Normal pulses.      Heart sounds: Normal heart sounds. No murmur heard.    No gallop.   Pulmonary:      Effort: Pulmonary effort is normal. No respiratory distress.      Breath sounds: Normal breath sounds. No stridor. No wheezing or rales.   Abdominal:      General: Bowel sounds are normal.   Musculoskeletal:         General: No swelling. Normal range of motion.      Right lower leg: No edema.      Left lower leg: No edema.      Comments: Mild Clubbing present in fingers and toes   Skin:     General: Skin is warm.      Capillary Refill: Capillary refill takes less than 2 seconds.      Findings: No rash.   Neurological:      Mental  Status: She is alert.      Comments: Patient does open eyes, does not follow commands, does not track although able to move all extremities.  Withdraws with pain       Medications  Current Facility-Administered Medications   Medication Dose Route Frequency Provider Last Rate Last Admin    [START ON 1/4/2023] insulin GLARGINE (Lantus,Semglee) injection  18 Units Subcutaneous QAM INSULIN Dionicio Watt M.D.        magnesium sulfate IVPB premix 2 g  2 g Intravenous Once Dionicio Watt M.D. 25 mL/hr at 01/03/23 0751 2 g at 01/03/23 0751    lisinopril (PRINIVIL) tablet 10 mg  10 mg Enteral Tube DAILY Harry Castaneda M.D.   10 mg at 01/03/23 0540    LORazepam (ATIVAN) injection 1-2 mg  1-2 mg Intravenous Q4HRS PRN Harry Castaneda M.D.   1 mg at 01/01/23 1048    aspirin (ASA) tablet 325 mg  325 mg Enteral Tube DAILY Harry Castaneda M.D.   325 mg at 01/03/23 0540    atorvastatin (LIPITOR) tablet 10 mg  10 mg Enteral Tube DAILY Harry Castaneda M.D.   10 mg at 01/03/23 0540    levothyroxine (SYNTHROID) tablet 112 mcg  112 mcg Enteral Tube DAILY Harry Castaneda M.D.   112 mcg at 01/03/23 0540    HYDROmorphone (Dilaudid) injection 0.5-1 mg  0.5-1 mg Intravenous Q2HRS PRN Harry Castaneda M.D.   1 mg at 01/03/23 0359    enoxaparin (Lovenox) inj 30 mg  30 mg Subcutaneous DAILY AT 1800 Thierry Pepper M.D.   30 mg at 01/02/23 1729    insulin regular (HumuLIN R,NovoLIN R) injection  3-13 Units Subcutaneous Q6HRS Jazmine Pike, A.P.R.N.   5 Units at 01/03/23 0545    And    dextrose 10 % BOLUS 25 g  25 g Intravenous Q15 MIN PRN Jazmine Pike, A.P.R.N.   Stopped at 12/20/22 0304    acetaminophen (Tylenol) tablet 650 mg  650 mg Enteral Tube Q6HRS PRN Jazmine Pike A.P.R.N.   650 mg at 12/29/22 0246    Respiratory Therapy Consult   Nebulization Continuous RT Ace Pool M.D.        famotidine (PEPCID) tablet 20 mg  20 mg Enteral Tube Q12HRS Ace Pool M.D.   20 mg at  01/03/23 0540    MD Alert...ICU Electrolyte Replacement per Pharmacy   Other PHARMACY TO DOSE Ace Pool M.D.        lidocaine (XYLOCAINE) 1 % injection 2 mL  2 mL Tracheal Tube Q30 MIN PRN Ace Pool M.D.   2 mL at 12/23/22 2115    ipratropium-albuterol (DUONEB) nebulizer solution  3 mL Nebulization Q2HRS PRN (RT) Ace Pool M.D.        Pharmacy Consult: Enteral tube insertion - review meds/change route/product selection  1 Each Other PHARMACY TO DOSE Jazmine Pike, A.P.R.N.        senna-docusate (PERICOLACE or SENOKOT S) 8.6-50 MG per tablet 2 Tablet  2 Tablet Enteral Tube BID Jazmine Pike, A.P.R.N.   2 Tablet at 01/03/23 0540    And    polyethylene glycol/lytes (MIRALAX) PACKET 1 Packet  1 Packet Enteral Tube QDAY PRN Jazmine Pike, A.P.R.N.   1 Packet at 12/28/22 1812    And    magnesium hydroxide (MILK OF MAGNESIA) suspension 30 mL  30 mL Enteral Tube QDAY PRN Jazmine Pike, A.P.R.N.   30 mL at 12/26/22 1715    And    bisacodyl (DULCOLAX) suppository 10 mg  10 mg Rectal QDAY PRN Jazmine Pike, A.P.R.N.   10 mg at 12/28/22 0504       Fluids    Intake/Output Summary (Last 24 hours) at 1/3/2023 0909  Last data filed at 1/3/2023 0600  Gross per 24 hour   Intake 1450 ml   Output 1210 ml   Net 240 ml       Laboratory          Recent Labs     01/01/23  0330 01/02/23  0448 01/03/23  0435   SODIUM 136 134* 135   POTASSIUM 3.8 4.2 4.2   CHLORIDE 104 102 101   CO2 24 24 25   BUN 17 18 20   CREATININE 0.54 0.52 0.56   MAGNESIUM 1.8 1.9 1.9   PHOSPHORUS 3.0 3.8 3.3   CALCIUM 8.1* 8.2* 8.4*     Recent Labs     01/01/23  0330 01/02/23  0448 01/03/23  0435   PREALBUMIN  --  12.0*  --    GLUCOSE 183* 255* 254*     Recent Labs     01/01/23  0330 01/02/23  0448 01/03/23  0435   WBC 5.1 6.3 6.8   NEUTSPOLYS 63.60 70.50 69.20   LYMPHOCYTES 23.30 17.60* 18.60*   MONOCYTES 9.90 9.00 9.20   EOSINOPHILS 2.40 2.20 2.30   BASOPHILS 0.40 0.50 0.30     Recent Labs     01/01/23  0330 01/02/23 0448 01/03/23  0435   RBC  2.91* 3.04* 3.13*   HEMOGLOBIN 8.6* 9.0* 9.2*   HEMATOCRIT 27.7* 28.7* 29.3*   PLATELETCT 404 412 395       Imaging  X-Ray:  I have personally reviewed the images and compared with prior images.    Assessment/Plan  * Acute encephalopathy- (present on admission)  Assessment & Plan  -Initial event likely due to hypoglycemia; unknown downtime but possibly as long as 4 hours with a blood sugar of 35  -History of methamphetamine use poorly controlled diabetes melitus  -cEEG captured no seizures, pattern of encephalopathy.  Keppra discontinued  -Fall precautions, aspiration precautions keep  HOB greater than 30 degrees  -MRI of head showed possible tiny acute infarcts to the right frontal lobe periventricular white matter; parenchymal atrophy; no other specific findings noted  -Serial neurologic exams-has already plateau, patient does not track, does not follow commands and intermittently withdraw with pain. Per daughter the interactions are consistent now, the encephalopathy is more clear now that it is chronic and not acute and multifactorial as above.  -TSH was normal.  -Ammonia levels were within normal limits.  -Cortisol levels were elevated as expected.    Acute respiratory failure with hypoxia (HCC)  Assessment & Plan  -Intubated 10/4 for airway protection for GCS of 3  -optimize acid-base balance, oxygenation and ventilation  -Extubated 12/- failed extubation after approx 2 hrs due to tachypnea, difficulty maintaining patent airway  -HOB > 30  -daily SBT  -Early mobility, ABCDEF bundle  -DVT prophylaxis; chemical VTE  -Pepcid, chlorhexidine  -s/p trach 1/1/23  -tolerating spontaneous breathing on PS.   -consider t piece soon and start process of weaning  -routine care of tracheostomy  -CXR from 1/2/2023 with marked improvement in the infiltrates and overall picture.  -We will need to start the process for LTAC  -will start trials of T piece now that is tolerating spontaneous breathing on the vent  -consult surgery  for PEG tube placement    Hypoglycemia- (present on admission)  Assessment & Plan  -A1c 13.2  -Suspected type 2 diabetes  -No hypoglycemia events recently  -Continue Lantus - caution with increasing dose  -Continue to follow closely and adjust as indicated (patient on 40 units Lantus as outpatient.  Unknown compliance)  -Continue tube feedings at goal  -Continue Accu-Cheks every 6 hours and as needed with SSI  -Hypoglycemia protocols  -Hypoglycemia has resolved.  -morning glucose remain elevated, on lantus 15, will change to 18 tomorrow if 4pm glucose is still high, otherwise consider lantus 3 units at night as add one    Pneumonia due to methicillin susceptible Staphylococcus aureus (MSSA) ([facility])  Assessment & Plan  -Tracheal aspirate positive MSSA  -Patient really completed a 13-day course of cefazolin on 12/31   -No indications of sepsis, hemodynamically stable off pressor  -Continue to monitor off antibiotics    Protein calorie malnutrition (HCC)- (present on admission)  Assessment & Plan  Body mass index is 17.06 kg/m².  -dietary consult-TF/supplements  -I have discussed with ptients daughter Sindy Leblanc about placing a PEG tube, patient's daughter gave the consent over the phone, will consult surgery today.     Dyslipidemia- (present on admission)  Assessment & Plan  -Continue statin    Methamphetamine abuse (HCC)- (present on admission)  Assessment & Plan  Unable to provide cessation counseling given mental acuity  UDS + 12/15    Cigarette nicotine dependence without complication- (present on admission)  Assessment & Plan  -given mental status, she might not smoke any more but cessation education and counseling might not happen  -unclear if any hx of COPD  -RT protocols    Hypothyroidism due to Hashimoto's thyroiditis- (present on admission)  Assessment & Plan  -TSH WNL  -continue home levothyroxine    Primary hypertension- (present on admission)  Assessment & Plan  -History of, unknown compliance with  medications  -As needed IV antihypertensives with parameters  -home oral med lisinopril 10mg daily         I certify that the patient requires continued medically necessary hospital services for the treatment of acute hypoxic respiratory failure and encephalopathy and will remain in the hospital for additional 7 days.  Discharge plan is anticipated to be to an LTAC on 1/9/23.     Dispo: Consult to social work to start the process for an LTAC. Consult for PEG tube today.  VTE:  Lovenox  Ulcer: H2 Antagonist  Lines: Catalan Catheter  Ongoing indication addressed    I have performed a physical exam and reviewed and updated ROS and Plan today (1/3/2023). In review of yesterday's note (1/2/2023), there are no changes except as documented above.     Discussed patient condition and risk of morbidity and/or mortality with RN, RT, Pharmacy, and Charge nurse / hot rounds  The patient remains critically ill.  Critical care time = 43 minutes in directly providing and coordinating critical care and extensive data review.  No time overlap and excludes procedures.    Dionicio Watt MD FACP  Pulmonary/Critical Care

## 2023-01-03 NOTE — CARE PLAN
The patient is Stable - Low risk of patient condition declining or worsening    Progress made toward(s) clinical / shift goals:    Problem: Skin Integrity  Goal: Skin integrity is maintained or improved  Outcome: Progressing     Problem: Fall Risk  Goal: Patient will remain free from falls  Outcome: Progressing     Problem: Safety - Medical Restraint  Goal: Remains free of injury from restraints (Restraint for Interference with Medical Device)  Outcome: Progressing     Problem: Mechanical Ventilation  Goal: Safe management of artificial airway and ventilation  Outcome: Progressing       Patient is not progressing towards the following goals:      Problem: Psychosocial  Goal: Patient's ability to verbalize feelings about condition will improve  Outcome: Not Met

## 2023-01-03 NOTE — CARE PLAN
Problem: Ventilation  Goal: Ability to achieve and maintain unassisted ventilation or tolerate decreased levels of ventilator support  Description: Target End Date:  4 days     Document on Vent flowsheet    1.  Support and monitor invasive and noninvasive mechanical ventilation  2.  Monitor ventilator weaning response  3.  Perform ventilator associated pneumonia prevention interventions  4.  Manage ventilation therapy by monitoring diagnostic test results  Outcome: Not Met      Ventilator Daily Summary    Vent Day #19  Trach Day #2    Ventilator settings changed this shift: No (110/+8/30%)    Weaning trials: SBT x 1    Respiratory Procedures: No    Plan: Continue current ventilator settings and wean mechanical ventilation as tolerated per physician orders.

## 2023-01-03 NOTE — DISCHARGE PLANNING
Patient discussed in IDT rounds with Dr. Watt. MD plans to speak with family about peg tube and then plan for LTAC placement. LMSW will speak with family about LTAC once more appropriate.     Patient has Medicare and Medicaid FFS.     Plan: Speak with family and provide LTAC choice

## 2023-01-03 NOTE — CARE PLAN
Problem: Knowledge Deficit - Standard  Goal: Patient and family/care givers will demonstrate understanding of plan of care, disease process/condition, diagnostic tests and medications  Outcome: Not Progressing     Problem: Safety - Medical Restraint  Goal: Remains free of injury from restraints (Restraint for Interference with Medical Device)  Outcome: Not Progressing  Goal: Free from restraint(s) (Restraint for Interference with Medical Device)  Outcome: Not Progressing     Problem: Pain - Standard  Goal: Alleviation of pain or a reduction in pain to the patient’s comfort goal  Outcome: Not Progressing   The patient is Watcher - Medium risk of patient condition declining or worsening    Shift Goals  Clinical Goals: Mobilize to EOB, continue to reorient patient with interactions  Patient Goals: unable to evaluate  Family Goals: no family present    Progress made toward(s) clinical / shift goals:  met    Patient is not progressing towards the following goals:      Problem: Knowledge Deficit - Standard  Goal: Patient and family/care givers will demonstrate understanding of plan of care, disease process/condition, diagnostic tests and medications  Outcome: Not Progressing     Problem: Skin Integrity  Goal: Skin integrity is maintained or improved  Outcome: Not Progressing     Problem: Fall Risk  Goal: Patient will remain free from falls  Outcome: Not Progressing     Problem: Safety - Medical Restraint  Goal: Remains free of injury from restraints (Restraint for Interference with Medical Device)  Outcome: Not Progressing  Goal: Free from restraint(s) (Restraint for Interference with Medical Device)  Outcome: Not Progressing     Problem: Pain - Standard  Goal: Alleviation of pain or a reduction in pain to the patient’s comfort goal  Outcome: Not Progressing     Problem: Psychosocial  Goal: Patient's level of anxiety will decrease  Outcome: Not Progressing  Goal: Patient's ability to verbalize feelings about condition  will improve  Outcome: Not Progressing  Goal: Patient's ability to re-evaluate and adapt role responsibilities will improve  Outcome: Not Progressing  Goal: Patient and family will demonstrate ability to cope with life altering diagnosis and/or procedure  Outcome: Not Progressing     Problem: Hemodynamics  Goal: Patient's hemodynamics, fluid balance and neurologic status will be stable or improve  Outcome: Not Progressing     Problem: Respiratory  Goal: Patient will achieve/maintain optimum respiratory ventilation and gas exchange  Outcome: Not Progressing     Problem: Mechanical Ventilation  Goal: Safe management of artificial airway and ventilation  Outcome: Not Progressing  Goal: Successful weaning off mechanical ventilator, spontaneously maintains adequate gas exchange  Outcome: Not Progressing  Goal: Patient will be able to express needs and understand communication  Outcome: Not Progressing     Problem: Risk for Aspiration  Goal: Patient's risk for aspiration will be absent or decrease  Outcome: Not Progressing     Problem: Urinary - Renal Perfusion  Goal: Ability to achieve and maintain adequate renal perfusion and functioning will improve  Outcome: Not Progressing     Problem: Venous Thromboembolism (VTE) Prevention  Goal: The patient will remain free from venous thromboembolism (VTE)  Outcome: Not Progressing     Problem: Nutrition  Goal: Patient's nutritional and fluid intake will be adequate or improve  Outcome: Not Progressing  Goal: Enteral nutrition will be maintained or improve  Outcome: Not Progressing     Problem: Urinary Elimination  Goal: Establish and maintain regular urinary output  Outcome: Not Progressing     Problem: Bowel Elimination  Goal: Establish and maintain regular bowel function  Outcome: Not Progressing

## 2023-01-04 PROBLEM — J15.211 PNEUMONIA DUE TO METHICILLIN SUSCEPTIBLE STAPHYLOCOCCUS AUREUS (MSSA) (HCC): Status: RESOLVED | Noted: 2022-01-01 | Resolved: 2023-01-01

## 2023-01-04 NOTE — PROGRESS NOTES
Pulmonary/Critical Care Progress Note    Date of admission  12/15/2022    Chief Complaint  62 y.o. female admitted 12/15/2022 with acute encephalopathy, profound hypoglycemia.  She has a history of diabetes type 2, hypertension, hypothyroidism, dyslipidemia, methamphetamine abuse.    Hospital Course  Chronology of events during this admission as follow listed per Dr Castaneda note 1/1/23:   12/16 - hypoglycemia improving, weaning D10. Starting TF's. MRI brain pending  12/1871-[id number] MRI with possible small acute infarct to right frontal lobe;no other acute intracranial abnormality noted-patient slightly more responsive  12/19 - Vent day #5. Tolerating SBT's. Not following for parameters  12/20 - spontaneous movements, nothing purposeful.  Repeat EEG captured no seizures.  Presence of triphasic waveforms consistent with toxic metabolic etiology.  12/21- Extubated however pt failed and required reintubation due to tachypnea and difficulty supporting her airway    12/22 reintubated midday back on full vent support, febrile  12/23 full vent, not following  12/24 Full vent, no change LOC, daughter updated   12/26 -    vent day 12.  Continue cefazolin.  SAT/SBT.  12/27 -    vent day 13.  SAT/SBT.  Continue cefazolin.  12/28 -    vent day 14.  SAT/SBT.  Continue cefazolin.  Continuous video EEG without evidence of seizures  12/29 -    vent day 15.  SAT/SBT.  Continue cefazolin.  DNR status.  12/30 -    vent day 16.  Continue cefazolin.  SAT/SBT.  12/31 -    vent day 17.  Continue cefazolin.  SAT/SBT.  Increase lisinopril, 10 mg daily.  1/1 -    vent day 18.  Completed cefazolin yesterday.  Observe off antibiotics.   1/2: Patient with good urine output. Continues with diabetic sores feedings at goal at 50 cc/h. Follow up chest x-ray improved.  Tolerating spontaneous breathing at the vent. Continues with diabetic formulat feedings at goal at 50 cc/h through NGT.  1/3: Vent day #20, Trach day #3. Tolerating spontaneous  breathing at the vent. She does not follow commands as a baseline although move all extremities. 20 days recertification statement at the end of the note  Consulted surgery.    Interval Problem Update  Reviewed last 24 hour events:  No events overnight  Plan to start paperwork for an LTAC.  Some decrease in blood pressures reported, will start IV fluids to supplement.  Changed lantus dose due to hyperglycemia  Not tolerating T piece yet  IVFs    Review of Systems  Review of Systems   Unable to perform ROS: Mental acuity   Constitutional:  Negative for malaise/fatigue.      Vital Signs for last 24 hours   Pulse:  [67-94] 87  Resp:  [12-25] 14  BP: ()/(50-77) 111/63  SpO2:  [86 %-100 %] 95 %    Hemodynamic parameters for last 24 hours       Respiratory Information for the last 24 hours  Vent Mode: Spont  Rate (breaths/min): 12  Vt Target (mL): 350  PEEP/CPAP: 8  P Support: 5  MAP: 9.7  Length of Weaning Trial (Hours): 1.5  Control VTE (exp VT): 484    Physical Exam   Physical Exam  Vitals and nursing note reviewed.   Constitutional:       General: She is not in acute distress.     Appearance: She is not toxic-appearing.   HENT:      Head: Normocephalic and atraumatic.      Nose: Nose normal.      Mouth/Throat:      Mouth: Mucous membranes are moist.   Neck:      Comments: Tracheostomy in place.  Cardiovascular:      Rate and Rhythm: Normal rate and regular rhythm.      Pulses: Normal pulses.      Heart sounds: Normal heart sounds. No murmur heard.    No gallop.   Pulmonary:      Effort: Pulmonary effort is normal. No respiratory distress.      Breath sounds: Normal breath sounds. No stridor. No wheezing or rales.   Abdominal:      General: Bowel sounds are normal.   Musculoskeletal:         General: No swelling. Normal range of motion.      Right lower leg: No edema.      Left lower leg: No edema.      Comments: Mild Clubbing present in fingers and toes   Skin:     General: Skin is warm.      Capillary Refill:  Capillary refill takes less than 2 seconds.      Findings: No rash.   Neurological:      Mental Status: She is alert.      Comments: Patient does open eyes, does not follow commands, does not track although able to move all extremities.  Withdraws with pain       Medications  Current Facility-Administered Medications   Medication Dose Route Frequency Provider Last Rate Last Admin    insulin GLARGINE (Lantus,Semglee) injection  4 Units Subcutaneous Q EVENING Dionicio Watt M.D.   4 Units at 01/04/23 1719    insulin GLARGINE (Lantus,Semglee) injection  18 Units Subcutaneous QAM INSULIN Dionicio Watt M.D.   18 Units at 01/04/23 0515    lisinopril (PRINIVIL) tablet 10 mg  10 mg Enteral Tube DAILY Harry Castaneda M.D.   10 mg at 01/03/23 0540    LORazepam (ATIVAN) injection 1-2 mg  1-2 mg Intravenous Q4HRS PRN Harry Castaneda M.D.   1 mg at 01/01/23 1048    aspirin (ASA) tablet 325 mg  325 mg Enteral Tube DAILY Harry Castaneda M.D.   325 mg at 01/04/23 0520    atorvastatin (LIPITOR) tablet 10 mg  10 mg Enteral Tube DAILY Harry Castaneda M.D.   10 mg at 01/04/23 0520    levothyroxine (SYNTHROID) tablet 112 mcg  112 mcg Enteral Tube DAILY Harry Castaneda M.D.   112 mcg at 01/04/23 0520    HYDROmorphone (Dilaudid) injection 0.5-1 mg  0.5-1 mg Intravenous Q2HRS PRN Harry Castaneda M.D.   1 mg at 01/04/23 1722    enoxaparin (Lovenox) inj 30 mg  30 mg Subcutaneous DAILY AT 1800 Thierry Pepper M.D.   30 mg at 01/04/23 1718    insulin regular (HumuLIN R,NovoLIN R) injection  3-13 Units Subcutaneous Q6HRS RAVINDER Neumann.P.R.N.   3 Units at 01/04/23 1735    And    dextrose 10 % BOLUS 25 g  25 g Intravenous Q15 MIN PRN RAVINDER Neumann.P.R.N.   Stopped at 12/20/22 0304    acetaminophen (Tylenol) tablet 650 mg  650 mg Enteral Tube Q6HRS PRN Jazmine Pike A.P.R.NDuane   650 mg at 12/29/22 0246    Respiratory Therapy Consult   Nebulization Continuous RT Ace Pool M.D.         famotidine (PEPCID) tablet 20 mg  20 mg Enteral Tube Q12HRS Ace Pool M.D.   20 mg at 01/04/23 1718    MD Alert...ICU Electrolyte Replacement per Pharmacy   Other PHARMACY TO DOSE Ace Pool M.D.        lidocaine (XYLOCAINE) 1 % injection 2 mL  2 mL Tracheal Tube Q30 MIN PRN Ace Pool M.D.   2 mL at 12/23/22 2115    ipratropium-albuterol (DUONEB) nebulizer solution  3 mL Nebulization Q2HRS PRN (RT) Ace Pool M.D.        Pharmacy Consult: Enteral tube insertion - review meds/change route/product selection  1 Each Other PHARMACY TO DOSE Jazmine Pike, A.P.R.N.        senna-docusate (PERICOLACE or SENOKOT S) 8.6-50 MG per tablet 2 Tablet  2 Tablet Enteral Tube BID Jazmine Pike, A.P.R.N.   2 Tablet at 01/04/23 1718    And    polyethylene glycol/lytes (MIRALAX) PACKET 1 Packet  1 Packet Enteral Tube QDAY PRN Jazmine Pike, A.P.R.N.   1 Packet at 12/28/22 1812    And    magnesium hydroxide (MILK OF MAGNESIA) suspension 30 mL  30 mL Enteral Tube QDAY PRN Jazmine Pike, A.P.R.N.   30 mL at 12/26/22 1715    And    bisacodyl (DULCOLAX) suppository 10 mg  10 mg Rectal QDAY PRN Jazmine Pike, A.P.R.N.   10 mg at 12/28/22 0504       Fluids    Intake/Output Summary (Last 24 hours) at 1/4/2023 1816  Last data filed at 1/4/2023 1400  Gross per 24 hour   Intake 2053.7 ml   Output 880 ml   Net 1173.7 ml       Laboratory          Recent Labs     01/02/23  0448 01/03/23  0435 01/04/23  0510   SODIUM 134* 135 137   POTASSIUM 4.2 4.2 4.3   CHLORIDE 102 101 103   CO2 24 25 27   BUN 18 20 22   CREATININE 0.52 0.56 0.55   MAGNESIUM 1.9 1.9 1.9   PHOSPHORUS 3.8 3.3 4.1   CALCIUM 8.2* 8.4* 8.5     Recent Labs     01/02/23 0448 01/03/23  0435 01/04/23  0510   PREALBUMIN 12.0*  --   --    GLUCOSE 255* 254* 201*     Recent Labs     01/02/23 0448 01/03/23  0435 01/04/23  0510   WBC 6.3 6.8 7.1   NEUTSPOLYS 70.50 69.20 71.40   LYMPHOCYTES 17.60* 18.60* 16.40*   MONOCYTES 9.00 9.20 8.60   EOSINOPHILS 2.20 2.30  2.80   BASOPHILS 0.50 0.30 0.40     Recent Labs     01/02/23  0448 01/03/23  0435 01/04/23  0510   RBC 3.04* 3.13* 3.07*   HEMOGLOBIN 9.0* 9.2* 9.2*   HEMATOCRIT 28.7* 29.3* 29.0*   PLATELETCT 412 395 381       Imaging  X-Ray:  I have personally reviewed the images and compared with prior images.    Assessment/Plan  * Acute encephalopathy- (present on admission)  Assessment & Plan  -Initial event likely due to hypoglycemia; unknown downtime but possibly as long as 4 hours with a blood sugar of 35  -History of methamphetamine use poorly controlled diabetes melitus  -cEEG captured no seizures, pattern of encephalopathy.  Keppra discontinued  -Fall precautions, aspiration precautions keep  HOB greater than 30 degrees  -MRI of head showed possible tiny acute infarcts to the right frontal lobe periventricular white matter; parenchymal atrophy; no other specific findings noted  -Serial neurologic exams-has already plateau, patient does not track, does not follow commands and intermittently withdraw with pain. Per daughter the interactions are consistent now, the encephalopathy is more clear now that it is chronic and not acute and multifactorial as above.  -TSH was normal.  -Ammonia levels were within normal limits.  -Cortisol levels were elevated as expected.    Acute respiratory failure with hypoxia (HCC)  Assessment & Plan  -Intubated 10/4 for airway protection for GCS of 3  -optimize acid-base balance, oxygenation and ventilation  -Extubated 12/- failed extubation after approx 2 hrs due to tachypnea, difficulty maintaining patent airway  -HOB > 30  -daily SBT  -Early mobility, ABCDEF bundle  -DVT prophylaxis; chemical VTE  -Pepcid, chlorhexidine  -s/p trach 1/1/23  -tolerating spontaneous breathing on PS.   -consider t piece soon and start process of weaning  -routine care of tracheostomy  -CXR from 1/2/2023 with marked improvement in the infiltrates and overall picture.  -We will need to start the process for  LTAC  -will start trials of T piece now that is tolerating spontaneous breathing on the vent  -consult GI for PEG tube placement    Hypoglycemia- (present on admission)  Assessment & Plan  -A1c 13.2  -Suspected type 2 diabetes  -No hypoglycemia events recently  -Continue Lantus - caution with increasing dose  -Continue to follow closely and adjust as indicated (patient on 40 units Lantus as outpatient.  Unknown compliance)  -Continue tube feedings at goal  -Continue Accu-Cheks every 6 hours and as needed with SSI  -Hypoglycemia protocols  -Hypoglycemia has resolved.  -morning glucose remain elevated, on lantus 15, changed to 18   -4pm glucose is still high, otherwise consider lantus 3-5 units at night    Protein calorie malnutrition (HCC)- (present on admission)  Assessment & Plan  Body mass index is 17.06 kg/m².  -dietary consult-TF/supplements  -I have discussed with ptients daughter Sindy Leblanc about placing a PEG tube, patient's daughter gave the consent over the phone, consulted surgery.     Dyslipidemia- (present on admission)  Assessment & Plan  -Continue statin    Methamphetamine abuse (HCC)- (present on admission)  Assessment & Plan  Unable to provide cessation counseling given mental acuity  UDS + 12/15    Cigarette nicotine dependence without complication- (present on admission)  Assessment & Plan  -given mental status, she might not smoke any more but cessation education and counseling might not happen  -unclear if any hx of COPD  -RT protocols    Hypothyroidism due to Hashimoto's thyroiditis- (present on admission)  Assessment & Plan  -TSH WNL  -continue home levothyroxine    Primary hypertension- (present on admission)  Assessment & Plan  -History of, unknown compliance with medications  -As needed IV antihypertensives with parameters  -home oral med lisinopril 10mg daily         I certify that the patient requires continued medically necessary hospital services for the treatment of acute hypoxic  respiratory failure and encephalopathy and will remain in the hospital for additional 7 days.  Discharge plan is anticipated to be to an LTAC on 1/9/23.     Dispo: Consult to social work to start the process for an LTAC. Consult for PEG tube today.  VTE:  Lovenox  Ulcer: H2 Antagonist  Lines: Catalan Catheter  Ongoing indication addressed    I have performed a physical exam and reviewed and updated ROS and Plan today (1/4/2023). In review of yesterday's note (1/3/2023), there are no changes except as documented above.     Discussed patient condition and risk of morbidity and/or mortality with RN, RT, Pharmacy, and Charge nurse / hot rounds  The patient remains critically ill.  Critical care time = 38 minutes in directly providing and coordinating critical care and extensive data review.  No time overlap and excludes procedures.    Dionicio Watt MD FACP  Pulmonary/Critical Care

## 2023-01-04 NOTE — CARE PLAN
Vent Samm 21  Trach Day 4    %, peep 8, Fio2 30%      Problem: Ventilation  Goal: Ability to achieve and maintain unassisted ventilation or tolerate decreased levels of ventilator support  Description: Target End Date:  4 days     Document on Vent flowsheet    1.  Support and monitor invasive and noninvasive mechanical ventilation  2.  Monitor ventilator weaning response  3.  Perform ventilator associated pneumonia prevention interventions  4.  Manage ventilation therapy by monitoring diagnostic test results  Outcome: Not Met

## 2023-01-04 NOTE — CARE PLAN
Problem: Ventilation  Goal: Ability to achieve and maintain unassisted ventilation or tolerate decreased levels of ventilator support  Description: Target End Date:  4 days     Document on Vent flowsheet    1.  Support and monitor invasive and noninvasive mechanical ventilation  2.  Monitor ventilator weaning response  3.  Perform ventilator associated pneumonia prevention interventions  4.  Manage ventilation therapy by monitoring diagnostic test results  Outcome: Not Met      Ventilator Daily Summary    Vent Day #20  Trach Day #3    Ventilator settings changed this shift: No (100%/+8/30%)    Weaning trials: SBT x 1    Respiratory Procedures: No    Plan: Continue current ventilator settings and wean mechanical ventilation as tolerated per physician orders.

## 2023-01-04 NOTE — CARE PLAN
The patient is Watcher - Medium risk of patient condition declining or worsening    Shift Goals  Clinical Goals: comfort  Patient Goals: erlinda  Family Goals: no family present    Progress made toward(s) clinical / shift goals:    Problem: Skin Integrity  Goal: Skin integrity is maintained or improved  Outcome: Progressing     Problem: Fall Risk  Goal: Patient will remain free from falls  Outcome: Progressing     Problem: Safety - Medical Restraint  Goal: Remains free of injury from restraints (Restraint for Interference with Medical Device)  Outcome: Progressing     Problem: Respiratory  Goal: Patient will achieve/maintain optimum respiratory ventilation and gas exchange  Outcome: Progressing     Problem: Mechanical Ventilation  Goal: Safe management of artificial airway and ventilation  Outcome: Progressing       Patient is not progressing towards the following goals:      Problem: Safety - Medical Restraint  Goal: Free from restraint(s) (Restraint for Interference with Medical Device)  Outcome: Not Met     Problem: Psychosocial  Goal: Patient's ability to verbalize feelings about condition will improve  Outcome: Not Met

## 2023-01-04 NOTE — CARE PLAN
Problem: Skin Integrity  Goal: Skin integrity is maintained or improved  Outcome: Progressing     Problem: Safety - Medical Restraint  Goal: Remains free of injury from restraints (Restraint for Interference with Medical Device)  Outcome: Progressing     Problem: Pain - Standard  Goal: Alleviation of pain or a reduction in pain to the patient’s comfort goal  Outcome: Progressing   The patient is Stable - Low risk of patient condition declining or worsening    Shift Goals  Clinical Goals: comfort  Patient Goals: erlinda  Family Goals: no family present    Progress made toward(s) clinical / shift goals:  met      Patient is not progressing towards the following goals:

## 2023-01-05 PROBLEM — G93.49 ENCEPHALOPATHY CHRONIC: Status: ACTIVE | Noted: 2022-01-01

## 2023-01-05 NOTE — CARE PLAN
Problem: Ventilation  Goal: Ability to achieve and maintain unassisted ventilation or tolerate decreased levels of ventilator support  Description: Target End Date:  4 days     Document on Vent flowsheet    1.  Support and monitor invasive and noninvasive mechanical ventilation  2.  Monitor ventilator weaning response  3.  Perform ventilator associated pneumonia prevention interventions  4.  Manage ventilation therapy by monitoring diagnostic test results  Note:    Ventilator Daily Summary    Vent Day #21  Tracheostomy #4  %/8+/30%    Ventilator settings changed this shift: none    Weaning trials: SBT as tolerated AM 1.5 hours PM 3 hours    Respiratory Procedures: none    Plan: Continue current ventilator settings and wean mechanical ventilation as tolerated per physician orders.

## 2023-01-05 NOTE — CARE PLAN
Vent Day 22  Trach Day 5    %, peep 8, Fio2 30%      Problem: Ventilation  Goal: Ability to achieve and maintain unassisted ventilation or tolerate decreased levels of ventilator support  Description: Target End Date:  4 days     Document on Vent flowsheet    1.  Support and monitor invasive and noninvasive mechanical ventilation  2.  Monitor ventilator weaning response  3.  Perform ventilator associated pneumonia prevention interventions  4.  Manage ventilation therapy by monitoring diagnostic test results  Outcome: Progressing

## 2023-01-05 NOTE — CARE PLAN
The patient is Stable - Low risk of patient condition declining or worsening    Shift Goals  Clinical Goals: Comfort  Patient Goals: erlinda  Family Goals: no family present    Progress made toward(s) clinical / shift goals:    Problem: Skin Integrity  Goal: Skin integrity is maintained or improved  Outcome: Progressing     Problem: Fall Risk  Goal: Patient will remain free from falls  Outcome: Progressing     Problem: Safety - Medical Restraint  Goal: Remains free of injury from restraints (Restraint for Interference with Medical Device)  Outcome: Progressing     Problem: Pain - Standard  Goal: Alleviation of pain or a reduction in pain to the patient’s comfort goal  Outcome: Progressing     Problem: Mechanical Ventilation  Goal: Safe management of artificial airway and ventilation  Outcome: Progressing       Patient is not progressing towards the following goals:      Problem: Safety - Medical Restraint  Goal: Free from restraint(s) (Restraint for Interference with Medical Device)  Outcome: Not Met     Problem: Mechanical Ventilation  Goal: Patient will be able to express needs and understand communication  Outcome: Not Met

## 2023-01-05 NOTE — PROGRESS NOTES
Pulmonary/Critical Care Progress Note    Date of admission  12/15/2022    Chief Complaint  62 y.o. female admitted 12/15/2022 with acute encephalopathy, profound hypoglycemia.  She has a history of diabetes type 2, hypertension, hypothyroidism, dyslipidemia, methamphetamine abuse.    Hospital Course  Chronology of events during this admission as follow listed per Dr Castaneda note 1/1/23:   12/16 - hypoglycemia improving, weaning D10. Starting TF's. MRI brain pending  [id number] MRI with possible small acute infarct to right frontal lobe;no other acute intracranial abnormality noted-patient slightly more responsive  12/19 - Vent day #5. Tolerating SBT's. Not following for parameters  12/20 - spontaneous movements, nothing purposeful.  Repeat EEG captured no seizures.  Presence of triphasic waveforms consistent with toxic metabolic etiology.  12/21- Extubated however pt failed and required reintubation due to tachypnea and difficulty supporting her airway    12/22 reintubated midday back on full vent support, febrile  12/23 full vent, not following  12/24 Full vent, no change LOC, daughter updated   12/26 -    vent day 12.  Continue cefazolin.  SAT/SBT.  12/27 -    vent day 13.  SAT/SBT.  Continue cefazolin.  12/28 -    vent day 14.  SAT/SBT.  Continue cefazolin.  Continuous video EEG without evidence of seizures  12/29 -    vent day 15.  SAT/SBT.  Continue cefazolin.  DNR status.  12/30 -    vent day 16.  Continue cefazolin.  SAT/SBT.  12/31 -    vent day 17.  Continue cefazolin.  SAT/SBT.  Increase lisinopril, 10 mg daily.  1/1 -    vent day 18.  Completed cefazolin yesterday.  Observe off antibiotics.   1/2: Patient with good urine output. Continues with diabetic sores feedings at goal at 50 cc/h. Follow up chest x-ray improved.  Tolerating spontaneous breathing at the vent. Continues with diabetic formulat feedings at goal at 50 cc/h through NGT.  1/3: Vent day #20, Trach day #3. Tolerating spontaneous  breathing at the vent. She does not follow commands as a baseline although move all extremities. 20 days recertification statement at the end of the note  Consulted surgery.    1/4: started process for an LTAC. Some decrease in blood pressures reported, will start IV fluids to supplement. Changed lantus dose due to hyperglycemia. Not tolerating T piece yet.     Interval Problem Update  Reviewed last 24 hour events:  No events overnight  Patient's glucose is responding to the Lantus but will need to be adjusted, POCT glucose at midnight was slightly low, will decrease the dose by 2 units from 6 PM.  Plan to consult GI today for possible PEG tube placement.  Awaiting for LTAC resolutions, referrals.  Mental and neurologic status continues the same    Review of Systems  Review of Systems   Unable to perform ROS: Mental acuity   Constitutional:  Negative for malaise/fatigue.      Vital Signs for last 24 hours   Pulse:  [67-92] 87  Resp:  [11-29] 22  BP: ()/(50-77) 101/58  SpO2:  [86 %-100 %] 100 %    Hemodynamic parameters for last 24 hours       Respiratory Information for the last 24 hours  Vent Mode: ASV  Vt Target (mL): 350  PEEP/CPAP: 8  P Support: 5  MAP: 10  Length of Weaning Trial (Hours): 2 hours  Control VTE (exp VT): 400    Physical Exam   Physical Exam  Vitals and nursing note reviewed.   Constitutional:       General: She is not in acute distress.     Appearance: She is not toxic-appearing.   HENT:      Head: Normocephalic and atraumatic.      Nose: Nose normal.      Mouth/Throat:      Mouth: Mucous membranes are moist.   Neck:      Comments: Tracheostomy in place.  Cardiovascular:      Rate and Rhythm: Normal rate and regular rhythm.      Pulses: Normal pulses.      Heart sounds: Normal heart sounds. No murmur heard.    No gallop.   Pulmonary:      Effort: Pulmonary effort is normal. No respiratory distress.      Breath sounds: Normal breath sounds. No stridor. No wheezing or rales.   Abdominal:       General: Bowel sounds are normal.   Musculoskeletal:         General: No swelling. Normal range of motion.      Right lower leg: No edema.      Left lower leg: No edema.      Comments: Mild Clubbing present in fingers and toes   Skin:     General: Skin is warm.      Capillary Refill: Capillary refill takes less than 2 seconds.      Findings: No rash.   Neurological:      Mental Status: She is alert.      Comments: Patient does open eyes, does not follow commands, does not track although able to move all extremities.  Withdraws with pain       Medications  Current Facility-Administered Medications   Medication Dose Route Frequency Provider Last Rate Last Admin    magnesium sulfate IVPB premix 2 g  2 g Intravenous Once Dionicio Watt M.D. 25 mL/hr at 01/05/23 0748 2 g at 01/05/23 0748    insulin GLARGINE (Lantus,Semglee) injection  2 Units Subcutaneous Q EVENING Dionicio Watt M.D.        insulin GLARGINE (Lantus,Semglee) injection  18 Units Subcutaneous QAM INSULIN Dionicio Watt M.D.   18 Units at 01/05/23 0510    lisinopril (PRINIVIL) tablet 10 mg  10 mg Enteral Tube DAILY Harry Castaneda M.D.   10 mg at 01/05/23 0505    LORazepam (ATIVAN) injection 1-2 mg  1-2 mg Intravenous Q4HRS PRN Harry Castaneda M.D.   1 mg at 01/04/23 2241    aspirin (ASA) tablet 325 mg  325 mg Enteral Tube DAILY Harry Castaneda M.D.   325 mg at 01/05/23 0504    atorvastatin (LIPITOR) tablet 10 mg  10 mg Enteral Tube DAILY Harry Castaneda M.D.   10 mg at 01/05/23 0504    levothyroxine (SYNTHROID) tablet 112 mcg  112 mcg Enteral Tube DAILY Harry Castaneda M.D.   112 mcg at 01/05/23 0504    HYDROmorphone (Dilaudid) injection 0.5-1 mg  0.5-1 mg Intravenous Q2HRS PRN Harry Castaneda M.D.   1 mg at 01/04/23 2215    enoxaparin (Lovenox) inj 30 mg  30 mg Subcutaneous DAILY AT 1800 Thierry Pepper M.D.   30 mg at 01/04/23 4749    insulin regular (HumuLIN R,NovoLIN R) injection  3-13 Units  Subcutaneous Q6HRS Jazmine Pike, A.P.R.N.   3 Units at 01/05/23 0511    And    dextrose 10 % BOLUS 25 g  25 g Intravenous Q15 MIN PRN Jazmine Pike, A.P.R.N.   Stopped at 12/20/22 0304    acetaminophen (Tylenol) tablet 650 mg  650 mg Enteral Tube Q6HRS PRN Jazmine Pike, A.P.R.N.   650 mg at 12/29/22 0246    Respiratory Therapy Consult   Nebulization Continuous RT Ace Pool M.D.        famotidine (PEPCID) tablet 20 mg  20 mg Enteral Tube Q12HRS Ace Pool M.D.   20 mg at 01/05/23 0504    MD Alert...ICU Electrolyte Replacement per Pharmacy   Other PHARMACY TO DOSE Ace Pool M.D.        lidocaine (XYLOCAINE) 1 % injection 2 mL  2 mL Tracheal Tube Q30 MIN PRN Aec Pool M.D.   2 mL at 12/23/22 2115    ipratropium-albuterol (DUONEB) nebulizer solution  3 mL Nebulization Q2HRS PRN (RT) Ace Pool M.D.        Pharmacy Consult: Enteral tube insertion - review meds/change route/product selection  1 Each Other PHARMACY TO DOSE Jazmine Pike, A.P.R.N.        senna-docusate (PERICOLACE or SENOKOT S) 8.6-50 MG per tablet 2 Tablet  2 Tablet Enteral Tube BID Jazmine Pike, A.P.R.N.   2 Tablet at 01/05/23 0504    And    polyethylene glycol/lytes (MIRALAX) PACKET 1 Packet  1 Packet Enteral Tube QDAY PRN Jazmine Pike, A.P.R.N.   1 Packet at 12/28/22 1812    And    magnesium hydroxide (MILK OF MAGNESIA) suspension 30 mL  30 mL Enteral Tube QDAY PRN Jazmine Pike, A.P.R.N.   30 mL at 12/26/22 1715    And    bisacodyl (DULCOLAX) suppository 10 mg  10 mg Rectal QDAY PRN Jazmine Pike, A.P.R.N.   10 mg at 12/28/22 0504       Fluids    Intake/Output Summary (Last 24 hours) at 1/5/2023 0925  Last data filed at 1/5/2023 0800  Gross per 24 hour   Intake 2253.7 ml   Output 1270 ml   Net 983.7 ml       Laboratory          Recent Labs     01/03/23  0435 01/04/23  0510 01/05/23  0400   SODIUM 135 137 134*   POTASSIUM 4.2 4.3 4.1   CHLORIDE 101 103 101   CO2 25 27 25   BUN 20 22 21   CREATININE 0.56 0.55 0.47*    MAGNESIUM 1.9 1.9 1.9   PHOSPHORUS 3.3 4.1 3.6   CALCIUM 8.4* 8.5 8.3*     Recent Labs     01/03/23  0435 01/04/23  0510 01/05/23  0400   GLUCOSE 254* 201* 135*     Recent Labs     01/03/23  0435 01/04/23  0510 01/05/23  0400   WBC 6.8 7.1 8.5   NEUTSPOLYS 69.20 71.40 74.70*   LYMPHOCYTES 18.60* 16.40* 11.50*   MONOCYTES 9.20 8.60 9.70   EOSINOPHILS 2.30 2.80 3.20   BASOPHILS 0.30 0.40 0.50     Recent Labs     01/03/23  0435 01/04/23  0510 01/05/23  0400   RBC 3.13* 3.07* 3.16*   HEMOGLOBIN 9.2* 9.2* 9.3*   HEMATOCRIT 29.3* 29.0* 29.8*   PLATELETCT 395 381 376       Imaging  X-Ray:  I have personally reviewed the images and compared with prior images.    Assessment/Plan  * Encephalopathy chronic  Assessment & Plan  -Initial event likely due to hypoglycemia; unknown downtime but possibly as long as 4 hours with a blood sugar of 35  -History of methamphetamine use poorly controlled diabetes melitus  -cEEG captured no seizures, pattern of encephalopathy.  Keppra discontinued  -Fall precautions, aspiration precautions keep  HOB greater than 30 degrees  -MRI of head showed possible tiny acute infarcts to the right frontal lobe periventricular white matter; parenchymal atrophy; no other specific findings noted  -Serial neurologic exams-has already plateau, patient does not track, does not follow commands and intermittently withdraw with pain. Per daughter the interactions are consistent now, the encephalopathy is more clear now that it is chronic and not acute and multifactorial as above.  -TSH was normal.  -Ammonia levels were within normal limits.  -Cortisol levels were elevated as expected.    Acute respiratory failure with hypoxia (HCC)  Assessment & Plan  -Intubated 10/4 for airway protection for GCS of 3  -optimize acid-base balance, oxygenation and ventilation  -Extubated 12/- failed extubation after approx 2 hrs due to tachypnea, difficulty maintaining patent airway  -HOB > 30  -daily SBT  -Early mobility,  ABCDEF bundle  -DVT prophylaxis; chemical VTE  -Pepcid, chlorhexidine  -s/p trach 1/1/23  -tolerating spontaneous breathing on PS.   -consider t piece soon and start process of weaning  -routine care of tracheostomy  -CXR from 1/2/2023 with marked improvement in the infiltrates and overall picture.  -We will need to start the process for LTAC  -will start trials of T piece now that is tolerating spontaneous breathing on the vent  -consult GI for PEG tube placement    Hypoglycemia- (present on admission)  Assessment & Plan  -A1c 13.2  -Suspected type 2 diabetes  -No hypoglycemia events recently  -Continue Lantus - caution with increasing dose  -Continue to follow closely and adjust as indicated (patient on 40 units Lantus as outpatient.  Unknown compliance)  -Continue tube feedings at goal  -Continue Accu-Cheks every 6 hours and as needed with SSI  -Hypoglycemia protocols  -Hypoglycemia has resolved.  -morning glucose remain elevated, adjusted Lantus a.m.  -Added Lantus p.m. and adjusted the dose    Protein calorie malnutrition (HCC)- (present on admission)  Assessment & Plan  Body mass index is 17.06 kg/m².  -dietary consult-TF/supplements  -I have discussed with ptients daughter Sindy Leblanc about placing a PEG tube, patient's daughter gave the consent over the phone, consulted GI dr Goodwin on 1/5/23    Dyslipidemia- (present on admission)  Assessment & Plan  -Continue statin    Methamphetamine abuse (HCC)- (present on admission)  Assessment & Plan  Unable to provide cessation counseling given mental acuity  UDS + 12/15    Cigarette nicotine dependence without complication- (present on admission)  Assessment & Plan  -given mental status, she might not smoke any more but cessation education and counseling might not happen  -unclear if any hx of COPD  -RT protocols    Hypothyroidism due to Hashimoto's thyroiditis- (present on admission)  Assessment & Plan  -TSH WNL  -continue home levothyroxine    Primary  hypertension- (present on admission)  Assessment & Plan  -History of, unknown compliance with medications  -As needed IV antihypertensives with parameters  -home oral med lisinopril 10mg daily         Dispo: Consult to social work to start the process for an LTAC. Consult for PEG tube today.  VTE:  Lovenox  Ulcer: H2 Antagonist  Lines: Catalan Catheter  Ongoing indication addressed    I have performed a physical exam and reviewed and updated ROS and Plan today (1/5/2023). In review of yesterday's note (1/4/2023), there are no changes except as documented above.     Discussed patient condition and risk of morbidity and/or mortality with RN, RT, Pharmacy, and Charge nurse / hot rounds  The patient remains critically ill.  Critical care time = 37 minutes in directly providing and coordinating critical care and extensive data review.  No time overlap and excludes procedures.    Dionicio Watt MD FACP  Pulmonary/Critical Care

## 2023-01-06 PROBLEM — Z79.4 TYPE 2 DIABETES MELLITUS WITH HYPOGLYCEMIA WITHOUT COMA, WITH LONG-TERM CURRENT USE OF INSULIN (HCC): Status: ACTIVE | Noted: 2022-01-01

## 2023-01-06 PROBLEM — E11.649 TYPE 2 DIABETES MELLITUS WITH HYPOGLYCEMIA WITHOUT COMA, WITH LONG-TERM CURRENT USE OF INSULIN (HCC): Status: ACTIVE | Noted: 2022-01-01

## 2023-01-06 PROBLEM — E16.2 HYPOGLYCEMIA: Status: RESOLVED | Noted: 2022-01-01 | Resolved: 2023-01-01

## 2023-01-06 PROBLEM — F17.210 CIGARETTE NICOTINE DEPENDENCE WITHOUT COMPLICATION: Chronic | Status: RESOLVED | Noted: 2020-10-07 | Resolved: 2023-01-01

## 2023-01-06 NOTE — CARE PLAN
Problem: Safety - Medical Restraint  Goal: Remains free of injury from restraints (Restraint for Interference with Medical Device)  Outcome: Progressing     Problem: Pain - Standard  Goal: Alleviation of pain or a reduction in pain to the patient’s comfort goal  Outcome: Progressing   The patient is Stable - Low risk of patient condition declining or worsening    Shift Goals  Clinical Goals: Comfort  Patient Goals: erlinda  Family Goals: No family present    Progress made toward(s) clinical / shift goals:  Met    Patient is not progressing towards the following goals:

## 2023-01-06 NOTE — PROGRESS NOTES
GI team saw the patient at the bedside and talked to the patient daughter on the phone.     Plan to have ICU bedside peg tube placement on 1/6 between 11am to 1pm.     Will call the daughter again before procedure with RN to obtain consent.     HEMATOLOGY/ ONCOLOGY/ID:            Recent Labs     01/03/23 0435 01/04/23  0510 01/05/23  0400   WBC 6.8 7.1 8.5   RBC 3.13* 3.07* 3.16*   HEMOGLOBIN 9.2* 9.2* 9.3*   HEMATOCRIT 29.3* 29.0* 29.8*   MCV 93.6 94.5 94.3   MCH 29.4 30.0 29.4   RDW 43.2 43.6 44.4   PLATELETCT 395 381 376   MPV 8.8* 8.8* 8.7*   NEUTSPOLYS 69.20 71.40 74.70*   LYMPHOCYTES 18.60* 16.40* 11.50*   MONOCYTES 9.20 8.60 9.70   EOSINOPHILS 2.30 2.80 3.20   BASOPHILS 0.30 0.40 0.50     Lab Results   Component Value Date    ENIEWALW10 486 01/06/2011       RENAL:        Estimated GFR/CRCL = CrCl cannot be calculated (Unknown ideal weight.).  Recent Labs     01/03/23 0435 01/04/23  0510 01/05/23  0400   SODIUM 135 137 134*   POTASSIUM 4.2 4.3 4.1   CHLORIDE 101 103 101   CO2 25 27 25   GLUCOSE 254* 201* 135*   BUN 20 22 21   CREATININE 0.56 0.55 0.47*   CALCIUM 8.4* 8.5 8.3*   MAGNESIUM 1.9 1.9 1.9   PHOSPHORUS 3.3 4.1 3.6       GASTROINTESTINAL/ HEPATIC:          No results for input(s): ALTSGPT, ASTSGOT, ALKPHOSPHAT, TBILIRUBIN, DBILIRUBIN, GAMMAGT, LIPASE, ALBUMIN, GLOBULIN, PREALBUMIN, INR, MACROCYTOSIS in the last 72 hours.  Lab Results   Component Value Date    AMMONIA 28 12/19/2022

## 2023-01-06 NOTE — PROGRESS NOTES
Patients daughter at bedside. Updated on POC and anticipated next steps of hospitalization. Daughter verbally approved  to reach out for LTAC placement. All questions answered at this time.

## 2023-01-06 NOTE — DISCHARGE PLANNING
Received Choice form at [phone]  Agency/Facility Name: 1)[facility] and 2)[facility]  Referral sent per Choice form at [phone]

## 2023-01-06 NOTE — CARE PLAN
Vent Day 23  Trach Day 6    %, peep 8 cwp, Fio2 30%      Problem: Ventilation  Goal: Ability to achieve and maintain unassisted ventilation or tolerate decreased levels of ventilator support  Description: Target End Date:  4 days     Document on Vent flowsheet    1.  Support and monitor invasive and noninvasive mechanical ventilation  2.  Monitor ventilator weaning response  3.  Perform ventilator associated pneumonia prevention interventions  4.  Manage ventilation therapy by monitoring diagnostic test results  Outcome: Progressing

## 2023-01-06 NOTE — DISCHARGE PLANNING
Daughter gave verbal consent for LTAC. Choice obtained. LMSW completed choice form per request and faxed to [facility] for processing.     Choice:   1) [facility]  2) [facility]     Plan: Follow up with LTAC referrals

## 2023-01-06 NOTE — CARE PLAN
The patient is Watcher - Medium risk of patient condition declining or worsening    Shift Goals  Clinical Goals: Comfort  Patient Goals: GREGORIO  Family Goals: None present    Progress made toward(s) clinical / shift goals:    Problem: Knowledge Deficit - Standard  Goal: Patient and family/care givers will demonstrate understanding of plan of care, disease process/condition, diagnostic tests and medications  Outcome: Progressing     Problem: Skin Integrity  Goal: Skin integrity is maintained or improved  Outcome: Progressing     Problem: Fall Risk  Goal: Patient will remain free from falls  Outcome: Progressing     Problem: Safety - Medical Restraint  Goal: Remains free of injury from restraints (Restraint for Interference with Medical Device)  Outcome: Progressing     Problem: Pain - Standard  Goal: Alleviation of pain or a reduction in pain to the patient’s comfort goal  Outcome: Progressing       Patient is not progressing towards the following goals:

## 2023-01-06 NOTE — PROGRESS NOTES
Pulmonary/Critical Care Progress Note    Date of admission  12/15/2022    Chief Complaint  62 y.o. female admitted 12/15/2022 with acute encephalopathy, profound hypoglycemia.  She has a history of diabetes type 2, hypertension, hypothyroidism, dyslipidemia, methamphetamine abuse.    Hospital Course  Chronology of events during this admission as follow listed per Dr Castaneda note 1/1/23:   12/16 - hypoglycemia improving, weaning D10. Starting TF's. MRI brain pending  [id number]/1801-[id number] MRI with possible small acute infarct to right frontal lobe;no other acute intracranial abnormality noted-patient slightly more responsive  12/19 - Vent day #5. Tolerating SBT's. Not following for parameters  12/20 - spontaneous movements, nothing purposeful.  Repeat EEG captured no seizures.  Presence of triphasic waveforms consistent with toxic metabolic etiology.  12/21- Extubated however pt failed and required reintubation due to tachypnea and difficulty supporting her airway    12/22 reintubated midday back on full vent support, febrile  12/23 full vent, not following  12/24 Full vent, no change LOC, daughter updated   12/26 -    vent day 12.  Continue cefazolin.  SAT/SBT.  12/27 -    vent day 13.  SAT/SBT.  Continue cefazolin.  12/28 -    vent day 14.  SAT/SBT.  Continue cefazolin.  Continuous video EEG without evidence of seizures  12/29 -    vent day 15.  SAT/SBT.  Continue cefazolin.  DNR status.  12/30 -    vent day 16.  Continue cefazolin.  SAT/SBT.  12/31 -    vent day 17.  Continue cefazolin.  SAT/SBT.  Increase lisinopril, 10 mg daily.  1/1 -    vent day 18.  Completed cefazolin yesterday.  Observe off antibiotics.   1/2: Patient with good urine output. Continues with diabetic sores feedings at goal at 50 cc/h. Follow up chest x-ray improved.  Tolerating spontaneous breathing at the vent. Continues with diabetic formulat feedings at goal at 50 cc/h through NGT.  1/3: Vent day #20, Trach day #3. Tolerating spontaneous  breathing at the vent. She does not follow commands as a baseline although move all extremities. 20 days recertification statement at the end of the note  Consulted surgery.    1/4: started process for an LTAC. Some decrease in blood pressures reported, will start IV fluids to supplement. Changed lantus dose due to hyperglycemia. Not tolerating T piece yet.   1/5:Patient's glucose is responding to the Lantus but will need to be adjusted, POCT glucose at midnight was slightly low, will decrease the dose by 2 units from 6 PM. Plan to consult GI today for possible PEG tube  placement. Awaiting for LTAC resolutions, referrals.    Interval Problem Update  Reviewed last 24 hour events:  No events overnight  Mental and neurologic status continues the same  Patient underwent PEG tube placement by GI with no complications.  Noted recommendations by GI and will follow  We will continue to monitor the response to the glucose to the changes of the insulin    Review of Systems  Review of Systems   Unable to perform ROS: Mental acuity   Constitutional:  Negative for malaise/fatigue.      Vital Signs for last 24 hours   Pulse:  [71-94] 83  Resp:  [14-39] 32  BP: ()/(50-68) 104/57  SpO2:  [94 %-100 %] 99 %    Hemodynamic parameters for last 24 hours       Respiratory Information for the last 24 hours  Vent Mode: ASV  Vt Target (mL): 350  PEEP/CPAP: 8  MAP: 11  Control VTE (exp VT): 330    Physical Exam   Physical Exam  Vitals and nursing note reviewed.   Constitutional:       General: She is not in acute distress.     Appearance: She is not toxic-appearing.   HENT:      Head: Normocephalic and atraumatic.      Nose: Nose normal.      Mouth/Throat:      Mouth: Mucous membranes are moist.   Neck:      Comments: Tracheostomy in place.  Cardiovascular:      Rate and Rhythm: Normal rate and regular rhythm.      Pulses: Normal pulses.      Heart sounds: Normal heart sounds. No murmur heard.    No gallop.   Pulmonary:      Effort:  Pulmonary effort is normal. No respiratory distress.      Breath sounds: Normal breath sounds. No stridor. No wheezing or rales.   Abdominal:      General: Bowel sounds are normal.   Musculoskeletal:         General: No swelling. Normal range of motion.      Right lower leg: No edema.      Left lower leg: No edema.      Comments: Mild Clubbing present in fingers and toes   Skin:     General: Skin is warm.      Capillary Refill: Capillary refill takes less than 2 seconds.      Findings: No rash.   Neurological:      Mental Status: She is alert.      Comments: Patient does open eyes, does not follow commands, does not track although able to move all extremities.  Withdraws with pain       Medications  Current Facility-Administered Medications   Medication Dose Route Frequency Provider Last Rate Last Admin    dextrose 10% infusion   Intravenous Continuous Dionicio Watt M.D. 50 mL/hr at 01/06/23 1016 New Bag at 01/06/23 1016    insulin GLARGINE (Lantus,Semglee) injection  2 Units Subcutaneous Q EVENING Dionicio Watt M.D.   2 Units at 01/05/23 1728    insulin GLARGINE (Lantus,Semglee) injection  18 Units Subcutaneous QAM INSULIN Dionicio Watt M.D.   18 Units at 01/06/23 0505    lisinopril (PRINIVIL) tablet 10 mg  10 mg Enteral Tube DAILY Harry Castaneda M.D.   10 mg at 01/05/23 0505    LORazepam (ATIVAN) injection 1-2 mg  1-2 mg Intravenous Q4HRS PRN Harry Castaneda M.D.   2 mg at 01/06/23 1302    aspirin (ASA) tablet 325 mg  325 mg Enteral Tube DAILY Harry Castaneda M.D.   325 mg at 01/05/23 0504    atorvastatin (LIPITOR) tablet 10 mg  10 mg Enteral Tube DAILY Harry Castaneda M.D.   10 mg at 01/06/23 0503    levothyroxine (SYNTHROID) tablet 112 mcg  112 mcg Enteral Tube DAILY Harry Castaneda M.D.   112 mcg at 01/06/23 0503    HYDROmorphone (Dilaudid) injection 0.5-1 mg  0.5-1 mg Intravenous Q2HRS PRN Harry Castaneda M.D.   1 mg at 01/06/23 1302    enoxaparin  (Lovenox) inj 30 mg  30 mg Subcutaneous DAILY AT 1800 Thierry Pepper M.D.   30 mg at 01/06/23 1752    insulin regular (HumuLIN R,NovoLIN R) injection  3-13 Units Subcutaneous Q6HRS Jazmine Pike, A.P.R.N.   3 Units at 01/05/23 0511    And    dextrose 10 % BOLUS 25 g  25 g Intravenous Q15 MIN PRN Jazmine Pike A.P.R.N.   Stopped at 12/20/22 0304    acetaminophen (Tylenol) tablet 650 mg  650 mg Enteral Tube Q6HRS PRN Jazmine Pike A.P.R.N.   650 mg at 01/05/23 1601    Respiratory Therapy Consult   Nebulization Continuous RT Ace Pool M.D.        famotidine (PEPCID) tablet 20 mg  20 mg Enteral Tube Q12HRS Ace Pool M.D.   20 mg at 01/06/23 0503    MD Alert...ICU Electrolyte Replacement per Pharmacy   Other PHARMACY TO DOSE Ace Pool M.D.        lidocaine (XYLOCAINE) 1 % injection 2 mL  2 mL Tracheal Tube Q30 MIN PRN Ace Pool M.D.   2 mL at 12/23/22 2115    ipratropium-albuterol (DUONEB) nebulizer solution  3 mL Nebulization Q2HRS PRN (RT) Ace Pool M.D.        Pharmacy Consult: Enteral tube insertion - review meds/change route/product selection  1 Each Other PHARMACY TO DOSE Jazmine Pike, A.P.R.N.        senna-docusate (PERICOLACE or SENOKOT S) 8.6-50 MG per tablet 2 Tablet  2 Tablet Enteral Tube BID Jazmine Pike, A.P.R.N.   2 Tablet at 01/06/23 0503    And    polyethylene glycol/lytes (MIRALAX) PACKET 1 Packet  1 Packet Enteral Tube QDAY PRN Jazmine Pike, A.P.R.N.   1 Packet at 12/28/22 1812    And    magnesium hydroxide (MILK OF MAGNESIA) suspension 30 mL  30 mL Enteral Tube QDAY PRN Jazmine Piek, A.P.R.N.   30 mL at 12/26/22 1715    And    bisacodyl (DULCOLAX) suppository 10 mg  10 mg Rectal QDAY PRN Jazmine Pike A.P.R.NDuane   10 mg at 12/28/22 0504       Fluids    Intake/Output Summary (Last 24 hours) at 1/6/2023 1802  Last data filed at 1/6/2023 1400  Gross per 24 hour   Intake 1025.5 ml   Output 560 ml   Net 465.5 ml       Laboratory          Recent Labs      01/04/23 0510 01/05/23  0400 01/06/23  0436   SODIUM 137 134* 136   POTASSIUM 4.3 4.1 4.0   CHLORIDE 103 101 102   CO2 27 25 25   BUN 22 21 19   CREATININE 0.55 0.47* 0.49*   MAGNESIUM 1.9 1.9 1.9   PHOSPHORUS 4.1 3.6 3.7   CALCIUM 8.5 8.3* 8.2*     Recent Labs     01/04/23  0510 01/05/23  0400 01/06/23  0436   GLUCOSE 201* 135* 91     Recent Labs     01/04/23  0510 01/05/23  0400 01/06/23  0436   WBC 7.1 8.5 5.4   NEUTSPOLYS 71.40 74.70* 70.70   LYMPHOCYTES 16.40* 11.50* 14.80*   MONOCYTES 8.60 9.70 10.90   EOSINOPHILS 2.80 3.20 3.00   BASOPHILS 0.40 0.50 0.40     Recent Labs     01/04/23 0510 01/05/23 0400 01/06/23  0436   RBC 3.07* 3.16* 2.57*   HEMOGLOBIN 9.2* 9.3* 7.6*   HEMATOCRIT 29.0* 29.8* 24.3*   PLATELETCT 381 376 294       Imaging  X-Ray:  I have personally reviewed the images and compared with prior images.    Assessment/Plan  * Encephalopathy chronic  Assessment & Plan  -Initial event likely due to hypoglycemia; unknown downtime but possibly as long as 4 hours with a blood sugar of 35  -History of methamphetamine use poorly controlled diabetes melitus  -cEEG captured no seizures, pattern of encephalopathy.  Keppra discontinued  -Fall precautions, aspiration precautions keep  HOB greater than 30 degrees  -MRI of head showed possible tiny acute infarcts to the right frontal lobe periventricular white matter; parenchymal atrophy; no other specific findings noted  -Serial neurologic exams-has already plateau, patient does not track, does not follow commands and intermittently withdraw with pain. Per daughter the interactions are consistent now, the encephalopathy is more clear now that it is chronic and not acute and multifactorial as above.  -TSH was normal.  -Ammonia levels were within normal limits.  -Cortisol levels were elevated as expected.    Acute respiratory failure with hypoxia (HCC)  Assessment & Plan  -Intubated 10/4 for airway protection for GCS of 3  -optimize acid-base balance,  oxygenation and ventilation  -Extubated 12/- failed extubation after approx 2 hrs due to tachypnea, difficulty maintaining patent airway  -HOB > 30  -daily SBT  -Early mobility, ABCDEF bundle  -DVT prophylaxis; chemical VTE  -Pepcid, chlorhexidine  -s/p trach 1/1/23  -tolerating spontaneous breathing on PS.   -consider t piece soon and start process of weaning  -routine care of tracheostomy  -CXR from 1/2/2023 with marked improvement in the infiltrates and overall picture.  -We will need to start the process for LTAC  -will start trials of T piece now that is tolerating spontaneous breathing on the vent  -consult GI for PEG tube placement    Protein calorie malnutrition (HCC)- (present on admission)  Assessment & Plan  Body mass index is 17.06 kg/m².  -dietary consult-TF/supplements  -I have discussed with ptients daughter Sindy Leblanc about placing a PEG tube, patient's daughter gave the consent over the phone,   -Peg tube placed by GI dr Goodwin on 1/6/23    Type 2 diabetes mellitus with hypoglycemia without coma, with long-term current use of insulin ([facility])  Assessment & Plan  -A1c 13.2  -Suspected type 2 diabetes  -No hypoglycemia events recently  -Continue Lantus - caution with increasing dose  -Continue to follow closely and adjust as indicated (patient on 40 units Lantus as outpatient.  Unknown compliance)  -Continue tube feedings at goal  -Continue Accu-Cheks every 6 hours and as needed with SSI  -Hypoglycemia protocols  -Hypoglycemia has resolved.  -morning glucose remain elevated, adjusted Lantus a.m.  -Added Lantus p.m. and adjusted the dose    Dyslipidemia- (present on admission)  Assessment & Plan  -Continue statin    Methamphetamine abuse (HCC)- (present on admission)  Assessment & Plan  Unable to provide cessation counseling given mental acuity  UDS + 12/15    Hypothyroidism due to Hashimoto's thyroiditis- (present on admission)  Assessment & Plan  -TSH WNL  -continue home levothyroxine    Primary  hypertension- (present on admission)  Assessment & Plan  -History of, unknown compliance with medications  -As needed IV antihypertensives with parameters  -home oral med lisinopril 10mg daily         Dispo: Consult to social work to start the process for an LTAC. Consult for PEG tube today.  VTE:  Lovenox  Ulcer: H2 Antagonist  Lines: Catalan Catheter  Ongoing indication addressed    I have performed a physical exam and reviewed and updated ROS and Plan today (1/6/2023). In review of yesterday's note (1/5/2023), there are no changes except as documented above.     Discussed patient condition and risk of morbidity and/or mortality with RN, RT, Pharmacy, and Charge nurse / hot rounds  The patient remains critically ill.  Critical care time = 32 minutes in directly providing and coordinating critical care and extensive data review.  No time overlap and excludes procedures.    Dionicio Watt MD FACP  Pulmonary/Critical Care

## 2023-01-06 NOTE — PROGRESS NOTES
PEG completed by Dr Goodwin. Do not use the tube for 6 hours (2100). After that ok for meds. Restart TF tomorrow AM.

## 2023-01-06 NOTE — CARE PLAN
Problem: Ventilation  Goal: Ability to achieve and maintain unassisted ventilation or tolerate decreased levels of ventilator support  Description: Target End Date:  4 days     Document on Vent flowsheet    1.  Support and monitor invasive and noninvasive mechanical ventilation  2.  Monitor ventilator weaning response  3.  Perform ventilator associated pneumonia prevention interventions  4.  Manage ventilation therapy by monitoring diagnostic test results  Note:    Ventilator Daily Summary    Vent Day #22 Tracheostomy Day #5    %/8+/40%    Ventilator settings changed this shift: none    Weaning trials: SBT x 1.5 in AM t-piece trials in PM Failed after 1 minute due to agitation and hypoxia. Required increased oxygen demand afterwards    Respiratory Procedures: none    Plan: Continue current ventilator settings and wean mechanical ventilation as tolerated per physician orders.

## 2023-01-06 NOTE — DIETARY
Nutrition support weekly update:  Day  of admit.  Poonam Mayo is a 62 y.o. female with admitting DX of Hypoglycemia.  Tube feeding initiated on . Current TF is on hold pending PEG placement today. Pt has been receiving Diabetisource AC at goal rate of 50 ml/hour which provides 1440 kcal, 72 gm protein, and 984 ml free water in 24 hours.    Assessment:  Weight : 44.3 kg via bed scale. Weight down from first bed scale weight of 46.5 kg on 12/15. Weight has been up and down during admission and difficult to assess.    Estimated needs with weight 46.5 kg:  Calculation/Equation: REE per MSJ x 1.2 = 1232 kcal  RMR per PSU (VE: 5.3 L/min and Tmax: 38.2 C) = 1317 kcal  Total Calories / day: 1200 - 1500 (Calories / k - 32)  Total Grams Protein / day: 56 - 70 (Grams Protein / k.2 - 1.5)    Evaluation:   Tube feed on hold. D10 started at 50 ml/hour.  Medications include Lipitor, Pepcid, Lantus, SSI, Synthroid, Senna  Labs  include Na 136, K+ 4, Glu 91, BUN 19, Creat 0.49 (L). 1/2: Pre-Alb 12 (L), CRP 2.12 (H)  LBM 1/5  S/P trach on .  LTAC referral.  Current CHO controlled formula remains appropriate.    Malnutrition risk: Severe per RD assessment .    Recommendations/Plan:  When able to resume tube feed, continue Diabetisource AC at goal rate of 50 ml/hour.  Fluids per MD.  Monitor weight.     RD following

## 2023-01-06 NOTE — CARE PLAN
The patient is Stable - Low risk of patient condition declining or worsening    Shift Goals  Clinical Goals: PEG today. LTAC referral  Patient Goals: GREGORIO  Family Goals: GREGORIO    Progress made toward(s) clinical /shift goals:  Problem: Knowledge Deficit - Standard  Goal: Patient and family/care givers will demonstrate understanding of plan of care, disease process/condition, diagnostic tests and medications  Outcome: Progressing     Problem: Skin Integrity  Goal: Skin integrity is maintained or improved  Outcome: Progressing     Problem: Pain - Standard  Goal: Alleviation of pain or a reduction in pain to the patient’s comfort goal  Outcome: Progressing      Patient is not progressing towards the following goals:    Problem: Safety - Medical Restraint  Goal: Remains free of injury from restraints (Restraint for Interference with Medical Device)  Outcome: Not Progressing  Goal: Free from restraint(s) (Restraint for Interference with Medical Device)  Outcome: Not Progressing

## 2023-01-07 NOTE — PROGRESS NOTES
Pulmonary/Critical Care Progress Note    Date of admission  12/15/2022    Chief Complaint  62 y.o. female admitted 12/15/2022 with acute encephalopathy, profound hypoglycemia.  She has a history of diabetes type 2, hypertension, hypothyroidism, dyslipidemia, methamphetamine abuse.    Hospital Course  Chronology of events during this admission as follow listed per Dr Castaneda note 1/1/23:   12/16 - hypoglycemia improving, weaning D10. Starting TF's. MRI brain pending  12/1849-[id number]-ag MRI with possible small acute infarct to right frontal lobe;no other acute intracranial abnormality noted-patient slightly more responsive  12/19 - Vent day #5. Tolerating SBT's. Not following for parameters  12/20 - spontaneous movements, nothing purposeful.  Repeat EEG captured no seizures.  Presence of triphasic waveforms consistent with toxic metabolic etiology.  12/21- Extubated however pt failed and required reintubation due to tachypnea and difficulty supporting her airway    12/22 reintubated midday back on full vent support, febrile  12/23 full vent, not following  12/24 Full vent, no change LOC, daughter updated   12/26 -    vent day 12.  Continue cefazolin.  SAT/SBT.  12/27 -    vent day 13.  SAT/SBT.  Continue cefazolin.  12/28 -    vent day 14.  SAT/SBT.  Continue cefazolin.  Continuous video EEG without evidence of seizures  12/29 -    vent day 15.  SAT/SBT.  Continue cefazolin.  DNR status.  12/30 -    vent day 16.  Continue cefazolin.  SAT/SBT.  12/31 -    vent day 17.  Continue cefazolin.  SAT/SBT.  Increase lisinopril, 10 mg daily.  1/1 -    vent day 18.  Completed cefazolin yesterday.  Observe off antibiotics.   1/2: Patient with good urine output. Continues with diabetic sores feedings at goal at 50 cc/h. Follow up chest x-ray improved.  Tolerating spontaneous breathing at the vent. Continues with diabetic formulat feedings at goal at 50 cc/h through NGT.  1/3: Vent day #20, Trach day #3. Tolerating spontaneous  breathing at the vent. She does not follow commands as a baseline although move all extremities. 20 days recertification statement at the end of the note  Consulted surgery.    1/4: started process for an LTAC. Some decrease in blood pressures reported, will start IV fluids to supplement. Changed lantus dose due to hyperglycemia. Not tolerating T piece yet.   1/5:Patient's glucose is responding to the Lantus but will need to be adjusted, POCT glucose at midnight was slightly low, will decrease the dose by 2 units from 6 PM. Plan to consult GI today for possible PEG tube  placement. Awaiting for LTAC resolutions, referrals.  1/6: placement of PEG tube    Interval Problem Update  Reviewed last 24 hour events:  No events overnight  Mental and neurologic status continues the same  Glucose seems better.  Restarting the feeding tubes.  Restarting the DVT prophylaxis    Review of Systems  Review of Systems   Unable to perform ROS: Mental acuity      Vital Signs for last 24 hours   Pulse:  [] 81  Resp:  [13-31] 17  BP: ()/(54-73) 103/57  SpO2:  [98 %-100 %] 99 %    Hemodynamic parameters for last 24 hours       Respiratory Information for the last 24 hours  Vent Mode: ASV  PEEP/CPAP: 8  MAP: 10  Control VTE (exp VT): 236    Physical Exam   Physical Exam  Vitals and nursing note reviewed.   Constitutional:       General: She is not in acute distress.     Appearance: She is not toxic-appearing.   HENT:      Head: Normocephalic and atraumatic.      Nose: Nose normal.      Mouth/Throat:      Mouth: Mucous membranes are moist.   Neck:      Comments: Tracheostomy in place.  Cardiovascular:      Rate and Rhythm: Normal rate and regular rhythm.      Pulses: Normal pulses.      Heart sounds: Normal heart sounds. No murmur heard.    No gallop.   Pulmonary:      Effort: Pulmonary effort is normal. No respiratory distress.      Breath sounds: Normal breath sounds. No stridor. No wheezing or rales.   Abdominal:      General:  Bowel sounds are normal.      Comments: Peg tube in place   Musculoskeletal:         General: No swelling. Normal range of motion.      Right lower leg: No edema.      Left lower leg: No edema.      Comments: Mild Clubbing present in fingers and toes   Skin:     General: Skin is warm.      Capillary Refill: Capillary refill takes less than 2 seconds.      Findings: No rash.   Neurological:      Mental Status: She is alert.      Comments: Patient does open eyes, does not follow commands, does not track although able to move all extremities.  Withdraws with pain       Medications  Current Facility-Administered Medications   Medication Dose Route Frequency Provider Last Rate Last Admin    dextrose 10% infusion   Intravenous Continuous Dionicio Watt M.D.   Stopped at 01/07/23 0846    insulin GLARGINE (Lantus,Semglee) injection  2 Units Subcutaneous Q EVENING Dionicio Watt M.D.   2 Units at 01/05/23 1728    insulin GLARGINE (Lantus,Semglee) injection  18 Units Subcutaneous QAM INSULIN Dionicio Watt M.D.   18 Units at 01/07/23 0534    lisinopril (PRINIVIL) tablet 10 mg  10 mg Enteral Tube DAILY Harry Castaneda M.D.   10 mg at 01/07/23 0515    LORazepam (ATIVAN) injection 1-2 mg  1-2 mg Intravenous Q4HRS PRN Harry Castaneda M.D.   2 mg at 01/07/23 0233    aspirin (ASA) tablet 325 mg  325 mg Enteral Tube DAILY Harry Castaneda M.D.   325 mg at 01/07/23 0515    atorvastatin (LIPITOR) tablet 10 mg  10 mg Enteral Tube DAILY Harry Castaneda M.D.   10 mg at 01/07/23 0514    levothyroxine (SYNTHROID) tablet 112 mcg  112 mcg Enteral Tube DAILY Harry Castaneda M.D.   112 mcg at 01/07/23 0514    HYDROmorphone (Dilaudid) injection 0.5-1 mg  0.5-1 mg Intravenous Q2HRS PRN Harry Castaneda M.D.   1 mg at 01/07/23 0134    enoxaparin (Lovenox) inj 30 mg  30 mg Subcutaneous DAILY AT 1800 Thierry Pepper M.D.   30 mg at 01/06/23 1752    insulin regular (HumuLIN R,NovoLIN R)  injection  3-13 Units Subcutaneous Q6HRS Jazmine Pike A.P.R.N.   3 Units at 01/07/23 0037    And    dextrose 10 % BOLUS 25 g  25 g Intravenous Q15 MIN PRN Jazmine Pike A.P.R.N.   Stopped at 12/20/22 0304    acetaminophen (Tylenol) tablet 650 mg  650 mg Enteral Tube Q6HRS PRN Jazmine Pike A.P.R.N.   650 mg at 01/05/23 1601    Respiratory Therapy Consult   Nebulization Continuous RT Ace Pool M.D.        famotidine (PEPCID) tablet 20 mg  20 mg Enteral Tube Q12HRS Ace Pool M.D.   20 mg at 01/07/23 0515    MD Alert...ICU Electrolyte Replacement per Pharmacy   Other PHARMACY TO DOSE Ace Pool M.D.        lidocaine (XYLOCAINE) 1 % injection 2 mL  2 mL Tracheal Tube Q30 MIN PRN Ace Pool M.D.   2 mL at 12/23/22 2115    ipratropium-albuterol (DUONEB) nebulizer solution  3 mL Nebulization Q2HRS PRN (RT) Ace Pool M.D.        Pharmacy Consult: Enteral tube insertion - review meds/change route/product selection  1 Each Other PHARMACY TO DOSE Jazmine Pike, A.P.R.N.        senna-docusate (PERICOLACE or SENOKOT S) 8.6-50 MG per tablet 2 Tablet  2 Tablet Enteral Tube BID Jazmine Pike A.P.R.N.   2 Tablet at 01/06/23 0503    And    polyethylene glycol/lytes (MIRALAX) PACKET 1 Packet  1 Packet Enteral Tube QDAY PRN Jazmine Pike A.P.R.N.   1 Packet at 12/28/22 1812    And    magnesium hydroxide (MILK OF MAGNESIA) suspension 30 mL  30 mL Enteral Tube QDAY PRN Jazmine Pike, A.P.R.N.   30 mL at 12/26/22 1715    And    bisacodyl (DULCOLAX) suppository 10 mg  10 mg Rectal QDAY PRN Jazmine Pike A.P.R.N.   10 mg at 12/28/22 0504       Fluids    Intake/Output Summary (Last 24 hours) at 1/7/2023 1601  Last data filed at 1/7/2023 1400  Gross per 24 hour   Intake 1197.32 ml   Output 3500 ml   Net -2302.68 ml       Laboratory          Recent Labs     01/05/23  0400 01/06/23  0436 01/07/23  0528   SODIUM 134* 136 131*   POTASSIUM 4.1 4.0 3.5*   CHLORIDE 101 102 98   CO2 25 25 25   BUN 21 19 13    CREATININE 0.47* 0.49* 0.40*   MAGNESIUM 1.9 1.9 1.9   PHOSPHORUS 3.6 3.7 3.8   CALCIUM 8.3* 8.2* 8.3*     Recent Labs     01/05/23  0400 01/06/23  0436 01/07/23  0528   GLUCOSE 135* 91 93     Recent Labs     01/05/23  0400 01/06/23  0436 01/07/23  0528   WBC 8.5 5.4 5.7   NEUTSPOLYS 74.70* 70.70 71.20   LYMPHOCYTES 11.50* 14.80* 16.30*   MONOCYTES 9.70 10.90 9.60   EOSINOPHILS 3.20 3.00 2.10   BASOPHILS 0.50 0.40 0.40     Recent Labs     01/05/23  0400 01/06/23  0436 01/07/23  0528   RBC 3.16* 2.57* 2.90*   HEMOGLOBIN 9.3* 7.6* 8.6*   HEMATOCRIT 29.8* 24.3* 26.5*   PLATELETCT 376 294 314       Imaging  X-Ray:  I have personally reviewed the images and compared with prior images.    Assessment/Plan  * Encephalopathy chronic- (present on admission)  Assessment & Plan  -Initial event likely due to hypoglycemia; unknown downtime but possibly as long as 4 hours with a blood sugar of 35  -History of methamphetamine use poorly controlled diabetes melitus  -cEEG captured no seizures, pattern of encephalopathy.  Keppra discontinued  -Fall precautions, aspiration precautions keep  HOB greater than 30 degrees  -MRI of head showed possible tiny acute infarcts to the right frontal lobe periventricular white matter; parenchymal atrophy; no other specific findings noted  -Serial neurologic exams-has already plateau, patient does not track, does not follow commands and intermittently withdraw with pain. Per daughter the interactions are consistent now, the encephalopathy is more clear now that it is chronic and not acute and multifactorial as above.  -TSH was normal.  -Ammonia levels were within normal limits.  -Cortisol levels were elevated as expected.    Acute respiratory failure with hypoxia (HCC)- (present on admission)  Assessment & Plan  -Intubated 10/4 for airway protection for GCS of 3  -optimize acid-base balance, oxygenation and ventilation  -Extubated 12/- failed extubation after approx 2 hrs due to tachypnea,  difficulty maintaining patent airway  -HOB > 30  -daily SBT  -Early mobility, ABCDEF bundle  -DVT prophylaxis; chemical VTE  -Pepcid, chlorhexidine  -s/p trach 1/1/23  -tolerating spontaneous breathing on PS.   -consider t piece soon and start process of weaning  -routine care of tracheostomy  -CXR from 1/2/2023 with marked improvement in the infiltrates and overall picture.  -started the process for LTAC with SW  -will attempt trials of T piece now that is tolerating spontaneous breathing on the vent  -GI placed a PEG tube on 1/6/23    Protein calorie malnutrition (HCC)- (present on admission)  Assessment & Plan  Body mass index is 17.06 kg/m².  -dietary consult-TF/supplements  -I have discussed with ptients daughter Sindy Leblanc about placing a PEG tube, patient's daughter gave the consent over the phone,   -Peg tube placed by GI dr Goodwin on 1/6/23    Type 2 diabetes mellitus with hypoglycemia without coma, with long-term current use of insulin ([facility])- (present on admission)  Assessment & Plan  -A1c 13.2  -Suspected type 2 diabetes  -No hypoglycemia events recently  -Continue Lantus - caution with increasing dose  -Continue to follow closely and adjust as indicated (patient on 40 units Lantus as outpatient.  Unknown compliance)  -Continue tube feedings at goal  -Continue Accu-Cheks every 6 hours and as needed with SSI  -Hypoglycemia protocols  -Hypoglycemia has resolved.  -morning glucose remain elevated, adjusted Lantus a.m.  -Added Lantus p.m. and adjusted the dose    Dyslipidemia- (present on admission)  Assessment & Plan  -Continue statin    Methamphetamine abuse (HCC)- (present on admission)  Assessment & Plan  Unable to provide cessation counseling given mental acuity  UDS + 12/15    Hypothyroidism due to Hashimoto's thyroiditis- (present on admission)  Assessment & Plan  -TSH WNL  -continue home levothyroxine    Primary hypertension- (present on admission)  Assessment & Plan  -History of, unknown compliance  with medications  -As needed IV antihypertensives with parameters  -home oral med lisinopril 10mg daily         Dispo: Consulted social work for an LTAC  VTE:  Lovenox  Ulcer: H2 Antagonist  Lines: Catalan Catheter  Ongoing indication addressed    I have performed a physical exam and reviewed and updated ROS and Plan today (1/7/2023). In review of yesterday's note (1/6/2023), there are no changes except as documented above.     Discussed patient condition and risk of morbidity and/or mortality with RN, RT, Pharmacy, and Charge nurse / hot rounds  The patient remains critically ill.  Critical care time = 35 minutes in directly providing and coordinating critical care and extensive data review.  No time overlap and excludes procedures.    Dionicio Watt MD FACP  Pulmonary/Critical Care

## 2023-01-07 NOTE — PROCEDURES
PATIENT: Poonam Mayo  1960      Location : [facility]    PREOPERATIVE DIAGNOSIS/INDICATION:   Dysphagia.     POSTOPERATIVE DIAGNOSES: PEG tube placement.     PROCEDURE:  EGD WITH PEG placement.    SURGEON:  Brain Lucero MD MPH.     ANESTHESIA:  Per Anesthesia team.     CONSENT:  After confirming the patient's identity, the procedure was explained in detail to the patient. Risks of the procedure including but not limited to bleeding, infection, perforation, sedation risks and risks of missed lesions were explained to the patient. Patient has signed informed consent.    DESCRIPTION:  The patient presented to the procedure room.  After routine checkup was performed, the patient was brought into the endoscopy suite, placed in the left lateral decubitus position and was sedated by anesthesia.  Vital signs were monitored throughout the procedure.  Oxygenation support was provided throughout the procedure. Upper endoscope was inserted into patient's mouth and advanced toward the second portion of the duodenum under direct visualization.  Once the site was reached and examined, the upper endoscope was withdrawn.  Retroflexion was made within the gastric cardia.  The patient tolerated the procedure well.  There were no immediate complications.    PEG placement: An appropriate site was selected and the stomach was trans-illuminated and an optimal position for the PEG tube was identified using good 1:1 transmission method. The skin was infiltrated with local anesthetic and the trocar and sheath were inserted through the abdomen into the stomach under direct visualization. The needle was removed and a guidewire was inserted through the sheath. The guidewire was grasped within the stomach with a snare. It was removed completely out of the mouth and the PEG tube was secured to the guidewire.    The guidewire and PEG tube were then pulled through the mouth and esophagus and snug to the abdominal wall.The external bumper  fixer was at 3.5 cm at the level of the skin. There was no evidence of bleeding. Gastroscope was reintroduced to look for adequate positioning of the PEG and for complications. No complications or bleeding seen. The Bolster was placed on the PEG site. The patient tolerated the procedure well and was transferred to recovery room in stable condition    Medications given: Ancef 2 gram IVPB for prophylaxis      RECOMMENDATIONS:    1. PEG placed without complication.    Please ensure proper PEG care and tube feeding:  - keep head of bed elevated to at least 30 degrees during tube feeding. The largest factor in preventing aspiration during tube feeding is not the placement of the tube into the stomach or jejunum, but keeping the head elevated to at least 30 degrees during feeding  - Medications and water can be started 6 hours after placement. Tube feeds can be started tomorrow morning.   - Gauze over the bumper, not under. Gauze under bumper may increase pressure on tract and impair maturation.  - PEG needs 0.5-1cm of laxity freely mobile in and out of the gastrostomy tract. Overtightening may impair tract maturation by inducing localized ischemia. Over tightening may also result in buried bumper syndrome.  - PEG needs to be freely rotational 360 degrees. See above as over tightening prevents free rotation.  - Clean site with half-strength hydrogen peroxide for the first three days, then daily with soap and water thereafter.    2. No NSAIDS for 7 days. For full dose anti-coagulation, please hold 24 hours after the procedure. Anti-platelet per the primary/cardiology team.

## 2023-01-07 NOTE — ASSESSMENT & PLAN NOTE
Suspected type 2 diabetes with A1c 13.2  Continue Lantus 18 units AM and 2 units in the evening  DM diet   Continue Accu-Cheks every 6 hours and as needed with high SSI  Hypoglycemia protocols

## 2023-01-07 NOTE — CARE PLAN
Problem: Ventilation  Goal: Ability to achieve and maintain unassisted ventilation or tolerate decreased levels of ventilator support  Description: Target End Date:  4 days     Document on Vent flowsheet    1.  Support and monitor invasive and noninvasive mechanical ventilation  2.  Monitor ventilator weaning response  3.  Perform ventilator associated pneumonia prevention interventions  4.  Manage ventilation therapy by monitoring diagnostic test results  Note:    Ventilator Daily Summary    Vent Day #23  Tracheostomy #6  %/8+/30%    Ventilator settings changed this shift: none    Weaning trials: T-piece trials 4 hours progress to 12 hours tmrw     Respiratory Procedures: none    Plan: Continue current ventilator settings and wean mechanical ventilation as tolerated per physician orders.

## 2023-01-07 NOTE — CARE PLAN
The patient is Stable - Low risk of patient condition declining or worsening    Shift Goals  Clinical Goals: T-Piece 24 hours  Patient Goals: GREGORIO  Family Goals: GREGORIO    Progress made toward(s) clinical / shift goals:    Problem: Skin Integrity  Goal: Skin integrity is maintained or improved  Outcome: Progressing     Problem: Pain - Standard  Goal: Alleviation of pain or a reduction in pain to the patient’s comfort goal  Outcome: Progressing       Patient is not progressing towards the following goals:  Problem: Knowledge Deficit - Standard  Goal: Patient and family/care givers will demonstrate understanding of plan of care, disease process/condition, diagnostic tests and medications  Outcome: Not Progressing     Problem: Safety - Medical Restraint  Goal: Remains free of injury from restraints (Restraint for Interference with Medical Device)  Outcome: Not Progressing  Goal: Free from restraint(s) (Restraint for Interference with Medical Device)  Outcome: Not Progressing

## 2023-01-07 NOTE — CARE PLAN
Problem: Ventilation  Goal: Ability to achieve and maintain unassisted ventilation or tolerate decreased levels of ventilator support  Description: Target End Date:  4 days     Document on Vent flowsheet    1.  Support and monitor invasive and noninvasive mechanical ventilation  2.  Monitor ventilator weaning response  3.  Perform ventilator associated pneumonia prevention interventions  4.  Manage ventilation therapy by monitoring diagnostic test results  Note:    Ventilator Daily Summary    Vent Day #24  Tracheostomy #7  %/8+/30%    Ventilator settings changed this shift: none    Weaning trials: T-piece trials 4 hours progress to 12 hours tmrw     Respiratory Procedures: none    Plan: Continue current ventilator settings and wean mechanical ventilation as tolerated per physician orders.

## 2023-01-07 NOTE — CARE PLAN
The patient is Stable - Low risk of patient condition declining or worsening    Shift Goals  Clinical Goals: Comfort  Patient Goals: GREGORIO  Family Goals: GREGORIO    Progress made toward(s) clinical / shift goals:    Problem: Skin Integrity  Goal: Skin integrity is maintained or improved  Outcome: Progressing  Note: Q2 turns with TAPS, mepilex on all bony prominences, pillows for support/positioning, low airloss mattress in place for prevention of skin breakdown.      Problem: Fall Risk  Goal: Patient will remain free from falls  Outcome: Progressing     Problem: Safety - Medical Restraint  Goal: Remains free of injury from restraints (Restraint for Interference with Medical Device)  Outcome: Progressing  Flowsheets (Taken 1/7/2023 0217)  Addressed this shift: Remains free of injury from restraints (restraint for interference with medical device):   Determine that other, less restrictive measures have been tried or would not be effective before applying the restraint   Evaluate the patient's condition at the time of restraint application   Inform patient/family regarding the reason for restraint   Every 2 hours: Monitor safety, psychosocial status, comfort, nutrition and hydration     Problem: Pain - Standard  Goal: Alleviation of pain or a reduction in pain to the patient’s comfort goal  Outcome: Progressing       Patient is not progressing towards the following goals:

## 2023-01-08 NOTE — PROGRESS NOTES
Pulmonary/Critical Care Progress Note    Date of admission  12/15/2022    Chief Complaint  62 y.o. female admitted 12/15/2022 with acute encephalopathy, profound hypoglycemia.  She has a history of diabetes type 2, hypertension, hypothyroidism, dyslipidemia, methamphetamine abuse.    Hospital Course  Chronology of events during this admission as follow listed per Dr Castaneda note 1/1/23:   12/16 - hypoglycemia improving, weaning D10. Starting TF's. MRI brain pending  12/1817-[id number] MRI with possible small acute infarct to right frontal lobe;no other acute intracranial abnormality noted-patient slightly more responsive  12/19 - Vent day #5. Tolerating SBT's. Not following for parameters  12/20 - spontaneous movements, nothing purposeful.  Repeat EEG captured no seizures.  Presence of triphasic waveforms consistent with toxic metabolic etiology.  12/21- Extubated however pt failed and required reintubation due to tachypnea and difficulty supporting her airway    12/22 reintubated midday back on full vent support, febrile  12/23 full vent, not following  12/24 Full vent, no change LOC, daughter updated   12/26 -    vent day 12.  Continue cefazolin.  SAT/SBT.  12/27 -    vent day 13.  SAT/SBT.  Continue cefazolin.  12/28 -    vent day 14.  SAT/SBT.  Continue cefazolin.  Continuous video EEG without evidence of seizures  12/29 -    vent day 15.  SAT/SBT.  Continue cefazolin.  DNR status.  12/30 -    vent day 16.  Continue cefazolin.  SAT/SBT.  12/31 -    vent day 17.  Continue cefazolin.  SAT/SBT.  Increase lisinopril, 10 mg daily.  1/1 -    vent day 18.  Completed cefazolin yesterday.  Observe off antibiotics.   1/2:  Follow up chest x-ray improved. Tolerating spontaneous breathing at the vent. Continues with diabetic formulat feedings at goal at 50 cc/h through NGT.  1/3:  Vent day #20, Trach day #3. Tolerating spontaneous breathing at the vent. She does not follow commands as a baseline although move all  extremities. 20 days recertification statement at the end of the note  Consulted surgery for PEG tube, not excited about placing it.  1/4:  started process for an LTAC. Some decrease in blood pressures reported, will start IV fluids to supplement. Adjusted lantus dose due to hyperglycemia. Not tolerating T piece yet.   1/5: Patient's glucose is responding to Lantus. Consulted GI for PEG tube  placement. Awaiting for LTAC resolutions, referrals.  1/6:  placement of PEG tube  1/7: Tolerating some time with t-piece. Restarting the feeding tubes.  Restarting the DVT prophylaxis    Interval Problem Update  Reviewed last 24 hour events:  Reported decrease urine output overnight, did better with a small bolus. Re starting IVF maintenance to supplement.  Mental and neurologic status continues the same as below described, new baseline  Glucose seems better.      Review of Systems  Review of Systems   Unable to perform ROS: Mental acuity      Vital Signs for last 24 hours   Pulse:  [75-93] 79  Resp:  [13-39] 39  BP: ()/(51-84) 109/62  SpO2:  [97 %-100 %] 97 %    Hemodynamic parameters for last 24 hours       Respiratory Information for the last 24 hours  Vent Mode: ASV  PEEP/CPAP: 8  MAP: 11  Control VTE (exp VT): 311    Physical Exam   Physical Exam  Vitals and nursing note reviewed.   Constitutional:       General: She is not in acute distress.     Appearance: She is not toxic-appearing.   HENT:      Head: Normocephalic and atraumatic.      Nose: Nose normal.      Mouth/Throat:      Mouth: Mucous membranes are moist.   Neck:      Comments: Tracheostomy in place.  Cardiovascular:      Rate and Rhythm: Normal rate and regular rhythm.      Pulses: Normal pulses.      Heart sounds: Normal heart sounds. No murmur heard.    No gallop.   Pulmonary:      Effort: Pulmonary effort is normal. No respiratory distress.      Breath sounds: Normal breath sounds. No stridor. No wheezing or rales.   Abdominal:      General: Bowel  sounds are normal.      Comments: Peg tube in place   Musculoskeletal:         General: No swelling. Normal range of motion.      Right lower leg: No edema.      Left lower leg: No edema.      Comments: Mild Clubbing present in fingers and toes   Skin:     General: Skin is warm.      Capillary Refill: Capillary refill takes less than 2 seconds.      Findings: No rash.   Neurological:      Mental Status: She is alert.      Comments: Patient does open eyes, does not follow commands, does not track although able to move all extremities.  Withdraws with pain intermitently       Medications  Current Facility-Administered Medications   Medication Dose Route Frequency Provider Last Rate Last Admin    lactated ringers infusion   Intravenous Continuous Dionicio Watt M.D. 50 mL/hr at 01/08/23 0624 New Bag at 01/08/23 0624    insulin GLARGINE (Lantus,Semglee) injection  2 Units Subcutaneous Q EVENING Dionicio Watt M.D.   2 Units at 01/07/23 1828    insulin GLARGINE (Lantus,Semglee) injection  18 Units Subcutaneous QAM INSULIN Dionicio Watt M.D.   18 Units at 01/08/23 0549    lisinopril (PRINIVIL) tablet 10 mg  10 mg Enteral Tube DAILY Harry Castaneda M.D.   10 mg at 01/08/23 0540    LORazepam (ATIVAN) injection 1-2 mg  1-2 mg Intravenous Q4HRS PRN Harry Castaneda M.D.   2 mg at 01/07/23 2144    aspirin (ASA) tablet 325 mg  325 mg Enteral Tube DAILY Harry Castaneda M.D.   325 mg at 01/08/23 0541    atorvastatin (LIPITOR) tablet 10 mg  10 mg Enteral Tube DAILY Harry Castaneda M.D.   10 mg at 01/08/23 0540    levothyroxine (SYNTHROID) tablet 112 mcg  112 mcg Enteral Tube DAILY Harry Castaneda M.D.   112 mcg at 01/08/23 0541    HYDROmorphone (Dilaudid) injection 0.5-1 mg  0.5-1 mg Intravenous Q2HRS PRN Harry Castaneda M.D.   1 mg at 01/07/23 2228    enoxaparin (Lovenox) inj 30 mg  30 mg Subcutaneous DAILY AT 1800 Thierry Pepper M.D.   30 mg at 01/07/23 1820    insulin  regular (HumuLIN R,NovoLIN R) injection  3-13 Units Subcutaneous Q6HRS Jazmine Pike, A.P.R.N.   3 Units at 01/08/23 0550    And    dextrose 10 % BOLUS 25 g  25 g Intravenous Q15 MIN PRN Jazmine Pike A.P.R.N.   Stopped at 12/20/22 0304    acetaminophen (Tylenol) tablet 650 mg  650 mg Enteral Tube Q6HRS PRN Jazmine Pike A.P.R.N.   650 mg at 01/05/23 1601    Respiratory Therapy Consult   Nebulization Continuous RT Ace Pool M.D.        famotidine (PEPCID) tablet 20 mg  20 mg Enteral Tube Q12HRS Ace Pool M.D.   20 mg at 01/08/23 0540    MD Alert...ICU Electrolyte Replacement per Pharmacy   Other PHARMACY TO DOSE Ace Pool M.D.        lidocaine (XYLOCAINE) 1 % injection 2 mL  2 mL Tracheal Tube Q30 MIN PRN Ace Pool M.D.   2 mL at 12/23/22 2115    ipratropium-albuterol (DUONEB) nebulizer solution  3 mL Nebulization Q2HRS PRN (RT) Ace Pool M.D.        Pharmacy Consult: Enteral tube insertion - review meds/change route/product selection  1 Each Other PHARMACY TO DOSE Jazmine Pike, A.P.R.N.        senna-docusate (PERICOLACE or SENOKOT S) 8.6-50 MG per tablet 2 Tablet  2 Tablet Enteral Tube BID Jazmine Pike A.P.R.N.   2 Tablet at 01/08/23 0540    And    polyethylene glycol/lytes (MIRALAX) PACKET 1 Packet  1 Packet Enteral Tube QDAY PRN Jazmine Pike, A.P.R.N.   1 Packet at 12/28/22 1812    And    magnesium hydroxide (MILK OF MAGNESIA) suspension 30 mL  30 mL Enteral Tube QDAY PRN Jazmine Pike, A.P.R.N.   30 mL at 12/26/22 1715    And    bisacodyl (DULCOLAX) suppository 10 mg  10 mg Rectal QDAY PRN Jazmine Pike A.P.R.N.   10 mg at 12/28/22 0504       Fluids    Intake/Output Summary (Last 24 hours) at 1/8/2023 0931  Last data filed at 1/8/2023 0600  Gross per 24 hour   Intake 1535.06 ml   Output 2350 ml   Net -814.94 ml       Laboratory          Recent Labs     01/06/23  0436 01/07/23  0528 01/08/23  0305   SODIUM 136 131* 136   POTASSIUM 4.0 3.5* 4.9   CHLORIDE 102 98 102    CO2 25 25 24   BUN 19 13 16   CREATININE 0.49* 0.40* 0.62   MAGNESIUM 1.9 1.9 2.0   PHOSPHORUS 3.7 3.8 3.8   CALCIUM 8.2* 8.3* 8.5     Recent Labs     01/06/23  0436 01/07/23  0528 01/08/23  0305   GLUCOSE 91 93 177*     Recent Labs     01/06/23  0436 01/07/23  0528 01/08/23  0305   WBC 5.4 5.7 5.6   NEUTSPOLYS 70.70 71.20 64.20   LYMPHOCYTES 14.80* 16.30* 19.10*   MONOCYTES 10.90 9.60 12.90   EOSINOPHILS 3.00 2.10 3.00   BASOPHILS 0.40 0.40 0.40     Recent Labs     01/06/23 0436 01/07/23 0528 01/08/23  0305   RBC 2.57* 2.90* 3.24*   HEMOGLOBIN 7.6* 8.6* 9.6*   HEMATOCRIT 24.3* 26.5* 31.2*   PLATELETCT 294 314 426       Imaging  X-Ray:  I have personally reviewed the images and compared with prior images.    Assessment/Plan  * Encephalopathy chronic- (present on admission)  Assessment & Plan  -Initial event likely due to hypoglycemia; unknown downtime but possibly as long as 4 hours with a blood sugar of 35  -History of methamphetamine use poorly controlled diabetes melitus  -cEEG captured no seizures, pattern of encephalopathy.  Keppra discontinued  -Fall precautions, aspiration precautions keep  HOB greater than 30 degrees  -MRI of head showed possible tiny acute infarcts to the right frontal lobe periventricular white matter; parenchymal atrophy; no other specific findings noted  -TSH was normal.  -Ammonia levels were within normal limits.  -Cortisol levels were elevated as expected.  -Serial neurologic exams-has already plateau, patient does not track, does not follow commands and intermittently withdraw with pain. Per daughter the interactions are consistent now, the encephalopathy is more clear now that it is chronic and not acute and multifactorial as above.    Acute respiratory failure with hypoxia (HCC)- (present on admission)  Assessment & Plan  -Intubated 10/4 for airway protection for GCS of 3  -Extubated 12/- failed extubation after approx 2 hrs due to tachypnea, difficulty maintaining patent  airway  -HOB > 30  -daily SBT  -Early mobility, ABCDEF bundle  -DVT prophylaxis; chemical VTE  -Pepcid, chlorhexidine  -s/p trach 1/1/23  -tolerating spontaneous breathing on PS.   -routine care of tracheostomy  -CXR from 1/2/2023 with marked improvement in the infiltrates and overall picture.  -started the process for LTAC with SW  -tolerating spontaneous breathing on the vent  -GI placed a PEG tube on 1/6/23  -tolerating trials of T piece 1/7/23    Type 2 diabetes mellitus with hypoglycemia without coma, with long-term current use of insulin (HCC)- (present on admission)  Assessment & Plan  -A1c 13.2  -Suspected type 2 diabetes  -No hypoglycemia events recently  -Continue Lantus   -Continue to follow closely and adjust as indicated (patient on 40 units Lantus as outpatient.  Unknown compliance)  -Continue tube feedings at goal  -Continue Accu-Cheks every 6 hours and as needed with SSI  -Hypoglycemia protocols  -Hypoglycemia has resolved 1/2/23.  -morning glucose remain elevated, adjusted Lantus a.m.  -Added Lantus p.m. and adjusted the dose  -tolerating well insulin  -on lisinopril and a statin    Methamphetamine abuse (HCC)- (present on admission)  Assessment & Plan  Unable to provide cessation counseling given mental acuity  UDS + 12/15    Protein calorie malnutrition (HCC)- (present on admission)  Assessment & Plan  Body mass index is 17.06 kg/m².  -dietary consult-TF/supplements  -I have discussed with ptients daughter Sindy Leblanc about placing a PEG tube, patient's daughter gave the consent over the phone,   -Peg tube placed by GI dr Goodwin on 1/6/23    Dyslipidemia- (present on admission)  Assessment & Plan  -Continue statin    Hypothyroidism due to Hashimoto's thyroiditis- (present on admission)  Assessment & Plan  -TSH WNL  -continue home levothyroxine    Primary hypertension- (present on admission)  Assessment & Plan  -History of, unknown compliance with medications  -As needed IV antihypertensives with  parameters  -home oral med lisinopril 10mg daily         Dispo: Consulted social work for an LTAC, awaiting  VTE:  Lovenox  Ulcer: H2 Antagonist  Lines: Catalan Catheter  Ongoing indication addressed    I have performed a physical exam and reviewed and updated ROS and Plan today (1/8/2023). In review of yesterday's note (1/7/2023), there are no changes except as documented above.     Discussed patient condition and risk of morbidity and/or mortality with RN, RT, Pharmacy, and Charge nurse / hot rounds  The patient remains critically ill.  Critical care time = 36 minutes in directly providing and coordinating critical care and extensive data review.  No time overlap and excludes procedures.    Dionicio Watt MD FACP  Pulmonary/Critical Care

## 2023-01-08 NOTE — CARE PLAN
The patient is Stable - Low risk of patient condition declining or worsening    Shift Goals  Clinical Goals: keep patient out of restraints  Patient Goals: GREGORIO  Family Goals: GREGORIO    Progress made toward(s) clinical / shift goals:      Patient is not progressing towards the following goals:      Problem: Skin Integrity  Goal: Skin integrity is maintained or improved  Outcome: Progressing     Problem: Pain - Standard  Goal: Alleviation of pain or a reduction in pain to the patient’s comfort goal  Outcome: Progressing

## 2023-01-08 NOTE — CARE PLAN
Problem: Ventilation  Goal: Ability to achieve and maintain unassisted ventilation or tolerate decreased levels of ventilator support  Description: Target End Date:  4 days     Document on Vent flowsheet    1.  Support and monitor invasive and noninvasive mechanical ventilation  2.  Monitor ventilator weaning response  3.  Perform ventilator associated pneumonia prevention interventions  4.  Manage ventilation therapy by monitoring diagnostic test results  Outcome: Progressing      Ventilator Daily Summary    Vent Day #25, Trach day #8 with an #8 Portex     Ventilator settings changed this shift:    Weaning trials: 12hrs T-piece for today    Respiratory Procedures:    Plan: Continue current ventilator settings and wean mechanical ventilation as tolerated per physician orders.    Vent Mode: ASV  PEEP/CPAP:  [8] 8  PIP:  [15-18] 18  MAP:  [11-13] 13

## 2023-01-08 NOTE — PROGRESS NOTES
Report received from night shift RN, assumed care of pt at this time. Plan of care, labs & chart reviewed. Pt on t-piece @30%. VSS. Call light within reach, bed locked and in lowest position. All needs appear to be met at this time. All fall precautions in place.

## 2023-01-09 PROBLEM — E83.42 HYPOMAGNESEMIA: Status: ACTIVE | Noted: 2023-01-01

## 2023-01-09 NOTE — ASSESSMENT & PLAN NOTE
Initially secondary to mental status changes, hypoglycemia induced encephalopathy  S/p prolonged mechanical ventilation and eventually undergoing tracheostomy

## 2023-01-09 NOTE — CARE PLAN
Problem: Ventilation  Goal: Ability to achieve and maintain unassisted ventilation or tolerate decreased levels of ventilator support  Description: Target End Date:  4 days     Document on Vent flowsheet    1.  Support and monitor invasive and noninvasive mechanical ventilation  2.  Monitor ventilator weaning response  3.  Perform ventilator associated pneumonia prevention interventions  4.  Manage ventilation therapy by monitoring diagnostic test results  Outcome: Progressing      Ventilator Daily Summary    Vent Day #25  Trach Day #8    Ventilator settings changed this shift: No (100/+8/30%)    Weaning trials: T-piece 4 lpm/28% (goal of 24 hrs)    Respiratory Procedures: No    Plan: Continue current ventilator settings and wean mechanical ventilation as tolerated per physician orders.

## 2023-01-09 NOTE — CARE PLAN
Problem: Ventilation  Goal: Ability to achieve and maintain unassisted ventilation or tolerate decreased levels of ventilator support  Description: Target End Date:  4 days     Document on Vent flowsheet    1.  Support and monitor invasive and noninvasive mechanical ventilation  2.  Monitor ventilator weaning response  3.  Perform ventilator associated pneumonia prevention interventions  4.  Manage ventilation therapy by monitoring diagnostic test results  Outcome: Progressing                                   Ventilator Daily Summary     Vent Day #26  Trach Day #9     Ventilator settings changed this shift: No (100/+8/30%)     Weaning trials: T-piece 4 lpm/28% (goal of 24 hrs met     Respiratory Procedures: No     Plan: Continue current ventilator settings and wean mechanical ventilation as tolerated per physician orders.

## 2023-01-09 NOTE — ASSESSMENT & PLAN NOTE
Chronic current state, the patient might of suffered cerebral injury from prolonged hypoglycemia on presentation  Seizure has been ruled out by EEGs  Patient might of suffered a small acute CVA although not explaining her current lack of progression  Comfort care measures have been initiated on 1/11/2023  Her POLST has been filled out.  Hospice to consult  G-tube Roxanol and ativan are being given as well as IV Dilaudid for breakthrough pain/air hunger.

## 2023-01-09 NOTE — ASSESSMENT & PLAN NOTE
Glycemic control with long and short acting insulin  Patient has a history of poor compliance with the most recent hemoglobin A1c of 13.2

## 2023-01-09 NOTE — PROGRESS NOTES
Pulmonary/Critical Care Progress Note    Date of admission  12/15/2022    Chief Complaint  62 y.o. female admitted 12/15/2022 with acute encephalopathy, profound hypoglycemia.  She has a history of diabetes type 2, hypertension, hypothyroidism, dyslipidemia, methamphetamine abuse.    Hospital Course  Chronology of events during this admission as follow listed per Dr Castaneda note 1/1/23:   12/16 - hypoglycemia improving, weaning D10. Starting TF's. MRI brain pending  12/1858-[id number] MRI with possible small acute infarct to right frontal lobe;no other acute intracranial abnormality noted-patient slightly more responsive  12/19 - Vent day #5. Tolerating SBT's. Not following for parameters  12/20 - spontaneous movements, nothing purposeful.  Repeat EEG captured no seizures.  Presence of triphasic waveforms consistent with toxic metabolic etiology.  12/21- Extubated however pt failed and required reintubation due to tachypnea and difficulty supporting her airway    12/22 reintubated midday back on full vent support, febrile  12/23 full vent, not following  12/24 Full vent, no change LOC, daughter updated   12/26 -    vent day 12.  Continue cefazolin.  SAT/SBT.  12/27 -    vent day 13.  SAT/SBT.  Continue cefazolin.  12/28 -    vent day 14.  SAT/SBT.  Continue cefazolin.  Continuous video EEG without evidence of seizures  12/29 -    vent day 15.  SAT/SBT.  Continue cefazolin.  DNR status.  12/30 -    vent day 16.  Continue cefazolin.  SAT/SBT.  12/31 -    vent day 17.  Continue cefazolin.  SAT/SBT.  Increase lisinopril, 10 mg daily.  1/1 -    vent day 18.  Completed cefazolin yesterday.  Observe off antibiotics.   1/2:  Follow up chest x-ray improved. Tolerating spontaneous breathing at the vent. Continues with diabetic formulat feedings at goal at 50 cc/h through NGT.  1/3:  Vent day #20, Trach day #3. Tolerating spontaneous breathing at the vent. She does not follow commands as a baseline although move all  extremities. 20 days recertification statement at the end of the note  Consulted surgery for PEG tube, not excited about placing it.  1/4:  started process for an LTAC. Some decrease in blood pressures reported, will start IV fluids to supplement. Adjusted lantus dose due to hyperglycemia. Not tolerating T piece yet.   1/5: Patient's glucose is responding to Lantus. Consulted GI for PEG tube  placement. Awaiting for LTAC resolutions, referrals.  1/6:  placement of PEG tube  1/7: Tolerating some time with t-piece. Restarting the feeding tubes.  Restarting the DVT prophylaxis    Interval Problem Update  Reviewed last 24 hour events:   - no events overnight   - pt moves all, does not follow commands   - SR 70-80s   - -140s   - Tmax 100.9   - TFs at goal with PEG tube   - UOP of 500cc overnight, Catalan   - BM yesterday   - Edge of bed   - T-piece at 4L/28%   - CXR(reviewed): trach in place, slight cephalization (1/8)   - lovenox   - pepcid   - Hb 9.9   - K 3.9   - Creat 0.48   - Mg 1.6   - -220s    Yesterday's Events:  Reported decrease urine output overnight, did better with a small bolus. Re starting IVF maintenance to supplement.  Mental and neurologic status continues the same as below described, new baseline  Glucose seems better.      Review of Systems  Review of Systems   Unable to perform ROS: Mental acuity      Vital Signs for last 24 hours   Pulse:  [74-95] 83  Resp:  [11-43] 19  BP: ()/(54-88) 126/77  SpO2:  [96 %-99 %] 97 %    Hemodynamic parameters for last 24 hours       Respiratory Information for the last 24 hours  Vent Mode: ASV  PEEP/CPAP: 8  MAP: 11  Control VTE (exp VT): 311    Physical Exam   Physical Exam  Vitals and nursing note reviewed.   Constitutional:       General: She is not in acute distress.     Appearance: She is ill-appearing. She is not toxic-appearing.   HENT:      Head: Normocephalic and atraumatic.      Right Ear: External ear normal.      Left Ear: External ear  normal.      Nose: Nose normal.      Mouth/Throat:      Mouth: Mucous membranes are moist.      Pharynx: Oropharynx is clear. No oropharyngeal exudate.   Eyes:      General: No scleral icterus.     Conjunctiva/sclera: Conjunctivae normal.      Pupils: Pupils are equal, round, and reactive to light.   Neck:      Comments: Tracheostomy in place: no swelling or bleeding  Cardiovascular:      Rate and Rhythm: Normal rate and regular rhythm.      Pulses: Normal pulses.      Heart sounds: Normal heart sounds. No murmur heard.    No gallop.   Pulmonary:      Effort: Pulmonary effort is normal. No respiratory distress.      Breath sounds: Normal breath sounds. No stridor. No wheezing or rales.   Abdominal:      General: Bowel sounds are normal.      Palpations: Abdomen is soft.      Tenderness: There is no abdominal tenderness. There is no guarding.      Comments: Peg tube in place   Genitourinary:     Comments: Catalan in place  Musculoskeletal:         General: No swelling. Normal range of motion.      Cervical back: Normal range of motion and neck supple.      Right lower leg: No edema.      Left lower leg: No edema.      Comments: Mild Clubbing present in fingers and toes   Skin:     General: Skin is warm.      Capillary Refill: Capillary refill takes less than 2 seconds.      Findings: No rash.   Neurological:      Mental Status: She is alert. She is disoriented.      Sensory: No sensory deficit.      Motor: No weakness.      Comments: Patient does open eyes, does not follow commands, does not track although able to move all extremities.  Withdraws to pain to all extremities, moving all extremities spontaneously       Medications  Current Facility-Administered Medications   Medication Dose Route Frequency Provider Last Rate Last Admin    lactated ringers infusion   Intravenous Continuous Dionicio Watt M.D. 50 mL/hr at 01/08/23 1800 Rate Verify at 01/08/23 1800    insulin GLARGINE (Lantus,Semglee) injection  2 Units  Subcutaneous Q EVENING Dionicio Watt M.D.   2 Units at 01/08/23 1743    insulin GLARGINE (Lantus,Semglee) injection  18 Units Subcutaneous QAM INSULIN Dionicio Watt M.D.   18 Units at 01/09/23 0516    lisinopril (PRINIVIL) tablet 10 mg  10 mg Enteral Tube DAILY Harry Castaneda M.D.   10 mg at 01/09/23 0503    LORazepam (ATIVAN) injection 1-2 mg  1-2 mg Intravenous Q4HRS PRN Harry Castaneda M.D.   1 mg at 01/08/23 2016    aspirin (ASA) tablet 325 mg  325 mg Enteral Tube DAILY Harry Castaneda M.D.   325 mg at 01/09/23 0503    atorvastatin (LIPITOR) tablet 10 mg  10 mg Enteral Tube DAILY Harry Castaneda M.D.   10 mg at 01/09/23 0503    levothyroxine (SYNTHROID) tablet 112 mcg  112 mcg Enteral Tube DAILY Harry Castaneda M.D.   112 mcg at 01/09/23 0503    HYDROmorphone (Dilaudid) injection 0.5-1 mg  0.5-1 mg Intravenous Q2HRS PRN Harry Castaneda M.D.   1 mg at 01/08/23 1536    enoxaparin (Lovenox) inj 30 mg  30 mg Subcutaneous DAILY AT 1800 Thierry Pepper M.D.   30 mg at 01/08/23 1743    insulin regular (HumuLIN R,NovoLIN R) injection  3-13 Units Subcutaneous Q6HRS Jazmine Pike, A.P.R.N.   5 Units at 01/09/23 0516    And    dextrose 10 % BOLUS 25 g  25 g Intravenous Q15 MIN PRN Jazmine Pike, A.P.R.N.   Stopped at 12/20/22 0304    acetaminophen (Tylenol) tablet 650 mg  650 mg Enteral Tube Q6HRS PRN Jazmine Pike, A.P.R.N.   650 mg at 01/05/23 1601    Respiratory Therapy Consult   Nebulization Continuous RT Ace Pool M.D.        famotidine (PEPCID) tablet 20 mg  20 mg Enteral Tube Q12HRS Ace Pool M.D.   20 mg at 01/09/23 0503    MD Alert...ICU Electrolyte Replacement per Pharmacy   Other PHARMACY TO DOSE Ace Pool M.D.        lidocaine (XYLOCAINE) 1 % injection 2 mL  2 mL Tracheal Tube Q30 MIN PRN Ace Pool M.D.   2 mL at 12/23/22 0006    ipratropium-albuterol (DUONEB) nebulizer solution  3 mL Nebulization Q2HRS PRN (RT) Ace Pool,  M.D.        Pharmacy Consult: Enteral tube insertion - review meds/change route/product selection  1 Each Other PHARMACY TO DOSE Jazmine Pike A.P.R.N.        senna-docusate (PERICOLACE or SENOKOT S) 8.6-50 MG per tablet 2 Tablet  2 Tablet Enteral Tube BID Jazmine Pike A.P.R.N.   2 Tablet at 01/08/23 1742    And    polyethylene glycol/lytes (MIRALAX) PACKET 1 Packet  1 Packet Enteral Tube QDAY PRN Jazmine Pike A.P.R.N.   1 Packet at 12/28/22 1812    And    magnesium hydroxide (MILK OF MAGNESIA) suspension 30 mL  30 mL Enteral Tube QDAY PRN Jazmine Pike, A.P.R.N.   30 mL at 12/26/22 1715    And    bisacodyl (DULCOLAX) suppository 10 mg  10 mg Rectal QDAY PRN Jazmine Pike, A.P.R.N.   10 mg at 12/28/22 0504       Fluids    Intake/Output Summary (Last 24 hours) at 1/9/2023 0611  Last data filed at 1/9/2023 0400  Gross per 24 hour   Intake 2403.16 ml   Output 1480 ml   Net 923.16 ml         Laboratory          Recent Labs     01/07/23 0528 01/08/23 0305 01/09/23 0312   SODIUM 131* 136 137   POTASSIUM 3.5* 4.9 3.9   CHLORIDE 98 102 102   CO2 25 24 25   BUN 13 16 15   CREATININE 0.40* 0.62 0.48*   MAGNESIUM 1.9 2.0 1.6   PHOSPHORUS 3.8 3.8 3.5   CALCIUM 8.3* 8.5 8.6       Recent Labs     01/07/23  0528 01/08/23  0305 01/09/23 0312   PREALBUMIN  --   --  8.9*   GLUCOSE 93 177* 184*       Recent Labs     01/07/23 0528 01/08/23 0305 01/09/23 0312   WBC 5.7 5.6 5.9   NEUTSPOLYS 71.20 64.20 67.10   LYMPHOCYTES 16.30* 19.10* 16.40*   MONOCYTES 9.60 12.90 11.60   EOSINOPHILS 2.10 3.00 4.10   BASOPHILS 0.40 0.40 0.50       Recent Labs     01/07/23  0528 01/08/23  0305 01/09/23  0312   RBC 2.90* 3.24* 3.38*   HEMOGLOBIN 8.6* 9.6* 9.9*   HEMATOCRIT 26.5* 31.2* 30.8*   PLATELETCT 314 448 374         Imaging  X-Ray:  I have personally reviewed the images and compared with prior images.    Assessment/Plan  * Encephalopathy chronic- (present on admission)  Assessment & Plan  Initial event likely due to hypoglycemia;  unknown downtime but possibly as long as 4 hours with a blood sugar of 35  History of methamphetamine use poorly controlled diabetes melitus  cEEG captured no seizures, pattern of encephalopathy.  Keppra discontinued  Fall precautions, aspiration precautions keep  HOB greater than 30 degrees  MRI of head showed possible tiny acute infarcts to the right frontal lobe periventricular white matter; parenchymal atrophy; no other specific findings noted  TSH was normal.  Ammonia levels were within normal limits.  Serial neurologic exams-has already plateau, patient does not track, does not follow commands and intermittently withdraw with pain. Per daughter the interactions are consistent now, the encephalopathy is more clear now that it is chronic and not acute and multifactorial as above.  LTAC transfer pending bed    Hypomagnesemia  Assessment & Plan  Replete to goal > 2    Acute respiratory failure with hypoxia (HCC)- (present on admission)  Assessment & Plan  Intubated 10/4 for airway protection for GCS of 3  Extubated 12/- failed extubation after approx 2 hrs due to tachypnea, difficulty maintaining patent airway  HOB > 30  Early mobility, ABCDEF bundle  DVT prophylaxis; chemical VTE  Pepcid, chlorhexidine  s/p trach 1/1/23, routine care of tracheostomy  GI placed a PEG tube on 1/6/23  Continue T piece trials 1/7/23    Methamphetamine abuse (HCC)- (present on admission)  Assessment & Plan  Unable to provide cessation counseling given mental acuity  UDS (+) 12/15    Protein calorie malnutrition (HCC)- (present on admission)  Assessment & Plan  Body mass index is 17.06 kg/m².  Dietary consult-TF/supplements  Peg tube placed by GI dr Goodwin on 1/6/23    Type 2 diabetes mellitus with hypoglycemia without coma, with long-term current use of insulin (HCC)- (present on admission)  Assessment & Plan  Suspected type 2 diabetes with A1c 13.2  Continue Lantus 18 units AM and 2 units in the evening  DM diet   Continue Accu-Cheks  every 6 hours and as needed with high SSI  Hypoglycemia protocols        Dyslipidemia- (present on admission)  Assessment & Plan  Continue statin    Hypothyroidism due to Hashimoto's thyroiditis- (present on admission)  Assessment & Plan  TSH WNL  continue home levothyroxine    Primary hypertension- (present on admission)  Assessment & Plan  History of, unknown compliance with medications  As needed IV antihypertensives with parameters  home oral med lisinopril 10mg daily  Well controlled   SBP < 160    Anemia  Assessment & Plan  Normochromic/normocytic  No obvious blood loss  Conservative transfusion protocols for Hb<7         Dispo: Consulted social work for an LTAC, awaiting  VTE:  Lovenox  Ulcer: H2 Antagonist  Lines: Catalan Catheter  Ongoing indication addressed    I have performed a physical exam and reviewed and updated ROS and Plan today (1/9/2023). In review of yesterday's note (1/8/2023), there are no changes except as documented above.     Discussed patient condition and risk of morbidity and/or mortality with RN, RT, Pharmacy, and Charge nurse / hot rounds    The patient has successfully weaned off mechanical ventilation and is on T-piece trials without issues.  The patient no longer is on any active drips that are requiring titration.  Patient will be transferred to the [facility] for continued monitoring.  Case was discussed with the hospitalist team who will take over care at this time.  The critical care team will sign off at this point.

## 2023-01-09 NOTE — PROGRESS NOTES
Report received from night shift RN, assumed care of pt at this time. Plan of care, labs & chart reviewed. Pt with t-piece @4L/28%. LR @100ml/hr running. PEG w/ TF @50ml/hr. Pt responds to voice and physical interaction but does not follow commands. Dr. Cary updated on pt's status/progress at bedside. Call light within reach, bed locked and in lowest position. All needs appear to be met at this time. All fall precautions in place.

## 2023-01-09 NOTE — PROGRESS NOTES
0938-Called report to REESE Huertas in [facility]  0950-Per Dr. Cary, pt no longer needs brown and to discontinue prior to transport.  1000-Pt transported to T605 on a transport monitor. RN and CNA present.

## 2023-01-09 NOTE — CARE PLAN
The patient is Stable - Low risk of patient condition declining or worsening    Shift Goals  Clinical Goals: Increase engagement  Patient Goals: GREGORIO  Family Goals: GREGORIO    Progress made toward(s) clinical / shift goals:      Patient is not progressing towards the following goals:    Dilaudid given to alleviate possible pain/discomfort. CPOT did not improve.   Problem: Pain - Standard  Goal: Alleviation of pain or a reduction in pain to the patient’s comfort goal  Outcome: Progressing

## 2023-01-09 NOTE — CONSULTS
Hospital Medicine Consultation    Date of Service  1/9/2023    Referring Physician  Rafaela Cary    Consulting Physician  Huber Claudio M.D.    Reason for Consultation  Hospital medicine consultation requested in a patient transferring to intermediate level care ICU after a prolonged ICU stay, with initial presentation of hypoglycemia induced acute encephalopathy.    History of Presenting Illness  62 y.o. female who presented 12/15/2022 with a history of diabetes type 2, insulin requiring, hypertension, hypothyroidism, dyslipidemia, medical noncompliance, methamphetamine abuse, reportedly the patient was found by the patient's daughter in her living quarters, facedown, unclear of the length of downtime, profoundly hypoglycemic with a blood sugar level of 32, prior poorly controlled diabetes with a hemoglobin A1c of 11.6, the patient was admitted to ICU, a seizure was ruled out by EEG initially, the patient was monitored in terms of her glycemic control, she had a MRI performed with possible small acute infarcts to the right frontal lobe, there was no other intracranial abnormality noted, the patient did not regain meaningful cerebral function, ongoing toxic metabolic encephalopathy versus hypoglycemia induced brain injury, the patient initially failed extubation, required reintubation.  Was treated with antibiotics for degree of respiratory infection, the patient eventually was undergoing tracheostomy and PEG tube placement, she was tolerating spontaneous breathing on the ventilator and was transitioned to a T-piece.  The patient had a prolonged course while in the hospital did not unfortunately regain further mental capacity, brain function, patient does not follow commands, she opens eyes intermittently, she is able to move all 4 extremities fairly strongly, she withdraws to pain.  The intent is to possibly transfer the patient to an LTAC unit.  The patient was felt stabilized and transferred to the [facility]  with ongoing tracheostomy care, ongoing medical care and disposition.  The patient is not a historian, she does not respond to verbal command, she does not open eyes  Review of Systems  Review of Systems   Unable to perform ROS: Mental acuity     Past Medical History   has a past medical history of 250.01 (1996), Dupuytren contracture, HTN (hypertension) (3/18/2011), Hyperlipidemia, Hypertension, and Hypothyroid.  Medical noncompliance  Insulin requiring diabetes    Surgical History   has a past surgical history that includes tubal coagulation laparoscopic bilateral (1984); appendectomy; tonsillectomy; and pr upper gi endoscopy,diagnosis (1/6/2023).    Family History  family history includes Diabetes in her son; Lung Disease in her father.    Social History   reports that she has been smoking cigarettes. She has a 4.00 pack-year smoking history. She has never used smokeless tobacco. She reports current alcohol use. She reports that she does not use drugs.    Medications  Prior to Admission Medications   Prescriptions Last Dose Informant Patient Reported? Taking?   HUMALOG 100 UNIT/ML   No No   Sig: INJECT 10 UNITS UNDER THE SKIN 3 TIMES A DAY BEFORE MEALS.   aspirin (ASA) 325 MG Tab  Patient Yes No   Sig: Take 325 mg by mouth every 6 hours as needed.   atorvastatin (LIPITOR) 10 MG Tab  Patient No No   Sig: Take 1 Tablet by mouth every day.   insulin glargine (LANTUS) 100 UNIT/ML Solution  Patient No No   Sig: Inject 40 Units under the skin every day. AS INSTRUCTED   levothyroxine (SYNTHROID) 112 MCG Tab  Patient No No   Sig: Take 1 Tablet by mouth every day.   lisinopril (PRINIVIL) 5 MG Tab  Patient No No   Sig: Take 1 Tablet by mouth every day.      Facility-Administered Medications: None       Allergies  No Known Allergies    Physical Exam  Pulse:  [72-95] 87  Resp:  [12-43] 15  BP: (107-158)/(55-88) 140/77  SpO2:  [91 %-99 %] 95 %    Physical Exam  Vitals and nursing note reviewed.   Constitutional:        Appearance: She is well-developed. She is not diaphoretic.      Comments: The patient is obtunded, does not respond to verbal command or stimuli  Looks older than stated age  Chronically ill-appearing   HENT:      Head: Normocephalic and atraumatic.      Nose: Nose normal.      Mouth/Throat:      Mouth: Mucous membranes are moist.   Eyes:      Conjunctiva/sclera: Conjunctivae normal.      Pupils: Pupils are equal, round, and reactive to light.   Neck:      Thyroid: No thyromegaly.      Vascular: No JVD.      Comments: Tracheostomy  Cardiovascular:      Rate and Rhythm: Normal rate and regular rhythm.      Heart sounds: Normal heart sounds.     No friction rub. No gallop.   Pulmonary:      Effort: Pulmonary effort is normal.      Breath sounds: Rhonchi present. No wheezing or rales.   Abdominal:      General: Bowel sounds are normal. There is no distension.      Palpations: Abdomen is soft. There is no mass.      Tenderness: There is no abdominal tenderness. There is no guarding or rebound.      Comments: PEG tube dressing   Genitourinary:     Comments: Catalan catheter in  Musculoskeletal:         General: No tenderness. Normal range of motion.      Cervical back: Normal range of motion and neck supple.   Lymphadenopathy:      Cervical: No cervical adenopathy.   Skin:     General: Skin is warm and dry.      Coloration: Skin is pale.   Neurological:      Cranial Nerves: No cranial nerve deficit.      Comments: Obtunded       Fluids  Date 01/09/23 0700 - 01/10/23 0659   Shift 0733-2940 7369-4383 5707-1813 24 Hour Total   INTAKE   I.V. 100   100   NG/   200   Enteral 90   90   Shift Total 390   390   OUTPUT   Urine 370   370   Shift Total 370   370   Weight (kg) 43.2 43.2 43.2 43.2       Laboratory  Recent Labs     01/07/23  0528 01/08/23  0305 01/09/23  0312   WBC 5.7 5.6 5.9   RBC 2.90* 3.24* 3.38*   HEMOGLOBIN 8.6* 9.6* 9.9*   HEMATOCRIT 26.5* 31.2* 30.8*   MCV 91.4 96.3 91.1   MCH 29.7 29.6 29.3   MCHC 32.5*  30.8* 32.1*   RDW 41.6 44.6 41.3   PLATELETCT 314 426 374   MPV 8.7* 8.9* 8.5*     Recent Labs     01/07/23  0528 01/08/23  0305 01/09/23  0312   SODIUM 131* 136 137   POTASSIUM 3.5* 4.9 3.9   CHLORIDE 98 102 102   CO2 25 24 25   GLUCOSE 93 177* 184*   BUN 13 16 15   CREATININE 0.40* 0.62 0.48*   CALCIUM 8.3* 8.5 8.6                     Imaging  DX-CHEST-PORTABLE (1 VIEW)   Final Result         Interval removal of gastric drainage tube.      Worsening left lower lobe opacities      DX-CHEST-PORTABLE (1 VIEW)   Final Result      Tracheostomy tube appears well-positioned.      Significantly improved left basilar opacity/consolidation.      DX-CHEST-LIMITED (1 VIEW)   Final Result      Worsening LEFT lung base consolidation.      DX-CHEST-PORTABLE (1 VIEW)   Final Result         1.  Left lower pulmonary lobe consolidation again identified.      2.  No new infiltrates or consolidations are identified.      DX-CHEST-LIMITED (1 VIEW)   Final Result      1.  Stable minimal left lower lobe opacity which could be atelectasis or resolving pneumonitis.      DX-CHEST-LIMITED (1 VIEW)   Final Result         1.  Left lower lobe infiltrates, slightly decreased since prior study.   2.  Atherosclerosis      DX-CHEST-PORTABLE (1 VIEW)   Final Result      1.  Retrocardiac airspace disease, increased.   2.  Support apparatus as above.      DX-ABDOMEN FOR TUBE PLACEMENT   Final Result      1.  NG tube tip projects at the peripyloric region.   2.  See evaluation of chest on dedicated imaging.      DX-CHEST-LIMITED (1 VIEW)   Final Result         1.  Slight hazy left lower lobe opacity suggests infiltrate.   2.  Atherosclerosis      MR-BRAIN-W/O   Final Result      1.  Questionable, tiny acute infarct of the right frontal lobe periventricular white matter.   2.  No other acute intracranial abnormality.   3.  No specific focal finding for seizure.   4.  Parenchymal atrophy.   5.  White matter disease likely representing microvascular  ischemic changes.      DX-CHEST-PORTABLE (1 VIEW)   Final Result      No radiographic evidence of acute cardiopulmonary process.      DX-HAND 2- RIGHT   Final Result      1.  Multiple sites of osteoarthritis      2.  Old, healed 4th and 5th metacarpal fracture      3.  Vascular calcification      DX-CHEST-PORTABLE (1 VIEW)   Final Result         1.  Slight hazy left midlung and lower lobe opacity suggests infiltrate.   2.  Trace left pleural effusion, new since prior   3.  Right lung base nodular densities, location and appearance favors nipple shadow. Similar to prior study.   4.  Atherosclerosis      DX-ABDOMEN FOR TUBE PLACEMENT   Final Result      1.  NG tube courses in the fundus of stomach.      DX-CHEST-PORTABLE (1 VIEW)   Final Result         1.  No acute cardiopulmonary disease.   2.  Bilateral lower lobe nodular densities, location and appearance favors nipple shadows. Could be definitively characterized with repeat chest x-ray with nipple markers to exclude pulmonary nodule.   3.  Atherosclerosis      CT-HEAD W/O   Final Result      No acute intracranial abnormality.             Assessment/Plan  * Encephalopathy chronic- (present on admission)  Assessment & Plan  Chronic current state, the patient might of suffered cerebral injury from prolonged hypoglycemia on presentation  Seizure has been ruled out  Patient might of suffered a small acute CVA although not explaining her current lack of progression  Overall poor prognosis    Acute respiratory failure with hypoxia (HCC)- (present on admission)  Assessment & Plan  Initially secondary to mental status changes, hypoglycemia induced encephalopathy  S/p prolonged mechanical ventilation and eventually undergoing tracheostomy  Currently maintaining  Tracheostomy care  Respiratory care    Hypoglycemia- (present on admission)  Assessment & Plan  Found, present on admission      Type 2 diabetes mellitus with hypoglycemia without coma, with long-term current use of  insulin (HCC)- (present on admission)  Assessment & Plan  Glycemic control with long and short acting insulin  Patient has a history of poor compliance with the most recent hemoglobin A1c of 13.2    Hypomagnesemia  Assessment & Plan  Monitor, replace as indicated    Atelectasis  Assessment & Plan  The patient is currently on a T-piece,  Not following commands  High risk for ongoing atelectasis and respiratory compromise  Position changes, respiratory therapy, suctioning as needed    Pneumonia due to methicillin susceptible Staphylococcus aureus (MSSA) ([facility])  Assessment & Plan  S/p complete treatment with a course of antibiotics in form of Ancef    Methamphetamine abuse ([facility])- (present on admission)  Assessment & Plan  Reported history    Protein calorie malnutrition (HCC)- (present on admission)  Assessment & Plan  Ongoing support with tube feeds    Dyslipidemia- (present on admission)  Assessment & Plan  History of, may resume Lipitor on discharge    Hypothyroidism due to Hashimoto's thyroiditis- (present on admission)  Assessment & Plan  Ongoing replacement    Primary hypertension- (present on admission)  Assessment & Plan  History of, monitor, treat as indicated    Anemia  Assessment & Plan  Developed during the hospitalization, likely a repeat phlebotomy, monitor    Plan  Ongoing supportive care as per request by family  The patient now with a prolonged hospitalization with lack of recovery from a neurologic standpoint,  Neurology documented on 12/28 that the patient might of suffered a irreversible brain injury from prolonged hypoglycemia  Patient is currently maintained on a tracheostomy, PEG tube and closely monitored,  Request for transfer to an LTAC level of care with ongoing support and hope for recovery  The patient this time has a very poor prognosis to regain mental capacity    See orders  Medically complex high-risk patient  Will involve palliative care  The patient might be adequate to be referred to  ethics    Thank you very much for consulting with us, we will follow along closely while the patient is hospitalized      Please note that this dictation was created using voice recognition software. I have made every reasonable attempt to correct obvious errors, but I expect that there are errors of grammar and possibly context that I did not discover before finalizing the note.

## 2023-01-09 NOTE — ASSESSMENT & PLAN NOTE
Patient has severe protein calorie malnutrition this is a clinical diagnosis present upon admission in setting of with a controlled diabetes cachectic condition.  BMI is 15.8

## 2023-01-09 NOTE — CARE PLAN
Problem: Pain - Standard  Goal: Alleviation of pain or a reduction in pain to the patient’s comfort goal  Outcome: Progressing  Patient receiving PRN medication for pain      The patient is Watcher - Medium risk of patient condition declining or worsening    Shift Goals  Clinical Goals: Improve engagement  Patient Goals: GREGORIO  Family Goals: Keep updated    Progress made toward(s) clinical / shift goals:  Progressing    Patient is not progressing towards the following goals: n/a

## 2023-01-09 NOTE — CARE PLAN
The patient is Stable - Low risk of patient condition declining or worsening    Shift Goals  Clinical Goals: Improve engagement  Patient Goals: GREGORIO  Family Goals: Keep updated    Progress made toward(s) clinical / shift goals:    Problem: Skin Integrity  Goal: Skin integrity is maintained or improved  Outcome: Progressing     Problem: Fall Risk  Goal: Patient will remain free from falls  Outcome: Progressing       Patient is not progressing towards the following goals:    Pt was mobilized, lights on, music therapy and continuous verbal and physical interaction. No increase in neurological engagement observed.

## 2023-01-10 NOTE — CARE PLAN
The patient is Stable - Low risk of patient condition declining or worsening    Shift Goals  Clinical Goals: Increase engagement  Patient Goals: GREGORIO  Family Goals: GREGORIO    Progress made toward(s) clinical / shift goals:    Problem: Knowledge Deficit - Standard  Goal: Patient and family/care givers will demonstrate understanding of plan of care, disease process/condition, diagnostic tests and medications  Outcome: Progressing  Note: Family updated on POC.      Problem: Safety - Medical Restraint  Goal: Remains free of injury from restraints (Restraint for Interference with Medical Device)  Outcome: Progressing  Note: Patient free from injury from restraints.   Goal: Free from restraint(s) (Restraint for Interference with Medical Device)  Outcome: Progressing  Note: Patient requires restraints to not pull life saving device.      Problem: Pain - Standard  Goal: Alleviation of pain or a reduction in pain to the patient’s comfort goal  Outcome: Progressing  Note: Patient remained a CPOT of zero throughout shift.

## 2023-01-10 NOTE — PROGRESS NOTES
4 Eyes Skin Assessment Completed by REESE Denise and REESE Cortes.    Head WDL  Ears Redness and Blanching  Nose WDL  Mouth WDL  Neck WDL  Breast/Chest WDL  Shoulder Blades WDL  Spine WDL  (R) Arm/Elbow/Hand Redness and Blanching  (L) Arm/Elbow/Hand Redness and Blanching, scab left forearm  Abdomen PEG tube  Groin WDL  Scrotum/Coccyx/Buttocks WDL  (R) Leg Bruising  (L) Leg Bruising  (R) Heel/Foot/Toe Redness, Blanching, and Boggy  (L) Heel/Foot/Toe Redness, Blanching, and Boggy          Devices In Places ECG, Blood Pressure Cuff, Pulse Ox, and Tracheostomy      Interventions In Place Gray Ear Foams, Pillows, Q2 Turns, and Low Air Loss Mattress    Possible Skin Injury Yes    Pictures Uploaded Into Epic Yes  Wound Consult Placed Yes  RN Wound Prevention Protocol Ordered Yes

## 2023-01-10 NOTE — PROGRESS NOTES
12 hour chart check    Monitor Summary:    NSR w/ occasional PVC's  HR 80-91    Unable to find current strip in chart.  Strip from 1/9 AM.    .157/.097/.[phone]

## 2023-01-10 NOTE — CONSULTS
Diabetes education: Pt has a hx of diabetes, on insulin prior to admit. Pt has previously been vented , has a peg tube with tube feedings and recently transferred to [facility]. Pt was admitted with blood sugar of 460, and Hga1c of 13.2%. Pt is currently on Glargine 2 units pm and 18 units am, with regular insulin per sliding scale every six hours. Blood sugars are 215 (5 units), 188 ( 3 units), and not charted or documented ( 0 units).  Met with pt's daughter briefly as pt not appropriate for discussion or education. Per daughter, she has her good days and opens her eyes and days when she doesn't.  Plan: CDE to continue to follow.

## 2023-01-10 NOTE — CARE PLAN
The patient is Watcher - Medium risk of patient condition declining or worsening    Shift Goals  Clinical Goals: Improved mental status, hemodynamic stability  Patient Goals: GREGORIO  Family Goals: GREGORIO    Progress made toward(s) clinical / shift goals: Mental status unchanged, pt unable to follow commands, does not track, withdraws to pain only. O2 stable on tpiece, vitals stable.     Problem: Knowledge Deficit - Standard  Goal: Patient and family/care givers will demonstrate understanding of plan of care, disease process/condition, diagnostic tests and medications  Outcome: Progressing     Problem: Fall Risk  Goal: Patient will remain free from falls  Outcome: Progressing     Problem: Safety - Medical Restraint  Goal: Remains free of injury from restraints (Restraint for Interference with Medical Device)  Outcome: Progressing  Goal: Free from restraint(s) (Restraint for Interference with Medical Device)  Outcome: Progressing     Problem: Pain - Standard  Goal: Alleviation of pain or a reduction in pain to the patient’s comfort goal  Outcome: Progressing     Problem: Hemodynamics  Goal: Patient's hemodynamics, fluid balance and neurologic status will be stable or improve  Outcome: Progressing     Problem: Respiratory  Goal: Patient will achieve/maintain optimum respiratory ventilation and gas exchange  Outcome: Progressing     Problem: Nutrition  Goal: Patient's nutritional and fluid intake will be adequate or improve  Outcome: Progressing  Goal: Enteral nutrition will be maintained or improve  Outcome: Progressing     Problem: Urinary Elimination  Goal: Establish and maintain regular urinary output  Outcome: Progressing     Problem: Bowel Elimination  Goal: Establish and maintain regular bowel function  Outcome: Progressing       Patient is not progressing towards the following goals:

## 2023-01-10 NOTE — DISCHARGE PLANNING
Case Management Discharge Planning    Admission Date: 12/15/2022  GMLOS: 24.2  ALOS: 26    6-Clicks ADL Score: 6  6-Clicks Mobility Score: 6  PT and/or OT Eval ordered: Yes  Post-acute Referrals Ordered: Yes  Post-acute Choice Obtained: Yes  Has referral(s) been sent to post-acute provider:  Yes      Anticipated Discharge Dispo: Discharge Disposition: D/T to Medicare cert LTCH w/planned hosp IP readmit (91)        Action(s) Taken: Pt currently has a trach/T-Piece with O2 4L 28%.   [facility] accepted [facility] per AllysonisonHeather.  They are hopeful for a bed by the end of this week.      RN CM telephoned patient's daughter, Sindy and provided an update.    Medically Clear: Yes    Next Steps: Follow up with [facility].    Barriers to Discharge: available bed at facility

## 2023-01-10 NOTE — PROGRESS NOTES
[facility] Medicine Daily Progress Note    Date of Service  1/10/2023    Chief Complaint  Poonam Mayo is a 62 y.o. female admitted 12/15/2022 with decreased level of consciousness.    Hospital Course  Ms. Mayo is a 62 y.o. female who presented 12/15/2022 with a history of diabetes type 2, insulin requiring, hypertension, hypothyroidism, dyslipidemia, medical noncompliance, methamphetamine abuse, reportedly the patient was found by the patient's daughter in her living quarters, facedown, unclear of the length of downtime, profoundly hypoglycemic with a blood sugar level of 32, prior poorly controlled diabetes with a hemoglobin A1c of 11.6, the patient was admitted to ICU, a seizure was ruled out by EEG initially, the patient was monitored in terms of her glycemic control, she had a MRI performed with possible small acute infarcts to the right frontal lobe, there was no other intracranial abnormality noted, the patient did not regain meaningful cerebral function, ongoing toxic metabolic encephalopathy versus hypoglycemia induced brain injury, the patient initially failed extubation, required reintubation.  Was treated with antibiotics for degree of respiratory infection, the patient eventually was undergoing tracheostomy and PEG tube placement, she was tolerating spontaneous breathing on the ventilator and was transitioned to a T-piece.  The patient had a prolonged course while in the hospital did not unfortunately regain further mental capacity, brain function, patient does not follow commands, she opens eyes intermittently, she is able to move all 4 extremities fairly strongly, she withdraws to pain.  The intent is to possibly transfer the patient to an LTAC unit.  The patient was felt stabilized and transferred to the [facility] with ongoing tracheostomy care, ongoing medical care and disposition.  The patient is not a historian, she does not respond to verbal command, she does not open eyes    Interval Problem  Update  1/10: Ms. Mayo was evaluated and examined in the IMCU. I met with her daughter at bedside and we had a long, candid discussion about goals of care. She has not made any meaningful progress lately and we discussed that this is a rather ominous sign. We discussed options such as eventually going home with family and on the other side of the spectrum is comfort care. I will call her two brothers tomorrow to discuss further. 35 minutes spent that of which over 50% of the time was spent in counseling about her very guarded condition and consideration of comfort care/Hospice.      I have discussed this patient's plan of care and discharge plan at IDT rounds today with Case Management, Nursing, Nursing leadership, and other members of the IDT team.    Consultants/Specialty  critical care  Neurology   Code Status  DNAR, I OK    Disposition  Patient is not medically cleared for discharge.   Anticipate discharge to be determined.  I have placed the appropriate orders for post-discharge needs.    Review of Systems  Review of Systems   Unable to perform ROS: Intubated      Physical Exam  Temp:  [36.3 °C (97.4 °F)-36.6 °C (97.8 °F)] 36.3 °C (97.4 °F)  Pulse:  [72-98] 91  Resp:  [7-38] 13  BP: (107-140)/(61-80) 135/80  SpO2:  [91 %-98 %] 96 %    Physical Exam  Vitals and nursing note reviewed.   Constitutional:       General: She is not in acute distress.     Appearance: She is ill-appearing.   HENT:      Head: Normocephalic.      Comments: Edentulous      Mouth/Throat:      Mouth: Mucous membranes are dry.      Pharynx: Oropharynx is clear.   Eyes:      General: No scleral icterus.     Conjunctiva/sclera: Conjunctivae normal.   Neck:      Comments: trach  Abdominal:      General: There is distension.      Comments: PEG   Genitourinary:     Comments: brown  Musculoskeletal:      Cervical back: Normal range of motion and neck supple.      Right lower leg: No edema.      Left lower leg: No edema.      Comments: Arms are   flexed   Skin:     General: Skin is warm and dry.   Neurological:      Mental Status: She is alert.      Comments: She is non-verbal  Does not follow       Fluids    Intake/Output Summary (Last 24 hours) at 1/10/2023 0740  Last data filed at 1/10/2023 0700  Gross per 24 hour   Intake 1277.47 ml   Output 1470 ml   Net -192.53 ml       Laboratory  Recent Labs     01/08/23  0305 01/09/23  0312 01/10/23  0334   WBC 5.6 5.9 5.6   RBC 3.24* 3.38* 3.66*   HEMOGLOBIN 9.6* 9.9* 10.6*   HEMATOCRIT 31.2* 30.8* 33.3*   MCV 96.3 91.1 91.0   MCH 29.6 29.3 29.0   MCHC 30.8* 32.1* 31.8*   RDW 44.6 41.3 40.9   PLATELETCT 426 374 395   MPV 8.9* 8.5* 8.4*     Recent Labs     01/08/23  0305 01/09/23  0312 01/10/23  0334   SODIUM 136 137 137   POTASSIUM 4.9 3.9 4.2   CHLORIDE 102 102 102   CO2 24 25 24   GLUCOSE 177* 184* 136*   BUN 16 15 14   CREATININE 0.62 0.48* 0.48*   CALCIUM 8.5 8.6 8.9                   Imaging   MRI brain:  1.  Questionable, tiny acute infarct of the right frontal lobe periventricular white matter.  2.  No other acute intracranial abnormality.  3.  No specific focal finding for seizure.  4.  Parenchymal atrophy.  5.  White matter disease likely representing microvascular ischemic changes.    Assessment/Plan  * Encephalopathy chronic- (present on admission)  Assessment & Plan  Chronic current state, the patient might of suffered cerebral injury from prolonged hypoglycemia on presentation  Seizure has been ruled out by EEGs  Patient might of suffered a small acute CVA although not explaining her current lack of progression  Overall poor prognosis.  Comfort care has been discussed.  Further family conferences tomorrow.    Hypomagnesemia  Assessment & Plan  Monitor, replace as indicated    Atelectasis  Assessment & Plan  The patient is currently on a T-piece,  Not following commands  High risk for ongoing atelectasis and respiratory compromise  Position changes, respiratory therapy, suctioning as needed    Pneumonia  due to methicillin susceptible Staphylococcus aureus (MSSA) ([facility])  Assessment & Plan  S/p complete treatment with a course of antibiotics in form of Ancef    Acute respiratory failure with hypoxia (HCC)- (present on admission)  Assessment & Plan  Initially secondary to mental status changes, hypoglycemia induced encephalopathy  S/p prolonged mechanical ventilation and eventually undergoing tracheostomy  Currently maintaining  Tracheostomy care  Respiratory care    Methamphetamine abuse (HCC)- (present on admission)  Assessment & Plan  Reported history    Protein calorie malnutrition (HCC)- (present on admission)  Assessment & Plan  Ongoing support with tube feeds    Hypoglycemia- (present on admission)  Assessment & Plan  Found, present on admission      Type 2 diabetes mellitus with hypoglycemia without coma, with long-term current use of insulin (HCC)- (present on admission)  Assessment & Plan  Glycemic control with long and short acting insulin  Patient has a history of poor compliance with the most recent hemoglobin A1c of 13.2    Dyslipidemia- (present on admission)  Assessment & Plan  History of, may resume Lipitor on discharge    Hypothyroidism due to Hashimoto's thyroiditis- (present on admission)  Assessment & Plan  Ongoing replacement    Primary hypertension- (present on admission)  Assessment & Plan  History of, monitor, treat as indicated    Anemia  Assessment & Plan  Developed during the hospitalization, likely a repeat phlebotomy, monitor         VTE prophylaxis: enoxaparin ppx    I have performed a physical exam and reviewed and updated ROS and Plan today (1/10/2023). In review of yesterday's note (1/9/2023), there are no changes except as documented above.

## 2023-01-10 NOTE — PROGRESS NOTES
[facility] Patient withdraws to pain. Moves all 4 extremities. On the T-piece at 4L 28%.

## 2023-01-11 NOTE — DISCHARGE PLANNING
Received Choice form at 1455  Agency/Facility Name: [facility]   Referral sent per Choice form @ [phone]  Received Choice form at 1532  Agency/Facility Name: [facility] deirdre Conrado/Ángela   Referral sent per Choice form @ [phone]

## 2023-01-11 NOTE — CARE PLAN
The patient is Stable - Low risk of patient condition declining or worsening    Shift Goals  Clinical Goals: Placement  Patient Goals: GREGORIO  Family Goals: GREGORIO    Progress made toward(s) clinical / shift goals:    Problem: Knowledge Deficit - Standard  Goal: Patient and family/care givers will demonstrate understanding of plan of care, disease process/condition, diagnostic tests and medications  Outcome: Progressing     Problem: Skin Integrity  Goal: Skin integrity is maintained or improved  Outcome: Progressing     Problem: Fall Risk  Goal: Patient will remain free from falls  Outcome: Progressing     Problem: Safety - Medical Restraint  Goal: Remains free of injury from restraints (Restraint for Interference with Medical Device)  Outcome: Progressing  Goal: Free from restraint(s) (Restraint for Interference with Medical Device)  Outcome: Progressing     Problem: Pain - Standard  Goal: Alleviation of pain or a reduction in pain to the patient’s comfort goal  Outcome: Progressing     Problem: Hemodynamics  Goal: Patient's hemodynamics, fluid balance and neurologic status will be stable or improve  Outcome: Progressing     Problem: Risk for Aspiration  Goal: Patient's risk for aspiration will be absent or decrease  Outcome: Progressing     Problem: Nutrition  Goal: Enteral nutrition will be maintained or improve  Outcome: Progressing

## 2023-01-11 NOTE — CONSULTS
Reason for PC Consult: Advance Care Planning    Consulted by:   Dr. Claudio    Assessment:  General:   62 year old female admitted for hypoglycemia on 12/15/22. Pt has a history of IDDM, HTN, hypothyroidism, dupuytren contractures, HLD, tobacco use, and remote methamphetamine use. Pt presented to [facility] ED via EMS after pt was found face down at home with the vacuum running by family. Pt was intubated on arrival, she got a perc trach on 1/1/23 and a PEG on 1/6. Pt's admission was complicated by persistent encephalopathy.     Prior PC Consult:   None on File    Social:   Prior to admission pt was living alone, independent with all ADLs.     Dyspnea: Yes, 96% on 28% T-piece  Last BM: 01/11/23   Pain: Unable to determine, Medicated per MAR  Depression: Unable to determine    Dementia: Unable to Determine      Spiritual:  Is Anabaptism or spirituality important for coping with this illness? Yes, Dtr requested to have a hospital  provide prayer for end-of-life  Has a  or spiritual provider visit been requested? Yes    Palliative Performance Scale:  10%    Advance Directive: None on File   DPOA: None on File  POLST: None on File    To locate the AD/POLST, please hover the cursor over the patient's code status to find all linked ACP documents.    Code Status: DNR/Intubation OK    Outcome:  Discussed with Dr. Carrillo, appreciate updates. Visited pt at beside, pt currently unresponsive and does not participate with her environment.     Called dtr Sindy, introduce self and role of PC. Discussed wanting to schedule a time with her siblings to have a care conference. Sindy reports that she spoke with both her brothers and neither want to participate nor assist with decision making. They have left that up to her, which she states she has made a decision.    Sindy states that after her discussion with Dr. Carrillo and knowing her mother's wishes, she decided that hospice would be best for pt.     Hospice  Discussion: Discussed hospice in detail, philosophy, goals and support provided. Discussed clinical appropriateness of hospice, explored beliefs, values and preferences surround hospice services. Answered questions, address concerns and clarified/reiterated points regarding hospice. Educated on hospice referral, timing and potential DC plan. Sindy verbalized understanding, addressed concerns regarding forced feeding and symptom management.     Discussed hospice at home vs SNF vs . Sindy states that she doesn't have a home pt can go to. Discussed SNF placement further, Sindy verbalized agreement with hospice in a SNF.     Obtain hospice choice for [facility] hosp[facility], faxed to [facility].     Advanced Care Planning Documents:   Discussed completing a POLST form, Sindy is planning to see pt today after work and can complete when at bedside.      To locate the AD/POLST, please hover the cursor over the patient's code status to find all linked ACP documents.    Active listening, education, validation, normalization, therapeutic touch, and emotional support provided throughout encounter.    Updated:   Joelle Martinez RN CM    Plan:   Comfort focused care now, Discharge to SNF with COL hospice. Will complete POLST with dtr when she is at bedside.       Thank you for allowing Palliative Care to participate in this patient's care. Please feel free to call [phone] with any questions or concerns.

## 2023-01-11 NOTE — CARE PLAN
The patient is Stable - Low risk of patient condition declining or worsening    Shift Goals  Clinical Goals: safety, LTAC placement  Patient Goals: GREGORIO  Family Goals: GREGORIO    Progress made toward(s) clinical / shift goals:    Problem: Skin Integrity  Goal: Skin integrity is maintained or improved  Outcome: Progressing     Problem: Fall Risk  Goal: Patient will remain free from falls  Outcome: Progressing     Problem: Safety - Medical Restraint  Goal: Remains free of injury from restraints (Restraint for Interference with Medical Device)  Outcome: Progressing     Problem: Pain - Standard  Goal: Alleviation of pain or a reduction in pain to the patient’s comfort goal  Outcome: Progressing       Patient is not progressing towards the following goals:

## 2023-01-11 NOTE — DISCHARGE PLANNING
Care Transition Team Discharge Planning    Anticipated Discharge Information  Discharge Disposition: D/T to Medicare cert LTCH w/planned hosp IP readmit (91)              Discharge Plan:  CM sent choice form to [facility] with SNF choices. Daughter is requesting a blanket send to SNF's in the [place] area. Daughter states that facility in [place] will be last choice and she will decide which facility from the accepting facilities. CM will continue to follow with all discharge needs.

## 2023-01-12 NOTE — DIETARY
Nutrition Services: Update   Day 28 of admit. Poonam Mayo is a 62 y.o. female with admitting DX of Hypoglycemia [E16.2].    COMFORT CARE (TF orders D/c'd 1/11).  RD signing off.

## 2023-01-12 NOTE — PROGRESS NOTES
Pt transitioned to comfort measures only. Wrist restraints removed and patient medicated for anxiety/agitation and pain. Daughter updated via phone that there is a possibility pt could pull out her tracheostomy without the restraints and it would not be replaced. Will medicate for air hunger as necessary.

## 2023-01-12 NOTE — PALLIATIVE CARE
Palliative Care follow-up  PC RN with Dr. Carrillo met with dtr at bedside. Dtr verbalized decision for comfort focused care and hospice.     Completed POLST form to reflect this plan. Scanned into EMR, original tapped to white board and should be sent with pt upon discharge.    Active listening, education, validation, normalization, therapeutic touch, and emotional support provided.     Plan:   Discharge to SNF with hospice once arranged.      Thank you for allowing Palliative Care to participate in this patient's care. Please feel free to call [phone] with any questions or concerns.

## 2023-01-12 NOTE — CARE PLAN
The patient is Watcher - Medium risk of patient condition declining or worsening    Shift Goals  Clinical Goals: Comfort  Patient Goals: GREGORIO  Family Goals: GREGORIO    Progress made toward(s) clinical / shift goals:    Problem: Knowledge Deficit - Standard  Goal: Patient and family/care givers will demonstrate understanding of plan of care, disease process/condition, diagnostic tests and medications  Outcome: Progressing     Problem: Skin Integrity  Goal: Skin integrity is maintained or improved  Outcome: Progressing     Problem: Pain - Standard  Goal: Alleviation of pain or a reduction in pain to the patient’s comfort goal  Outcome: Progressing     Problem: Respiratory  Goal: Patient will achieve/maintain optimum respiratory ventilation and gas exchange  Outcome: Progressing       Patient is not progressing towards the following goals:

## 2023-01-12 NOTE — PROGRESS NOTES
[facility] Medicine Daily Progress Note    Date of Service  1/11/2023    Chief Complaint  Poonam Mayo is a 62 y.o. female admitted 12/15/2022 with decreased level of consciousness.    Hospital Course  Ms. Mayo is a 62 y.o. female who presented 12/15/2022 with a history of diabetes type 2, insulin requiring, hypertension, hypothyroidism, dyslipidemia, medical noncompliance, methamphetamine abuse, reportedly the patient was found by the patient's daughter in her living quarters, facedown, unclear of the length of downtime, profoundly hypoglycemic with a blood sugar level of 32, prior poorly controlled diabetes with a hemoglobin A1c of 11.6, the patient was admitted to ICU, a seizure was ruled out by EEG initially, the patient was monitored in terms of her glycemic control, she had a MRI performed with possible small acute infarcts to the right frontal lobe, there was no other intracranial abnormality noted, the patient did not regain meaningful cerebral function, ongoing toxic metabolic encephalopathy versus hypoglycemia induced brain injury, the patient initially failed extubation, required reintubation.  Was treated with antibiotics for degree of respiratory infection, the patient eventually was undergoing tracheostomy and PEG tube placement, she was tolerating spontaneous breathing on the ventilator and was transitioned to a T-piece.  The patient had a prolonged course while in the hospital did not unfortunately regain further mental capacity, brain function, patient does not follow commands, she opens eyes intermittently, she is able to move all 4 extremities fairly strongly, she withdraws to pain.  The intent is to possibly transfer the patient to an LTAC unit.  The patient was felt stabilized and transferred to the [facility] with ongoing tracheostomy care, ongoing medical care and disposition.  The patient is not a historian, she does not respond to verbal command, she does not open eyes    Interval Problem  Update  1/10: Ms. Mayo was evaluated and examined in the IMCU. I met with her daughter at bedside and we had a long, candid discussion about goals of care. She has not made any meaningful progress lately and we discussed that this is a rather ominous sign. We discussed options such as eventually going home with family and on the other side of the spectrum is comfort care. I will call her two brothers tomorrow to discuss further.   1/11/2023: Patient seen and evaluated in [facility].  I met with her daughter Lanie at bedside as well as Courtney from palliative care.  Lanie says her brothers do not want to partake in the discussion for comfort care though they both agree with this.  Lanie understands that we have given her mother significant time to get better.  Unfortunately she has not improved. Comfort care measures have been ordered. Hospice consult.     I have discussed this patient's plan of care and discharge plan at IDT rounds today with Case Management, Nursing, Nursing leadership, and other members of the IDT team.    Consultants/Specialty  critical care  Neurology   Code Status  Comfort Care/DNR    Disposition  Patient is not medically cleared for discharge.   Anticipate discharge to be determined.  I have placed the appropriate orders for post-discharge needs.    Review of Systems  Review of Systems   Unable to perform ROS: Intubated      Physical Exam  Temp:  [36.2 °C (97.1 °F)] 36.2 °C (97.1 °F)  Pulse:  [73-91] 89  Resp:  [16-27] 16  BP: (107-147)/(64-88) 124/68  SpO2:  [93 %-99 %] 98 %    Physical Exam  Vitals and nursing note reviewed.   Constitutional:       General: She is not in acute distress.     Appearance: She is ill-appearing.      Comments: Very thin and severely cachectic    HENT:      Head: Normocephalic.      Comments: Edentulous      Mouth/Throat:      Mouth: Mucous membranes are dry.      Pharynx: Oropharynx is clear.   Eyes:      General: No scleral icterus.     Conjunctiva/sclera:  Conjunctivae normal.   Neck:      Comments: trach  Abdominal:      General: There is no distension.      Comments: PEG   Genitourinary:     Comments: brown  Musculoskeletal:      Cervical back: Normal range of motion and neck supple.      Right lower leg: No edema.      Left lower leg: No edema.      Comments: Arms are  flexed  Soft wrist restraints    Skin:     General: Skin is warm and dry.   Neurological:      Mental Status: She is alert.      Comments: She is non-verbal  Does not follow       Fluids    Intake/Output Summary (Last 24 hours) at 1/11/2023 1717  Last data filed at 1/11/2023 1600  Gross per 24 hour   Intake 880 ml   Output 1590 ml   Net -710 ml         Laboratory  Recent Labs     01/09/23  0312 01/10/23  0334 01/11/23  0235   WBC 5.9 5.6 5.6   RBC 3.38* 3.66* 3.69*   HEMOGLOBIN 9.9* 10.6* 10.6*   HEMATOCRIT 30.8* 33.3* 32.8*   MCV 91.1 91.0 88.9   MCH 29.3 29.0 28.7   MCHC 32.1* 31.8* 32.3*   RDW 41.3 40.9 40.8   PLATELETCT 374 395 362   MPV 8.5* 8.4* 8.4*       Recent Labs     01/09/23  0312 01/10/23  0334 01/11/23  0235   SODIUM 137 137 135   POTASSIUM 3.9 4.2 4.0   CHLORIDE 102 102 100   CO2 25 24 26   GLUCOSE 184* 136* 300*   BUN 15 14 20   CREATININE 0.48* 0.48* 0.54   CALCIUM 8.6 8.9 8.9                     Imaging   MRI brain:  1.  Questionable, tiny acute infarct of the right frontal lobe periventricular white matter.  2.  No other acute intracranial abnormality.  3.  No specific focal finding for seizure.  4.  Parenchymal atrophy.  5.  White matter disease likely representing microvascular ischemic changes.    Assessment/Plan  * Encephalopathy chronic- (present on admission)  Assessment & Plan  Chronic current state, the patient might of suffered cerebral injury from prolonged hypoglycemia on presentation  Seizure has been ruled out by EEGs  Patient might of suffered a small acute CVA although not explaining her current lack of progression  Comfort care measures have been initiated on  1/11/2023  Her POLST has been filled out.  Hospice to consult  G-tube Roxanol and IV Dilaudid for pain    Hypomagnesemia  Assessment & Plan  History of    Atelectasis  Assessment & Plan  The patient is currently on a T-piece,      Pneumonia due to methicillin susceptible Staphylococcus aureus (MSSA) ([facility])  Assessment & Plan  S/p complete treatment with a course of antibiotics in form of Ancef    Acute respiratory failure with hypoxia ([facility])- (present on admission)  Assessment & Plan  Initially secondary to mental status changes, hypoglycemia induced encephalopathy  S/p prolonged mechanical ventilation and eventually undergoing tracheostomy      Methamphetamine abuse ([facility])- (present on admission)  Assessment & Plan  Reported history    Protein calorie malnutrition (HCC)- (present on admission)  Assessment & Plan  Patient has severe protein calorie malnutrition this is a clinical diagnosis present upon admission in setting of with a controlled diabetes cachectic condition.  BMI is 15.8    Hypoglycemia- (present on admission)  Assessment & Plan  Found, present on admission      Type 2 diabetes mellitus with hypoglycemia without coma, with long-term current use of insulin ([facility])- (present on admission)  Assessment & Plan  Glycemic control with long and short acting insulin  Patient has a history of poor compliance with the most recent hemoglobin A1c of 13.2    Dyslipidemia- (present on admission)  Assessment & Plan  Hx of    Hypothyroidism due to Hashimoto's thyroiditis- (present on admission)  Assessment & Plan  treated    Primary hypertension- (present on admission)  Assessment & Plan  History of    Anemia  Assessment & Plan  Developed during the hospitalization         VTE prophylaxis: enoxaparin ppx    I have performed a physical exam and reviewed and updated ROS and Plan today (1/11/2023). In review of yesterday's note (1/10/2023), there are no changes except as documented above.

## 2023-01-13 NOTE — PROGRESS NOTES
[facility] Medicine Daily Progress Note    Date of Service  1/12/2023    Chief Complaint  Poonam Mayo is a 62 y.o. female admitted 12/15/2022 with decreased level of consciousness.    Hospital Course  Ms. Mayo is a 62 y.o. female who presented 12/15/2022 with a history of diabetes type 2, insulin requiring, hypertension, hypothyroidism, dyslipidemia, medical noncompliance, methamphetamine abuse, reportedly the patient was found by the patient's daughter in her living quarters, facedown, unclear of the length of downtime, profoundly hypoglycemic with a blood sugar level of 32, prior poorly controlled diabetes with a hemoglobin A1c of 11.6, the patient was admitted to ICU, a seizure was ruled out by EEG initially, the patient was monitored in terms of her glycemic control, she had a MRI performed with possible small acute infarcts to the right frontal lobe, there was no other intracranial abnormality noted, the patient did not regain meaningful cerebral function, ongoing toxic metabolic encephalopathy versus hypoglycemia induced brain injury, the patient initially failed extubation, required reintubation.  Was treated with antibiotics for degree of respiratory infection, the patient eventually was undergoing tracheostomy and PEG tube placement, she was tolerating spontaneous breathing on the ventilator and was transitioned to a T-piece.  The patient had a prolonged course while in the hospital did not unfortunately regain further mental capacity, brain function, patient does not follow commands, she opens eyes intermittently, she is able to move all 4 extremities fairly strongly, she withdraws to pain.  The intent is to possibly transfer the patient to an LTAC unit.  The patient was felt stabilized and transferred to the [facility] with ongoing tracheostomy care, ongoing medical care and disposition.  The patient is not a historian, she does not respond to verbal command, she does not open eyes    Interval Problem  Update  1/10: Ms. Mayo was evaluated and examined in the IMCU. I met with her daughter at bedside and we had a long, candid discussion about goals of care. She has not made any meaningful progress lately and we discussed that this is a rather ominous sign. We discussed options such as eventually going home with family and on the other side of the spectrum is comfort care. I will call her two brothers tomorrow to discuss further.   1/11/2023: Patient seen and evaluated in [facility].  I met with her daughter Lanie at bedside as well as Courtney from palliative care.  Lanie says her brothers do not want to partake in the discussion for comfort care though they both agree with this.  Lanie understands that we have given her mother significant time to get better.  Unfortunately she has not improved. Comfort care measures have been ordered. Hospice consult.   1/12: Ms. Mayo is on comfort care. She is 83% on room air. Ativan and morphine via the G tube and prn IV dilaudid are being utilized. Await Hospice eval.     I have discussed this patient's plan of care and discharge plan at IDT rounds today with Case Management, Nursing, Nursing leadership, and other members of the IDT team.    Consultants/Specialty  critical care  Neurology   Code Status  Comfort Care/DNR    Disposition  Patient is not medically cleared for discharge.   Anticipate discharge to be determined.  I have placed the appropriate orders for post-discharge needs.    Review of Systems  Review of Systems   Unable to perform ROS: Mental acuity      Physical Exam  Pulse:  [] 102  BP: (100-112)/(56-90) 100/56  SpO2:  [68 %-98 %] 73 %    Physical Exam  Vitals and nursing note reviewed.   Constitutional:       General: She is not in acute distress.     Appearance: She is ill-appearing and toxic-appearing.      Comments: Very thin and severely cachectic    HENT:      Head: Normocephalic.      Comments: Edentulous      Mouth/Throat:      Mouth: Mucous  membranes are dry.      Pharynx: Oropharynx is clear.   Neck:      Comments: trach  Abdominal:      General: There is no distension.      Comments: PEG   Genitourinary:     Comments: brown  Musculoskeletal:      Right lower leg: No edema.      Left lower leg: No edema.      Comments: Arms are  flexed  Soft wrist restraints    Skin:     General: Skin is warm and dry.   Neurological:      Comments: She is non-verbal  Does not follow  somnolent       Fluids    Intake/Output Summary (Last 24 hours) at 1/12/2023 1713  Last data filed at 1/12/2023 1700  Gross per 24 hour   Intake 0 ml   Output 1315 ml   Net -1315 ml         Laboratory  Recent Labs     01/10/23  0334 01/11/23  0235   WBC 5.6 5.6   RBC 3.66* 3.69*   HEMOGLOBIN 10.6* 10.6*   HEMATOCRIT 33.3* 32.8*   MCV 91.0 88.9   MCH 29.0 28.7   MCHC 31.8* 32.3*   RDW 40.9 40.8   PLATELETCT 395 362   MPV 8.4* 8.4*       Recent Labs     01/10/23  0334 01/11/23  0235   SODIUM 137 135   POTASSIUM 4.2 4.0   CHLORIDE 102 100   CO2 24 26   GLUCOSE 136* 300*   BUN 14 20   CREATININE 0.48* 0.54   CALCIUM 8.9 8.9                     Imaging   MRI brain:  1.  Questionable, tiny acute infarct of the right frontal lobe periventricular white matter.  2.  No other acute intracranial abnormality.  3.  No specific focal finding for seizure.  4.  Parenchymal atrophy.  5.  White matter disease likely representing microvascular ischemic changes.    Assessment/Plan  * Encephalopathy chronic- (present on admission)  Assessment & Plan  Chronic current state, the patient might of suffered cerebral injury from prolonged hypoglycemia on presentation  Seizure has been ruled out by EEGs  Patient might of suffered a small acute CVA although not explaining her current lack of progression  Comfort care measures have been initiated on 1/11/2023  Her POLST has been filled out.  Hospice to consult  G-tube Roxanol and ativan are being given as well as IV Dilaudid for breakthrough pain/air  hunger.    Hypomagnesemia  Assessment & Plan  History of    Atelectasis  Assessment & Plan  The patient is currently on a T-piece,      Pneumonia due to methicillin susceptible Staphylococcus aureus (MSSA) ([facility])  Assessment & Plan  S/p complete treatment with a course of antibiotics in form of Ancef    Acute respiratory failure with hypoxia (HCC)- (present on admission)  Assessment & Plan  Initially secondary to mental status changes, hypoglycemia induced encephalopathy  S/p prolonged mechanical ventilation and eventually undergoing tracheostomy      Methamphetamine abuse ([facility])- (present on admission)  Assessment & Plan  Reported history    Protein calorie malnutrition ([facility])- (present on admission)  Assessment & Plan  Patient has severe protein calorie malnutrition this is a clinical diagnosis present upon admission in setting of with a controlled diabetes cachectic condition.  BMI is 15.8    Hypoglycemia- (present on admission)  Assessment & Plan  Found, present on admission      Type 2 diabetes mellitus with hypoglycemia without coma, with long-term current use of insulin ([facility])- (present on admission)  Assessment & Plan  Glycemic control with long and short acting insulin  Patient has a history of poor compliance with the most recent hemoglobin A1c of 13.2    Dyslipidemia- (present on admission)  Assessment & Plan  Hx of    Hypothyroidism due to Hashimoto's thyroiditis- (present on admission)  Assessment & Plan  treated    Primary hypertension- (present on admission)  Assessment & Plan  History of    Anemia  Assessment & Plan  Developed during the hospitalization         VTE prophylaxis: not indicated    I have performed a physical exam and reviewed and updated ROS and Plan today (1/12/2023). In review of yesterday's note (1/11/2023), there are no changes except as documented above.

## 2023-01-13 NOTE — PROGRESS NOTES
Pt  at 0242.  Family (daughter) notified.  Resource information printed and given to daughter.  [employer] called and notified.  called and notified.  Was informed that pt would be a [facility] case and was told it was ok to remove all lines from pt.  Post mortem care given to pt.

## 2023-01-13 NOTE — CARE PLAN
The patient is Watcher - Medium risk of patient condition declining or worsening    Shift Goals  Clinical Goals: comfort, pain mangement  Patient Goals: GREGORIO  Family Goals: GREGORIO    Progress made toward(s) clinical / shift goals:    Problem: Skin Integrity  Goal: Skin integrity is maintained or improved  Outcome: Progressing     Problem: Fall Risk  Goal: Patient will remain free from falls  Outcome: Progressing     Problem: Pain - Standard  Goal: Alleviation of pain or a reduction in pain to the patient’s comfort goal  Outcome: Progressing   Dilaudid PRN given for grimacing, pt medicated per MAR.     Patient is not progressing towards the following goals:

## 2023-01-13 NOTE — CARE PLAN
Problem: Knowledge Deficit - Standard  Goal: Patient and family/care givers will demonstrate understanding of plan of care, disease process/condition, diagnostic tests and medications  Outcome: Progressing  Note: Discussed POC and medications with patient.  Patient verbalized understanding.     The patient is Watcher - Medium risk of patient condition declining or worsening    Shift Goals  Clinical Goals: comfort, pain mangement  Patient Goals: GREGORIO  Family Goals: GREGORIO

## 2023-01-14 NOTE — DISCHARGE SUMMARY
Death Summary    Cause of Death  Severe encephalopathy due to hypoglycemia due to accidental insulin overdose .    Comorbid Conditions at the Time of Death  Principal Problem:    Encephalopathy chronic POA: Yes  Active Problems:    Anemia POA: Unknown    Primary hypertension POA: Yes    Hypothyroidism due to Hashimoto's thyroiditis (Chronic) POA: Yes    Dyslipidemia (Chronic) POA: Yes    Type 2 diabetes mellitus with hypoglycemia without coma, with long-term current use of insulin (HCC) POA: Yes    Hypoglycemia POA: Yes    Protein calorie malnutrition (HCC) POA: Yes    Methamphetamine abuse (HCC) (Chronic) POA: Yes    Acute respiratory failure with hypoxia (HCC) POA: Yes    Pneumonia due to methicillin susceptible Staphylococcus aureus (MSSA) (HCC) POA: Unknown    Atelectasis POA: Unknown    Hypomagnesemia POA: No  Resolved Problems:    Oral thrush POA: Unknown    Cigarette nicotine dependence without complication (Chronic) POA: Yes      History of Presenting Illness and Hospital Course  This is a 62 y.o. female admitted 12/15/2022 with decreased level of consciousness.  Ms. Mayo is a 62 y.o. female who presented 12/15/2022 with a history of diabetes type 2, insulin requiring, hypertension, hypothyroidism, dyslipidemia, medical noncompliance, methamphetamine abuse, reportedly the patient was found by the patient's daughter in her living quarters, facedown, unclear of the length of downtime, profoundly hypoglycemic with a blood sugar level of 32, prior poorly controlled diabetes with a hemoglobin A1c of 11.6, the patient was admitted to ICU, a seizure was ruled out by EEG initially, the patient was monitored in terms of her glycemic control, she had a MRI performed with possible small acute infarcts to the right frontal lobe, there was no other intracranial abnormality noted, the patient did not regain meaningful cerebral function, ongoing toxic metabolic encephalopathy versus hypoglycemia induced brain injury, the  patient initially failed extubation, required reintubation.  Was treated with antibiotics for degree of respiratory infection, the patient eventually was undergoing tracheostomy and PEG tube placement, she was tolerating spontaneous breathing on the ventilator and was transitioned to a T-piece.  The patient had a prolonged course while in the hospital did not unfortunately regain further mental capacity, brain function, patient does not follow commands, she opens eyes intermittently, she is able to move all 4 extremities fairly strongly, she withdraws to pain.  The intent is to possibly transfer the patient to an LTAC unit.  The patient was felt stabilized and transferred to the [facility] [place] with ongoing tracheostomy care, ongoing medical care and disposition.  The patient is not a historian, she does not respond to verbal command, she does not open eyes       1/11/2023: Patient seen and evaluated in [facility] [place].  I met with her daughter Lanie at bedside as well as Courtney from palliative care.  Lanie says her brothers do not want to partake in the discussion for comfort care though they both agree with this.  Lanie understands that we have given her mother significant time to get better.  Unfortunately she has not improved. Comfort care measures have been ordered. Hospice consult.     Death Date: 01/13/23   Death Time: 0242         Pronounced By (RN1): Chaparro Edmonds  Pronounced By (RN2): Jennifer Giordano

## 2023-01-23 DIAGNOSIS — E10.65 TYPE 1 DIABETES MELLITUS WITH HYPERGLYCEMIA (HCC): ICD-10-CM

## 2023-01-23 RX ORDER — INSULIN GLARGINE 100 [IU]/ML
INJECTION, SOLUTION SUBCUTANEOUS
Qty: 40 ML | Refills: 2 | OUTPATIENT
Start: 2023-01-23
